# Patient Record
Sex: MALE | Race: WHITE | NOT HISPANIC OR LATINO | Employment: OTHER | URBAN - NONMETROPOLITAN AREA
[De-identification: names, ages, dates, MRNs, and addresses within clinical notes are randomized per-mention and may not be internally consistent; named-entity substitution may affect disease eponyms.]

---

## 2018-03-06 ENCOUNTER — HOSPITAL ENCOUNTER (EMERGENCY)
Facility: HOSPITAL | Age: 43
Discharge: HOME | End: 2018-03-06
Attending: EMERGENCY MEDICINE | Admitting: EMERGENCY MEDICINE

## 2018-03-06 ENCOUNTER — APPOINTMENT (OUTPATIENT)
Dept: ULTRASOUND IMAGING | Facility: HOSPITAL | Age: 43
End: 2018-03-06

## 2018-03-06 VITALS
SYSTOLIC BLOOD PRESSURE: 144 MMHG | HEART RATE: 88 BPM | OXYGEN SATURATION: 98 % | RESPIRATION RATE: 17 BRPM | WEIGHT: 315 LBS | TEMPERATURE: 98.5 F | HEIGHT: 62 IN | BODY MASS INDEX: 57.97 KG/M2 | DIASTOLIC BLOOD PRESSURE: 78 MMHG

## 2018-03-06 DIAGNOSIS — L03.115 CELLULITIS OF RIGHT LOWER EXTREMITY: Primary | ICD-10-CM

## 2018-03-06 LAB
ALBUMIN SERPL-MCNC: 3.7 G/DL (ref 3.5–5)
ALBUMIN/GLOB SERPL: 1.1 G/DL (ref 1.5–2.5)
ALP SERPL-CCNC: 80 U/L (ref 40–129)
ALT SERPL W P-5'-P-CCNC: 18 U/L (ref 10–44)
ANION GAP SERPL CALCULATED.3IONS-SCNC: 4.3 MMOL/L (ref 3.6–11.2)
APTT PPP: 30.2 SECONDS (ref 23.8–36.1)
AST SERPL-CCNC: 12 U/L (ref 10–34)
BASOPHILS # BLD AUTO: 0.01 10*3/MM3 (ref 0–0.3)
BASOPHILS NFR BLD AUTO: 0.2 % (ref 0–2)
BILIRUB SERPL-MCNC: 0.5 MG/DL (ref 0.2–1.8)
BUN BLD-MCNC: 13 MG/DL (ref 7–21)
BUN/CREAT SERPL: 24.1 (ref 7–25)
CALCIUM SPEC-SCNC: 8.8 MG/DL (ref 7.7–10)
CHLORIDE SERPL-SCNC: 105 MMOL/L (ref 99–112)
CO2 SERPL-SCNC: 30.7 MMOL/L (ref 24.3–31.9)
CREAT BLD-MCNC: 0.54 MG/DL (ref 0.43–1.29)
CRP SERPL-MCNC: 5.24 MG/DL (ref 0–0.99)
D-LACTATE SERPL-SCNC: 1 MMOL/L (ref 0.5–2)
DEPRECATED RDW RBC AUTO: 46.6 FL (ref 37–54)
EOSINOPHIL # BLD AUTO: 0.31 10*3/MM3 (ref 0–0.7)
EOSINOPHIL NFR BLD AUTO: 5.2 % (ref 0–5)
ERYTHROCYTE [DISTWIDTH] IN BLOOD BY AUTOMATED COUNT: 14.2 % (ref 11.5–14.5)
ERYTHROCYTE [SEDIMENTATION RATE] IN BLOOD: 41 MM/HR (ref 0–15)
GFR SERPL CREATININE-BSD FRML MDRD: >150 ML/MIN/1.73
GLOBULIN UR ELPH-MCNC: 3.3 GM/DL
GLUCOSE BLD-MCNC: 112 MG/DL (ref 70–110)
HCT VFR BLD AUTO: 43.3 % (ref 42–52)
HGB BLD-MCNC: 13.9 G/DL (ref 14–18)
IMM GRANULOCYTES # BLD: 0.01 10*3/MM3 (ref 0–0.03)
IMM GRANULOCYTES NFR BLD: 0.2 % (ref 0–0.5)
INR PPP: 1.05 (ref 0.9–1.1)
LYMPHOCYTES # BLD AUTO: 0.84 10*3/MM3 (ref 1–3)
LYMPHOCYTES NFR BLD AUTO: 14 % (ref 21–51)
MCH RBC QN AUTO: 29.6 PG (ref 27–33)
MCHC RBC AUTO-ENTMCNC: 32.1 G/DL (ref 33–37)
MCV RBC AUTO: 92.3 FL (ref 80–94)
MONOCYTES # BLD AUTO: 0.57 10*3/MM3 (ref 0.1–0.9)
MONOCYTES NFR BLD AUTO: 9.5 % (ref 0–10)
NEUTROPHILS # BLD AUTO: 4.26 10*3/MM3 (ref 1.4–6.5)
NEUTROPHILS NFR BLD AUTO: 70.9 % (ref 30–70)
OSMOLALITY SERPL CALC.SUM OF ELEC: 280.3 MOSM/KG (ref 273–305)
PLATELET # BLD AUTO: 202 10*3/MM3 (ref 130–400)
PMV BLD AUTO: 10.4 FL (ref 6–10)
POTASSIUM BLD-SCNC: 3.4 MMOL/L (ref 3.5–5.3)
PROT SERPL-MCNC: 7 G/DL (ref 6–8)
PROTHROMBIN TIME: 13.8 SECONDS (ref 11–15.4)
RBC # BLD AUTO: 4.69 10*6/MM3 (ref 4.7–6.1)
SODIUM BLD-SCNC: 140 MMOL/L (ref 135–153)
WBC NRBC COR # BLD: 6 10*3/MM3 (ref 4.5–12.5)

## 2018-03-06 PROCEDURE — 93971 EXTREMITY STUDY: CPT

## 2018-03-06 PROCEDURE — 96365 THER/PROPH/DIAG IV INF INIT: CPT

## 2018-03-06 PROCEDURE — 85652 RBC SED RATE AUTOMATED: CPT | Performed by: NURSE PRACTITIONER

## 2018-03-06 PROCEDURE — 99283 EMERGENCY DEPT VISIT LOW MDM: CPT

## 2018-03-06 PROCEDURE — 93971 EXTREMITY STUDY: CPT | Performed by: RADIOLOGY

## 2018-03-06 PROCEDURE — 85025 COMPLETE CBC W/AUTO DIFF WBC: CPT | Performed by: NURSE PRACTITIONER

## 2018-03-06 PROCEDURE — 86140 C-REACTIVE PROTEIN: CPT | Performed by: NURSE PRACTITIONER

## 2018-03-06 PROCEDURE — 87040 BLOOD CULTURE FOR BACTERIA: CPT | Performed by: NURSE PRACTITIONER

## 2018-03-06 PROCEDURE — 85730 THROMBOPLASTIN TIME PARTIAL: CPT | Performed by: NURSE PRACTITIONER

## 2018-03-06 PROCEDURE — 80053 COMPREHEN METABOLIC PANEL: CPT | Performed by: NURSE PRACTITIONER

## 2018-03-06 PROCEDURE — 36415 COLL VENOUS BLD VENIPUNCTURE: CPT

## 2018-03-06 PROCEDURE — 83605 ASSAY OF LACTIC ACID: CPT | Performed by: NURSE PRACTITIONER

## 2018-03-06 PROCEDURE — 85610 PROTHROMBIN TIME: CPT | Performed by: NURSE PRACTITIONER

## 2018-03-06 RX ORDER — SODIUM CHLORIDE 0.9 % (FLUSH) 0.9 %
10 SYRINGE (ML) INJECTION AS NEEDED
Status: DISCONTINUED | OUTPATIENT
Start: 2018-03-06 | End: 2018-03-07 | Stop reason: HOSPADM

## 2018-03-06 RX ORDER — CLINDAMYCIN PHOSPHATE 600 MG/50ML
600 INJECTION INTRAVENOUS ONCE
Status: COMPLETED | OUTPATIENT
Start: 2018-03-06 | End: 2018-03-06

## 2018-03-06 RX ORDER — DOXYCYCLINE 100 MG/1
100 CAPSULE ORAL 2 TIMES DAILY
Qty: 20 CAPSULE | Refills: 0 | Status: SHIPPED | OUTPATIENT
Start: 2018-03-06 | End: 2018-03-16

## 2018-03-06 RX ADMIN — CLINDAMYCIN PHOSPHATE 600 MG: 12 INJECTION, SOLUTION INTRAVENOUS at 22:20

## 2018-03-07 NOTE — DISCHARGE INSTRUCTIONS
"    Hypertension  Hypertension, commonly called high blood pressure, is when the force of blood pumping through the arteries is too strong. The arteries are the blood vessels that carry blood from the heart throughout the body. Hypertension forces the heart to work harder to pump blood and may cause arteries to become narrow or stiff. Having untreated or uncontrolled hypertension can cause heart attacks, strokes, kidney disease, and other problems.  A blood pressure reading consists of a higher number over a lower number. Ideally, your blood pressure should be below 120/80. The first (\"top\") number is called the systolic pressure. It is a measure of the pressure in your arteries as your heart beats. The second (\"bottom\") number is called the diastolic pressure. It is a measure of the pressure in your arteries as the heart relaxes.  What are the causes?  The cause of this condition is not known.  What increases the risk?  Some risk factors for high blood pressure are under your control. Others are not.  Factors you can change   · Smoking.  · Having type 2 diabetes mellitus, high cholesterol, or both.  · Not getting enough exercise or physical activity.  · Being overweight.  · Having too much fat, sugar, calories, or salt (sodium) in your diet.  · Drinking too much alcohol.  Factors that are difficult or impossible to change   · Having chronic kidney disease.  · Having a family history of high blood pressure.  · Age. Risk increases with age.  · Race. You may be at higher risk if you are -American.  · Gender. Men are at higher risk than women before age 45. After age 65, women are at higher risk than men.  · Having obstructive sleep apnea.  · Stress.  What are the signs or symptoms?  Extremely high blood pressure (hypertensive crisis) may cause:  · Headache.  · Anxiety.  · Shortness of breath.  · Nosebleed.  · Nausea and vomiting.  · Severe chest pain.  · Jerky movements you cannot control (seizures).  How is " this diagnosed?  This condition is diagnosed by measuring your blood pressure while you are seated, with your arm resting on a surface. The cuff of the blood pressure monitor will be placed directly against the skin of your upper arm at the level of your heart. It should be measured at least twice using the same arm. Certain conditions can cause a difference in blood pressure between your right and left arms.  Certain factors can cause blood pressure readings to be lower or higher than normal (elevated) for a short period of time:  · When your blood pressure is higher when you are in a health care provider's office than when you are at home, this is called white coat hypertension. Most people with this condition do not need medicines.  · When your blood pressure is higher at home than when you are in a health care provider's office, this is called masked hypertension. Most people with this condition may need medicines to control blood pressure.  If you have a high blood pressure reading during one visit or you have normal blood pressure with other risk factors:  · You may be asked to return on a different day to have your blood pressure checked again.  · You may be asked to monitor your blood pressure at home for 1 week or longer.  If you are diagnosed with hypertension, you may have other blood or imaging tests to help your health care provider understand your overall risk for other conditions.  How is this treated?  This condition is treated by making healthy lifestyle changes, such as eating healthy foods, exercising more, and reducing your alcohol intake. Your health care provider may prescribe medicine if lifestyle changes are not enough to get your blood pressure under control, and if:  · Your systolic blood pressure is above 130.  · Your diastolic blood pressure is above 80.  Your personal target blood pressure may vary depending on your medical conditions, your age, and other factors.  Follow these  instructions at home:  Eating and drinking   · Eat a diet that is high in fiber and potassium, and low in sodium, added sugar, and fat. An example eating plan is called the DASH (Dietary Approaches to Stop Hypertension) diet. To eat this way:  ¨ Eat plenty of fresh fruits and vegetables. Try to fill half of your plate at each meal with fruits and vegetables.  ¨ Eat whole grains, such as whole wheat pasta, brown rice, or whole grain bread. Fill about one quarter of your plate with whole grains.  ¨ Eat or drink low-fat dairy products, such as skim milk or low-fat yogurt.  ¨ Avoid fatty cuts of meat, processed or cured meats, and poultry with skin. Fill about one quarter of your plate with lean proteins, such as fish, chicken without skin, beans, eggs, and tofu.  ¨ Avoid premade and processed foods. These tend to be higher in sodium, added sugar, and fat.  · Reduce your daily sodium intake. Most people with hypertension should eat less than 1,500 mg of sodium a day.  · Limit alcohol intake to no more than 1 drink a day for nonpregnant women and 2 drinks a day for men. One drink equals 12 oz of beer, 5 oz of wine, or 1½ oz of hard liquor.  Lifestyle   · Work with your health care provider to maintain a healthy body weight or to lose weight. Ask what an ideal weight is for you.  · Get at least 30 minutes of exercise that causes your heart to beat faster (aerobic exercise) most days of the week. Activities may include walking, swimming, or biking.  · Include exercise to strengthen your muscles (resistance exercise), such as pilates or lifting weights, as part of your weekly exercise routine. Try to do these types of exercises for 30 minutes at least 3 days a week.  · Do not use any products that contain nicotine or tobacco, such as cigarettes and e-cigarettes. If you need help quitting, ask your health care provider.  · Monitor your blood pressure at home as told by your health care provider.  · Keep all follow-up visits  as told by your health care provider. This is important.  Medicines   · Take over-the-counter and prescription medicines only as told by your health care provider. Follow directions carefully. Blood pressure medicines must be taken as prescribed.  · Do not skip doses of blood pressure medicine. Doing this puts you at risk for problems and can make the medicine less effective.  · Ask your health care provider about side effects or reactions to medicines that you should watch for.  Contact a health care provider if:  · You think you are having a reaction to a medicine you are taking.  · You have headaches that keep coming back (recurring).  · You feel dizzy.  · You have swelling in your ankles.  · You have trouble with your vision.  Get help right away if:  · You develop a severe headache or confusion.  · You have unusual weakness or numbness.  · You feel faint.  · You have severe pain in your chest or abdomen.  · You vomit repeatedly.  · You have trouble breathing.  Summary  · Hypertension is when the force of blood pumping through your arteries is too strong. If this condition is not controlled, it may put you at risk for serious complications.  · Your personal target blood pressure may vary depending on your medical conditions, your age, and other factors. For most people, a normal blood pressure is less than 120/80.  · Hypertension is treated with lifestyle changes, medicines, or a combination of both. Lifestyle changes include weight loss, eating a healthy, low-sodium diet, exercising more, and limiting alcohol.  This information is not intended to replace advice given to you by your health care provider. Make sure you discuss any questions you have with your health care provider.  Document Released: 12/18/2006 Document Revised: 11/15/2017 Document Reviewed: 11/15/2017  [employer] Interactive Patient Education © 2017 [employer]

## 2018-03-07 NOTE — ED NOTES
Fall risk band applied to pts left risk at this time.     Jammie Teresa, HERNANDEZ  03/06/18 9548

## 2018-03-07 NOTE — ED PROVIDER NOTES
Subjective   Patient is a 42 y.o. male presenting with lower extremity pain.   History provided by:  Patient  Lower Extremity Issue   Location:  Leg  Injury: no    Leg location:  R leg  Pain details:     Quality:  Throbbing    Radiates to:  Does not radiate    Severity:  Moderate    Onset quality:  Sudden    Timing:  Constant    Progression:  Worsening  Chronicity:  New  Dislocation: no    Foreign body present:  No foreign bodies  Tetanus status:  Up to date  Prior injury to area:  No  Relieved by:  None tried  Worsened by:  Nothing  Ineffective treatments:  None tried  Associated symptoms: swelling    Associated symptoms: no fever        Review of Systems   Constitutional: Negative.  Negative for fever.   HENT: Negative.    Respiratory: Negative.    Cardiovascular: Negative.  Negative for chest pain.   Gastrointestinal: Negative.  Negative for abdominal pain.   Endocrine: Negative.    Genitourinary: Negative.  Negative for dysuria.   Skin: Negative.    Neurological: Negative.    Psychiatric/Behavioral: Negative.    All other systems reviewed and are negative.      History reviewed. No pertinent past medical history.    No Known Allergies    History reviewed. No pertinent surgical history.    History reviewed. No pertinent family history.    Social History     Social History   • Marital status:            Objective   Physical Exam   Constitutional: He is oriented to person, place, and time. He appears well-developed and well-nourished. No distress.   HENT:   Head: Normocephalic and atraumatic.   Right Ear: External ear normal.   Left Ear: External ear normal.   Nose: Nose normal.   Eyes: Conjunctivae and EOM are normal. Pupils are equal, round, and reactive to light.   Neck: Normal range of motion. Neck supple. No JVD present. No tracheal deviation present.   Cardiovascular: Normal rate, regular rhythm and normal heart sounds.    No murmur heard.  Pulmonary/Chest: Effort normal and breath sounds normal. No  respiratory distress. He has no wheezes.   Abdominal: Soft. Bowel sounds are normal. There is no tenderness.   Musculoskeletal: Normal range of motion. He exhibits no edema or deformity.        Right upper leg: He exhibits swelling.        Legs:  Neurological: He is alert and oriented to person, place, and time. No cranial nerve deficit.   Skin: Skin is warm and dry. No rash noted. He is not diaphoretic. No erythema. No pallor.   Psychiatric: He has a normal mood and affect. His behavior is normal. Thought content normal.   Nursing note and vitals reviewed.      Procedures         ED Course  ED Course                  MDM  Number of Diagnoses or Management Options  Cellulitis of right lower extremity: new and requires workup     Amount and/or Complexity of Data Reviewed  Clinical lab tests: reviewed  Tests in the radiology section of CPT®: reviewed    Risk of Complications, Morbidity, and/or Mortality  Presenting problems: low  Diagnostic procedures: low  Management options: low    Patient Progress  Patient progress: stable      Final diagnoses:   Cellulitis of right lower extremity            Christine Shea, KHUSHBU  03/07/18 0102

## 2018-03-11 LAB
BACTERIA SPEC AEROBE CULT: NORMAL
BACTERIA SPEC AEROBE CULT: NORMAL

## 2018-09-17 ENCOUNTER — HOSPITAL ENCOUNTER (EMERGENCY)
Facility: HOSPITAL | Age: 43
Discharge: HOME | End: 2018-09-18
Attending: FAMILY MEDICINE | Admitting: NURSE PRACTITIONER

## 2018-09-17 DIAGNOSIS — J18.9 PNEUMONIA OF RIGHT LOWER LOBE DUE TO INFECTIOUS ORGANISM: Primary | ICD-10-CM

## 2018-09-17 PROCEDURE — 99284 EMERGENCY DEPT VISIT MOD MDM: CPT

## 2018-09-17 PROCEDURE — 96365 THER/PROPH/DIAG IV INF INIT: CPT

## 2018-09-17 PROCEDURE — 96375 TX/PRO/DX INJ NEW DRUG ADDON: CPT

## 2018-09-17 RX ORDER — SODIUM CHLORIDE 0.9 % (FLUSH) 0.9 %
10 SYRINGE (ML) INJECTION AS NEEDED
Status: DISCONTINUED | OUTPATIENT
Start: 2018-09-17 | End: 2018-09-18 | Stop reason: HOSPADM

## 2018-09-17 RX ORDER — ACETAMINOPHEN 325 MG/1
975 TABLET ORAL ONCE
Status: COMPLETED | OUTPATIENT
Start: 2018-09-17 | End: 2018-09-18

## 2018-09-18 ENCOUNTER — APPOINTMENT (OUTPATIENT)
Dept: GENERAL RADIOLOGY | Facility: HOSPITAL | Age: 43
End: 2018-09-18

## 2018-09-18 VITALS
HEIGHT: 70 IN | HEART RATE: 96 BPM | BODY MASS INDEX: 45.1 KG/M2 | OXYGEN SATURATION: 99 % | RESPIRATION RATE: 14 BRPM | SYSTOLIC BLOOD PRESSURE: 152 MMHG | WEIGHT: 315 LBS | TEMPERATURE: 99.6 F | DIASTOLIC BLOOD PRESSURE: 86 MMHG

## 2018-09-18 LAB
ALBUMIN SERPL-MCNC: 3.7 G/DL (ref 3.5–5)
ALBUMIN/GLOB SERPL: 0.9 G/DL (ref 1.5–2.5)
ALP SERPL-CCNC: 80 U/L (ref 40–129)
ALT SERPL W P-5'-P-CCNC: 17 U/L (ref 10–44)
ANION GAP SERPL CALCULATED.3IONS-SCNC: 8.1 MMOL/L (ref 3.6–11.2)
AST SERPL-CCNC: 18 U/L (ref 10–34)
BACTERIA UR QL AUTO: ABNORMAL /HPF
BASOPHILS # BLD AUTO: 0.01 10*3/MM3 (ref 0–0.3)
BASOPHILS NFR BLD AUTO: 0.1 % (ref 0–2)
BILIRUB SERPL-MCNC: 1 MG/DL (ref 0.2–1.8)
BILIRUB UR QL STRIP: NEGATIVE
BUN BLD-MCNC: 12 MG/DL (ref 7–21)
BUN/CREAT SERPL: 16.9 (ref 7–25)
CALCIUM SPEC-SCNC: 8.8 MG/DL (ref 7.7–10)
CHLORIDE SERPL-SCNC: 98 MMOL/L (ref 99–112)
CLARITY UR: CLEAR
CO2 SERPL-SCNC: 24.9 MMOL/L (ref 24.3–31.9)
COLOR UR: YELLOW
CREAT BLD-MCNC: 0.71 MG/DL (ref 0.43–1.29)
DEPRECATED RDW RBC AUTO: 46.9 FL (ref 37–54)
EOSINOPHIL # BLD AUTO: 0.04 10*3/MM3 (ref 0–0.7)
EOSINOPHIL NFR BLD AUTO: 0.5 % (ref 0–5)
ERYTHROCYTE [DISTWIDTH] IN BLOOD BY AUTOMATED COUNT: 14.3 % (ref 11.5–14.5)
FLUAV AG NPH QL: NEGATIVE
FLUBV AG NPH QL IA: NEGATIVE
GFR SERPL CREATININE-BSD FRML MDRD: 122 ML/MIN/1.73
GLOBULIN UR ELPH-MCNC: 4.1 GM/DL
GLUCOSE BLD-MCNC: 108 MG/DL (ref 70–110)
GLUCOSE UR STRIP-MCNC: NEGATIVE MG/DL
HCT VFR BLD AUTO: 44 % (ref 42–52)
HGB BLD-MCNC: 13.9 G/DL (ref 14–18)
HGB UR QL STRIP.AUTO: ABNORMAL
HYALINE CASTS UR QL AUTO: ABNORMAL /LPF
IMM GRANULOCYTES # BLD: 0.01 10*3/MM3 (ref 0–0.03)
IMM GRANULOCYTES NFR BLD: 0.1 % (ref 0–0.5)
KETONES UR QL STRIP: NEGATIVE
LEUKOCYTE ESTERASE UR QL STRIP.AUTO: NEGATIVE
LYMPHOCYTES # BLD AUTO: 0.53 10*3/MM3 (ref 1–3)
LYMPHOCYTES NFR BLD AUTO: 6.4 % (ref 21–51)
MCH RBC QN AUTO: 29.4 PG (ref 27–33)
MCHC RBC AUTO-ENTMCNC: 31.6 G/DL (ref 33–37)
MCV RBC AUTO: 93 FL (ref 80–94)
MONOCYTES # BLD AUTO: 0.57 10*3/MM3 (ref 0.1–0.9)
MONOCYTES NFR BLD AUTO: 6.8 % (ref 0–10)
NEUTROPHILS # BLD AUTO: 7.17 10*3/MM3 (ref 1.4–6.5)
NEUTROPHILS NFR BLD AUTO: 86.1 % (ref 30–70)
NITRITE UR QL STRIP: NEGATIVE
OSMOLALITY SERPL CALC.SUM OF ELEC: 262.9 MOSM/KG (ref 273–305)
PH UR STRIP.AUTO: 8.5 [PH] (ref 5–8)
PLATELET # BLD AUTO: 184 10*3/MM3 (ref 130–400)
PMV BLD AUTO: 10 FL (ref 6–10)
POTASSIUM BLD-SCNC: 4 MMOL/L (ref 3.5–5.3)
PROT SERPL-MCNC: 7.8 G/DL (ref 6–8)
PROT UR QL STRIP: NEGATIVE
RBC # BLD AUTO: 4.73 10*6/MM3 (ref 4.7–6.1)
RBC # UR: ABNORMAL /HPF
REF LAB TEST METHOD: ABNORMAL
SODIUM BLD-SCNC: 131 MMOL/L (ref 135–153)
SP GR UR STRIP: 1.02 (ref 1–1.03)
SQUAMOUS #/AREA URNS HPF: ABNORMAL /HPF
UROBILINOGEN UR QL STRIP: ABNORMAL
WBC NRBC COR # BLD: 8.33 10*3/MM3 (ref 4.5–12.5)
WBC UR QL AUTO: ABNORMAL /HPF

## 2018-09-18 PROCEDURE — 94799 UNLISTED PULMONARY SVC/PX: CPT

## 2018-09-18 PROCEDURE — 94640 AIRWAY INHALATION TREATMENT: CPT

## 2018-09-18 PROCEDURE — 71045 X-RAY EXAM CHEST 1 VIEW: CPT | Performed by: RADIOLOGY

## 2018-09-18 PROCEDURE — 81001 URINALYSIS AUTO W/SCOPE: CPT | Performed by: NURSE PRACTITIONER

## 2018-09-18 PROCEDURE — 96375 TX/PRO/DX INJ NEW DRUG ADDON: CPT

## 2018-09-18 PROCEDURE — 25010000002 KETOROLAC TROMETHAMINE PER 15 MG: Performed by: NURSE PRACTITIONER

## 2018-09-18 PROCEDURE — 80053 COMPREHEN METABOLIC PANEL: CPT | Performed by: NURSE PRACTITIONER

## 2018-09-18 PROCEDURE — 96361 HYDRATE IV INFUSION ADD-ON: CPT

## 2018-09-18 PROCEDURE — 25010000002 CEFTRIAXONE: Performed by: NURSE PRACTITIONER

## 2018-09-18 PROCEDURE — 87804 INFLUENZA ASSAY W/OPTIC: CPT | Performed by: NURSE PRACTITIONER

## 2018-09-18 PROCEDURE — 96365 THER/PROPH/DIAG IV INF INIT: CPT

## 2018-09-18 PROCEDURE — 85025 COMPLETE CBC W/AUTO DIFF WBC: CPT | Performed by: NURSE PRACTITIONER

## 2018-09-18 PROCEDURE — 71045 X-RAY EXAM CHEST 1 VIEW: CPT

## 2018-09-18 RX ORDER — ALBUTEROL SULFATE 90 UG/1
2 AEROSOL, METERED RESPIRATORY (INHALATION) EVERY 6 HOURS PRN
Qty: 6.7 G | Refills: 0 | Status: ON HOLD | OUTPATIENT
Start: 2018-09-18 | End: 2018-11-26

## 2018-09-18 RX ORDER — AZITHROMYCIN 250 MG/1
250 TABLET, FILM COATED ORAL DAILY
Qty: 6 TABLET | Refills: 0 | Status: ON HOLD | OUTPATIENT
Start: 2018-09-18 | End: 2018-11-26

## 2018-09-18 RX ORDER — AZITHROMYCIN 250 MG/1
500 TABLET, FILM COATED ORAL ONCE
Status: COMPLETED | OUTPATIENT
Start: 2018-09-18 | End: 2018-09-18

## 2018-09-18 RX ORDER — IBUPROFEN 600 MG/1
600 TABLET ORAL EVERY 6 HOURS PRN
Qty: 20 TABLET | Refills: 0 | OUTPATIENT
Start: 2018-09-18 | End: 2018-11-24

## 2018-09-18 RX ORDER — IPRATROPIUM BROMIDE AND ALBUTEROL SULFATE 2.5; .5 MG/3ML; MG/3ML
3 SOLUTION RESPIRATORY (INHALATION) ONCE
Status: COMPLETED | OUTPATIENT
Start: 2018-09-18 | End: 2018-09-18

## 2018-09-18 RX ORDER — KETOROLAC TROMETHAMINE 30 MG/ML
15 INJECTION, SOLUTION INTRAMUSCULAR; INTRAVENOUS ONCE
Status: COMPLETED | OUTPATIENT
Start: 2018-09-18 | End: 2018-09-18

## 2018-09-18 RX ADMIN — ACETAMINOPHEN 975 MG: 325 TABLET ORAL at 00:20

## 2018-09-18 RX ADMIN — IPRATROPIUM BROMIDE AND ALBUTEROL SULFATE 3 ML: .5; 3 SOLUTION RESPIRATORY (INHALATION) at 00:58

## 2018-09-18 RX ADMIN — AZITHROMYCIN 500 MG: 250 TABLET, FILM COATED ORAL at 01:28

## 2018-09-18 RX ADMIN — CEFTRIAXONE 1 G: 1 INJECTION, POWDER, FOR SOLUTION INTRAMUSCULAR; INTRAVENOUS at 02:10

## 2018-09-18 RX ADMIN — SODIUM CHLORIDE 1000 ML: 9 INJECTION, SOLUTION INTRAVENOUS at 00:28

## 2018-09-18 RX ADMIN — KETOROLAC TROMETHAMINE 15 MG: 30 INJECTION, SOLUTION INTRAMUSCULAR; INTRAVENOUS at 01:29

## 2018-09-18 NOTE — ED PROVIDER NOTES
Subjective   Patient presents to ED with complaint of fever, body aches, and headache x 3 days. He denies cough, runny nose, ear pain, or sore throat. He has not been exposed to sick contacts.         History provided by:  Patient   used: No    Headache   Pain location:  Generalized  Quality:  Dull  Radiates to:  Does not radiate  Severity currently:  7/10  Severity at highest:  7/10  Onset quality:  Gradual  Duration:  3 days  Timing:  Intermittent  Progression:  Waxing and waning  Chronicity:  New  Similar to prior headaches: no    Context: not activity and not exposure to bright light    Relieved by:  Nothing  Worsened by:  Nothing  Ineffective treatments:  None tried  Associated symptoms: fatigue and fever    Associated symptoms: no eye pain, no near-syncope, no neck pain, no neck stiffness, no sore throat, no swollen glands, no syncope, no tingling and no visual change        Review of Systems   Constitutional: Positive for chills, fatigue and fever.   HENT: Negative for sore throat.    Eyes: Negative.  Negative for pain.   Respiratory: Negative.    Cardiovascular: Negative.  Negative for syncope and near-syncope.   Gastrointestinal: Negative.    Endocrine: Negative.    Genitourinary: Negative.    Musculoskeletal: Negative.  Negative for neck pain and neck stiffness.   Skin: Negative.    Allergic/Immunologic: Negative.    Neurological: Positive for headaches.   Hematological: Negative.    Psychiatric/Behavioral: Negative.    All other systems reviewed and are negative.      No past medical history on file.    No Known Allergies    No past surgical history on file.    No family history on file.    Social History     Social History   • Marital status:      Social History Main Topics   • Drug use: Unknown     Other Topics Concern   • Not on file           Objective   Physical Exam   Constitutional: He is oriented to person, place, and time. He appears well-developed and well-nourished.    HENT:   Head: Normocephalic.   Mouth/Throat: Oropharynx is clear and moist.   Eyes: Pupils are equal, round, and reactive to light. EOM are normal.   Neck: Normal range of motion. Neck supple.   Cardiovascular: Normal rate, regular rhythm, normal heart sounds and intact distal pulses.    Pulmonary/Chest: Effort normal.   Decreased breath sounds throughout   Abdominal: Soft. Bowel sounds are normal.   Musculoskeletal: Normal range of motion.   Neurological: He is alert and oriented to person, place, and time.   Skin: Skin is warm and dry. Capillary refill takes less than 2 seconds.   Psychiatric: He has a normal mood and affect. His behavior is normal. Judgment and thought content normal.   Nursing note and vitals reviewed.      Procedures           ED Course  ED Course as of Sep 18 0127   Tue Sep 18, 2018   0041 RLL infiltrate XR Chest 1 View [KK]      ED Course User Index  [KK] Mirela Davis, KHUSHBU                  MDM  Number of Diagnoses or Management Options  Pneumonia of right lower lobe due to infectious organism (CMS/HCC): new and requires workup     Amount and/or Complexity of Data Reviewed  Clinical lab tests: ordered and reviewed  Tests in the radiology section of CPT®: reviewed and ordered  Tests in the medicine section of CPT®: reviewed and ordered    Risk of Complications, Morbidity, and/or Mortality  Presenting problems: moderate  Diagnostic procedures: moderate  Management options: moderate    Patient Progress  Patient progress: improved        Final diagnoses:   Pneumonia of right lower lobe due to infectious organism (CMS/HCC)            Mirela Davis APRN  09/18/18 0127

## 2018-11-24 ENCOUNTER — HOSPITAL ENCOUNTER (EMERGENCY)
Facility: HOSPITAL | Age: 43
Discharge: HOME | End: 2018-11-24
Attending: EMERGENCY MEDICINE | Admitting: EMERGENCY MEDICINE

## 2018-11-24 VITALS
HEART RATE: 114 BPM | WEIGHT: 315 LBS | TEMPERATURE: 99.2 F | HEIGHT: 70 IN | SYSTOLIC BLOOD PRESSURE: 137 MMHG | BODY MASS INDEX: 45.1 KG/M2 | RESPIRATION RATE: 20 BRPM | DIASTOLIC BLOOD PRESSURE: 75 MMHG | OXYGEN SATURATION: 96 %

## 2018-11-24 DIAGNOSIS — J06.9 UPPER RESPIRATORY TRACT INFECTION, UNSPECIFIED TYPE: Primary | ICD-10-CM

## 2018-11-24 DIAGNOSIS — R51.9 NONINTRACTABLE HEADACHE, UNSPECIFIED CHRONICITY PATTERN, UNSPECIFIED HEADACHE TYPE: ICD-10-CM

## 2018-11-24 LAB
FLUAV AG NPH QL: NEGATIVE
FLUBV AG NPH QL IA: NEGATIVE

## 2018-11-24 PROCEDURE — 99283 EMERGENCY DEPT VISIT LOW MDM: CPT

## 2018-11-24 PROCEDURE — 87804 INFLUENZA ASSAY W/OPTIC: CPT | Performed by: PHYSICIAN ASSISTANT

## 2018-11-24 RX ORDER — FEXOFENADINE HCL 180 MG/1
180 TABLET ORAL DAILY
Qty: 30 TABLET | Refills: 0 | Status: ON HOLD | OUTPATIENT
Start: 2018-11-24 | End: 2018-11-26

## 2018-11-24 RX ORDER — IBUPROFEN 400 MG/1
800 TABLET ORAL ONCE
Status: COMPLETED | OUTPATIENT
Start: 2018-11-24 | End: 2018-11-24

## 2018-11-24 RX ORDER — IBUPROFEN 800 MG/1
800 TABLET ORAL EVERY 8 HOURS PRN
Qty: 15 TABLET | Refills: 0 | Status: ON HOLD | OUTPATIENT
Start: 2018-11-24 | End: 2018-11-26

## 2018-11-24 RX ADMIN — IBUPROFEN 800 MG: 400 TABLET, FILM COATED ORAL at 16:53

## 2018-11-26 ENCOUNTER — APPOINTMENT (OUTPATIENT)
Dept: GENERAL RADIOLOGY | Facility: HOSPITAL | Age: 43
End: 2018-11-26

## 2018-11-26 ENCOUNTER — APPOINTMENT (OUTPATIENT)
Dept: CT IMAGING | Facility: HOSPITAL | Age: 43
End: 2018-11-26

## 2018-11-26 ENCOUNTER — APPOINTMENT (OUTPATIENT)
Dept: ULTRASOUND IMAGING | Facility: HOSPITAL | Age: 43
End: 2018-11-26
Attending: EMERGENCY MEDICINE

## 2018-11-26 ENCOUNTER — HOSPITAL ENCOUNTER (INPATIENT)
Facility: HOSPITAL | Age: 43
LOS: 9 days | Discharge: HOME HEALTH | End: 2018-12-05
Attending: EMERGENCY MEDICINE | Admitting: HOSPITALIST

## 2018-11-26 DIAGNOSIS — E66.01 OBESITY, MORBID, BMI 50 OR HIGHER (HCC): ICD-10-CM

## 2018-11-26 DIAGNOSIS — L03.116 CELLULITIS OF LEFT LOWER EXTREMITY: Primary | ICD-10-CM

## 2018-11-26 DIAGNOSIS — R53.81 DEBILITY: ICD-10-CM

## 2018-11-26 LAB
ALBUMIN SERPL-MCNC: 3.3 G/DL (ref 3.5–5)
ALBUMIN/GLOB SERPL: 0.9 G/DL (ref 1.5–2.5)
ALP SERPL-CCNC: 80 U/L (ref 40–129)
ALT SERPL W P-5'-P-CCNC: 16 U/L (ref 10–44)
ANION GAP SERPL CALCULATED.3IONS-SCNC: 5.8 MMOL/L (ref 3.6–11.2)
AST SERPL-CCNC: 21 U/L (ref 10–34)
BACTERIA UR QL AUTO: ABNORMAL /HPF
BASOPHILS # BLD AUTO: 0.01 10*3/MM3 (ref 0–0.3)
BASOPHILS NFR BLD AUTO: 0.1 % (ref 0–2)
BILIRUB SERPL-MCNC: 1.1 MG/DL (ref 0.2–1.8)
BILIRUB UR QL STRIP: ABNORMAL
BUN BLD-MCNC: 18 MG/DL (ref 7–21)
BUN/CREAT SERPL: 26.9 (ref 7–25)
CALCIUM SPEC-SCNC: 8.9 MG/DL (ref 7.7–10)
CHLORIDE SERPL-SCNC: 102 MMOL/L (ref 99–112)
CLARITY UR: ABNORMAL
CO2 SERPL-SCNC: 26.2 MMOL/L (ref 24.3–31.9)
COLOR UR: ABNORMAL
CREAT BLD-MCNC: 0.67 MG/DL (ref 0.43–1.29)
CRP SERPL-MCNC: 27.85 MG/DL (ref 0–0.99)
D DIMER PPP FEU-MCNC: 3.01 MCGFEU/ML (ref 0–0.5)
D-LACTATE SERPL-SCNC: 1.6 MMOL/L (ref 0.5–2)
DEPRECATED RDW RBC AUTO: 48.3 FL (ref 37–54)
EOSINOPHIL # BLD AUTO: 0 10*3/MM3 (ref 0–0.7)
EOSINOPHIL NFR BLD AUTO: 0 % (ref 0–5)
ERYTHROCYTE [DISTWIDTH] IN BLOOD BY AUTOMATED COUNT: 14.8 % (ref 11.5–14.5)
ERYTHROCYTE [SEDIMENTATION RATE] IN BLOOD: 49 MM/HR (ref 0–15)
FLUAV AG NPH QL: NEGATIVE
FLUBV AG NPH QL IA: NEGATIVE
GFR SERPL CREATININE-BSD FRML MDRD: 130 ML/MIN/1.73
GLOBULIN UR ELPH-MCNC: 3.6 GM/DL
GLUCOSE BLD-MCNC: 132 MG/DL (ref 70–110)
GLUCOSE UR STRIP-MCNC: NEGATIVE MG/DL
HCT VFR BLD AUTO: 42.4 % (ref 42–52)
HGB BLD-MCNC: 13.6 G/DL (ref 14–18)
HGB UR QL STRIP.AUTO: ABNORMAL
HOLD SPECIMEN: NORMAL
HOLD SPECIMEN: NORMAL
HYALINE CASTS UR QL AUTO: ABNORMAL /LPF
IMM GRANULOCYTES # BLD: 0.09 10*3/MM3 (ref 0–0.03)
IMM GRANULOCYTES NFR BLD: 0.7 % (ref 0–0.5)
KETONES UR QL STRIP: ABNORMAL
LEUKOCYTE ESTERASE UR QL STRIP.AUTO: ABNORMAL
LYMPHOCYTES # BLD AUTO: 0.44 10*3/MM3 (ref 1–3)
LYMPHOCYTES NFR BLD AUTO: 3.6 % (ref 21–51)
MAGNESIUM SERPL-MCNC: 1.4 MG/DL (ref 1.7–2.6)
MCH RBC QN AUTO: 29.8 PG (ref 27–33)
MCHC RBC AUTO-ENTMCNC: 32.1 G/DL (ref 33–37)
MCV RBC AUTO: 92.8 FL (ref 80–94)
MONOCYTES # BLD AUTO: 0.22 10*3/MM3 (ref 0.1–0.9)
MONOCYTES NFR BLD AUTO: 1.8 % (ref 0–10)
NEUTROPHILS # BLD AUTO: 11.62 10*3/MM3 (ref 1.4–6.5)
NEUTROPHILS NFR BLD AUTO: 93.8 % (ref 30–70)
NITRITE UR QL STRIP: NEGATIVE
OSMOLALITY SERPL CALC.SUM OF ELEC: 272 MOSM/KG (ref 273–305)
PH UR STRIP.AUTO: 5.5 [PH] (ref 5–8)
PLATELET # BLD AUTO: 160 10*3/MM3 (ref 130–400)
PMV BLD AUTO: 11.2 FL (ref 6–10)
POTASSIUM BLD-SCNC: 3.4 MMOL/L (ref 3.5–5.3)
PROT SERPL-MCNC: 6.9 G/DL (ref 6–8)
PROT UR QL STRIP: ABNORMAL
RBC # BLD AUTO: 4.57 10*6/MM3 (ref 4.7–6.1)
RBC # UR: ABNORMAL /HPF
REF LAB TEST METHOD: ABNORMAL
S PYO AG THROAT QL: NEGATIVE
SODIUM BLD-SCNC: 134 MMOL/L (ref 135–153)
SP GR UR STRIP: 1.02 (ref 1–1.03)
SQUAMOUS #/AREA URNS HPF: ABNORMAL /HPF
UROBILINOGEN UR QL STRIP: ABNORMAL
WBC NRBC COR # BLD: 12.38 10*3/MM3 (ref 4.5–12.5)
WBC UR QL AUTO: ABNORMAL /HPF
WHOLE BLOOD HOLD SPECIMEN: NORMAL
WHOLE BLOOD HOLD SPECIMEN: NORMAL

## 2018-11-26 PROCEDURE — 71045 X-RAY EXAM CHEST 1 VIEW: CPT | Performed by: RADIOLOGY

## 2018-11-26 PROCEDURE — 99223 1ST HOSP IP/OBS HIGH 75: CPT | Performed by: HOSPITALIST

## 2018-11-26 PROCEDURE — 86140 C-REACTIVE PROTEIN: CPT | Performed by: EMERGENCY MEDICINE

## 2018-11-26 PROCEDURE — 85379 FIBRIN DEGRADATION QUANT: CPT | Performed by: EMERGENCY MEDICINE

## 2018-11-26 PROCEDURE — 87880 STREP A ASSAY W/OPTIC: CPT | Performed by: EMERGENCY MEDICINE

## 2018-11-26 PROCEDURE — 83735 ASSAY OF MAGNESIUM: CPT | Performed by: PHYSICIAN ASSISTANT

## 2018-11-26 PROCEDURE — 71045 X-RAY EXAM CHEST 1 VIEW: CPT

## 2018-11-26 PROCEDURE — 93970 EXTREMITY STUDY: CPT

## 2018-11-26 PROCEDURE — 80053 COMPREHEN METABOLIC PANEL: CPT | Performed by: EMERGENCY MEDICINE

## 2018-11-26 PROCEDURE — 93005 ELECTROCARDIOGRAM TRACING: CPT | Performed by: HOSPITALIST

## 2018-11-26 PROCEDURE — 25010000002 MAGNESIUM SULFATE 2 GM/50ML SOLUTION

## 2018-11-26 PROCEDURE — 85025 COMPLETE CBC W/AUTO DIFF WBC: CPT | Performed by: EMERGENCY MEDICINE

## 2018-11-26 PROCEDURE — 0 IOPAMIDOL PER 1 ML: Performed by: EMERGENCY MEDICINE

## 2018-11-26 PROCEDURE — 71275 CT ANGIOGRAPHY CHEST: CPT

## 2018-11-26 PROCEDURE — 25010000002 LINEZOLID 600 MG/300ML SOLUTION: Performed by: PHYSICIAN ASSISTANT

## 2018-11-26 PROCEDURE — 83605 ASSAY OF LACTIC ACID: CPT | Performed by: EMERGENCY MEDICINE

## 2018-11-26 PROCEDURE — 25010000002 HEPARIN (PORCINE) PER 1000 UNITS: Performed by: PHYSICIAN ASSISTANT

## 2018-11-26 PROCEDURE — 99284 EMERGENCY DEPT VISIT MOD MDM: CPT

## 2018-11-26 PROCEDURE — 93010 ELECTROCARDIOGRAM REPORT: CPT | Performed by: INTERNAL MEDICINE

## 2018-11-26 PROCEDURE — 81001 URINALYSIS AUTO W/SCOPE: CPT | Performed by: EMERGENCY MEDICINE

## 2018-11-26 PROCEDURE — 85652 RBC SED RATE AUTOMATED: CPT | Performed by: EMERGENCY MEDICINE

## 2018-11-26 PROCEDURE — 93970 EXTREMITY STUDY: CPT | Performed by: RADIOLOGY

## 2018-11-26 PROCEDURE — 25010000002 CEFTRIAXONE: Performed by: EMERGENCY MEDICINE

## 2018-11-26 PROCEDURE — 71275 CT ANGIOGRAPHY CHEST: CPT | Performed by: RADIOLOGY

## 2018-11-26 PROCEDURE — 87804 INFLUENZA ASSAY W/OPTIC: CPT | Performed by: EMERGENCY MEDICINE

## 2018-11-26 PROCEDURE — 87040 BLOOD CULTURE FOR BACTERIA: CPT | Performed by: EMERGENCY MEDICINE

## 2018-11-26 PROCEDURE — 87081 CULTURE SCREEN ONLY: CPT | Performed by: EMERGENCY MEDICINE

## 2018-11-26 RX ORDER — POTASSIUM CHLORIDE 1.5 G/1.77G
40 POWDER, FOR SOLUTION ORAL AS NEEDED
Status: DISCONTINUED | OUTPATIENT
Start: 2018-11-26 | End: 2018-12-05 | Stop reason: HOSPADM

## 2018-11-26 RX ORDER — SODIUM CHLORIDE 0.9 % (FLUSH) 0.9 %
3 SYRINGE (ML) INJECTION EVERY 12 HOURS SCHEDULED
Status: DISCONTINUED | OUTPATIENT
Start: 2018-11-26 | End: 2018-12-05 | Stop reason: HOSPADM

## 2018-11-26 RX ORDER — HEPARIN SODIUM 5000 [USP'U]/ML
5000 INJECTION, SOLUTION INTRAVENOUS; SUBCUTANEOUS EVERY 12 HOURS SCHEDULED
Status: DISCONTINUED | OUTPATIENT
Start: 2018-11-26 | End: 2018-12-05 | Stop reason: HOSPADM

## 2018-11-26 RX ORDER — POTASSIUM CHLORIDE 750 MG/1
40 CAPSULE, EXTENDED RELEASE ORAL AS NEEDED
Status: DISCONTINUED | OUTPATIENT
Start: 2018-11-26 | End: 2018-12-05 | Stop reason: HOSPADM

## 2018-11-26 RX ORDER — SODIUM CHLORIDE 0.9 % (FLUSH) 0.9 %
10 SYRINGE (ML) INJECTION AS NEEDED
Status: DISCONTINUED | OUTPATIENT
Start: 2018-11-26 | End: 2018-12-05 | Stop reason: HOSPADM

## 2018-11-26 RX ORDER — MAGNESIUM SULFATE HEPTAHYDRATE 40 MG/ML
4 INJECTION, SOLUTION INTRAVENOUS AS NEEDED
Status: DISCONTINUED | OUTPATIENT
Start: 2018-11-26 | End: 2018-12-05 | Stop reason: HOSPADM

## 2018-11-26 RX ORDER — NITROGLYCERIN 0.4 MG/1
0.4 TABLET SUBLINGUAL
Status: DISCONTINUED | OUTPATIENT
Start: 2018-11-26 | End: 2018-12-05 | Stop reason: HOSPADM

## 2018-11-26 RX ORDER — MAGNESIUM SULFATE HEPTAHYDRATE 40 MG/ML
2 INJECTION, SOLUTION INTRAVENOUS
Status: COMPLETED | OUTPATIENT
Start: 2018-11-26 | End: 2018-11-27

## 2018-11-26 RX ORDER — MAGNESIUM SULFATE HEPTAHYDRATE 40 MG/ML
2 INJECTION, SOLUTION INTRAVENOUS AS NEEDED
Status: DISCONTINUED | OUTPATIENT
Start: 2018-11-26 | End: 2018-12-05 | Stop reason: HOSPADM

## 2018-11-26 RX ORDER — HYDROCODONE BITARTRATE AND ACETAMINOPHEN 5; 325 MG/1; MG/1
1 TABLET ORAL EVERY 6 HOURS PRN
Status: DISCONTINUED | OUTPATIENT
Start: 2018-11-26 | End: 2018-12-05 | Stop reason: HOSPADM

## 2018-11-26 RX ORDER — SODIUM CHLORIDE 0.9 % (FLUSH) 0.9 %
1-10 SYRINGE (ML) INJECTION AS NEEDED
Status: DISCONTINUED | OUTPATIENT
Start: 2018-11-26 | End: 2018-12-05 | Stop reason: HOSPADM

## 2018-11-26 RX ORDER — LINEZOLID 2 MG/ML
600 INJECTION, SOLUTION INTRAVENOUS EVERY 12 HOURS
Status: DISCONTINUED | OUTPATIENT
Start: 2018-11-26 | End: 2018-11-27

## 2018-11-26 RX ORDER — SODIUM CHLORIDE 9 MG/ML
100 INJECTION, SOLUTION INTRAVENOUS CONTINUOUS
Status: DISCONTINUED | OUTPATIENT
Start: 2018-11-26 | End: 2018-11-27

## 2018-11-26 RX ADMIN — CEFTRIAXONE 1 G: 1 INJECTION, POWDER, FOR SOLUTION INTRAMUSCULAR; INTRAVENOUS at 17:30

## 2018-11-26 RX ADMIN — SODIUM CHLORIDE 100 ML/HR: 9 INJECTION, SOLUTION INTRAVENOUS at 23:27

## 2018-11-26 RX ADMIN — IOPAMIDOL 100 ML: 755 INJECTION, SOLUTION INTRAVENOUS at 19:00

## 2018-11-26 RX ADMIN — LINEZOLID 600 MG: 600 INJECTION, SOLUTION INTRAVENOUS at 21:22

## 2018-11-26 RX ADMIN — HEPARIN SODIUM 5000 UNITS: 5000 INJECTION INTRAVENOUS; SUBCUTANEOUS at 21:22

## 2018-11-26 RX ADMIN — HYDROCODONE BITARTRATE AND ACETAMINOPHEN 1 TABLET: 5; 325 TABLET ORAL at 21:22

## 2018-11-26 RX ADMIN — MAGNESIUM SULFATE IN WATER 2 G: 40 INJECTION, SOLUTION INTRAVENOUS at 23:34

## 2018-11-26 RX ADMIN — SODIUM CHLORIDE, PRESERVATIVE FREE 3 ML: 5 INJECTION INTRAVENOUS at 21:23

## 2018-11-26 RX ADMIN — SODIUM CHLORIDE 500 ML: 9 INJECTION, SOLUTION INTRAVENOUS at 21:22

## 2018-11-27 PROBLEM — L03.115 BILATERAL CELLULITIS OF LOWER LEG: Status: ACTIVE | Noted: 2018-11-26

## 2018-11-27 LAB
ALBUMIN SERPL-MCNC: 3 G/DL (ref 3.5–5)
ALBUMIN/GLOB SERPL: 0.8 G/DL (ref 1.5–2.5)
ALP SERPL-CCNC: 84 U/L (ref 40–129)
ALT SERPL W P-5'-P-CCNC: 17 U/L (ref 10–44)
ANION GAP SERPL CALCULATED.3IONS-SCNC: 6.7 MMOL/L (ref 3.6–11.2)
AST SERPL-CCNC: 25 U/L (ref 10–34)
BASOPHILS # BLD AUTO: 0.01 10*3/MM3 (ref 0–0.3)
BASOPHILS NFR BLD AUTO: 0.1 % (ref 0–2)
BILIRUB SERPL-MCNC: 1.1 MG/DL (ref 0.2–1.8)
BUN BLD-MCNC: 15 MG/DL (ref 7–21)
BUN/CREAT SERPL: 28.3 (ref 7–25)
CALCIUM SPEC-SCNC: 8.6 MG/DL (ref 7.7–10)
CHLORIDE SERPL-SCNC: 100 MMOL/L (ref 99–112)
CO2 SERPL-SCNC: 22.3 MMOL/L (ref 24.3–31.9)
CREAT BLD-MCNC: 0.53 MG/DL (ref 0.43–1.29)
CRP SERPL-MCNC: 30.63 MG/DL (ref 0–0.99)
DEPRECATED RDW RBC AUTO: 47.9 FL (ref 37–54)
EOSINOPHIL # BLD AUTO: 0.05 10*3/MM3 (ref 0–0.7)
EOSINOPHIL NFR BLD AUTO: 0.4 % (ref 0–5)
ERYTHROCYTE [DISTWIDTH] IN BLOOD BY AUTOMATED COUNT: 14.7 % (ref 11.5–14.5)
GFR SERPL CREATININE-BSD FRML MDRD: >150 ML/MIN/1.73
GLOBULIN UR ELPH-MCNC: 3.7 GM/DL
GLUCOSE BLD-MCNC: 98 MG/DL (ref 70–110)
HCT VFR BLD AUTO: 40.8 % (ref 42–52)
HGB BLD-MCNC: 13.1 G/DL (ref 14–18)
IMM GRANULOCYTES # BLD: 0.04 10*3/MM3 (ref 0–0.03)
IMM GRANULOCYTES NFR BLD: 0.3 % (ref 0–0.5)
LYMPHOCYTES # BLD AUTO: 0.38 10*3/MM3 (ref 1–3)
LYMPHOCYTES NFR BLD AUTO: 3 % (ref 21–51)
MCH RBC QN AUTO: 30 PG (ref 27–33)
MCHC RBC AUTO-ENTMCNC: 32.1 G/DL (ref 33–37)
MCV RBC AUTO: 93.6 FL (ref 80–94)
MONOCYTES # BLD AUTO: 0.41 10*3/MM3 (ref 0.1–0.9)
MONOCYTES NFR BLD AUTO: 3.2 % (ref 0–10)
MRSA DNA SPEC QL NAA+PROBE: POSITIVE
NEUTROPHILS # BLD AUTO: 11.78 10*3/MM3 (ref 1.4–6.5)
NEUTROPHILS NFR BLD AUTO: 93 % (ref 30–70)
OSMOLALITY SERPL CALC.SUM OF ELEC: 259.7 MOSM/KG (ref 273–305)
PLATELET # BLD AUTO: 165 10*3/MM3 (ref 130–400)
PMV BLD AUTO: 11 FL (ref 6–10)
POTASSIUM BLD-SCNC: 4 MMOL/L (ref 3.5–5.3)
PROT SERPL-MCNC: 6.7 G/DL (ref 6–8)
RBC # BLD AUTO: 4.36 10*6/MM3 (ref 4.7–6.1)
S AUREUS DNA SPEC QL NAA+PROBE: POSITIVE
SODIUM BLD-SCNC: 129 MMOL/L (ref 135–153)
WBC NRBC COR # BLD: 12.67 10*3/MM3 (ref 4.5–12.5)

## 2018-11-27 PROCEDURE — 87640 STAPH A DNA AMP PROBE: CPT | Performed by: INTERNAL MEDICINE

## 2018-11-27 PROCEDURE — 87641 MR-STAPH DNA AMP PROBE: CPT | Performed by: INTERNAL MEDICINE

## 2018-11-27 PROCEDURE — 25010000002 HEPARIN (PORCINE) PER 1000 UNITS: Performed by: PHYSICIAN ASSISTANT

## 2018-11-27 PROCEDURE — 85025 COMPLETE CBC W/AUTO DIFF WBC: CPT | Performed by: PHYSICIAN ASSISTANT

## 2018-11-27 PROCEDURE — 94799 UNLISTED PULMONARY SVC/PX: CPT

## 2018-11-27 PROCEDURE — 86140 C-REACTIVE PROTEIN: CPT | Performed by: PHYSICIAN ASSISTANT

## 2018-11-27 PROCEDURE — 80053 COMPREHEN METABOLIC PANEL: CPT | Performed by: PHYSICIAN ASSISTANT

## 2018-11-27 PROCEDURE — 25010000002 MAGNESIUM SULFATE 2 GM/50ML SOLUTION

## 2018-11-27 PROCEDURE — 25010000002 CEFEPIME 2 G/NS 100 ML SOLUTION: Performed by: INTERNAL MEDICINE

## 2018-11-27 PROCEDURE — 25010000002 LINEZOLID 600 MG/300ML SOLUTION: Performed by: PHYSICIAN ASSISTANT

## 2018-11-27 PROCEDURE — 99232 SBSQ HOSP IP/OBS MODERATE 35: CPT | Performed by: INTERNAL MEDICINE

## 2018-11-27 RX ADMIN — LINEZOLID 600 MG: 600 INJECTION, SOLUTION INTRAVENOUS at 08:33

## 2018-11-27 RX ADMIN — CEFEPIME 2 G: 2 INJECTION, POWDER, FOR SOLUTION INTRAVENOUS at 12:48

## 2018-11-27 RX ADMIN — HEPARIN SODIUM 5000 UNITS: 5000 INJECTION INTRAVENOUS; SUBCUTANEOUS at 20:15

## 2018-11-27 RX ADMIN — SODIUM CHLORIDE 100 ML/HR: 9 INJECTION, SOLUTION INTRAVENOUS at 00:03

## 2018-11-27 RX ADMIN — HEPARIN SODIUM 5000 UNITS: 5000 INJECTION INTRAVENOUS; SUBCUTANEOUS at 08:33

## 2018-11-27 RX ADMIN — HYDROCODONE BITARTRATE AND ACETAMINOPHEN 1 TABLET: 5; 325 TABLET ORAL at 20:15

## 2018-11-27 RX ADMIN — SODIUM CHLORIDE, PRESERVATIVE FREE 3 ML: 5 INJECTION INTRAVENOUS at 20:16

## 2018-11-27 RX ADMIN — CEFEPIME 2 G: 2 INJECTION, POWDER, FOR SOLUTION INTRAVENOUS at 20:15

## 2018-11-27 RX ADMIN — HYDROCODONE BITARTRATE AND ACETAMINOPHEN 1 TABLET: 5; 325 TABLET ORAL at 12:52

## 2018-11-27 RX ADMIN — SODIUM CHLORIDE, PRESERVATIVE FREE 3 ML: 5 INJECTION INTRAVENOUS at 08:34

## 2018-11-27 RX ADMIN — MAGNESIUM SULFATE IN WATER 2 G: 40 INJECTION, SOLUTION INTRAVENOUS at 00:00

## 2018-11-27 RX ADMIN — MAGNESIUM SULFATE IN WATER 2 G: 40 INJECTION, SOLUTION INTRAVENOUS at 02:30

## 2018-11-27 RX ADMIN — HYDROCODONE BITARTRATE AND ACETAMINOPHEN 1 TABLET: 5; 325 TABLET ORAL at 04:03

## 2018-11-28 LAB
ALBUMIN SERPL-MCNC: 3 G/DL (ref 3.5–5)
ALBUMIN/GLOB SERPL: 0.8 G/DL (ref 1.5–2.5)
ALP SERPL-CCNC: 86 U/L (ref 40–129)
ALT SERPL W P-5'-P-CCNC: 17 U/L (ref 10–44)
ANION GAP SERPL CALCULATED.3IONS-SCNC: 5.1 MMOL/L (ref 3.6–11.2)
AST SERPL-CCNC: 22 U/L (ref 10–34)
BACTERIA SPEC AEROBE CULT: NORMAL
BASOPHILS # BLD AUTO: 0.02 10*3/MM3 (ref 0–0.3)
BASOPHILS NFR BLD AUTO: 0.2 % (ref 0–2)
BILIRUB SERPL-MCNC: 0.8 MG/DL (ref 0.2–1.8)
BUN BLD-MCNC: 14 MG/DL (ref 7–21)
BUN/CREAT SERPL: 28.6 (ref 7–25)
CALCIUM SPEC-SCNC: 8.5 MG/DL (ref 7.7–10)
CHLORIDE SERPL-SCNC: 101 MMOL/L (ref 99–112)
CO2 SERPL-SCNC: 26.9 MMOL/L (ref 24.3–31.9)
CREAT BLD-MCNC: 0.49 MG/DL (ref 0.43–1.29)
CRP SERPL-MCNC: 27.66 MG/DL (ref 0–0.99)
DEPRECATED RDW RBC AUTO: 47.2 FL (ref 37–54)
EOSINOPHIL # BLD AUTO: 0.16 10*3/MM3 (ref 0–0.7)
EOSINOPHIL NFR BLD AUTO: 1.6 % (ref 0–5)
ERYTHROCYTE [DISTWIDTH] IN BLOOD BY AUTOMATED COUNT: 14.3 % (ref 11.5–14.5)
GFR SERPL CREATININE-BSD FRML MDRD: >150 ML/MIN/1.73
GLOBULIN UR ELPH-MCNC: 3.6 GM/DL
GLUCOSE BLD-MCNC: 88 MG/DL (ref 70–110)
HCT VFR BLD AUTO: 39.2 % (ref 42–52)
HGB BLD-MCNC: 12.4 G/DL (ref 14–18)
IMM GRANULOCYTES # BLD: 0.05 10*3/MM3 (ref 0–0.03)
IMM GRANULOCYTES NFR BLD: 0.5 % (ref 0–0.5)
LYMPHOCYTES # BLD AUTO: 0.57 10*3/MM3 (ref 1–3)
LYMPHOCYTES NFR BLD AUTO: 5.7 % (ref 21–51)
MAGNESIUM SERPL-MCNC: 1.9 MG/DL (ref 1.7–2.6)
MCH RBC QN AUTO: 29.1 PG (ref 27–33)
MCHC RBC AUTO-ENTMCNC: 31.6 G/DL (ref 33–37)
MCV RBC AUTO: 92 FL (ref 80–94)
MONOCYTES # BLD AUTO: 0.51 10*3/MM3 (ref 0.1–0.9)
MONOCYTES NFR BLD AUTO: 5.1 % (ref 0–10)
NEUTROPHILS # BLD AUTO: 8.62 10*3/MM3 (ref 1.4–6.5)
NEUTROPHILS NFR BLD AUTO: 86.9 % (ref 30–70)
OSMOLALITY SERPL CALC.SUM OF ELEC: 266.3 MOSM/KG (ref 273–305)
PHOSPHATE SERPL-MCNC: 3.1 MG/DL (ref 2.7–4.5)
PLATELET # BLD AUTO: 190 10*3/MM3 (ref 130–400)
PMV BLD AUTO: 10.3 FL (ref 6–10)
POTASSIUM BLD-SCNC: 3.7 MMOL/L (ref 3.5–5.3)
PROT SERPL-MCNC: 6.6 G/DL (ref 6–8)
RBC # BLD AUTO: 4.26 10*6/MM3 (ref 4.7–6.1)
SODIUM BLD-SCNC: 133 MMOL/L (ref 135–153)
WBC NRBC COR # BLD: 9.93 10*3/MM3 (ref 4.5–12.5)

## 2018-11-28 PROCEDURE — 84100 ASSAY OF PHOSPHORUS: CPT | Performed by: INTERNAL MEDICINE

## 2018-11-28 PROCEDURE — 80053 COMPREHEN METABOLIC PANEL: CPT | Performed by: INTERNAL MEDICINE

## 2018-11-28 PROCEDURE — 25010000002 CEFEPIME 2 G/NS 100 ML SOLUTION: Performed by: INTERNAL MEDICINE

## 2018-11-28 PROCEDURE — 83735 ASSAY OF MAGNESIUM: CPT | Performed by: HOSPITALIST

## 2018-11-28 PROCEDURE — 99232 SBSQ HOSP IP/OBS MODERATE 35: CPT | Performed by: INTERNAL MEDICINE

## 2018-11-28 PROCEDURE — 86140 C-REACTIVE PROTEIN: CPT | Performed by: INTERNAL MEDICINE

## 2018-11-28 PROCEDURE — 94799 UNLISTED PULMONARY SVC/PX: CPT

## 2018-11-28 PROCEDURE — 85025 COMPLETE CBC W/AUTO DIFF WBC: CPT | Performed by: INTERNAL MEDICINE

## 2018-11-28 PROCEDURE — 25010000002 VANCOMYCIN 5 G RECONSTITUTED SOLUTION 5,000 MG VIAL

## 2018-11-28 PROCEDURE — 25010000002 HEPARIN (PORCINE) PER 1000 UNITS: Performed by: PHYSICIAN ASSISTANT

## 2018-11-28 RX ORDER — L.ACID,PARA/B.BIFIDUM/S.THERM 8B CELL
1 CAPSULE ORAL DAILY
Status: DISCONTINUED | OUTPATIENT
Start: 2018-11-28 | End: 2018-12-05 | Stop reason: HOSPADM

## 2018-11-28 RX ORDER — DOCUSATE SODIUM 100 MG/1
100 CAPSULE, LIQUID FILLED ORAL 2 TIMES DAILY
Status: DISCONTINUED | OUTPATIENT
Start: 2018-11-28 | End: 2018-12-05 | Stop reason: HOSPADM

## 2018-11-28 RX ORDER — EMOLLIENT COMBINATION NO.92
LOTION (ML) TOPICAL DAILY
Status: DISCONTINUED | OUTPATIENT
Start: 2018-11-28 | End: 2018-12-05 | Stop reason: HOSPADM

## 2018-11-28 RX ORDER — MAGNESIUM SULFATE HEPTAHYDRATE 40 MG/ML
4 INJECTION, SOLUTION INTRAVENOUS ONCE
Status: COMPLETED | OUTPATIENT
Start: 2018-11-28 | End: 2018-11-28

## 2018-11-28 RX ADMIN — POLYETHYLENE GLYCOL (3350) 17 G: 17 POWDER, FOR SOLUTION ORAL at 13:24

## 2018-11-28 RX ADMIN — SODIUM CHLORIDE, PRESERVATIVE FREE 3 ML: 5 INJECTION INTRAVENOUS at 20:23

## 2018-11-28 RX ADMIN — HEPARIN SODIUM 5000 UNITS: 5000 INJECTION INTRAVENOUS; SUBCUTANEOUS at 08:27

## 2018-11-28 RX ADMIN — DOCUSATE SODIUM 100 MG: 100 CAPSULE ORAL at 20:22

## 2018-11-28 RX ADMIN — MAGNESIUM SULFATE HEPTAHYDRATE 4 G: 40 INJECTION, SOLUTION INTRAVENOUS at 08:27

## 2018-11-28 RX ADMIN — DOCUSATE SODIUM 100 MG: 100 CAPSULE ORAL at 13:24

## 2018-11-28 RX ADMIN — CEFEPIME 2 G: 2 INJECTION, POWDER, FOR SOLUTION INTRAVENOUS at 03:18

## 2018-11-28 RX ADMIN — HYDROCODONE BITARTRATE AND ACETAMINOPHEN 1 TABLET: 5; 325 TABLET ORAL at 20:22

## 2018-11-28 RX ADMIN — Medication 1 CAPSULE: at 18:07

## 2018-11-28 RX ADMIN — HEPARIN SODIUM 5000 UNITS: 5000 INJECTION INTRAVENOUS; SUBCUTANEOUS at 20:22

## 2018-11-28 RX ADMIN — VANCOMYCIN HYDROCHLORIDE 2000 MG: 5 INJECTION, POWDER, LYOPHILIZED, FOR SOLUTION INTRAVENOUS at 20:22

## 2018-11-28 RX ADMIN — HYDROCODONE BITARTRATE AND ACETAMINOPHEN 1 TABLET: 5; 325 TABLET ORAL at 02:05

## 2018-11-28 RX ADMIN — VANCOMYCIN HYDROCHLORIDE 2000 MG: 5 INJECTION, POWDER, LYOPHILIZED, FOR SOLUTION INTRAVENOUS at 13:18

## 2018-11-28 RX ADMIN — CEFEPIME 2 G: 2 INJECTION, POWDER, FOR SOLUTION INTRAVENOUS at 13:18

## 2018-11-28 RX ADMIN — SODIUM CHLORIDE, PRESERVATIVE FREE 3 ML: 5 INJECTION INTRAVENOUS at 08:27

## 2018-11-28 RX ADMIN — Medication: at 18:07

## 2018-11-29 LAB
ANION GAP SERPL CALCULATED.3IONS-SCNC: 6.5 MMOL/L (ref 3.6–11.2)
BASOPHILS # BLD AUTO: 0.05 10*3/MM3 (ref 0–0.3)
BASOPHILS NFR BLD AUTO: 0.7 % (ref 0–2)
BUN BLD-MCNC: 11 MG/DL (ref 7–21)
BUN/CREAT SERPL: 25 (ref 7–25)
CALCIUM SPEC-SCNC: 7.8 MG/DL (ref 7.7–10)
CHLORIDE SERPL-SCNC: 100 MMOL/L (ref 99–112)
CO2 SERPL-SCNC: 26.5 MMOL/L (ref 24.3–31.9)
CREAT BLD-MCNC: 0.44 MG/DL (ref 0.43–1.29)
CRP SERPL-MCNC: 18.28 MG/DL (ref 0–0.99)
DEPRECATED RDW RBC AUTO: 46.3 FL (ref 37–54)
EOSINOPHIL # BLD AUTO: 0.22 10*3/MM3 (ref 0–0.7)
EOSINOPHIL NFR BLD AUTO: 3.1 % (ref 0–5)
ERYTHROCYTE [DISTWIDTH] IN BLOOD BY AUTOMATED COUNT: 14.1 % (ref 11.5–14.5)
GFR SERPL CREATININE-BSD FRML MDRD: >150 ML/MIN/1.73
GLUCOSE BLD-MCNC: 96 MG/DL (ref 70–110)
HCT VFR BLD AUTO: 40.6 % (ref 42–52)
HGB BLD-MCNC: 12.9 G/DL (ref 14–18)
IMM GRANULOCYTES # BLD: 0.11 10*3/MM3 (ref 0–0.03)
IMM GRANULOCYTES NFR BLD: 1.6 % (ref 0–0.5)
LYMPHOCYTES # BLD AUTO: 0.58 10*3/MM3 (ref 1–3)
LYMPHOCYTES NFR BLD AUTO: 8.2 % (ref 21–51)
MAGNESIUM SERPL-MCNC: 1.9 MG/DL (ref 1.7–2.6)
MCH RBC QN AUTO: 29.3 PG (ref 27–33)
MCHC RBC AUTO-ENTMCNC: 31.8 G/DL (ref 33–37)
MCV RBC AUTO: 92.3 FL (ref 80–94)
MONOCYTES # BLD AUTO: 0.54 10*3/MM3 (ref 0.1–0.9)
MONOCYTES NFR BLD AUTO: 7.7 % (ref 0–10)
NEUTROPHILS # BLD AUTO: 5.54 10*3/MM3 (ref 1.4–6.5)
NEUTROPHILS NFR BLD AUTO: 78.7 % (ref 30–70)
OSMOLALITY SERPL CALC.SUM OF ELEC: 265.6 MOSM/KG (ref 273–305)
PLATELET # BLD AUTO: 195 10*3/MM3 (ref 130–400)
PMV BLD AUTO: 10.4 FL (ref 6–10)
POTASSIUM BLD-SCNC: 3.7 MMOL/L (ref 3.5–5.3)
RBC # BLD AUTO: 4.4 10*6/MM3 (ref 4.7–6.1)
SODIUM BLD-SCNC: 133 MMOL/L (ref 135–153)
VANCOMYCIN TROUGH SERPL-MCNC: 15.8 MCG/ML (ref 5–15)
WBC NRBC COR # BLD: 7.04 10*3/MM3 (ref 4.5–12.5)

## 2018-11-29 PROCEDURE — 80048 BASIC METABOLIC PNL TOTAL CA: CPT | Performed by: INTERNAL MEDICINE

## 2018-11-29 PROCEDURE — 80202 ASSAY OF VANCOMYCIN: CPT

## 2018-11-29 PROCEDURE — 85025 COMPLETE CBC W/AUTO DIFF WBC: CPT | Performed by: INTERNAL MEDICINE

## 2018-11-29 PROCEDURE — 83735 ASSAY OF MAGNESIUM: CPT | Performed by: INTERNAL MEDICINE

## 2018-11-29 PROCEDURE — 99222 1ST HOSP IP/OBS MODERATE 55: CPT | Performed by: SURGERY

## 2018-11-29 PROCEDURE — 25010000002 CEFEPIME 2 G/NS 100 ML SOLUTION: Performed by: INTERNAL MEDICINE

## 2018-11-29 PROCEDURE — 25010000002 VANCOMYCIN 5 G RECONSTITUTED SOLUTION 5,000 MG VIAL

## 2018-11-29 PROCEDURE — 25010000002 HEPARIN (PORCINE) PER 1000 UNITS: Performed by: PHYSICIAN ASSISTANT

## 2018-11-29 PROCEDURE — 86140 C-REACTIVE PROTEIN: CPT | Performed by: INTERNAL MEDICINE

## 2018-11-29 PROCEDURE — C1751 CATH, INF, PER/CENT/MIDLINE: HCPCS

## 2018-11-29 PROCEDURE — 99232 SBSQ HOSP IP/OBS MODERATE 35: CPT | Performed by: INTERNAL MEDICINE

## 2018-11-29 RX ORDER — SIMETHICONE 80 MG
80 TABLET,CHEWABLE ORAL 4 TIMES DAILY PRN
Status: DISCONTINUED | OUTPATIENT
Start: 2018-11-29 | End: 2018-12-05 | Stop reason: HOSPADM

## 2018-11-29 RX ORDER — ALUMINA, MAGNESIA, AND SIMETHICONE 2400; 2400; 240 MG/30ML; MG/30ML; MG/30ML
15 SUSPENSION ORAL EVERY 6 HOURS PRN
Status: DISCONTINUED | OUTPATIENT
Start: 2018-11-29 | End: 2018-12-05 | Stop reason: HOSPADM

## 2018-11-29 RX ORDER — MAGNESIUM SULFATE HEPTAHYDRATE 40 MG/ML
4 INJECTION, SOLUTION INTRAVENOUS ONCE
Status: COMPLETED | OUTPATIENT
Start: 2018-11-29 | End: 2018-11-29

## 2018-11-29 RX ORDER — LACTULOSE 10 G/15ML
30 SOLUTION ORAL ONCE
Status: COMPLETED | OUTPATIENT
Start: 2018-11-29 | End: 2018-11-29

## 2018-11-29 RX ORDER — SODIUM CHLORIDE 0.9 % (FLUSH) 0.9 %
10 SYRINGE (ML) INJECTION EVERY 12 HOURS SCHEDULED
Status: DISCONTINUED | OUTPATIENT
Start: 2018-11-29 | End: 2018-12-05 | Stop reason: HOSPADM

## 2018-11-29 RX ORDER — SODIUM CHLORIDE 0.9 % (FLUSH) 0.9 %
10 SYRINGE (ML) INJECTION AS NEEDED
Status: DISCONTINUED | OUTPATIENT
Start: 2018-11-29 | End: 2018-12-05 | Stop reason: HOSPADM

## 2018-11-29 RX ORDER — MAGNESIUM SULFATE HEPTAHYDRATE 40 MG/ML
4 INJECTION, SOLUTION INTRAVENOUS ONCE
Status: DISCONTINUED | OUTPATIENT
Start: 2018-11-29 | End: 2018-11-29

## 2018-11-29 RX ADMIN — VANCOMYCIN HYDROCHLORIDE 2000 MG: 5 INJECTION, POWDER, LYOPHILIZED, FOR SOLUTION INTRAVENOUS at 12:20

## 2018-11-29 RX ADMIN — CEFEPIME 2 G: 2 INJECTION, POWDER, FOR SOLUTION INTRAVENOUS at 00:33

## 2018-11-29 RX ADMIN — POLYETHYLENE GLYCOL (3350) 17 G: 17 POWDER, FOR SOLUTION ORAL at 08:18

## 2018-11-29 RX ADMIN — LACTULOSE 30 G: 20 SOLUTION ORAL at 11:40

## 2018-11-29 RX ADMIN — SODIUM CHLORIDE, PRESERVATIVE FREE 3 ML: 5 INJECTION INTRAVENOUS at 08:18

## 2018-11-29 RX ADMIN — VANCOMYCIN HYDROCHLORIDE 2000 MG: 5 INJECTION, POWDER, LYOPHILIZED, FOR SOLUTION INTRAVENOUS at 22:09

## 2018-11-29 RX ADMIN — SODIUM CHLORIDE, PRESERVATIVE FREE 3 ML: 5 INJECTION INTRAVENOUS at 21:04

## 2018-11-29 RX ADMIN — VANCOMYCIN HYDROCHLORIDE 2000 MG: 5 INJECTION, POWDER, LYOPHILIZED, FOR SOLUTION INTRAVENOUS at 04:18

## 2018-11-29 RX ADMIN — HEPARIN SODIUM 5000 UNITS: 5000 INJECTION INTRAVENOUS; SUBCUTANEOUS at 08:18

## 2018-11-29 RX ADMIN — MAGNESIUM SULFATE HEPTAHYDRATE 4 G: 40 INJECTION, SOLUTION INTRAVENOUS at 13:55

## 2018-11-29 RX ADMIN — DOCUSATE SODIUM 100 MG: 100 CAPSULE ORAL at 08:18

## 2018-11-29 RX ADMIN — HEPARIN SODIUM 5000 UNITS: 5000 INJECTION INTRAVENOUS; SUBCUTANEOUS at 21:03

## 2018-11-29 RX ADMIN — SODIUM CHLORIDE, PRESERVATIVE FREE 10 ML: 5 INJECTION INTRAVENOUS at 21:03

## 2018-11-29 RX ADMIN — DOCUSATE SODIUM 100 MG: 100 CAPSULE ORAL at 21:03

## 2018-11-29 RX ADMIN — CEFEPIME 2 G: 2 INJECTION, POWDER, FOR SOLUTION INTRAVENOUS at 17:49

## 2018-11-29 RX ADMIN — Medication 1 CAPSULE: at 08:18

## 2018-11-29 RX ADMIN — CEFEPIME 2 G: 2 INJECTION, POWDER, FOR SOLUTION INTRAVENOUS at 08:17

## 2018-11-29 RX ADMIN — SODIUM CHLORIDE, PRESERVATIVE FREE 10 ML: 5 INJECTION INTRAVENOUS at 11:45

## 2018-11-29 RX ADMIN — Medication: at 08:23

## 2018-11-29 RX ADMIN — SIMETHICONE CHEW TAB 80 MG 80 MG: 80 TABLET ORAL at 12:20

## 2018-11-30 LAB
ANION GAP SERPL CALCULATED.3IONS-SCNC: 6.8 MMOL/L (ref 3.6–11.2)
BASOPHILS # BLD MANUAL: 0.07 10*3/MM3 (ref 0–0.3)
BASOPHILS NFR BLD AUTO: 1 % (ref 0–2)
BUN BLD-MCNC: 9 MG/DL (ref 7–21)
BUN/CREAT SERPL: 22 (ref 7–25)
CALCIUM SPEC-SCNC: 7.6 MG/DL (ref 7.7–10)
CHLORIDE SERPL-SCNC: 103 MMOL/L (ref 99–112)
CO2 SERPL-SCNC: 28.2 MMOL/L (ref 24.3–31.9)
CREAT BLD-MCNC: 0.41 MG/DL (ref 0.43–1.29)
CRP SERPL-MCNC: 10.49 MG/DL (ref 0–0.99)
DEPRECATED RDW RBC AUTO: 47.1 FL (ref 37–54)
EOSINOPHIL # BLD MANUAL: 0.36 10*3/MM3 (ref 0–0.7)
EOSINOPHIL NFR BLD MANUAL: 5 % (ref 0–5)
ERYTHROCYTE [DISTWIDTH] IN BLOOD BY AUTOMATED COUNT: 14.1 % (ref 11.5–14.5)
GFR SERPL CREATININE-BSD FRML MDRD: >150 ML/MIN/1.73
GLUCOSE BLD-MCNC: 102 MG/DL (ref 70–110)
HCT VFR BLD AUTO: 38.9 % (ref 42–52)
HGB BLD-MCNC: 12.5 G/DL (ref 14–18)
HYPOCHROMIA BLD QL: ABNORMAL
LYMPHOCYTES # BLD MANUAL: 0.86 10*3/MM3 (ref 1–3)
LYMPHOCYTES NFR BLD MANUAL: 12 % (ref 21–51)
LYMPHOCYTES NFR BLD MANUAL: 5 % (ref 0–10)
MAGNESIUM SERPL-MCNC: 2.1 MG/DL (ref 1.7–2.6)
MCH RBC QN AUTO: 29.5 PG (ref 27–33)
MCHC RBC AUTO-ENTMCNC: 32.1 G/DL (ref 33–37)
MCV RBC AUTO: 91.7 FL (ref 80–94)
METAMYELOCYTES NFR BLD MANUAL: 1 % (ref 0–0)
MONOCYTES # BLD AUTO: 0.36 10*3/MM3 (ref 0.1–0.9)
NEUTROPHILS # BLD AUTO: 5.43 10*3/MM3 (ref 1.4–6.5)
NEUTROPHILS NFR BLD MANUAL: 74 % (ref 30–70)
NEUTS BAND NFR BLD MANUAL: 2 % (ref 4–12)
OSMOLALITY SERPL CALC.SUM OF ELEC: 274.6 MOSM/KG (ref 273–305)
PLAT MORPH BLD: NORMAL
PLATELET # BLD AUTO: 215 10*3/MM3 (ref 130–400)
PMV BLD AUTO: 9.9 FL (ref 6–10)
POTASSIUM BLD-SCNC: 3.8 MMOL/L (ref 3.5–5.3)
RBC # BLD AUTO: 4.24 10*6/MM3 (ref 4.7–6.1)
SCAN SLIDE: NORMAL
SODIUM BLD-SCNC: 138 MMOL/L (ref 135–153)
WBC NRBC COR # BLD: 7.14 10*3/MM3 (ref 4.5–12.5)

## 2018-11-30 PROCEDURE — 25010000002 HEPARIN (PORCINE) PER 1000 UNITS: Performed by: PHYSICIAN ASSISTANT

## 2018-11-30 PROCEDURE — 25010000002 CEFEPIME 2 G/NS 100 ML SOLUTION: Performed by: INTERNAL MEDICINE

## 2018-11-30 PROCEDURE — 25010000002 VANCOMYCIN 5 G RECONSTITUTED SOLUTION 5,000 MG VIAL

## 2018-11-30 PROCEDURE — 94799 UNLISTED PULMONARY SVC/PX: CPT

## 2018-11-30 PROCEDURE — 85025 COMPLETE CBC W/AUTO DIFF WBC: CPT | Performed by: INTERNAL MEDICINE

## 2018-11-30 PROCEDURE — 85007 BL SMEAR W/DIFF WBC COUNT: CPT | Performed by: INTERNAL MEDICINE

## 2018-11-30 PROCEDURE — 83735 ASSAY OF MAGNESIUM: CPT | Performed by: INTERNAL MEDICINE

## 2018-11-30 PROCEDURE — 80048 BASIC METABOLIC PNL TOTAL CA: CPT | Performed by: INTERNAL MEDICINE

## 2018-11-30 PROCEDURE — 99232 SBSQ HOSP IP/OBS MODERATE 35: CPT | Performed by: INTERNAL MEDICINE

## 2018-11-30 PROCEDURE — 99231 SBSQ HOSP IP/OBS SF/LOW 25: CPT | Performed by: SURGERY

## 2018-11-30 PROCEDURE — 86140 C-REACTIVE PROTEIN: CPT | Performed by: INTERNAL MEDICINE

## 2018-11-30 RX ORDER — MAGNESIUM CARB/ALUMINUM HYDROX 105-160MG
150 TABLET,CHEWABLE ORAL ONCE
Status: COMPLETED | OUTPATIENT
Start: 2018-11-30 | End: 2018-11-30

## 2018-11-30 RX ADMIN — CEFEPIME 2 G: 2 INJECTION, POWDER, FOR SOLUTION INTRAVENOUS at 23:35

## 2018-11-30 RX ADMIN — DOCUSATE SODIUM 100 MG: 100 CAPSULE ORAL at 20:01

## 2018-11-30 RX ADMIN — Medication: at 09:22

## 2018-11-30 RX ADMIN — VANCOMYCIN HYDROCHLORIDE 2000 MG: 5 INJECTION, POWDER, LYOPHILIZED, FOR SOLUTION INTRAVENOUS at 13:34

## 2018-11-30 RX ADMIN — CEFEPIME 2 G: 2 INJECTION, POWDER, FOR SOLUTION INTRAVENOUS at 01:38

## 2018-11-30 RX ADMIN — DOCUSATE SODIUM 100 MG: 100 CAPSULE ORAL at 09:21

## 2018-11-30 RX ADMIN — HEPARIN SODIUM 5000 UNITS: 5000 INJECTION INTRAVENOUS; SUBCUTANEOUS at 09:21

## 2018-11-30 RX ADMIN — CITROMA MAGNESIUM CITRATE 150 ML: 1.75 LIQUID ORAL at 09:33

## 2018-11-30 RX ADMIN — CEFEPIME 2 G: 2 INJECTION, POWDER, FOR SOLUTION INTRAVENOUS at 09:21

## 2018-11-30 RX ADMIN — SODIUM CHLORIDE, PRESERVATIVE FREE 3 ML: 5 INJECTION INTRAVENOUS at 20:01

## 2018-11-30 RX ADMIN — Medication 1 CAPSULE: at 09:21

## 2018-11-30 RX ADMIN — VANCOMYCIN HYDROCHLORIDE 2000 MG: 5 INJECTION, POWDER, LYOPHILIZED, FOR SOLUTION INTRAVENOUS at 20:01

## 2018-11-30 RX ADMIN — SODIUM CHLORIDE, PRESERVATIVE FREE 3 ML: 5 INJECTION INTRAVENOUS at 09:23

## 2018-11-30 RX ADMIN — HEPARIN SODIUM 5000 UNITS: 5000 INJECTION INTRAVENOUS; SUBCUTANEOUS at 20:01

## 2018-11-30 RX ADMIN — SODIUM CHLORIDE, PRESERVATIVE FREE 10 ML: 5 INJECTION INTRAVENOUS at 20:01

## 2018-11-30 RX ADMIN — POLYETHYLENE GLYCOL (3350) 17 G: 17 POWDER, FOR SOLUTION ORAL at 09:21

## 2018-11-30 RX ADMIN — CEFEPIME 2 G: 2 INJECTION, POWDER, FOR SOLUTION INTRAVENOUS at 15:54

## 2018-11-30 RX ADMIN — VANCOMYCIN HYDROCHLORIDE 2000 MG: 5 INJECTION, POWDER, LYOPHILIZED, FOR SOLUTION INTRAVENOUS at 05:20

## 2018-11-30 RX ADMIN — SODIUM CHLORIDE, PRESERVATIVE FREE 10 ML: 5 INJECTION INTRAVENOUS at 09:23

## 2018-12-01 LAB
ANION GAP SERPL CALCULATED.3IONS-SCNC: 5.6 MMOL/L (ref 3.6–11.2)
BACTERIA SPEC AEROBE CULT: NORMAL
BACTERIA SPEC AEROBE CULT: NORMAL
BASOPHILS # BLD MANUAL: 0.09 10*3/MM3 (ref 0–0.3)
BASOPHILS NFR BLD AUTO: 1 % (ref 0–2)
BUN BLD-MCNC: 9 MG/DL (ref 7–21)
BUN/CREAT SERPL: 25 (ref 7–25)
CALCIUM SPEC-SCNC: 7.7 MG/DL (ref 7.7–10)
CHLORIDE SERPL-SCNC: 101 MMOL/L (ref 99–112)
CO2 SERPL-SCNC: 28.4 MMOL/L (ref 24.3–31.9)
CREAT BLD-MCNC: 0.36 MG/DL (ref 0.43–1.29)
CRP SERPL-MCNC: 7 MG/DL (ref 0–0.99)
DEPRECATED RDW RBC AUTO: 46.4 FL (ref 37–54)
EOSINOPHIL # BLD MANUAL: 0.34 10*3/MM3 (ref 0–0.7)
EOSINOPHIL NFR BLD MANUAL: 4 % (ref 0–5)
ERYTHROCYTE [DISTWIDTH] IN BLOOD BY AUTOMATED COUNT: 14.1 % (ref 11.5–14.5)
GFR SERPL CREATININE-BSD FRML MDRD: >150 ML/MIN/1.73
GLUCOSE BLD-MCNC: 84 MG/DL (ref 70–110)
HCT VFR BLD AUTO: 40.2 % (ref 42–52)
HGB BLD-MCNC: 12.6 G/DL (ref 14–18)
LYMPHOCYTES # BLD MANUAL: 0.69 10*3/MM3 (ref 1–3)
LYMPHOCYTES NFR BLD MANUAL: 2 % (ref 0–10)
LYMPHOCYTES NFR BLD MANUAL: 8 % (ref 21–51)
MAGNESIUM SERPL-MCNC: 2.1 MG/DL (ref 1.7–2.6)
MCH RBC QN AUTO: 29.4 PG (ref 27–33)
MCHC RBC AUTO-ENTMCNC: 31.3 G/DL (ref 33–37)
MCV RBC AUTO: 93.7 FL (ref 80–94)
METAMYELOCYTES NFR BLD MANUAL: 1 % (ref 0–0)
MONOCYTES # BLD AUTO: 0.17 10*3/MM3 (ref 0.1–0.9)
MYELOCYTES NFR BLD MANUAL: 2 % (ref 0–0)
NEUTROPHILS # BLD AUTO: 7.04 10*3/MM3 (ref 1.4–6.5)
NEUTROPHILS NFR BLD MANUAL: 81 % (ref 30–70)
NEUTS BAND NFR BLD MANUAL: 1 % (ref 4–12)
NRBC SPEC MANUAL: 1 /100 WBC (ref 0–0)
OSMOLALITY SERPL CALC.SUM OF ELEC: 268 MOSM/KG (ref 273–305)
PLAT MORPH BLD: NORMAL
PLATELET # BLD AUTO: 258 10*3/MM3 (ref 130–400)
PMV BLD AUTO: 9.5 FL (ref 6–10)
POTASSIUM BLD-SCNC: 4.3 MMOL/L (ref 3.5–5.3)
RBC # BLD AUTO: 4.29 10*6/MM3 (ref 4.7–6.1)
RBC MORPH BLD: NORMAL
SCAN SLIDE: NORMAL
SODIUM BLD-SCNC: 135 MMOL/L (ref 135–153)
WBC NRBC COR # BLD: 8.59 10*3/MM3 (ref 4.5–12.5)

## 2018-12-01 PROCEDURE — 99232 SBSQ HOSP IP/OBS MODERATE 35: CPT | Performed by: INTERNAL MEDICINE

## 2018-12-01 PROCEDURE — 85025 COMPLETE CBC W/AUTO DIFF WBC: CPT | Performed by: INTERNAL MEDICINE

## 2018-12-01 PROCEDURE — 25010000002 VANCOMYCIN 5 G RECONSTITUTED SOLUTION 5,000 MG VIAL

## 2018-12-01 PROCEDURE — 86140 C-REACTIVE PROTEIN: CPT | Performed by: INTERNAL MEDICINE

## 2018-12-01 PROCEDURE — 83735 ASSAY OF MAGNESIUM: CPT | Performed by: INTERNAL MEDICINE

## 2018-12-01 PROCEDURE — 25010000002 CEFEPIME 2 G/NS 100 ML SOLUTION: Performed by: INTERNAL MEDICINE

## 2018-12-01 PROCEDURE — 99231 SBSQ HOSP IP/OBS SF/LOW 25: CPT | Performed by: SURGERY

## 2018-12-01 PROCEDURE — 25010000002 HEPARIN (PORCINE) PER 1000 UNITS: Performed by: PHYSICIAN ASSISTANT

## 2018-12-01 PROCEDURE — 85007 BL SMEAR W/DIFF WBC COUNT: CPT | Performed by: INTERNAL MEDICINE

## 2018-12-01 PROCEDURE — 80048 BASIC METABOLIC PNL TOTAL CA: CPT | Performed by: INTERNAL MEDICINE

## 2018-12-01 RX ORDER — BISACODYL 10 MG
10 SUPPOSITORY, RECTAL RECTAL ONCE
Status: COMPLETED | OUTPATIENT
Start: 2018-12-01 | End: 2018-12-01

## 2018-12-01 RX ORDER — LISINOPRIL 2.5 MG/1
5 TABLET ORAL
Status: DISCONTINUED | OUTPATIENT
Start: 2018-12-01 | End: 2018-12-05 | Stop reason: HOSPADM

## 2018-12-01 RX ADMIN — VANCOMYCIN HYDROCHLORIDE 2000 MG: 5 INJECTION, POWDER, LYOPHILIZED, FOR SOLUTION INTRAVENOUS at 05:03

## 2018-12-01 RX ADMIN — DOCUSATE SODIUM 100 MG: 100 CAPSULE ORAL at 08:42

## 2018-12-01 RX ADMIN — VANCOMYCIN HYDROCHLORIDE 2000 MG: 5 INJECTION, POWDER, LYOPHILIZED, FOR SOLUTION INTRAVENOUS at 22:12

## 2018-12-01 RX ADMIN — SODIUM CHLORIDE, PRESERVATIVE FREE 10 ML: 5 INJECTION INTRAVENOUS at 08:45

## 2018-12-01 RX ADMIN — BISACODYL 10 MG: 10 SUPPOSITORY RECTAL at 08:56

## 2018-12-01 RX ADMIN — SODIUM CHLORIDE, PRESERVATIVE FREE 10 ML: 5 INJECTION INTRAVENOUS at 21:04

## 2018-12-01 RX ADMIN — HEPARIN SODIUM 5000 UNITS: 5000 INJECTION INTRAVENOUS; SUBCUTANEOUS at 08:42

## 2018-12-01 RX ADMIN — LISINOPRIL 5 MG: 2.5 TABLET ORAL at 11:29

## 2018-12-01 RX ADMIN — POLYETHYLENE GLYCOL (3350) 17 G: 17 POWDER, FOR SOLUTION ORAL at 21:02

## 2018-12-01 RX ADMIN — SODIUM CHLORIDE, PRESERVATIVE FREE 3 ML: 5 INJECTION INTRAVENOUS at 21:04

## 2018-12-01 RX ADMIN — VANCOMYCIN HYDROCHLORIDE 2000 MG: 5 INJECTION, POWDER, LYOPHILIZED, FOR SOLUTION INTRAVENOUS at 13:50

## 2018-12-01 RX ADMIN — SODIUM CHLORIDE, PRESERVATIVE FREE 3 ML: 5 INJECTION INTRAVENOUS at 08:46

## 2018-12-01 RX ADMIN — Medication 1 CAPSULE: at 08:42

## 2018-12-01 RX ADMIN — POLYETHYLENE GLYCOL (3350) 17 G: 17 POWDER, FOR SOLUTION ORAL at 08:41

## 2018-12-01 RX ADMIN — Medication: at 08:46

## 2018-12-01 RX ADMIN — CEFEPIME 2 G: 2 INJECTION, POWDER, FOR SOLUTION INTRAVENOUS at 08:55

## 2018-12-01 RX ADMIN — HEPARIN SODIUM 5000 UNITS: 5000 INJECTION INTRAVENOUS; SUBCUTANEOUS at 21:02

## 2018-12-01 RX ADMIN — DOCUSATE SODIUM 100 MG: 100 CAPSULE ORAL at 21:03

## 2018-12-01 RX ADMIN — CEFEPIME 2 G: 2 INJECTION, POWDER, FOR SOLUTION INTRAVENOUS at 15:48

## 2018-12-01 NOTE — PROGRESS NOTES
Subjective     History:   Jimmy Recinos is a 42 y.o. male admitted on 11/26/2018 secondary to <principal problem not specified>     Procedures: None    CC: Constipation      Patient seen and examined with HERNANDEZ Cheatham. Awake and alert. States he feels overall improved. Denies any significant leg pain today. Reports constipation with no BM in several days despite bowel stimulation. (+) flatus. Denies CP, dyspnea or palpitations. Denies fever or chills. No acute events overnight per RN.     History taken from: patient, chart, and RN.      Objective     Vital Signs  Temp:  [98.1 °F (36.7 °C)-99 °F (37.2 °C)] 98.1 °F (36.7 °C)  Heart Rate:  [75-87] 75  Resp:  [18-22] 18  BP: (148-164)/(73-88) 149/80    Intake/Output Summary (Last 24 hours) at 12/1/2018 0838  Last data filed at 12/1/2018 0406  Gross per 24 hour   Intake 2650 ml   Output 2975 ml   Net -325 ml         Physical Exam:  General:    Awake, alert, in no acute distress, morbidly obese    Heart:      Normal S1 and S2. Regular rate and rhythm. No significant murmur, rubs or gallops appreciated.   Lungs:     Respirations regular, even and unlabored. Lungs clear to auscultation B/L. No wheezes, rales or rhonchi.   Abdomen:   Soft. Nontender. No guarding, rebound tenderness or  organomegaly noted. Bowel sounds present x 4.   Extremities:  (+) bilateral lower extremity lymphedema with erythema.  Blister formation noted on right foot (x 2) and left foot (x 1). Erythema appears overall improved over the last few days. Moves UE and LE equally B/L.     Results Review:    Results from last 7 days   Lab Units  12/01/18   0507  11/30/18   0515  11/29/18   0508  11/28/18   0545  11/27/18   0422  11/26/18   1408   WBC 10*3/mm3  8.59  7.14  7.04  9.93  12.67*  12.38   HEMOGLOBIN g/dL  12.6*  12.5*  12.9*  12.4*  13.1*  13.6*   PLATELETS 10*3/mm3  258  215  195  190  165  160     Results from last 7 days   Lab Units  12/01/18   0507  11/30/18   0515  11/29/18   0508  11/28/18    0545  11/27/18   0422  11/26/18   1408   SODIUM mmol/L  135  138  133*  133*  129*  134*   POTASSIUM mmol/L  4.3  3.8  3.7  3.7  4.0  3.4*   CHLORIDE mmol/L  101  103  100  101  100  102   CO2 mmol/L  28.4  28.2  26.5  26.9  22.3*  26.2   BUN mg/dL  9  9  11  14  15  18   CREATININE mg/dL  0.36*  0.41*  0.44  0.49  0.53  0.67   CALCIUM mg/dL  7.7  7.6*  7.8  8.5  8.6  8.9   GLUCOSE mg/dL  84  102  96  88  98  132*     Results from last 7 days   Lab Units  11/28/18   0545  11/27/18   0422  11/26/18   1408   BILIRUBIN mg/dL  0.8  1.1  1.1   ALK PHOS U/L  86  84  80   AST (SGOT) U/L  22  25  21   ALT (SGPT) U/L  17  17  16     Results from last 7 days   Lab Units  12/01/18   0507  11/30/18   0515  11/29/18   0508  11/28/18   0545  11/26/18   1408   MAGNESIUM mg/dL  2.1  2.1  1.9  1.9  1.4*               Imaging Results (last 24 hours)     ** No results found for the last 24 hours. **            Medications:    bisacodyl 10 mg Rectal Once   cefepime 2 g Intravenous Q8H   docusate sodium 100 mg Oral BID   heparin (porcine) 5,000 Units Subcutaneous Q12H   lactobacillus acidophilus 1 capsule Oral Daily   lubrisoft  Apply externally Daily   polyethylene glycol 17 g Oral BID   sodium chloride 10 mL Intravenous Q12H   sodium chloride 3 mL Intravenous Q12H   vancomycin 2,000 mg Intravenous Q8H              Assessment/Plan   Sepsis: Likely 2/2 bilateral lower extremity cellulitis. Afebrile and hemodynamically stable. Leukocytosis has resolved with stable WBC today. CRP remains elevated but improving. Blood cultures with NGTD. MRSA PCR (+). Currently on Vanc and Cefepime per ID. Cont to follow cultures and repeat labs in the AM. ID input appreciated.     Bilateral lower extremity cellulitis in the setting of chronic lymphedema: No evidence of DVT on venous duplex US of the lower extremities. Cont antibiotics as above. Appears clinically improved over the last few days. No clinical indication for surgical intervention at this  time. Surgery input appreciated.     Hyponatremia: Resolved. Cont 1500mL fluid restriction. Repeat labs in the AM.     Hypokalemia: Now resolved. Mg is >2 today. Repeat labs in the AM.     (+) D-dimer: CT chest PE protocol negative for PE. Venous duplex US of the bilateral lower extremities negative for DVT.     Essential HTN: BP staying persistently elevated. Start lisinopril 5mg daily.     Morbid obesity: Nutrition consulted.      Constipation: Cont Colace. Increase Miralax. Order dulcolax supp x 1 today.      DVT PPX: SQ heparin     Pt is at high risk 2/2 sepsis, bilateral LE cellulitis with chronic lymphedema and electrolyte abnormalities.       Bobby Nolan,   12/01/18  8:38 AM

## 2018-12-01 NOTE — PLAN OF CARE
Problem: Skin and Soft Tissue Infection (Adult)  Goal: Signs and Symptoms of Listed Potential Problems Will be Absent, Minimized or Managed (Skin and Soft Tissue Infection)  Outcome: Ongoing (interventions implemented as appropriate)      Problem: Infection, Risk/Actual (Adult)  Goal: Identify Related Risk Factors and Signs and Symptoms  Outcome: Ongoing (interventions implemented as appropriate)    Goal: Infection Prevention/Resolution  Outcome: Ongoing (interventions implemented as appropriate)      Problem: Self-Care Deficit (Adult,Obstetrics,Pediatric)  Goal: Identify Related Risk Factors and Signs and Symptoms  Outcome: Ongoing (interventions implemented as appropriate)    Goal: Improved Ability to Perform BADL and IADL  Outcome: Ongoing (interventions implemented as appropriate)      Problem: Fall Risk (Adult)  Goal: Identify Related Risk Factors and Signs and Symptoms  Outcome: Ongoing (interventions implemented as appropriate)    Goal: Absence of Fall  Outcome: Ongoing (interventions implemented as appropriate)      Problem: Patient Care Overview  Goal: Plan of Care Review  Outcome: Ongoing (interventions implemented as appropriate)      Problem: Skin Injury Risk (Adult)  Goal: Identify Related Risk Factors and Signs and Symptoms  Outcome: Ongoing (interventions implemented as appropriate)    Goal: Skin Health and Integrity  Outcome: Ongoing (interventions implemented as appropriate)      Problem: Patient Care Overview  Goal: Plan of Care Review  Outcome: Ongoing (interventions implemented as appropriate)

## 2018-12-01 NOTE — PLAN OF CARE
Problem: Skin and Soft Tissue Infection (Adult)  Goal: Signs and Symptoms of Listed Potential Problems Will be Absent, Minimized or Managed (Skin and Soft Tissue Infection)  Outcome: Ongoing (interventions implemented as appropriate)   11/27/18 1356   Goal/Outcome Evaluation   Problems Assessed (Skin and Soft Tissue Infection) all   Problems Present (Skin/Soft Tissue Inf) infection progression;situational response       Problem: Infection, Risk/Actual (Adult)  Goal: Identify Related Risk Factors and Signs and Symptoms  Outcome: Ongoing (interventions implemented as appropriate)   11/27/18 0137   Infection, Risk/Actual (Adult)   Related Risk Factors (Infection, Risk/Actual) tissue perfusion altered;skin integrity impairment   Signs and Symptoms (Infection, Risk/Actual) edema;pain;weakness     Goal: Infection Prevention/Resolution  Outcome: Ongoing (interventions implemented as appropriate)   12/01/18 0126   Infection, Risk/Actual (Adult)   Infection Prevention/Resolution making progress toward outcome       Problem: Fall Risk (Adult)  Goal: Identify Related Risk Factors and Signs and Symptoms  Outcome: Ongoing (interventions implemented as appropriate)   11/27/18 0137   Fall Risk (Adult)   Related Risk Factors (Fall Risk) fatigue/slow reaction;sleep pattern alteration;gait/mobility problems;environment unfamiliar   Signs and Symptoms (Fall Risk) presence of risk factors     Goal: Absence of Fall  Outcome: Ongoing (interventions implemented as appropriate)   12/01/18 0126   Fall Risk (Adult)   Absence of Fall making progress toward outcome       Problem: Skin Injury Risk (Adult)  Goal: Identify Related Risk Factors and Signs and Symptoms  Outcome: Ongoing (interventions implemented as appropriate)   11/27/18 2220   Skin Injury Risk (Adult)   Related Risk Factors (Skin Injury Risk) body weight extremes;moisture;mobility impaired     Goal: Skin Health and Integrity  Outcome: Ongoing (interventions implemented as  appropriate)   12/01/18 0126   Skin Injury Risk (Adult)   Skin Health and Integrity making progress toward outcome

## 2018-12-01 NOTE — PROGRESS NOTES
PROGRESS NOTE         Patient Identification:  Name:  Jimmy Recinos  Age:  42 y.o.  Sex:  male  :  1975  MRN:  [id number]  Visit Number:  [id number]  Primary Care Provider:  Belle Khanna DO      ----------------------------------------------------------------------------------------------------------------------  Subjective       Chief Complaints:    Fever        Interval History:      Bilateral lower extremities showing significant improvement of erythema and edema. Possible blister sites stable today. Discussed importance of elevation today. CRP improving with a normal white count. Denies any fever, chills, nausea, vomiting or diarrhea.    Review of Systems:    Constitutional: no fever, chills and night sweats. No appetite change or unexpected weight change. No fatigue.  Eyes: no eye drainage, itching or redness.  HEENT: no mouth sores, dysphagia or nose bleed.  Respiratory: no for shortness of breath, cough or production of sputum.  Cardiovascular: no chest pain, no palpitations, no orthopnea.  Gastrointestinal: no nausea, vomiting or diarrhea. No abdominal pain, hematemesis or rectal bleeding.  Genitourinary: no dysuria or polyuria.  Hematologic/lymphatic: no lymph node abnormalities, no easy bruising or easy bleeding.  Musculoskeletal: no muscle or joint pain.  Skin: No rash and no itching.  Neurological: no loss of consciousness, no seizure, no headache.  Behavioral/Psych: no depression or suicidal ideation.  Endocrine: no hot flashes.  Immunologic: negative.  ----------------------------------------------------------------------------------------------------------------------      Objective       Current [facility] Meds:    cefepime 2 g Intravenous Q8H   docusate sodium 100 mg Oral BID   heparin (porcine) 5,000 Units Subcutaneous Q12H   lactobacillus acidophilus 1 capsule Oral Daily   lisinopril 5 mg Oral Q24H   lubrisoft  Apply externally Daily   polyethylene glycol 17 g Oral BID    sodium chloride 10 mL Intravenous Q12H   sodium chloride 3 mL Intravenous Q12H   vancomycin 2,000 mg Intravenous Q8H        ----------------------------------------------------------------------------------------------------------------------    Vital Signs:  Temp:  [98.1 °F (36.7 °C)-98.8 °F (37.1 °C)] 98.1 °F (36.7 °C)  Heart Rate:  [75-87] 80  Resp:  [18-22] 18  BP: (145-164)/(73-88) 145/80  No data found.  SpO2 Percentage    11/30/18 1126 11/30/18 2328 12/01/18 0640   SpO2: 96% 95% 96%     SpO2:  [95 %-96 %] 96 %  on  Flow (L/min):  [2] 2;   Device (Oxygen Therapy): nasal cannula    Body mass index is 68.23 kg/m².  Wt Readings from Last 3 Encounters:   11/26/18 (!) 216 kg (475 lb 8 oz)   11/24/18 (!) 211 kg (465 lb)   09/17/18 (!) 220 kg (485 lb)        Intake/Output Summary (Last 24 hours) at 12/1/2018 1227  Last data filed at 12/1/2018 1200  Gross per 24 hour   Intake 3260 ml   Output 4300 ml   Net -1040 ml     Diet Regular; Cardiac, Consistent Carbohydrate, Daily Fluid Restriction; 1500 mL Fluid  ----------------------------------------------------------------------------------------------------------------------    Physical exam:    Constitutional:  Well-developed and well-nourished.  No respiratory distress.      HENT:  Head:  Normocephalic and atraumatic.  Mouth:  Moist mucous membranes.    Eyes:  Conjunctivae and EOM are normal.  No scleral icterus.    Neck:  Neck supple.  No JVD present.    Cardiovascular:  Normal rate, regular rhythm and normal heart sounds with no murmur. No edema.  Pulmonary/Chest:  No respiratory distress, no wheezes, no crackles, with normal breath sounds and good air movement.  Abdominal:  Soft.  Bowel sounds are normal.  No distension and no tenderness.   Musculoskeletal:  No edema, no tenderness, and no deformity.  No red or swollen joints anywhere.    Neurological:  Alert and oriented to person, place, and time. No facial droop.  No slurred speech.   Skin:  Skin is warm and  dry. No rash noted. No pallor.     ----------------------------------------------------------------------------------------------------------------------  I have personally reviewed the EKG/Telemetry strips   ----------------------------------------------------------------------------------------------------------------------            Results from last 7 days   Lab Units  12/01/18 0507 11/30/18   0515  11/29/18   0508   11/26/18   1408   CRP mg/dL  7.00*  10.49*  18.28*   < >  27.85*   LACTATE mmol/L   --    --    --    --   1.6   WBC 10*3/mm3  8.59  7.14  7.04   < >  12.38   HEMOGLOBIN g/dL  12.6*  12.5*  12.9*   < >  13.6*   HEMATOCRIT %  40.2*  38.9*  40.6*   < >  42.4   MCV fL  93.7  91.7  92.3   < >  92.8   MCHC g/dL  31.3*  32.1*  31.8*   < >  32.1*   PLATELETS 10*3/mm3  258  215  195   < >  160    < > = values in this interval not displayed.     Results from last 7 days   Lab Units  12/01/18 0507 11/30/18   0515  11/29/18   0508  11/28/18   0545  11/27/18   0422  11/26/18   1408   SODIUM mmol/L  135  138  133*  133*  129*  134*   POTASSIUM mmol/L  4.3  3.8  3.7  3.7  4.0  3.4*   MAGNESIUM mg/dL  2.1  2.1  1.9  1.9   --   1.4*   CHLORIDE mmol/L  101  103  100  101  100  102   CO2 mmol/L  28.4  28.2  26.5  26.9  22.3*  26.2   BUN mg/dL  9  9  11  14  15  18   CREATININE mg/dL  0.36*  0.41*  0.44  0.49  0.53  0.67   EGFR IF NONAFRICN AM mL/min/1.73  >150  >150  >150  >150  >150  130   CALCIUM mg/dL  7.7  7.6*  7.8  8.5  8.6  8.9   GLUCOSE mg/dL  84  102  96  88  98  132*   ALBUMIN g/dL   --    --    --   3.00*  3.00*  3.30*   BILIRUBIN mg/dL   --    --    --   0.8  1.1  1.1   ALK PHOS U/L   --    --    --   86  84  80   AST (SGOT) U/L   --    --    --   22  25  21   ALT (SGPT) U/L   --    --    --   17  17  16   Estimated Creatinine Clearance: 491.5 mL/min (A) (by C-G formula based on SCr of 0.36 mg/dL (L)).  No results found for: AMMONIA    No results found for: HGBA1C, POCGLU  No results found for:  HGBA1C  No results found for: TSH, FREET4    Blood Culture   Date Value Ref Range Status   11/26/2018 No growth at 24 hours  Preliminary   11/26/2018 No growth at 24 hours  Preliminary                 Pain Management Panel     Pain Management Panel Latest Ref Rng & Units 10/19/2014    AMPHETAMINES SCREEN, URINE NEGATIVE Negative    BARBITURATES SCREEN NEGATIVE Negative    BENZODIAZEPINE SCREEN, URINE NEGATIVE Negative    COCAINE SCREEN, URINE NEGATIVE Negative    METHADONE SCREEN, URINE NEGATIVE Negative          I have personally reviewed the above laboratory results.   ----------------------------------------------------------------------------------------------------------------------  Imaging Results (last 24 hours)     ** No results found for the last 24 hours. **        I have personally reviewed the above radiology results.   ----------------------------------------------------------------------------------------------------------------------    Assessment/Plan       Assessment/Plan     ASSESSMENT:       1. Sepsis with elevated white count and fever on admission  2. 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 Possible blister sites stable today. Discussed importance of elevation today. CRP improving with a normal white count. Denies any fever, chills, nausea, vomiting or diarrhea.    CT of chest and venous doppler studies are reported negative for PE, or DVT. Discussed his concerns with taking Vancomycin and he agrees to proceed with taking it going forward.     In the setting of  Charcot Ramya Tooth disease, and risk of nosocomial infection, recommend Cefepime 2 gm IV q 8hrs to cover for pseudomonas and vancomycin pharmacy to dose.     Will continue to follow and make adjustments as indicated.     CRP in the a.m.     Current Antimicrobials:    Cefepime 2 gm IV q 8  hrs  Vancomycin pharmacy to dose    Code Status:   Code Status and Medical Interventions:   Ordered at: 11/26/18 1911     Code Status:    CPR     Medical Interventions (Level of Support Prior to Arrest):    Full       Katerina Dickinson PA-C  12/01/18  12:27 PM

## 2018-12-01 NOTE — PROGRESS NOTES
CC: bilateral lower extremity cellulitis    Pt. Cellulitis improving.  Afebrile,  No open wounds of the BLE and areas of possible blistering improving.    AFVSS  BLE obesity and edema with improving cellulitis  No purulence, difficult to palpate pulses due to habitus and edema.    43 yo M with BLE cellulitis improving with antibiotics.  CRP continues to improve.  -continue antibiotics

## 2018-12-02 LAB
ANION GAP SERPL CALCULATED.3IONS-SCNC: 5 MMOL/L (ref 3.6–11.2)
BUN BLD-MCNC: 10 MG/DL (ref 7–21)
BUN/CREAT SERPL: 25 (ref 7–25)
CALCIUM SPEC-SCNC: 7.8 MG/DL (ref 7.7–10)
CHLORIDE SERPL-SCNC: 103 MMOL/L (ref 99–112)
CO2 SERPL-SCNC: 29 MMOL/L (ref 24.3–31.9)
CREAT BLD-MCNC: 0.4 MG/DL (ref 0.43–1.29)
CRP SERPL-MCNC: 4.92 MG/DL (ref 0–0.99)
DEPRECATED RDW RBC AUTO: 46.9 FL (ref 37–54)
EOSINOPHIL # BLD MANUAL: 0.24 10*3/MM3 (ref 0–0.7)
EOSINOPHIL NFR BLD MANUAL: 3 % (ref 0–5)
ERYTHROCYTE [DISTWIDTH] IN BLOOD BY AUTOMATED COUNT: 14.4 % (ref 11.5–14.5)
GFR SERPL CREATININE-BSD FRML MDRD: >150 ML/MIN/1.73
GLUCOSE BLD-MCNC: 94 MG/DL (ref 70–110)
HCT VFR BLD AUTO: 40.2 % (ref 42–52)
HGB BLD-MCNC: 12.7 G/DL (ref 14–18)
LYMPHOCYTES # BLD MANUAL: 0.89 10*3/MM3 (ref 1–3)
LYMPHOCYTES NFR BLD MANUAL: 11 % (ref 21–51)
LYMPHOCYTES NFR BLD MANUAL: 4 % (ref 0–10)
MCH RBC QN AUTO: 29.8 PG (ref 27–33)
MCHC RBC AUTO-ENTMCNC: 31.6 G/DL (ref 33–37)
MCV RBC AUTO: 94.4 FL (ref 80–94)
MONOCYTES # BLD AUTO: 0.32 10*3/MM3 (ref 0.1–0.9)
NEUTROPHILS # BLD AUTO: 6.39 10*3/MM3 (ref 1.4–6.5)
NEUTROPHILS NFR BLD MANUAL: 75 % (ref 30–70)
NEUTS BAND NFR BLD MANUAL: 4 % (ref 4–12)
OSMOLALITY SERPL CALC.SUM OF ELEC: 272.6 MOSM/KG (ref 273–305)
PLAT MORPH BLD: NORMAL
PLATELET # BLD AUTO: 299 10*3/MM3 (ref 130–400)
PMV BLD AUTO: 9.3 FL (ref 6–10)
POTASSIUM BLD-SCNC: 4.4 MMOL/L (ref 3.5–5.3)
PROMYELOCYTES NFR BLD MANUAL: 3 % (ref 0–0)
RBC # BLD AUTO: 4.26 10*6/MM3 (ref 4.7–6.1)
RBC MORPH BLD: NORMAL
SCAN SLIDE: NORMAL
SODIUM BLD-SCNC: 137 MMOL/L (ref 135–153)
WBC NRBC COR # BLD: 8.09 10*3/MM3 (ref 4.5–12.5)

## 2018-12-02 PROCEDURE — 25010000002 CEFEPIME 2 G/NS 100 ML SOLUTION: Performed by: INTERNAL MEDICINE

## 2018-12-02 PROCEDURE — 99232 SBSQ HOSP IP/OBS MODERATE 35: CPT | Performed by: INTERNAL MEDICINE

## 2018-12-02 PROCEDURE — 86140 C-REACTIVE PROTEIN: CPT | Performed by: INTERNAL MEDICINE

## 2018-12-02 PROCEDURE — 99231 SBSQ HOSP IP/OBS SF/LOW 25: CPT | Performed by: SURGERY

## 2018-12-02 PROCEDURE — 85007 BL SMEAR W/DIFF WBC COUNT: CPT | Performed by: INTERNAL MEDICINE

## 2018-12-02 PROCEDURE — 25010000002 VANCOMYCIN 5 G RECONSTITUTED SOLUTION 5,000 MG VIAL

## 2018-12-02 PROCEDURE — 25010000002 HEPARIN (PORCINE) PER 1000 UNITS: Performed by: PHYSICIAN ASSISTANT

## 2018-12-02 PROCEDURE — 80048 BASIC METABOLIC PNL TOTAL CA: CPT | Performed by: INTERNAL MEDICINE

## 2018-12-02 PROCEDURE — 85025 COMPLETE CBC W/AUTO DIFF WBC: CPT | Performed by: INTERNAL MEDICINE

## 2018-12-02 PROCEDURE — 94799 UNLISTED PULMONARY SVC/PX: CPT

## 2018-12-02 RX ADMIN — HEPARIN SODIUM 5000 UNITS: 5000 INJECTION INTRAVENOUS; SUBCUTANEOUS at 08:47

## 2018-12-02 RX ADMIN — LISINOPRIL 5 MG: 2.5 TABLET ORAL at 08:47

## 2018-12-02 RX ADMIN — SODIUM CHLORIDE, PRESERVATIVE FREE 3 ML: 5 INJECTION INTRAVENOUS at 21:07

## 2018-12-02 RX ADMIN — VANCOMYCIN HYDROCHLORIDE 2000 MG: 5 INJECTION, POWDER, LYOPHILIZED, FOR SOLUTION INTRAVENOUS at 05:46

## 2018-12-02 RX ADMIN — CEFEPIME 2 G: 2 INJECTION, POWDER, FOR SOLUTION INTRAVENOUS at 08:48

## 2018-12-02 RX ADMIN — SODIUM CHLORIDE, PRESERVATIVE FREE 10 ML: 5 INJECTION INTRAVENOUS at 21:07

## 2018-12-02 RX ADMIN — Medication: at 08:48

## 2018-12-02 RX ADMIN — SODIUM CHLORIDE, PRESERVATIVE FREE 3 ML: 5 INJECTION INTRAVENOUS at 08:52

## 2018-12-02 RX ADMIN — POLYETHYLENE GLYCOL (3350) 17 G: 17 POWDER, FOR SOLUTION ORAL at 08:47

## 2018-12-02 RX ADMIN — CEFEPIME 2 G: 2 INJECTION, POWDER, FOR SOLUTION INTRAVENOUS at 01:42

## 2018-12-02 RX ADMIN — DOCUSATE SODIUM 100 MG: 100 CAPSULE ORAL at 21:06

## 2018-12-02 RX ADMIN — Medication 1 CAPSULE: at 08:47

## 2018-12-02 RX ADMIN — POLYETHYLENE GLYCOL (3350) 17 G: 17 POWDER, FOR SOLUTION ORAL at 21:06

## 2018-12-02 RX ADMIN — VANCOMYCIN HYDROCHLORIDE 2000 MG: 5 INJECTION, POWDER, LYOPHILIZED, FOR SOLUTION INTRAVENOUS at 13:07

## 2018-12-02 RX ADMIN — VANCOMYCIN HYDROCHLORIDE 2000 MG: 5 INJECTION, POWDER, LYOPHILIZED, FOR SOLUTION INTRAVENOUS at 21:06

## 2018-12-02 RX ADMIN — DOCUSATE SODIUM 100 MG: 100 CAPSULE ORAL at 08:47

## 2018-12-02 RX ADMIN — HEPARIN SODIUM 5000 UNITS: 5000 INJECTION INTRAVENOUS; SUBCUTANEOUS at 21:06

## 2018-12-02 RX ADMIN — CEFEPIME 2 G: 2 INJECTION, POWDER, FOR SOLUTION INTRAVENOUS at 15:34

## 2018-12-02 RX ADMIN — SODIUM CHLORIDE, PRESERVATIVE FREE 10 ML: 5 INJECTION INTRAVENOUS at 08:52

## 2018-12-02 NOTE — PROGRESS NOTES
PROGRESS NOTE         Patient Identification:  Name:  Jimmy Recinos  Age:  42 y.o.  Sex:  male  :  1975  MRN:  [id number]  Visit Number:  [id number]  Primary Care Provider:  Belle Khanna DO      ----------------------------------------------------------------------------------------------------------------------  Subjective       Chief Complaints:    Fever        Interval History:      Bilateral lower extremities showing significant improvement of erythema and edema. Possible blister sites stable today. CRP level continues to improve from 7.00 down to 4.92 with a normal white count. Denies any complaints.   Review of Systems:    Constitutional: no fever, chills and night sweats. No appetite change or unexpected weight change. No fatigue.  Eyes: no eye drainage, itching or redness.  HEENT: no mouth sores, dysphagia or nose bleed.  Respiratory: no for shortness of breath, cough or production of sputum.  Cardiovascular: no chest pain, no palpitations, no orthopnea.  Gastrointestinal: no nausea, vomiting or diarrhea. No abdominal pain, hematemesis or rectal bleeding.  Genitourinary: no dysuria or polyuria.  Hematologic/lymphatic: no lymph node abnormalities, no easy bruising or easy bleeding.  Musculoskeletal: no muscle or joint pain.  Skin: No rash and no itching.  Neurological: no loss of consciousness, no seizure, no headache.  Behavioral/Psych: no depression or suicidal ideation.  Endocrine: no hot flashes.  Immunologic: negative.  ----------------------------------------------------------------------------------------------------------------------      Objective       Current Hospital Meds:    cefepime 2 g Intravenous Q8H   docusate sodium 100 mg Oral BID   heparin (porcine) 5,000 Units Subcutaneous Q12H   lactobacillus acidophilus 1 capsule Oral Daily   lisinopril 5 mg Oral Q24H   lubrisoft  Apply externally Daily   polyethylene glycol 17 g Oral BID   sodium chloride 10 mL Intravenous Q12H    sodium chloride 3 mL Intravenous Q12H   vancomycin 2,000 mg Intravenous Q8H        ----------------------------------------------------------------------------------------------------------------------    Vital Signs:  Temp:  [98.1 °F (36.7 °C)-99.5 °F (37.5 °C)] 98.2 °F (36.8 °C)  Heart Rate:  [80-92] 80  Resp:  [20-22] 22  BP: (136-149)/(72-87) 136/78  No data found.  SpO2 Percentage    12/01/18 2305 12/02/18 0209 12/02/18 0625   SpO2: 97% 97% 95%     SpO2:  [95 %-97 %] 95 %  on   ;   Device (Oxygen Therapy): room air    Body mass index is 68.23 kg/m².  Wt Readings from Last 3 Encounters:   11/26/18 (!) 216 kg (475 lb 8 oz)   11/24/18 (!) 211 kg (465 lb)   09/17/18 (!) 220 kg (485 lb)        Intake/Output Summary (Last 24 hours) at 12/2/2018 1217  Last data filed at 12/2/2018 1110  Gross per 24 hour   Intake 1460 ml   Output 3600 ml   Net -2140 ml     Diet Regular; Cardiac, Consistent Carbohydrate, Daily Fluid Restriction; 1500 mL Fluid  ----------------------------------------------------------------------------------------------------------------------    Physical exam:    Constitutional:  Well-developed and well-nourished.  No respiratory distress.      HENT:  Head:  Normocephalic and atraumatic.  Mouth:  Moist mucous membranes.    Eyes:  Conjunctivae and EOM are normal.  No scleral icterus.    Neck:  Neck supple.  No JVD present.    Cardiovascular:  Normal rate, regular rhythm and normal heart sounds with no murmur. No edema.  Pulmonary/Chest:  No respiratory distress, no wheezes, no crackles, with normal breath sounds and good air movement.  Abdominal:  Soft.  Bowel sounds are normal.  No distension and no tenderness.   Musculoskeletal:  No edema, no tenderness, and no deformity.  No red or swollen joints anywhere.    Neurological:  Alert and oriented to person, place, and time. No facial droop.  No slurred speech.   Skin:  Skin is warm and dry. No rash noted. No pallor.      ----------------------------------------------------------------------------------------------------------------------  I have personally reviewed the EKG/Telemetry strips   ----------------------------------------------------------------------------------------------------------------------            Results from last 7 days   Lab Units  12/02/18   0508 12/01/18   0507 11/30/18   0515   11/26/18   1408   CRP mg/dL  4.92*  7.00*  10.49*   < >  27.85*   LACTATE mmol/L   --    --    --    --   1.6   WBC 10*3/mm3  8.09  8.59  7.14   < >  12.38   HEMOGLOBIN g/dL  12.7*  12.6*  12.5*   < >  13.6*   HEMATOCRIT %  40.2*  40.2*  38.9*   < >  42.4   MCV fL  94.4*  93.7  91.7   < >  92.8   MCHC g/dL  31.6*  31.3*  32.1*   < >  32.1*   PLATELETS 10*3/mm3  299  258  215   < >  160    < > = values in this interval not displayed.     Results from last 7 days   Lab Units  12/02/18   0508  12/01/18   0507  11/30/18   0515  11/29/18   0508  11/28/18   0545  11/27/18   0422  11/26/18   1408   SODIUM mmol/L  137  135  138  133*  133*  129*  134*   POTASSIUM mmol/L  4.4  4.3  3.8  3.7  3.7  4.0  3.4*   MAGNESIUM mg/dL   --   2.1  2.1  1.9  1.9   --   1.4*   CHLORIDE mmol/L  103  101  103  100  101  100  102   CO2 mmol/L  29.0  28.4  28.2  26.5  26.9  22.3*  26.2   BUN mg/dL  10  9  9  11  14  15  18   CREATININE mg/dL  0.40*  0.36*  0.41*  0.44  0.49  0.53  0.67   EGFR IF NONAFRICN AM mL/min/1.73  >150  >150  >150  >150  >150  >150  130   CALCIUM mg/dL  7.8  7.7  7.6*  7.8  8.5  8.6  8.9   GLUCOSE mg/dL  94  84  102  96  88  98  132*   ALBUMIN g/dL   --    --    --    --   3.00*  3.00*  3.30*   BILIRUBIN mg/dL   --    --    --    --   0.8  1.1  1.1   ALK PHOS U/L   --    --    --    --   86  84  80   AST (SGOT) U/L   --    --    --    --   22  25  21   ALT (SGPT) U/L   --    --    --    --   17  17  16   Estimated Creatinine Clearance: 442.4 mL/min (A) (by C-G formula based on SCr of 0.4 mg/dL (L)).  No results found for:  AMMONIA    No results found for: HGBA1C, POCGLU  No results found for: HGBA1C  No results found for: TSH, FREET4    Blood Culture   Date Value Ref Range Status   11/26/2018 No growth at 24 hours  Preliminary   11/26/2018 No growth at 24 hours  Preliminary                 Pain Management Panel     Pain Management Panel Latest Ref Rng & Units 10/19/2014    AMPHETAMINES SCREEN, URINE NEGATIVE Negative    BARBITURATES SCREEN NEGATIVE Negative    BENZODIAZEPINE SCREEN, URINE NEGATIVE Negative    COCAINE SCREEN, URINE NEGATIVE Negative    METHADONE SCREEN, URINE NEGATIVE Negative          I have personally reviewed the above laboratory results.   ----------------------------------------------------------------------------------------------------------------------  Imaging Results (last 24 hours)     ** No results found for the last 24 hours. **        I have personally reviewed the above radiology results.   ----------------------------------------------------------------------------------------------------------------------    Assessment/Plan       Assessment/Plan     ASSESSMENT:       1. Sepsis with elevated white count and fever on admission  2. 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 Possible blister sites stable today. CRP level continues to improve from 7.00 down to 4.92 with a normal white count. Denies any complaints. Would continue current coverage with hope to de-escalate soon.     CT of chest and venous doppler studies are reported negative for PE, or DVT. Discussed his concerns with taking Vancomycin and he agrees to proceed with taking it going forward.     In the setting of  Charcot Ramya Tooth disease, and risk of nosocomial infection, recommend Cefepime 2 gm IV q 8hrs to cover for pseudomonas and vancomycin pharmacy to dose.     Will continue to follow and make  adjustments as indicated.     CRP in the a.m.     Current Antimicrobials:    Cefepime 2 gm IV q 8 hrs  Vancomycin pharmacy to dose    Code Status:   Code Status and Medical Interventions:   Ordered at: 11/26/18 1911     Code Status:    CPR     Medical Interventions (Level of Support Prior to Arrest):    Full       Katerina Dickinson PA-C  12/02/18  12:17 PM

## 2018-12-02 NOTE — PLAN OF CARE
Problem: Skin and Soft Tissue Infection (Adult)  Goal: Signs and Symptoms of Listed Potential Problems Will be Absent, Minimized or Managed (Skin and Soft Tissue Infection)  Outcome: Ongoing (interventions implemented as appropriate)      Problem: Infection, Risk/Actual (Adult)  Goal: Identify Related Risk Factors and Signs and Symptoms  Outcome: Outcome(s) achieved Date Met: 12/02/18    Goal: Infection Prevention/Resolution  Outcome: Ongoing (interventions implemented as appropriate)      Problem: Self-Care Deficit (Adult,Obstetrics,Pediatric)  Goal: Identify Related Risk Factors and Signs and Symptoms  Outcome: Outcome(s) achieved Date Met: 12/02/18    Goal: Improved Ability to Perform BADL and IADL  Outcome: Ongoing (interventions implemented as appropriate)      Problem: Fall Risk (Adult)  Goal: Identify Related Risk Factors and Signs and Symptoms  Outcome: Outcome(s) achieved Date Met: 12/02/18    Goal: Absence of Fall  Outcome: Ongoing (interventions implemented as appropriate)      Problem: Skin Injury Risk (Adult)  Goal: Identify Related Risk Factors and Signs and Symptoms  Outcome: Outcome(s) achieved Date Met: 12/02/18    Goal: Skin Health and Integrity  Outcome: Ongoing (interventions implemented as appropriate)      Problem: Patient Care Overview  Goal: Plan of Care Review  Outcome: Ongoing (interventions implemented as appropriate)

## 2018-12-02 NOTE — PROGRESS NOTES
Subjective     History:   Jimmy Recinos is a 42 y.o. male admitted on 11/26/2018 secondary to <principal problem not specified>     Procedures: None    CC: Follow up lower extremity cellulitis       Patient seen and examined with HERNANDEZ Valiente. Awake and alert. States he feels much better today. Leg pain much improved. (+) BM yesterday. Denies fever or chills. Denies CP, dyspnea or palpitations. Denies nausea or vomiting. No acute events overnight per RN.     History taken from: patient, chart, and RN.      Objective     Vital Signs  Temp:  [98.1 °F (36.7 °C)-99.5 °F (37.5 °C)] 98.1 °F (36.7 °C)  Heart Rate:  [80-92] 80  Resp:  [18-20] 20  BP: (136-149)/(72-87) 137/86    Intake/Output Summary (Last 24 hours) at 12/2/2018 0848  Last data filed at 12/2/2018 0728  Gross per 24 hour   Intake 1770 ml   Output 4400 ml   Net -2630 ml         Physical Exam:  General:    Awake, alert, in no acute distress, morbidly obese    Heart:      Normal S1 and S2. Regular rate and rhythm. No significant murmur, rubs or gallops appreciated.   Lungs:     Respirations regular, even and unlabored. Lungs clear to auscultation B/L. No wheezes, rales or rhonchi.   Abdomen:   Soft. Nontender. No guarding, rebound tenderness or  organomegaly noted. Bowel sounds present x 4.   Extremities:  (+) bilateral lower extremity lymphedema with erythema.  Blister formation noted on right foot (x 2) and left foot (x 1). Erythema overall improved. Moves UE and LE equally B/L.     Results Review:    Results from last 7 days   Lab Units  12/02/18   0508  12/01/18   0507  11/30/18   0515  11/29/18   0508  11/28/18   0545  11/27/18   0422  11/26/18   1408   WBC 10*3/mm3  8.09  8.59  7.14  7.04  9.93  12.67*  12.38   HEMOGLOBIN g/dL  12.7*  12.6*  12.5*  12.9*  12.4*  13.1*  13.6*   PLATELETS 10*3/mm3  299  258  215  195  190  165  160     Results from last 7 days   Lab Units  12/02/18   0508  12/01/18   0507  11/30/18   0515  11/29/18   0508  11/28/18   0545   11/27/18   0422  11/26/18   1408   SODIUM mmol/L  137  135  138  133*  133*  129*  134*   POTASSIUM mmol/L  4.4  4.3  3.8  3.7  3.7  4.0  3.4*   CHLORIDE mmol/L  103  101  103  100  101  100  102   CO2 mmol/L  29.0  28.4  28.2  26.5  26.9  22.3*  26.2   BUN mg/dL  10  9  9  11  14  15  18   CREATININE mg/dL  0.40*  0.36*  0.41*  0.44  0.49  0.53  0.67   CALCIUM mg/dL  7.8  7.7  7.6*  7.8  8.5  8.6  8.9   GLUCOSE mg/dL  94  84  102  96  88  98  132*     Results from last 7 days   Lab Units  11/28/18   0545  11/27/18   0422  11/26/18   1408   BILIRUBIN mg/dL  0.8  1.1  1.1   ALK PHOS U/L  86  84  80   AST (SGOT) U/L  22  25  21   ALT (SGPT) U/L  17  17  16     Results from last 7 days   Lab Units  12/01/18   0507  11/30/18   0515  11/29/18   0508  11/28/18   0545  11/26/18   1408   MAGNESIUM mg/dL  2.1  2.1  1.9  1.9  1.4*               Imaging Results (last 24 hours)     ** No results found for the last 24 hours. **            Medications:    cefepime 2 g Intravenous Q8H   docusate sodium 100 mg Oral BID   heparin (porcine) 5,000 Units Subcutaneous Q12H   lactobacillus acidophilus 1 capsule Oral Daily   lisinopril 5 mg Oral Q24H   lubrisoft  Apply externally Daily   polyethylene glycol 17 g Oral BID   sodium chloride 10 mL Intravenous Q12H   sodium chloride 3 mL Intravenous Q12H   vancomycin 2,000 mg Intravenous Q8H              Assessment/Plan   Sepsis: Likely 2/2 bilateral lower extremity cellulitis. Afebrile and hemodynamically stable. Leukocytosis has resolved with stable WBC today. CRP improving. Blood cultures with NGTD. MRSA PCR (+). Currently on Vanc and Cefepime per ID. Cont to follow cultures and repeat labs in the AM. ID input appreciated.     Bilateral lower extremity cellulitis in the setting of chronic lymphedema: No evidence of DVT on venous duplex US of the lower extremities. Cont antibiotics as above. Appears clinically improved over the last few days. No clinical indication for surgical intervention  at this time. Surgery input appreciated.     Hyponatremia: Resolved. Cont 1500mL fluid restriction. Repeat labs in the AM.     Hypokalemia: Now resolved.  Repeat labs in the AM.     (+) D-dimer: CT chest PE protocol negative for PE. Venous duplex US of the bilateral lower extremities negative for DVT.     Essential HTN: BP improved today after adding low dose lisinopril yesterday.      Morbid obesity: Nutrition consulted.      Constipation: Resolved after suppository yesterday. Cont Colace and Miralax.       DVT PPX: SQ heparin     Discussed with Katerina (ID PAMinaC).     Disposition: Home when medically stable.     Pt is at high risk 2/2 sepsis, bilateral LE cellulitis with chronic lymphedema and electrolyte abnormalities.       Bobby Nolan,   12/02/18  8:48 AM

## 2018-12-02 NOTE — PLAN OF CARE
Problem: Infection, Risk/Actual (Adult)  Goal: Identify Related Risk Factors and Signs and Symptoms  Outcome: Ongoing (interventions implemented as appropriate)   12/01/18 1417   Infection, Risk/Actual (Adult)   Related Risk Factors (Infection, Risk/Actual) chronic illness/condition;medication effects;skin integrity impairment   Signs and Symptoms (Infection, Risk/Actual) weakness     Goal: Infection Prevention/Resolution  Outcome: Ongoing (interventions implemented as appropriate)   12/01/18 2120   Infection, Risk/Actual (Adult)   Infection Prevention/Resolution making progress toward outcome       Problem: Self-Care Deficit (Adult,Obstetrics,Pediatric)  Goal: Identify Related Risk Factors and Signs and Symptoms  Outcome: Ongoing (interventions implemented as appropriate)   12/01/18 1417   Self-Care Deficit (Adult,Obstetrics,Pediatric)   Related Risk Factors (Self-Care Deficit) activity intolerance;fatigue/weakness;pain/discomfort;immobility   Signs and Symptoms (Self-Care Deficit) gait unstable;range of motion decreased;inability/unwillingness to perform self-care;weakness, paresis, paralysis     Goal: Improved Ability to Perform BADL and IADL  Outcome: Ongoing (interventions implemented as appropriate)   12/01/18 2120   Self-Care Deficit (Adult,Obstetrics,Pediatric)   Improved Ability to Perform BADL and IADL making progress toward outcome       Problem: Fall Risk (Adult)  Goal: Identify Related Risk Factors and Signs and Symptoms  Outcome: Ongoing (interventions implemented as appropriate)   12/01/18 1417   Fall Risk (Adult)   Related Risk Factors (Fall Risk) polypharmacy;fatigue/slow reaction;sensory deficits;environment unfamiliar;gait/mobility problems   Signs and Symptoms (Fall Risk) presence of risk factors     Goal: Absence of Fall  Outcome: Ongoing (interventions implemented as appropriate)   12/01/18 2120   Fall Risk (Adult)   Absence of Fall making progress toward outcome       Problem: Skin Injury Risk  (Adult)  Goal: Identify Related Risk Factors and Signs and Symptoms  Outcome: Ongoing (interventions implemented as appropriate)   12/01/18 1417   Skin Injury Risk (Adult)   Related Risk Factors (Skin Injury Risk) body weight extremes;infection;mobility impaired;moisture;tissue perfusion altered     Goal: Skin Health and Integrity  Outcome: Ongoing (interventions implemented as appropriate)   12/01/18 2120   Skin Injury Risk (Adult)   Skin Health and Integrity making progress toward outcome

## 2018-12-02 NOTE — PROGRESS NOTES
CC: bilateral lower extremity cellulitis     Pt. Cellulitis improving.  Afebrile,  No open wounds of the BLE and areas of possible blistering improving.     AFVSS  BLE obesity and edema with improving cellulitis  No purulence, difficult to palpate pulses due to habitus and edema.     43 yo M with BLE cellulitis improving with antibiotics.  CRP continues to improve.  -continue antibiotics  -Continue to monitor

## 2018-12-03 LAB
ANION GAP SERPL CALCULATED.3IONS-SCNC: 5.2 MMOL/L (ref 3.6–11.2)
BASOPHILS # BLD AUTO: 0.1 10*3/MM3 (ref 0–0.3)
BASOPHILS NFR BLD AUTO: 1.2 % (ref 0–2)
BUN BLD-MCNC: 9 MG/DL (ref 7–21)
BUN/CREAT SERPL: 22.5 (ref 7–25)
CALCIUM SPEC-SCNC: 7.9 MG/DL (ref 7.7–10)
CHLORIDE SERPL-SCNC: 101 MMOL/L (ref 99–112)
CO2 SERPL-SCNC: 27.8 MMOL/L (ref 24.3–31.9)
CREAT BLD-MCNC: 0.4 MG/DL (ref 0.43–1.29)
CRP SERPL-MCNC: 3.95 MG/DL (ref 0–0.99)
DEPRECATED RDW RBC AUTO: 47.9 FL (ref 37–54)
EOSINOPHIL # BLD AUTO: 0.45 10*3/MM3 (ref 0–0.7)
EOSINOPHIL NFR BLD AUTO: 5.6 % (ref 0–5)
ERYTHROCYTE [DISTWIDTH] IN BLOOD BY AUTOMATED COUNT: 14.6 % (ref 11.5–14.5)
GFR SERPL CREATININE-BSD FRML MDRD: >150 ML/MIN/1.73
GLUCOSE BLD-MCNC: 89 MG/DL (ref 70–110)
HCT VFR BLD AUTO: 42.5 % (ref 42–52)
HGB BLD-MCNC: 13 G/DL (ref 14–18)
IMM GRANULOCYTES # BLD: 0.29 10*3/MM3 (ref 0–0.03)
IMM GRANULOCYTES NFR BLD: 3.6 % (ref 0–0.5)
LYMPHOCYTES # BLD AUTO: 1 10*3/MM3 (ref 1–3)
LYMPHOCYTES NFR BLD AUTO: 12.4 % (ref 21–51)
MCH RBC QN AUTO: 29 PG (ref 27–33)
MCHC RBC AUTO-ENTMCNC: 30.6 G/DL (ref 33–37)
MCV RBC AUTO: 94.7 FL (ref 80–94)
MONOCYTES # BLD AUTO: 0.49 10*3/MM3 (ref 0.1–0.9)
MONOCYTES NFR BLD AUTO: 6.1 % (ref 0–10)
NEUTROPHILS # BLD AUTO: 5.71 10*3/MM3 (ref 1.4–6.5)
NEUTROPHILS NFR BLD AUTO: 71.1 % (ref 30–70)
OSMOLALITY SERPL CALC.SUM OF ELEC: 266.4 MOSM/KG (ref 273–305)
PLATELET # BLD AUTO: 314 10*3/MM3 (ref 130–400)
PMV BLD AUTO: 9.1 FL (ref 6–10)
POTASSIUM BLD-SCNC: 4.8 MMOL/L (ref 3.5–5.3)
RBC # BLD AUTO: 4.49 10*6/MM3 (ref 4.7–6.1)
SODIUM BLD-SCNC: 134 MMOL/L (ref 135–153)
WBC NRBC COR # BLD: 8.04 10*3/MM3 (ref 4.5–12.5)

## 2018-12-03 PROCEDURE — 99232 SBSQ HOSP IP/OBS MODERATE 35: CPT | Performed by: HOSPITALIST

## 2018-12-03 PROCEDURE — 25010000002 CEFEPIME 2 G/NS 100 ML SOLUTION

## 2018-12-03 PROCEDURE — 85025 COMPLETE CBC W/AUTO DIFF WBC: CPT | Performed by: INTERNAL MEDICINE

## 2018-12-03 PROCEDURE — 25010000002 HEPARIN (PORCINE) PER 1000 UNITS: Performed by: PHYSICIAN ASSISTANT

## 2018-12-03 PROCEDURE — 94799 UNLISTED PULMONARY SVC/PX: CPT

## 2018-12-03 PROCEDURE — 86140 C-REACTIVE PROTEIN: CPT | Performed by: INTERNAL MEDICINE

## 2018-12-03 PROCEDURE — 25010000002 VANCOMYCIN 5 G RECONSTITUTED SOLUTION 5,000 MG VIAL

## 2018-12-03 PROCEDURE — 99231 SBSQ HOSP IP/OBS SF/LOW 25: CPT | Performed by: SURGERY

## 2018-12-03 PROCEDURE — 80048 BASIC METABOLIC PNL TOTAL CA: CPT | Performed by: INTERNAL MEDICINE

## 2018-12-03 PROCEDURE — C1751 CATH, INF, PER/CENT/MIDLINE: HCPCS

## 2018-12-03 PROCEDURE — 25010000002 CEFEPIME 2 G/NS 100 ML SOLUTION: Performed by: INTERNAL MEDICINE

## 2018-12-03 RX ADMIN — DOCUSATE SODIUM 100 MG: 100 CAPSULE ORAL at 08:11

## 2018-12-03 RX ADMIN — SODIUM CHLORIDE, PRESERVATIVE FREE 10 ML: 5 INJECTION INTRAVENOUS at 20:12

## 2018-12-03 RX ADMIN — CEFEPIME 2 G: 2 INJECTION, POWDER, FOR SOLUTION INTRAVENOUS at 17:24

## 2018-12-03 RX ADMIN — VANCOMYCIN HYDROCHLORIDE 2000 MG: 5 INJECTION, POWDER, LYOPHILIZED, FOR SOLUTION INTRAVENOUS at 20:11

## 2018-12-03 RX ADMIN — CEFEPIME 2 G: 2 INJECTION, POWDER, FOR SOLUTION INTRAVENOUS at 01:00

## 2018-12-03 RX ADMIN — Medication: at 09:00

## 2018-12-03 RX ADMIN — POLYETHYLENE GLYCOL (3350) 17 G: 17 POWDER, FOR SOLUTION ORAL at 20:11

## 2018-12-03 RX ADMIN — POLYETHYLENE GLYCOL (3350) 17 G: 17 POWDER, FOR SOLUTION ORAL at 08:11

## 2018-12-03 RX ADMIN — HEPARIN SODIUM 5000 UNITS: 5000 INJECTION INTRAVENOUS; SUBCUTANEOUS at 20:11

## 2018-12-03 RX ADMIN — CEFEPIME 2 G: 2 INJECTION, POWDER, FOR SOLUTION INTRAVENOUS at 08:10

## 2018-12-03 RX ADMIN — VANCOMYCIN HYDROCHLORIDE 2000 MG: 5 INJECTION, POWDER, LYOPHILIZED, FOR SOLUTION INTRAVENOUS at 05:47

## 2018-12-03 RX ADMIN — LISINOPRIL 5 MG: 2.5 TABLET ORAL at 08:10

## 2018-12-03 RX ADMIN — VANCOMYCIN HYDROCHLORIDE 2000 MG: 5 INJECTION, POWDER, LYOPHILIZED, FOR SOLUTION INTRAVENOUS at 15:05

## 2018-12-03 RX ADMIN — SODIUM CHLORIDE, PRESERVATIVE FREE 3 ML: 5 INJECTION INTRAVENOUS at 09:05

## 2018-12-03 RX ADMIN — DOCUSATE SODIUM 100 MG: 100 CAPSULE ORAL at 20:11

## 2018-12-03 RX ADMIN — SODIUM CHLORIDE, PRESERVATIVE FREE 10 ML: 5 INJECTION INTRAVENOUS at 09:00

## 2018-12-03 RX ADMIN — HEPARIN SODIUM 5000 UNITS: 5000 INJECTION INTRAVENOUS; SUBCUTANEOUS at 08:11

## 2018-12-03 RX ADMIN — Medication 1 CAPSULE: at 08:11

## 2018-12-03 NOTE — PROGRESS NOTES
Cellulitis, morbid obesity    The erythema is improved significantly from last week and labs are improving. He has a couple of areas on the the right foot in particular that will likely peal eventually but I would not recommend any drainage procedure at this time. It is extremely important he loose weight if he wants to keep this from happening again.

## 2018-12-03 NOTE — PROGRESS NOTES
PROGRESS NOTE         Patient Identification:  Name:  Jimmy Recinos  Age:  42 y.o.  Sex:  male  :  1975  MRN:  [id number]  Visit Number:  [id number]  Primary Care Provider:  Belle Khanna DO      ----------------------------------------------------------------------------------------------------------------------  Subjective       Chief Complaints:    Fever        Interval History:      Stable erythema to lower extremities but worsening edema to right lower extremity. CRP continues to show improvement with normal white count. Patient did not have his lower extremities elevated during exam and discussed again the importance.     Review of Systems:    Constitutional: no fever, chills and night sweats. No appetite change or unexpected weight change. No fatigue.  Eyes: no eye drainage, itching or redness.  HEENT: no mouth sores, dysphagia or nose bleed.  Respiratory: no for shortness of breath, cough or production of sputum.  Cardiovascular: no chest pain, no palpitations, no orthopnea.  Gastrointestinal: no nausea, vomiting or diarrhea. No abdominal pain, hematemesis or rectal bleeding.  Genitourinary: no dysuria or polyuria.  Hematologic/lymphatic: no lymph node abnormalities, no easy bruising or easy bleeding.  Musculoskeletal: no muscle or joint pain.  Skin: No rash and no itching.  Neurological: no loss of consciousness, no seizure, no headache.  Behavioral/Psych: no depression or suicidal ideation.  Endocrine: no hot flashes.  Immunologic: negative.  ----------------------------------------------------------------------------------------------------------------------      Objective       Current Hospital Meds:    cefepime 2 g Intravenous Q8H   docusate sodium 100 mg Oral BID   heparin (porcine) 5,000 Units Subcutaneous Q12H   lactobacillus acidophilus 1 capsule Oral Daily   lisinopril 5 mg Oral Q24H   lubrisoft  Apply externally Daily   polyethylene glycol 17 g Oral BID   sodium chloride 10  mL Intravenous Q12H   sodium chloride 3 mL Intravenous Q12H   vancomycin 2,000 mg Intravenous Q8H        ----------------------------------------------------------------------------------------------------------------------    Vital Signs:  Temp:  [98.2 °F (36.8 °C)-98.8 °F (37.1 °C)] 98.2 °F (36.8 °C)  Heart Rate:  [82-87] 82  Resp:  [20] 20  BP: (127-161)/(63-79) 161/79  No data found.  SpO2 Percentage    12/02/18 2349 12/03/18 0625 12/03/18 0815   SpO2: 95% 96% 96%     SpO2:  [94 %-96 %] 96 %  on   ;   Device (Oxygen Therapy): room air    Body mass index is 68.23 kg/m².  Wt Readings from Last 3 Encounters:   11/26/18 (!) 216 kg (475 lb 8 oz)   11/24/18 (!) 211 kg (465 lb)   09/17/18 (!) 220 kg (485 lb)        Intake/Output Summary (Last 24 hours) at 12/3/2018 1207  Last data filed at 12/3/2018 0841  Gross per 24 hour   Intake 600 ml   Output 3300 ml   Net -2700 ml     Diet Regular; Cardiac, Consistent Carbohydrate, Daily Fluid Restriction; 1500 mL Fluid  ----------------------------------------------------------------------------------------------------------------------    Physical exam:    Constitutional:  Well-developed and well-nourished.  No respiratory distress.      HENT:  Head:  Normocephalic and atraumatic.  Mouth:  Moist mucous membranes.    Eyes:  Conjunctivae and EOM are normal.  No scleral icterus.    Neck:  Neck supple.  No JVD present.    Cardiovascular:  Normal rate, regular rhythm and normal heart sounds with no murmur. No edema.  Pulmonary/Chest:  No respiratory distress, no wheezes, no crackles, with normal breath sounds and good air movement.  Abdominal:  Soft.  Bowel sounds are normal.  No distension and no tenderness.   Musculoskeletal:  No edema, no tenderness, and no deformity.  No red or swollen joints anywhere.    Neurological:  Alert and oriented to person, place, and time. No facial droop.  No slurred speech.   Skin:  Skin is warm and dry. No rash noted. No pallor.      ----------------------------------------------------------------------------------------------------------------------  I have personally reviewed the EKG/Telemetry strips   ----------------------------------------------------------------------------------------------------------------------            Results from last 7 days   Lab Units  12/03/18 0429 12/02/18 0508 12/01/18 0507 11/26/18   1408   CRP mg/dL  3.95*  4.92*  7.00*   < >  27.85*   LACTATE mmol/L   --    --    --    --   1.6   WBC 10*3/mm3  8.04  8.09  8.59   < >  12.38   HEMOGLOBIN g/dL  13.0*  12.7*  12.6*   < >  13.6*   HEMATOCRIT %  42.5  40.2*  40.2*   < >  42.4   MCV fL  94.7*  94.4*  93.7   < >  92.8   MCHC g/dL  30.6*  31.6*  31.3*   < >  32.1*   PLATELETS 10*3/mm3  314  299  258   < >  160    < > = values in this interval not displayed.     Results from last 7 days   Lab Units  12/03/18 0429 12/02/18 0508 12/01/18   0507  11/30/18   0515  11/29/18   0508  11/28/18   0545  11/27/18 0422 11/26/18   1408   SODIUM mmol/L  134*  137  135  138  133*  133*  129*  134*   POTASSIUM mmol/L  4.8  4.4  4.3  3.8  3.7  3.7  4.0  3.4*   MAGNESIUM mg/dL   --    --   2.1  2.1  1.9  1.9   --   1.4*   CHLORIDE mmol/L  101  103  101  103  100  101  100  102   CO2 mmol/L  27.8  29.0  28.4  28.2  26.5  26.9  22.3*  26.2   BUN mg/dL  9  10  9  9  11  14  15  18   CREATININE mg/dL  0.40*  0.40*  0.36*  0.41*  0.44  0.49  0.53  0.67   EGFR IF NONAFRICN AM mL/min/1.73  >150  >150  >150  >150  >150  >150  >150  130   CALCIUM mg/dL  7.9  7.8  7.7  7.6*  7.8  8.5  8.6  8.9   GLUCOSE mg/dL  89  94  84  102  96  88  98  132*   ALBUMIN g/dL   --    --    --    --    --   3.00*  3.00*  3.30*   BILIRUBIN mg/dL   --    --    --    --    --   0.8  1.1  1.1   ALK PHOS U/L   --    --    --    --    --   86  84  80   AST (SGOT) U/L   --    --    --    --    --   22  25  21   ALT (SGPT) U/L   --    --    --    --    --   17  17  16   Estimated Creatinine  Clearance: 442.4 mL/min (A) (by C-G formula based on SCr of 0.4 mg/dL (L)).  No results found for: AMMONIA    No results found for: HGBA1C, POCGLU  No results found for: HGBA1C  No results found for: TSH, FREET4    Blood Culture   Date Value Ref Range Status   11/26/2018 No growth at 24 hours  Preliminary   11/26/2018 No growth at 24 hours  Preliminary                 Pain Management Panel     Pain Management Panel Latest Ref Rng & Units 10/19/2014    AMPHETAMINES SCREEN, URINE NEGATIVE Negative    BARBITURATES SCREEN NEGATIVE Negative    BENZODIAZEPINE SCREEN, URINE NEGATIVE Negative    COCAINE SCREEN, URINE NEGATIVE Negative    METHADONE SCREEN, URINE NEGATIVE Negative          I have personally reviewed the above laboratory results.   ----------------------------------------------------------------------------------------------------------------------  Imaging Results (last 24 hours)     ** No results found for the last 24 hours. **        I have personally reviewed the above radiology results.   ----------------------------------------------------------------------------------------------------------------------    Assessment/Plan       Assessment/Plan     ASSESSMENT:       1. Sepsis with elevated white count and fever on admission  2. 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 CRP continues to show improvement with normal white count. Patient did not have his lower extremities elevated during exam and discussed again the importance.     CT of chest and venous doppler studies are reported negative for PE, or DVT. Discussed his concerns with taking Vancomycin and he agrees to proceed with taking it going forward.     In the setting of  Charcot Ramya Tooth disease, and risk of nosocomial infection, recommend Cefepime 2 gm IV q 8hrs to cover for pseudomonas and vancomycin  pharmacy to dose.     Will continue to follow and make adjustments as indicated.     CRP in the a.m.     Current Antimicrobials:    Cefepime 2 gm IV q 8 hrs  Vancomycin pharmacy to dose    Code Status:   Code Status and Medical Interventions:   Ordered at: 11/26/18 1911     Code Status:    CPR     Medical Interventions (Level of Support Prior to Arrest):    Full       Katerina Dickinson PA-C  12/03/18  12:07 PM

## 2018-12-03 NOTE — PROGRESS NOTES
[facility] HOSPITALIST PROGRESS NOTE     Patient Identification:  Name:  Jimmy Recinos  Age:  42 y.o.  Sex:  male  :  1975  MRN:  [id number]  Visit Number:  [id number]  ROOM: [address] [place] [address]     Primary Care Provider:  Belle Khanna DO    Length of stay:  7    Subjective        Chief Compliant follow-up for cellulitis    Patient seen and examined this morning.  States that he is feeling much better.  Cellulitis in both lower extremity continues to improve.  Left lower extremity almost back to normal.  Right lower extremity improving and does have 2 blisters in the foot which is not draining.  Patient is not ambulating since he is cared that blisters will break open.  Otherwise denies any other complaints.  Afebrile and no events overnight.    Objective     Current Hospital Meds:  cefepime 2 g Intravenous Q8H   docusate sodium 100 mg Oral BID   heparin (porcine) 5,000 Units Subcutaneous Q12H   lactobacillus acidophilus 1 capsule Oral Daily   lisinopril 5 mg Oral Q24H   lubrisoft  Apply externally Daily   polyethylene glycol 17 g Oral BID   sodium chloride 10 mL Intravenous Q12H   sodium chloride 3 mL Intravenous Q12H   vancomycin 2,000 mg Intravenous Q8H      ----------------------------------------------------------------------------------------------------------------------  Vital Signs:  Temp:  [97.9 °F (36.6 °C)-98.4 °F (36.9 °C)] 97.9 °F (36.6 °C)  Heart Rate:  [78-87] 83  Resp:  [20] 20  BP: (115-161)/(63-79) 138/66  SpO2:  [94 %-96 %] 95 %  on   ;   Device (Oxygen Therapy): room air  Body mass index is 68.23 kg/m².    Wt Readings from Last 3 Encounters:   18 (!) 216 kg (475 lb 8 oz)   18 (!) 211 kg (465 lb)   18 (!) 220 kg (485 lb)       Intake/Output Summary (Last 24 hours) at 12/3/2018 1724  Last data filed at 12/3/2018 1547  Gross per 24 hour   Intake 480 ml   Output 2150 ml   Net -1670 ml     Diet Regular; Cardiac, Consistent Carbohydrate, Daily Fluid Restriction; 1500  mL Fluid  ----------------------------------------------------------------------------------------------------------------------  Physical exam:  General: Comfortable, Not in distress.  Well-developed and well-nourished.   HENT:  Head:  Normocephalic and atraumatic.  Mouth:  Moist mucous membranes.    Eyes:  Conjunctivae and EOM are normal.  Pupils are equal, round, and reactive to light.  No scleral icterus.    Neck:  Neck supple.  No JVD present.    Cardiovascular:  Normal rate, regular rhythm with no murmur.  Pulmonary/Chest:  No respiratory distress, no wheezes, no crackles, with normal breath sounds and good air movement.  Abdomen:  Soft.  Bowel sounds are normal.  No distension and no tenderness.   Musculoskeletal: Right lower extremity with the erythema anteriorly and 2 blister in the right foot.  Left lower extremity with the home blistered and minimal erythema.  Edema positive bilaterally but improving.    Neurological:  Alert and oriented to person, place, and time.  No cranial nerve deficit. No focal deficits. No facial droop.  No slurred speech.   Skin:  Skin is warm and dry. No rash noted. No pallor.   Peripheral vascular:  pulses in all 4 extremities with no clubbing, no cyanosis, no edema.  Genitourinary: No Mehta  ----------------------------------------------------------------------------------------------------------------------  ----------------------------------------------------------------------------------------------------------------------Results from last 7 days   Lab Units  12/03/18 0429 12/02/18 0508 12/01/18 0507   CRP mg/dL  3.95*  4.92*  7.00*   WBC 10*3/mm3  8.04  8.09  8.59   HEMOGLOBIN g/dL  13.0*  12.7*  12.6*   HEMATOCRIT %  42.5  40.2*  40.2*   MCV fL  94.7*  94.4*  93.7   MCHC g/dL  30.6*  31.6*  31.3*   PLATELETS 10*3/mm3  314  299  582     Results from last 7 days   Lab Units  12/03/18 0429 12/02/18 0508 12/01/18 0507 11/30/18   0515  11/29/18   0508   11/28/18   0545   11/27/18   0422   SODIUM mmol/L  134*  137  135  138  133*  133*   --   129*   POTASSIUM mmol/L  4.8  4.4  4.3  3.8  3.7  3.7   --   4.0   MAGNESIUM mg/dL   --    --   2.1  2.1  1.9  1.9   < >   --    CHLORIDE mmol/L  101  103  101  103  100  101   --   100   CO2 mmol/L  27.8  29.0  28.4  28.2  26.5  26.9   --   22.3*   BUN mg/dL  9  10  9  9  11  14   --   15   CREATININE mg/dL  0.40*  0.40*  0.36*  0.41*  0.44  0.49   --   0.53   PHOSPHORUS mg/dL   --    --    --    --    --   3.1   --    --    EGFR IF NONAFRICN AM mL/min/1.73  >150  >150  >150  >150  >150  >150   --   >150   CALCIUM mg/dL  7.9  7.8  7.7  7.6*  7.8  8.5   --   8.6   GLUCOSE mg/dL  89  94  84  102  96  88   --   98   ALBUMIN g/dL   --    --    --    --    --   3.00*   --   3.00*   BILIRUBIN mg/dL   --    --    --    --    --   0.8   --   1.1   ALK PHOS U/L   --    --    --    --    --   86   --   84   AST (SGOT) U/L   --    --    --    --    --   22   --   25   ALT (SGPT) U/L   --    --    --    --    --   17   --   17    < > = values in this interval not displayed.   Estimated Creatinine Clearance: 442.4 mL/min (A) (by C-G formula based on SCr of 0.4 mg/dL (L)).      No results found for: HGBA1C, POCGLU  No results found for: AMMONIA              I have personally looked at the labs and they are summarized above.  ----------------------------------------------------------------------------------------------------------------------  Imaging Results (last 24 hours)     ** No results found for the last 24 hours. **        I have personally reviewed the radiology images and read the final radiology report.    Assessment & Plan      Assessment:  Sepsis  B/L lower extremity cellulitis  Hyponatremia, mild  HTN  Positive d-dimer with negative CT PE protocol and negative venous Doppler bilateral  Morbid obesity  Constipation    Plan:  Sepsis secondary to bilateral lower extremity cellulitis resolved.  Leukocytosis resolved and CRP  improving.  Blood cultures negative.  Continue with IV antibiotics vancomycin and cefepime.  We will discuss with ID in a.m. regarding change to by mouth antibiotics.  Hyponatremia mild asymptomatic.  Continue to monitor.  Blood pressure improved on low-dose lisinopril.  Continue to monitor and titrate accordingly.  Continue with Colace and MiraLAX for constipation.  Heparin for DVT prophylaxis.  DC to home in next 24-48 hrs.    The patient is high risk due to the following diagnoses/reasons:  sepsis, bilateral LE cellulitis with chronic lymphedema and electrolyte abnormalities.           Jo Ann Díaz MD  12/03/18  5:24 PM

## 2018-12-03 NOTE — PROGRESS NOTES
Continued Stay Note  JOEL Ornelas     Patient Name: Jimmy Recinos  MRN: [id number]  Today's Date: 12/3/2018    Admit Date: 11/26/2018    Discharge Plan     Row Name 12/03/18 1115       Plan    Plan  Pt states he still plans to return home alone at d/c.  He says his ex-wife lives next door to him and she will be able to assist him, if needed.  He has a RW, WC, cane and AFO braces. Pt is requesting RW at d/c and  will arrange this with MD order for bariatric walker at d/c.  He says he had HH approx 6 years ago when he received IV abx at home for 2 months.  He says if he is unable to drive himself home at d/c his girlfriend, Philly, will drive him.  He sees KHUSHBU Champion, as his PCP.  He states he has not [facility] yet about a wheelchair ramp but he will do so today.    Patient/Family in Agreement with Plan  --    Plan Comments  Pt states he feels better today and has less redness/swelling of LE.  He has AFO braces at bedside but says he has not attempted to put them on yet.  He remains on Maxipime and Vanc IV with WBC and CRP improving.   CM will follow.        Discharge Codes    No documentation.       Expected Discharge Date and Time     Expected Discharge Date Expected Discharge Time    Dec 1, 2018             Judith Gamez RN

## 2018-12-03 NOTE — PROGRESS NOTES
Discharge Planning Assessment   [facility]     Patient Name: Jimmy Recinos  MRN: [id number]  Today's Date: 12/3/2018    Admit Date: 11/26/2018      Discharge Plan     Row Name 12/03/18 1244       Plan    Plan SS discussed pt with CM nurse, Judith. Pt has a prescription for a new rolling walker, however informed CM nurse that he contacted [facility] regarding new walker and they do not have the size he needs. SS contacted [facility] per Cammie who states pt's insurance will not pay for the size of rolling walker that pt is requesting due to his weight. SS notified CM nurse. CM following.      Anastasiya Marshall

## 2018-12-03 NOTE — PLAN OF CARE
Problem: Skin and Soft Tissue Infection (Adult)  Goal: Signs and Symptoms of Listed Potential Problems Will be Absent, Minimized or Managed (Skin and Soft Tissue Infection)  Outcome: Ongoing (interventions implemented as appropriate)   12/01/18 1417 12/02/18 1043   Goal/Outcome Evaluation   Problems Assessed (Skin and Soft Tissue Infection) --  all   Problems Present (Skin/Soft Tissue Inf) infection progression;pain --        Problem: Infection, Risk/Actual (Adult)  Goal: Infection Prevention/Resolution  Outcome: Ongoing (interventions implemented as appropriate)   12/02/18 1043   Infection, Risk/Actual (Adult)   Infection Prevention/Resolution making progress toward outcome       Problem: Self-Care Deficit (Adult,Obstetrics,Pediatric)  Goal: Improved Ability to Perform BADL and IADL  Outcome: Ongoing (interventions implemented as appropriate)   12/02/18 1043   Self-Care Deficit (Adult,Obstetrics,Pediatric)   Improved Ability to Perform BADL and IADL making progress toward outcome       Problem: Fall Risk (Adult)  Goal: Absence of Fall  Outcome: Ongoing (interventions implemented as appropriate)   12/02/18 1043   Fall Risk (Adult)   Absence of Fall making progress toward outcome       Problem: Patient Care Overview  Goal: Plan of Care Review  Outcome: Ongoing (interventions implemented as appropriate)   12/02/18 1043 12/03/18 0745   Coping/Psychosocial   Plan of Care Reviewed With --  patient   Plan of Care Review   Progress improving --      Goal: Individualization and Mutuality  Outcome: Ongoing (interventions implemented as appropriate)    Goal: Discharge Needs Assessment  Outcome: Ongoing (interventions implemented as appropriate)   11/27/18 1130   Discharge Needs Assessment   Readmission Within the Last 30 Days no previous admission in last 30 days   Concerns to be Addressed no discharge needs identified;denies needs/concerns at this time  (States he has no home care needs.)   Patient/Family Anticipates  Transition to home   Patient/Family Anticipated Services at Transition none  (Denies any needs at this time.)   Transportation Concerns car, none   Transportation Anticipated car, drives self  (Plans to drive self home if he can get his braces on if swelling is gone down in his legs.)     Goal: Interprofessional Rounds/Family Conf  Outcome: Ongoing (interventions implemented as appropriate)   12/03/18 1105   Interdisciplinary Rounds/Family Conf   Participants nursing       Problem: Skin Injury Risk (Adult)  Goal: Skin Health and Integrity  Outcome: Ongoing (interventions implemented as appropriate)   12/02/18 1043   Skin Injury Risk (Adult)   Skin Health and Integrity making progress toward outcome       Problem: Patient Care Overview  Goal: Plan of Care Review  Outcome: Ongoing (interventions implemented as appropriate)   12/02/18 1043 12/03/18 0745   Coping/Psychosocial   Plan of Care Reviewed With --  patient   Plan of Care Review   Progress improving --      Goal: Individualization and Mutuality  Outcome: Ongoing (interventions implemented as appropriate)    Goal: Discharge Needs Assessment  Outcome: Ongoing (interventions implemented as appropriate)   11/27/18 1130   Disability   Equipment Currently Used at Home ramp;walker, standard;wheelchair  (DME provided per [facility] facility. He has leg braces.)   Discharge Needs Assessment   Readmission Within the Last 30 Days no previous admission in last 30 days   Concerns to be Addressed no discharge needs identified;denies needs/concerns at this time  (States he has no home care needs.)   Patient/Family Anticipates Transition to home   Patient/Family Anticipated Services at Transition none  (Denies any needs at this time.)   Transportation Concerns car, none   Transportation Anticipated car, drives self  (Plans to drive self home if he can get his braces on if swelling is gone down in his legs.)     Goal: Interprofessional Rounds/Family Conf  Outcome: Ongoing  (interventions implemented as appropriate)   11/27/18 4413   Interdisciplinary Rounds/Family Conf   Participants family;nursing

## 2018-12-03 NOTE — PLAN OF CARE
Problem: Skin and Soft Tissue Infection (Adult)  Goal: Signs and Symptoms of Listed Potential Problems Will be Absent, Minimized or Managed (Skin and Soft Tissue Infection)  Outcome: Ongoing (interventions implemented as appropriate)   12/01/18 1417 12/02/18 1043   Goal/Outcome Evaluation   Problems Assessed (Skin and Soft Tissue Infection) --  all   Problems Present (Skin/Soft Tissue Inf) infection progression;pain --        Problem: Infection, Risk/Actual (Adult)  Goal: Infection Prevention/Resolution  Outcome: Ongoing (interventions implemented as appropriate)   12/02/18 1043   Infection, Risk/Actual (Adult)   Infection Prevention/Resolution making progress toward outcome       Problem: Fall Risk (Adult)  Goal: Absence of Fall  Outcome: Ongoing (interventions implemented as appropriate)   12/02/18 1043   Fall Risk (Adult)   Absence of Fall making progress toward outcome       Problem: Skin Injury Risk (Adult)  Goal: Skin Health and Integrity  Outcome: Ongoing (interventions implemented as appropriate)   12/02/18 1043   Skin Injury Risk (Adult)   Skin Health and Integrity making progress toward outcome       Problem: Patient Care Overview  Goal: Plan of Care Review  Outcome: Ongoing (interventions implemented as appropriate)   12/02/18 1043   Coping/Psychosocial   Plan of Care Reviewed With patient   Plan of Care Review   Progress improving     Goal: Individualization and Mutuality  Outcome: Ongoing (interventions implemented as appropriate)

## 2018-12-04 LAB — VANCOMYCIN TROUGH SERPL-MCNC: 28.1 MCG/ML (ref 5–15)

## 2018-12-04 PROCEDURE — 94799 UNLISTED PULMONARY SVC/PX: CPT

## 2018-12-04 PROCEDURE — G8979 MOBILITY GOAL STATUS: HCPCS

## 2018-12-04 PROCEDURE — 80202 ASSAY OF VANCOMYCIN: CPT

## 2018-12-04 PROCEDURE — G8987 SELF CARE CURRENT STATUS: HCPCS

## 2018-12-04 PROCEDURE — 25010000002 HEPARIN (PORCINE) PER 1000 UNITS: Performed by: PHYSICIAN ASSISTANT

## 2018-12-04 PROCEDURE — G8988 SELF CARE GOAL STATUS: HCPCS

## 2018-12-04 PROCEDURE — G8978 MOBILITY CURRENT STATUS: HCPCS

## 2018-12-04 PROCEDURE — 99232 SBSQ HOSP IP/OBS MODERATE 35: CPT | Performed by: HOSPITALIST

## 2018-12-04 PROCEDURE — 99231 SBSQ HOSP IP/OBS SF/LOW 25: CPT | Performed by: SURGERY

## 2018-12-04 PROCEDURE — 97530 THERAPEUTIC ACTIVITIES: CPT

## 2018-12-04 PROCEDURE — 25010000002 CEFEPIME 2 G/NS 100 ML SOLUTION

## 2018-12-04 PROCEDURE — 97162 PT EVAL MOD COMPLEX 30 MIN: CPT

## 2018-12-04 PROCEDURE — 25010000002 VANCOMYCIN 5 G RECONSTITUTED SOLUTION 5,000 MG VIAL

## 2018-12-04 PROCEDURE — 97110 THERAPEUTIC EXERCISES: CPT

## 2018-12-04 PROCEDURE — 97167 OT EVAL HIGH COMPLEX 60 MIN: CPT

## 2018-12-04 RX ADMIN — SODIUM CHLORIDE, PRESERVATIVE FREE 10 ML: 5 INJECTION INTRAVENOUS at 08:17

## 2018-12-04 RX ADMIN — CEFEPIME 2 G: 2 INJECTION, POWDER, FOR SOLUTION INTRAVENOUS at 00:35

## 2018-12-04 RX ADMIN — CEFEPIME 2 G: 2 INJECTION, POWDER, FOR SOLUTION INTRAVENOUS at 08:17

## 2018-12-04 RX ADMIN — DOCUSATE SODIUM 100 MG: 100 CAPSULE ORAL at 21:24

## 2018-12-04 RX ADMIN — SODIUM CHLORIDE, PRESERVATIVE FREE 3 ML: 5 INJECTION INTRAVENOUS at 22:10

## 2018-12-04 RX ADMIN — SODIUM CHLORIDE, PRESERVATIVE FREE 3 ML: 5 INJECTION INTRAVENOUS at 08:17

## 2018-12-04 RX ADMIN — CEFEPIME 2 G: 2 INJECTION, POWDER, FOR SOLUTION INTRAVENOUS at 17:00

## 2018-12-04 RX ADMIN — Medication: at 08:17

## 2018-12-04 RX ADMIN — VANCOMYCIN HYDROCHLORIDE 2000 MG: 5 INJECTION, POWDER, LYOPHILIZED, FOR SOLUTION INTRAVENOUS at 21:24

## 2018-12-04 RX ADMIN — VANCOMYCIN HYDROCHLORIDE 2000 MG: 5 INJECTION, POWDER, LYOPHILIZED, FOR SOLUTION INTRAVENOUS at 04:41

## 2018-12-04 RX ADMIN — LISINOPRIL 5 MG: 2.5 TABLET ORAL at 08:16

## 2018-12-04 RX ADMIN — POLYETHYLENE GLYCOL (3350) 17 G: 17 POWDER, FOR SOLUTION ORAL at 08:17

## 2018-12-04 RX ADMIN — DOCUSATE SODIUM 100 MG: 100 CAPSULE ORAL at 08:17

## 2018-12-04 RX ADMIN — Medication 1 CAPSULE: at 08:16

## 2018-12-04 RX ADMIN — HEPARIN SODIUM 5000 UNITS: 5000 INJECTION INTRAVENOUS; SUBCUTANEOUS at 21:24

## 2018-12-04 RX ADMIN — HEPARIN SODIUM 5000 UNITS: 5000 INJECTION INTRAVENOUS; SUBCUTANEOUS at 08:17

## 2018-12-04 RX ADMIN — SODIUM CHLORIDE, PRESERVATIVE FREE 10 ML: 5 INJECTION INTRAVENOUS at 22:11

## 2018-12-04 RX ADMIN — POLYETHYLENE GLYCOL (3350) 17 G: 17 POWDER, FOR SOLUTION ORAL at 21:24

## 2018-12-04 NOTE — PROGRESS NOTES
[facility] HOSPITALIST PROGRESS NOTE     Patient Identification:  Name:  Jimmy Recinos  Age:  42 y.o.  Sex:  male  :  1975  MRN:  [id number]  Visit Number:  [id number]  ROOM: [address] [place] [address]     Primary Care Provider:  Belle Khanna DO    Length of stay:  8    Subjective        Chief Compliant follow-up for cellulitis    Patient seen and examined this morning.  States that he is feeling much better.  Cellulitis in both lower extremity continues to improve.  Left lower extremity almost back to normal.  Right lower extremity improving and does have 2 blisters in the foot which is not draining.  Patient has not ambulated yet, did poorly with therapy.  Otherwise denies any other complaints.  Afebrile and no events overnight.    Objective     Current Hospital Meds:    cefepime 2 g Intravenous Q8H   docusate sodium 100 mg Oral BID   heparin (porcine) 5,000 Units Subcutaneous Q12H   lactobacillus acidophilus 1 capsule Oral Daily   lisinopril 5 mg Oral Q24H   lubrisoft  Apply externally Daily   polyethylene glycol 17 g Oral BID   sodium chloride 10 mL Intravenous Q12H   sodium chloride 3 mL Intravenous Q12H   vancomycin 2,000 mg Intravenous Q12H      ----------------------------------------------------------------------------------------------------------------------  Vital Signs:  Temp:  [97.8 °F (36.6 °C)-98.6 °F (37 °C)] 97.8 °F (36.6 °C)  Heart Rate:  [83-90] 88  Resp:  [20] 20  BP: (120-139)/(67-81) 139/74  SpO2:  [91 %-98 %] 93 %  on   ;   Device (Oxygen Therapy): room air  Body mass index is 68.23 kg/m².    Wt Readings from Last 3 Encounters:   18 (!) 216 kg (475 lb 8 oz)   18 (!) 211 kg (465 lb)   18 (!) 220 kg (485 lb)       Intake/Output Summary (Last 24 hours) at 2018 1738  Last data filed at 2018 1519  Gross per 24 hour   Intake 210 ml   Output 3350 ml   Net -3140 ml     Diet Regular; Cardiac, Consistent Carbohydrate, Daily Fluid Restriction; 1500 mL  Fluid  ----------------------------------------------------------------------------------------------------------------------  Physical exam:  General: Comfortable, Not in distress.  Well-developed and well-nourished.   HENT:  Head:  Normocephalic and atraumatic.  Mouth:  Moist mucous membranes.    Eyes:  Conjunctivae and EOM are normal.  Pupils are equal, round, and reactive to light.  No scleral icterus.    Neck:  Neck supple.  No JVD present.    Cardiovascular:  Normal rate, regular rhythm with no murmur.  Pulmonary/Chest:  No respiratory distress, no wheezes, no crackles, with normal breath sounds and good air movement.  Abdomen:  Soft.  Bowel sounds are normal.  No distension and no tenderness.   Musculoskeletal: Right lower extremity with the erythema and warmth anteriorly and 2 blister in the right foot.  Left lower extremity with the home blistered and minimal erythema.  Edema positive bilaterally but improving.    Neurological:  Alert and oriented to person, place, and time.  No cranial nerve deficit. No focal deficits. No facial droop.  No slurred speech.   Skin:  Skin is warm and dry. No rash noted. No pallor.   Peripheral vascular:  pulses in all 4 extremities with no clubbing, no cyanosis, no edema.  Genitourinary: No Mehta  ----------------------------------------------------------------------------------------------------------------------  ----------------------------------------------------------------------------------------------------------------------  Results from last 7 days   Lab Units  12/03/18 0429 12/02/18 0508 12/01/18 0507   CRP mg/dL  3.95*  4.92*  7.00*   WBC 10*3/mm3  8.04  8.09  8.59   HEMOGLOBIN g/dL  13.0*  12.7*  12.6*   HEMATOCRIT %  42.5  40.2*  40.2*   MCV fL  94.7*  94.4*  93.7   MCHC g/dL  30.6*  31.6*  31.3*   PLATELETS 10*3/mm3  314  299  258     Results from last 7 days   Lab Units  12/03/18 0429 12/02/18 0508 12/01/18 0507 11/30/18   0515  11/29/18   0508   11/28/18   0545   SODIUM mmol/L  134*  137  135  138  133*  133*   POTASSIUM mmol/L  4.8  4.4  4.3  3.8  3.7  3.7   MAGNESIUM mg/dL   --    --   2.1  2.1  1.9  1.9   CHLORIDE mmol/L  101  103  101  103  100  101   CO2 mmol/L  27.8  29.0  28.4  28.2  26.5  26.9   BUN mg/dL  9  10  9  9  11  14   CREATININE mg/dL  0.40*  0.40*  0.36*  0.41*  0.44  0.49   PHOSPHORUS mg/dL   --    --    --    --    --   3.1   EGFR IF NONAFRICN AM mL/min/1.73  >150  >150  >150  >150  >150  >150   CALCIUM mg/dL  7.9  7.8  7.7  7.6*  7.8  8.5   GLUCOSE mg/dL  89  94  84  102  96  88   ALBUMIN g/dL   --    --    --    --    --   3.00*   BILIRUBIN mg/dL   --    --    --    --    --   0.8   ALK PHOS U/L   --    --    --    --    --   86   AST (SGOT) U/L   --    --    --    --    --   22   ALT (SGPT) U/L   --    --    --    --    --   17   Estimated Creatinine Clearance: 442.4 mL/min (A) (by C-G formula based on SCr of 0.4 mg/dL (L)).      No results found for: HGBA1C, POCGLU  No results found for: AMMONIA              I have personally looked at the labs and they are summarized above.  ----------------------------------------------------------------------------------------------------------------------  Imaging Results (last 24 hours)     ** No results found for the last 24 hours. **        I have personally reviewed the radiology images and read the final radiology report.    Assessment & Plan      Assessment:  Sepsis  B/L lower extremity cellulitis  Hyponatremia, mild  HTN  Positive d-dimer with negative CT PE protocol and negative venous Doppler bilateral  Morbid obesity  Constipation  Debility    Plan:  Sepsis secondary to bilateral lower extremity cellulitis resolved.  Leukocytosis resolved and CRP improving.  Blood cultures negative.  Continue with IV antibiotics vancomycin and cefepime for 1 day than change to omnicef and doxycycline.  discuss with MERCEDES Borges regarding the plan.  Hyponatremia mild asymptomatic.  Continue to  monitor.  Blood pressure improved on low-dose lisinopril.  Continue to monitor and titrate accordingly.  Continue with Colace and MiraLAX for constipation.  Heparin for DVT prophylaxis.   DC to home in next 24-48 hrs if stable with weakness and debility.    The patient is high risk due to the following diagnoses/reasons:  sepsis, bilateral LE cellulitis with chronic lymphedema and electrolyte abnormalities.           Jo Ann Díaz MD  12/04/18  5:38 PM

## 2018-12-04 NOTE — PROGRESS NOTES
Pharmacokinetics Service Note:    Mr. Recinos continues on day 7 of vancomycin 2 gm q8hrs for his BLE cellulitis in the setting of chronic lymphedema.  A 5.25 hour post 2 hour infusion trough level was reported as 28.1 mg/L today.  This is higher than desired and consistent with poor clearance at this time.  Will extend the dosage interval to q12hrs from 2100 tonight to target troughs = 15-20 mg/L.  Will repeat a trough level at steady state if treatment continues.      Day 8 cefepime  Day 7 vancomycin IV    Thank you.  Daniela He, Pharm.D.  12/4/2018  2:30 PM

## 2018-12-04 NOTE — THERAPY EVALUATION
Acute Care - Physical Therapy Initial Evaluation   [facility]     Patient Name: Jimmy Recinos  : 1975  MRN: [id number]  Today's Date: 2018   Onset of Illness/Injury or Date of Surgery: 18  Date of Referral to PT: 18  Referring Physician:       Admit Date: 2018    Visit Dx:     ICD-10-CM ICD-9-CM   1. Cellulitis of left lower extremity L03.116 682.6     Patient Active Problem List   Diagnosis   • Bilateral cellulitis of lower leg     Past Medical History:   Diagnosis Date   • Charcot-Ramya disease    • Muscular dystrophy      Past Surgical History:   Procedure Laterality Date   • WRIST SURGERY          PT ASSESSMENT (last 12 hours)      Physical Therapy Evaluation     Row Name 18 1600          PT Evaluation Time/Intention    Subjective Information  complains of;weakness  -BC     Document Type  evaluation  -BC     Mode of Treatment  physical therapy;individual therapy  -BC     Patient Effort  fair  -BC     Symptoms Noted During/After Treatment  fatigue  -BC     Row Name 18 1600          General Information    Patient Profile Reviewed?  yes  -BC     Onset of Illness/Injury or Date of Surgery  18  -BC     Referring Physician    -BC     Patient Observations  alert;cooperative;agree to therapy  -BC     Prior Level of Function  independent:;all household mobility  -BC     Equipment Currently Used at Home  walker, rolling;orthotic device B LE leg braces  -BC     Risks Reviewed  patient:;LOB;nausea/vomiting;dizziness;increased discomfort;change in vital signs;increased drainage;lines disloged  -BC     Benefits Reviewed  patient:;improve function;increase independence;increase strength;increase balance;decrease risk of DVT;decrease pain;improve skin integrity;increase knowledge  -BC     Row Name 18 1600          Relationship/Environment    Lives With  alone  -BC     Family Caregiver if Needed  child(mercy), adult  -BC     Family Caregiver Names  Son   -BC     Row Name 12/04/18 1600          Resource/Environmental Concerns    Current Living Arrangements  home/apartment/condo  -BC     Resource/Environmental Concerns  none  -BC     Transportation Concerns  car, none  -BC     Row Name 12/04/18 1600          Cognitive Assessment/Intervention- PT/OT    Orientation Status (Cognition)  oriented x 4  -BC     Follows Commands (Cognition)  WFL  -BC     Row Name 12/04/18 1600          Bed Mobility Assessment/Treatment    Bed Mobility Assessment/Treatment  bed mobility (all) activities  -BC     [place] Level (Bed Mobility)  moderate assist (50% patient effort);maximum assist (25% patient effort);2 person assist  -BC     Assistive Device (Bed Mobility)  bed rails  -BC     Row Name 12/04/18 1600          Transfer Assessment/Treatment    Transfer Assessment/Treatment  sit-stand transfer;stand-sit transfer  -BC     Maintains Weight-bearing Status (Transfers)  physical assist to maintain  -BC     Sit-Stand [place] (Transfers)  moderate assist (50% patient effort);maximum assist (25% patient effort)  -BC     Stand-Sit [place] (Transfers)  moderate assist (50% patient effort);maximum assist (25% patient effort);2 person assist  -BC     Row Name 12/04/18 1600          Sit-Stand Transfer    Assistive Device (Sit-Stand Transfers)  walker, front-wheeled bariatric walker  -BC     Row Name 12/04/18 1600          Stand-Sit Transfer    Assistive Device (Stand-Sit Transfers)  walker, front-wheeled  -BC     Row Name 12/04/18 1600          Gait/Stairs Assessment/Training    Gait/Stairs Assessment/Training  gait/ambulation independence  -BC     [place] Level (Gait)  moderate assist (50% patient effort);maximum assist (25% patient effort);2 person assist  -BC     Assistive Device (Gait)  walker, front-wheeled  -BC     Distance in Feet (Gait)  3 side stepping toward HOB  -BC     Row Name 12/04/18 1600          General ROM    GENERAL ROM COMMENTS  BLE ROM limited due to disease  process.  -BC     Row Name 12/04/18 1600          MMT (Manual Muscle Testing)    General MMT Comments  BLE MMT 3-/5  -BC     Row Name 12/04/18 1600          Physical Therapy Clinical Impression    Date of Referral to PT  12/03/18  -BC     Functional Level at Time of Evaluation (PT Clinical Impression)  fair/poor  -BC     Criteria for Skilled Interventions Met (PT Clinical Impression)  yes;treatment indicated  -BC     Functional Limitations in Following Categories (Describe Specific Limitations)  self-care;home management;community/leisure  -BC     Rehab Potential (PT Clinical Summary)  fair, will monitor progress closely  -BC     Row Name 12/04/18 1600          Physical Therapy Goals    Bed Mobility Goal Selection (PT)  bed mobility, PT goal 1  -BC     Transfer Goal Selection (PT)  transfer, PT goal 1  -BC     Gait Training Goal Selection (PT)  gait training, PT goal 1  -BC     Row Name 12/04/18 1600          Bed Mobility Goal 1 (PT)    Activity/Assistive Device (Bed Mobility Goal 1, PT)  bed mobility activities, all  -BC     [place] Level/Cues Needed (Bed Mobility Goal 1, PT)  moderate assist (50-74% patient effort)  -BC     Time Frame (Bed Mobility Goal 1, PT)  by discharge  -[facility] Name 12/04/18 1600          Transfer Goal 1 (PT)    Activity/Assistive Device (Transfer Goal 1, PT)  transfers, all  -BC     [place] Level/Cues Needed (Transfer Goal 1, PT)  moderate assist (50-74% patient effort);minimum assist (75% or more patient effort)  -BC     Time Frame (Transfer Goal 1, PT)  by discharge  -BC     Row Name 12/04/18 1600          Gait Training Goal 1 (PT)    Activity/Assistive Device (Gait Training Goal 1, PT)  gait (walking locomotion)  -BC     [place] Level (Gait Training Goal 1, PT)  minimum assist (75% or more patient effort);moderate assist (50-74% patient effort)  -BC     Distance (Gait Goal 1, PT)  20  -BC     Time Frame (Gait Training Goal 1, PT)  by discharge  -BC     Row Name 12/04/18  1600          Positioning and Restraints    Pre-Treatment Position  in bed  -BC     Post Treatment Position  bed  -BC       User Key  (r) = Recorded By, (t) = Taken By, (c) = Cosigned By    Initials Name Provider Type    BC Janell Rosales PT Physical Therapist        Physical Therapy Education     Title: PT OT SLP Therapies (Done)     Topic: Physical Therapy (Done)     Point: Mobility training (Done)     Learning Progress Summary           Patient Acceptance, E, VU by BC at 12/4/2018  5:05 PM                   Point: Home exercise program (Done)     Learning Progress Summary           Patient Acceptance, E, VU by BC at 12/4/2018  5:05 PM                   Point: Body mechanics (Done)     Learning Progress Summary           Patient Acceptance, E, VU by BC at 12/4/2018  5:05 PM                   Point: Precautions (Done)     Learning Progress Summary           Patient Acceptance, E, VU by BC at 12/4/2018  5:05 PM                               User Key     Initials Effective Dates Name Provider Type Discipline    BC 03/14/16 -  Janell Rosales PT Physical Therapist PT              PT Recommendation and Plan  Planned Therapy Interventions (PT Eval): balance training, bed mobility training, gait training, home exercise program, patient/family education, strengthening, transfer training  Therapy Frequency (PT Clinical Impression): 3 times/wk(3-5x/week)  Plan of Care Reviewed With: patient  Outcome Measures     Row Name 12/04/18 1700 12/04/18 1325 12/04/18 1300       How much help from another person do you currently need...    Turning from your back to your side while in flat bed without using bedrails?  2  -BC  --  --    Moving from lying on back to sitting on the side of a flat bed without bedrails?  2  -BC  --  --    Moving to and from a bed to a chair (including a wheelchair)?  1  -BC  --  --    Standing up from a chair using your arms (e.g., wheelchair, bedside chair)?  2  -BC  --  --    Climbing 3-5 steps  with a railing?  1  -BC  --  --    To walk in hospital room?  2  -BC  --  --    AM-PAC 6 Clicks Score  10  -BC  --  --       How much help from another is currently needed...    Putting on and taking off regular lower body clothing?  --  2  -KR  --    Bathing (including washing, rinsing, and drying)  --  3  -KR  --    Toileting (which includes using toilet bed pan or urinal)  --  2  -KR  --    Putting on and taking off regular upper body clothing  --  3  -KR  --    Taking care of personal grooming (such as brushing teeth)  --  3  -KR  --    Eating meals  --  4  -KR  --    Score  --  17  -KR  --       Functional Assessment    Outcome Measure Options  AM-PAC 6 Clicks Basic Mobility (PT)  -BC  --  AM-PAC 6 Clicks Daily Activity (OT)  -KR      User Key  (r) = Recorded By, (t) = Taken By, (c) = Cosigned By    Initials Name Provider Type    BC Janell Rosales, PT Physical Therapist    David Thomas, OT Occupational Therapist         Time Calculation:   PT Charges     Row Name 12/04/18 1708             Time Calculation    PT Received On  12/04/18  -BC      PT Goal Re-Cert Due Date  12/18/18  -BC         Time Calculation- PT    Total Timed Code Minutes- PT  60 minute(s)  -BC         Timed Charges    15457 - PT Therapeutic Exercise Minutes  15  -BC      61669 - PT Therapeutic Activity Minutes  15  -BC        User Key  (r) = Recorded By, (t) = Taken By, (c) = Cosigned By    Initials Name Provider Type    BC Janell Rosales, PT Physical Therapist        Therapy Suggested Charges     Code   Minutes Charges    56699 (CPT®) Hc Pt Neuromusc Re Education Ea 15 Min      92030 (CPT®) Hc Pt Ther Proc Ea 15 Min 15 1    95792 (CPT®) Hc Gait Training Ea 15 Min      99254 (CPT®) Hc Pt Therapeutic Act Ea 15 Min 15 1    60462 (CPT®) Hc Pt Manual Therapy Ea 15 Min      86801 (CPT®) Hc Pt Iontophoresis Ea 15 Min      13197 (CPT®) Hc Pt Elec Stim Ea-Per 15 Min      50989 (CPT®) Hc Pt Ultrasound Ea 15 Min      12336 (CPT®) Hc Pt Self  Care/Mgmt/Train Ea 15 Min      27538 (CPT®) Hc Pt Prosthetic (S) Train Initial Encounter, Each 15 Min      20272 (CPT®) Hc Pt Orthotic(S)/Prosthetic(S) Encounter, Each 15 Min      90436 (CPT®) Hc Orthotic(S) Mgmt/Train Initial Encounter, Each 15min      Total  30 2        Therapy Charges for Today     Code Description Service Date Service Provider Modifiers Qty    03611837385 HC PT MOBILITY CURRENT 12/4/2018 Janell Rosales, PT GP, CL 1    11572752760 HC PT MOBILITY PROJECTED 12/4/2018 Janell Rosales, PT GP, CK 1    72570991661 HC PT THER PROC EA 15 MIN 12/4/2018 Janell Rosales, PT GP 1    27701058596 HC PT THERAPEUTIC ACT EA 15 MIN 12/4/2018 Janell Rosales, PT GP 1    91648847624 HC PT EVAL MOD COMPLEXITY 2 12/4/2018 Janell Rosales, PT GP 1    83832952710 HC PT THER SUPP EA 15 MIN 12/4/2018 Janell Rosales, PT GP 4          PT G-Codes  Outcome Measure Options: AM-PAC 6 Clicks Basic Mobility (PT)  AM-PAC 6 Clicks Score: 10  Score: 17  Functional Limitation: Mobility: Walking and moving around  Mobility: Walking and Moving Around Current Status (): At least 60 percent but less than 80 percent impaired, limited or restricted  Mobility: Walking and Moving Around Goal Status (): At least 40 percent but less than 60 percent impaired, limited or restricted      Janell Rosales PT  12/4/2018

## 2018-12-04 NOTE — PLAN OF CARE
Problem: Skin and Soft Tissue Infection (Adult)  Goal: Signs and Symptoms of Listed Potential Problems Will be Absent, Minimized or Managed (Skin and Soft Tissue Infection)  Outcome: Ongoing (interventions implemented as appropriate)   12/01/18 1417 12/04/18 0935   Goal/Outcome Evaluation   Problems Assessed (Skin and Soft Tissue Infection) --  all   Problems Present (Skin/Soft Tissue Inf) infection progression;pain --        Problem: Infection, Risk/Actual (Adult)  Goal: Infection Prevention/Resolution  Outcome: Ongoing (interventions implemented as appropriate)   12/04/18 0935   Infection, Risk/Actual (Adult)   Infection Prevention/Resolution making progress toward outcome       Problem: Self-Care Deficit (Adult,Obstetrics,Pediatric)  Goal: Improved Ability to Perform BADL and IADL  Outcome: Ongoing (interventions implemented as appropriate)   12/04/18 0935   Self-Care Deficit (Adult,Obstetrics,Pediatric)   Improved Ability to Perform BADL and IADL making progress toward outcome       Problem: Fall Risk (Adult)  Goal: Absence of Fall  Outcome: Ongoing (interventions implemented as appropriate)   12/04/18 0935   Fall Risk (Adult)   Absence of Fall making progress toward outcome       Problem: Skin Injury Risk (Adult)  Goal: Skin Health and Integrity  Outcome: Ongoing (interventions implemented as appropriate)   12/04/18 0935   Skin Injury Risk (Adult)   Skin Health and Integrity making progress toward outcome       Problem: Patient Care Overview  Goal: Plan of Care Review  Outcome: Ongoing (interventions implemented as appropriate)   12/04/18 0935   Coping/Psychosocial   Plan of Care Reviewed With patient   Plan of Care Review   Progress improving     Goal: Individualization and Mutuality  Outcome: Ongoing (interventions implemented as appropriate)    Goal: Discharge Needs Assessment  Outcome: Ongoing (interventions implemented as appropriate)    Goal: Interprofessional Rounds/Family Conf  Outcome: Ongoing  (interventions implemented as appropriate)

## 2018-12-04 NOTE — PROGRESS NOTES
PROGRESS NOTE         Patient Identification:  Name:  Jimmy Recinos  Age:  42 y.o.  Sex:  male  :  1975  MRN:  [id number]  Visit Number:  [id number]  Primary Care Provider:  Belle Khanna DO      ----------------------------------------------------------------------------------------------------------------------  Subjective       Chief Complaints:    Fever        Interval History:      Lower extremities continue to show improvement with less edema and erythema today. CRP level continues to show improvement down to 3.95 from 4.92 with a normal white count. The patient has requested to continue IV antibiotic therapy for another day.     Review of Systems:    Constitutional: no fever, chills and night sweats. No appetite change or unexpected weight change. No fatigue.  Eyes: no eye drainage, itching or redness.  HEENT: no mouth sores, dysphagia or nose bleed.  Respiratory: no for shortness of breath, cough or production of sputum.  Cardiovascular: no chest pain, no palpitations, no orthopnea.  Gastrointestinal: no nausea, vomiting or diarrhea. No abdominal pain, hematemesis or rectal bleeding.  Genitourinary: no dysuria or polyuria.  Hematologic/lymphatic: no lymph node abnormalities, no easy bruising or easy bleeding.  Musculoskeletal: no muscle or joint pain.  Skin: No rash and no itching.  Neurological: no loss of consciousness, no seizure, no headache.  Behavioral/Psych: no depression or suicidal ideation.  Endocrine: no hot flashes.  Immunologic: negative.  ----------------------------------------------------------------------------------------------------------------------      Objective       Current [facility] Meds:    cefepime 2 g Intravenous Q8H   docusate sodium 100 mg Oral BID   heparin (porcine) 5,000 Units Subcutaneous Q12H   lactobacillus acidophilus 1 capsule Oral Daily   lisinopril 5 mg Oral Q24H   lubrisoft  Apply externally Daily   polyethylene glycol 17 g Oral BID   sodium  chloride 10 mL Intravenous Q12H   sodium chloride 3 mL Intravenous Q12H   vancomycin 2,000 mg Intravenous Q8H        ----------------------------------------------------------------------------------------------------------------------    Vital Signs:  Temp:  [97.9 °F (36.6 °C)-98.6 °F (37 °C)] 98.6 °F (37 °C)  Heart Rate:  [78-90] 86  Resp:  [20] 20  BP: (115-138)/(66-81) 121/67  No data found.  SpO2 Percentage    12/03/18 2234 12/04/18 0300 12/04/18 0639   SpO2: 91% 94% 93%     SpO2:  [91 %-98 %] 93 %  on   ;   Device (Oxygen Therapy): room air    Body mass index is 68.23 kg/m².  Wt Readings from Last 3 Encounters:   11/26/18 (!) 216 kg (475 lb 8 oz)   11/24/18 (!) 211 kg (465 lb)   09/17/18 (!) 220 kg (485 lb)        Intake/Output Summary (Last 24 hours) at 12/4/2018 0939  Last data filed at 12/4/2018 0806  Gross per 24 hour   Intake 360 ml   Output 4700 ml   Net -4340 ml     Diet Regular; Cardiac, Consistent Carbohydrate, Daily Fluid Restriction; 1500 mL Fluid  ----------------------------------------------------------------------------------------------------------------------    Physical exam:    Constitutional:  Well-developed and well-nourished.  No respiratory distress.      HENT:  Head:  Normocephalic and atraumatic.  Mouth:  Moist mucous membranes.    Eyes:  Conjunctivae and EOM are normal.  No scleral icterus.    Neck:  Neck supple.  No JVD present.    Cardiovascular:  Normal rate, regular rhythm and normal heart sounds with no murmur. No edema.  Pulmonary/Chest:  No respiratory distress, no wheezes, no crackles, with normal breath sounds and good air movement.  Abdominal:  Soft.  Bowel sounds are normal.  No distension and no tenderness.   Musculoskeletal:  No edema, no tenderness, and no deformity.  No red or swollen joints anywhere.    Neurological:  Alert and oriented to person, place, and time. No facial droop.  No slurred speech.   Skin:  Skin is warm and dry. No rash noted. No pallor.      ----------------------------------------------------------------------------------------------------------------------  I have personally reviewed the EKG/Telemetry strips   ----------------------------------------------------------------------------------------------------------------------            Results from last 7 days   Lab Units  12/03/18   0429  12/02/18   0508  12/01/18   0507   CRP mg/dL  3.95*  4.92*  7.00*   WBC 10*3/mm3  8.04  8.09  8.59   HEMOGLOBIN g/dL  13.0*  12.7*  12.6*   HEMATOCRIT %  42.5  40.2*  40.2*   MCV fL  94.7*  94.4*  93.7   MCHC g/dL  30.6*  31.6*  31.3*   PLATELETS 10*3/mm3  314  299  258     Results from last 7 days   Lab Units  12/03/18   0429  12/02/18   0508  12/01/18   0507  11/30/18   0515  11/29/18   0508  11/28/18   0545   SODIUM mmol/L  134*  137  135  138  133*  133*   POTASSIUM mmol/L  4.8  4.4  4.3  3.8  3.7  3.7   MAGNESIUM mg/dL   --    --   2.1  2.1  1.9  1.9   CHLORIDE mmol/L  101  103  101  103  100  101   CO2 mmol/L  27.8  29.0  28.4  28.2  26.5  26.9   BUN mg/dL  9  10  9  9  11  14   CREATININE mg/dL  0.40*  0.40*  0.36*  0.41*  0.44  0.49   EGFR IF NONAFRICN AM mL/min/1.73  >150  >150  >150  >150  >150  >150   CALCIUM mg/dL  7.9  7.8  7.7  7.6*  7.8  8.5   GLUCOSE mg/dL  89  94  84  102  96  88   ALBUMIN g/dL   --    --    --    --    --   3.00*   BILIRUBIN mg/dL   --    --    --    --    --   0.8   ALK PHOS U/L   --    --    --    --    --   86   AST (SGOT) U/L   --    --    --    --    --   22   ALT (SGPT) U/L   --    --    --    --    --   17   Estimated Creatinine Clearance: 442.4 mL/min (A) (by C-G formula based on SCr of 0.4 mg/dL (L)).  No results found for: AMMONIA    No results found for: HGBA1C, POCGLU  No results found for: HGBA1C  No results found for: TSH, FREET4    Blood Culture   Date Value Ref Range Status   11/26/2018 No growth at 24 hours  Preliminary   11/26/2018 No growth at 24 hours  Preliminary                 Pain Management Panel      Pain Management Panel Latest Ref Rng & Units 10/19/2014    AMPHETAMINES SCREEN, URINE NEGATIVE Negative    BARBITURATES SCREEN NEGATIVE Negative    BENZODIAZEPINE SCREEN, URINE NEGATIVE Negative    COCAINE SCREEN, URINE NEGATIVE Negative    METHADONE SCREEN, URINE NEGATIVE Negative          I have personally reviewed the above laboratory results.   ----------------------------------------------------------------------------------------------------------------------  Imaging Results (last 24 hours)     ** No results found for the last 24 hours. **        I have personally reviewed the above radiology results.   ----------------------------------------------------------------------------------------------------------------------    Assessment/Plan       Assessment/Plan     ASSESSMENT:       1. Sepsis with elevated white count and fever on admission  2. 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 CRP level continues to show improvement down to 3.95 from 4.92 with a normal white count. The patient has requested to continue IV antibiotic therapy for another day. Would de-escalate to Omnicef 300 mg PO BID and Doxycycline 100 mg PO BID on discharge to continue for another 10-14 days due to the high risk of relapsing cellulitis.     Recommend to continue to elevate lower extremities as this is important for further improvement.      Current Antimicrobials:    Cefepime 2 gm IV q 8 hrs  Vancomycin pharmacy to dose    Code Status:   Code Status and Medical Interventions:   Ordered at: 11/26/18 0131     Code Status:    CPR     Medical Interventions (Level of Support Prior to Arrest):    Full       Katerina Dickinson PA-C  12/04/18  9:39 AM

## 2018-12-04 NOTE — THERAPY EVALUATION
Acute Care - Occupational Therapy Initial Evaluation   [facility]     Patient Name: Jimmy Recinos  : 1975  MRN: [id number]  Today's Date: 2018             Admit Date: 2018       ICD-10-CM ICD-9-CM   1. Cellulitis of left lower extremity L03.116 682.6     Patient Active Problem List   Diagnosis   • Bilateral cellulitis of lower leg     Past Medical History:   Diagnosis Date   • Charcot-Ramya disease    • Muscular dystrophy      Past Surgical History:   Procedure Laterality Date   • WRIST SURGERY            OT ASSESSMENT FLOWSHEET (last 72 hours)      Occupational Therapy Evaluation     Row Name 18 1313                   OT Evaluation Time/Intention    Document Type  evaluation  -KR        Mode of Treatment  occupational therapy  -KR        Patient Effort  good  -KR           Cognitive Assessment/Intervention- PT/OT    Orientation Status (Cognition)  oriented x 4  -KR        Follows Commands (Cognition)  WFL  -KR           ADL Assessment/Intervention    BADL Assessment/Intervention  upper body dressing;lower body dressing;grooming;feeding;toileting  -KR           Upper Body Dressing Assessment/Training    Upper Body Dressing [place] Level  upper body dressing skills;contact guard assist  -KR           Lower Body Dressing Assessment/Training    Lower Body Dressing [place] Level  lower body dressing skills;minimum assist (75% patient effort)  -KR           Grooming Assessment/Training    [place] Level (Grooming)  grooming skills;set up  -KR           Self-Feeding Assessment/Training    [place] Level (Feeding)  feeding skills;set up  -KR           Toileting Assessment/Training    [place] Level (Toileting)  toileting skills;moderate assist (50% patient effort)  -KR           General ROM    GENERAL ROM COMMENTS  BUE WFL, BUE hands present with premorbid deformities at PIP 90 degrees flexion. PROM WFL.   -KR           MMT (Manual Muscle Testing)    General MMT Comments  BUE  3/5  -KR           Clinical Impression (OT)    Criteria for Skilled Therapeutic Interventions Met (OT Eval)  yes  -KR        Rehab Potential (OT Eval)  good, to achieve stated therapy goals  -KR           Planned OT Interventions    Planned Therapy Interventions (OT Eval)  activity tolerance training;strengthening exercise  -KR           OT Goals    Dressing Goal Selection (OT)  dressing, OT goal 1  -KR        Strength Goal Selection (OT)  strength, OT goal 1  -KR        Additional Documentation  Strength Goal Selection (OT) (Row)  -KR           Dressing Goal 1 (OT)    Activity/Assistive Device (Dressing Goal 1, OT)  dressing skills, all  -KR        [place]/Cues Needed (Dressing Goal 1, OT)  set-up required  -KR        Time Frame (Dressing Goal 1, OT)  by discharge  -KR           Strength Goal 1 (OT)    Strength Goal 1 (OT)  BUE increase x 1 to enhance self care skills  -KR        Time Frame (Strength Goal 1, OT)  by discharge  -KR          User Key  (r) = Recorded By, (t) = Taken By, (c) = Cosigned By    Initials Name Effective Dates    KR David Daniels, OT 04/03/18 -                OT Recommendation and Plan  Planned Therapy Interventions (OT Eval): activity tolerance training, strengthening exercise       Outcome Measures     Row Name 12/04/18 1325 12/04/18 1300          How much help from another is currently needed...    Putting on and taking off regular lower body clothing?  2  -KR  --     Bathing (including washing, rinsing, and drying)  3  -KR  --     Toileting (which includes using toilet bed pan or urinal)  2  -KR  --     Putting on and taking off regular upper body clothing  3  -KR  --     Taking care of personal grooming (such as brushing teeth)  3  -KR  --     Eating meals  4  -KR  --     Score  17  -KR  --        Functional Assessment    Outcome Measure Options  --  AM-PAC 6 Clicks Daily Activity (OT)  -KR       User Key  (r) = Recorded By, (t) = Taken By, (c) = Cosigned By    Initials Name  Provider Type    David Thomas OT Occupational Therapist          Time Calculation:   Time Calculation- OT     Row Name 12/04/18 1326             Time Calculation- OT    Total Timed Code Minutes- OT  30 minute(s)  -JARED        User Key  (r) = Recorded By, (t) = Taken By, (c) = Cosigned By    Initials Name Provider Type    David Thomas OT Occupational Therapist        Therapy Suggested Charges     Code   Minutes Charges    None           Therapy Charges for Today     Code Description Service Date Service Provider Modifiers Qty    [id number]  OT SELFCARE CURRENT 12/4/2018 David Daniels OT GO, CK 1    [id number] HC OT SELFCARE PROJECTED 12/4/2018 David Daniels OT GO, CI 1    [id number]  OT EVAL HIGH COMPLEXITY 2 12/4/2018 David Daniels OT GO 1          OT G-codes  OT Professional Judgement Used?: Yes  Functional Limitation: Self care  Self Care Current Status (): At least 40 percent but less than 60 percent impaired, limited or restricted  Self Care Goal Status (): At least 1 percent but less than 20 percent impaired, limited or restricted    David Daniels OT  12/4/2018

## 2018-12-04 NOTE — PLAN OF CARE
Problem: Skin and Soft Tissue Infection (Adult)  Goal: Signs and Symptoms of Listed Potential Problems Will be Absent, Minimized or Managed (Skin and Soft Tissue Infection)  Outcome: Ongoing (interventions implemented as appropriate)   12/01/18 1417 12/02/18 1043   Goal/Outcome Evaluation   Problems Assessed (Skin and Soft Tissue Infection) --  all   Problems Present (Skin/Soft Tissue Inf) infection progression;pain --        Problem: Infection, Risk/Actual (Adult)  Goal: Infection Prevention/Resolution  Outcome: Ongoing (interventions implemented as appropriate)   12/02/18 1043   Infection, Risk/Actual (Adult)   Infection Prevention/Resolution making progress toward outcome       Problem: Self-Care Deficit (Adult,Obstetrics,Pediatric)  Goal: Improved Ability to Perform BADL and IADL  Outcome: Ongoing (interventions implemented as appropriate)   12/02/18 1043   Self-Care Deficit (Adult,Obstetrics,Pediatric)   Improved Ability to Perform BADL and IADL making progress toward outcome       Problem: Fall Risk (Adult)  Goal: Absence of Fall  Outcome: Ongoing (interventions implemented as appropriate)   12/02/18 1043   Fall Risk (Adult)   Absence of Fall making progress toward outcome       Problem: Patient Care Overview  Goal: Interprofessional Rounds/Family Conf  Outcome: Ongoing (interventions implemented as appropriate)   12/03/18 1105   Interdisciplinary Rounds/Family Conf   Participants nursing       Problem: Skin Injury Risk (Adult)  Goal: Skin Health and Integrity  Outcome: Ongoing (interventions implemented as appropriate)   12/02/18 1043   Skin Injury Risk (Adult)   Skin Health and Integrity making progress toward outcome       Problem: Patient Care Overview  Goal: Plan of Care Review  Outcome: Ongoing (interventions implemented as appropriate)   12/02/18 1043 12/03/18 2000   Coping/Psychosocial   Plan of Care Reviewed With --  patient   Plan of Care Review   Progress improving --      Goal: Discharge Needs  "Assessment  Outcome: Ongoing (interventions implemented as appropriate)   11/27/18 1130 12/03/18 1115   Discharge Needs Assessment   Readmission Within the Last 30 Days no previous admission in last 30 days --    Concerns to be Addressed no discharge needs identified;denies needs/concerns at this time  (States he has no home care needs.) --    Patient/Family Anticipates Transition to home --    Patient/Family Anticipated Services at Transition none  (Denies any needs at this time.) --    Transportation Concerns car, none --    Transportation Anticipated car, drives self  (Plans to drive self home if he can get his braces on if swelling is gone down in his legs.) --    Anticipated Changes Related to Illness none --    Equipment Needed After Discharge --  walker, rolling  (Pt states he has had RW approx 9 years and is requesting a new one. He says he has attempted to get one from [facility], but he needs 36\" width and they have been unable to order one for him. SW will arrange bariatric RW at d/c with MD order.)   Outpatient/Agency/Support Group Needs (States he has no Home care needs and no Home Health needs.) --    Disability   Equipment Currently Used at Home --  cane, straight;wheelchair;walker, rolling;other (see comments);ramp  (Pt says he has a RW, homemade cane, WC, AFO braces from [employer] in [place] and wheelchair ramp at home. )     Goal: Interprofessional Rounds/Family Conf  Outcome: Ongoing (interventions implemented as appropriate)   11/27/18 2220   Interdisciplinary Rounds/Family Conf   Participants family;nursing         "

## 2018-12-04 NOTE — PROGRESS NOTES
Cellulitis, morbid obesity    The feet are continuing to improve. Afeb and VSS. The areas on the right foot that were more blister like will likely eventually peal but  I would leave them alone.

## 2018-12-05 VITALS
TEMPERATURE: 97.6 F | OXYGEN SATURATION: 94 % | WEIGHT: 315 LBS | SYSTOLIC BLOOD PRESSURE: 145 MMHG | BODY MASS INDEX: 45.1 KG/M2 | DIASTOLIC BLOOD PRESSURE: 75 MMHG | HEIGHT: 70 IN | RESPIRATION RATE: 16 BRPM | HEART RATE: 82 BPM

## 2018-12-05 PROBLEM — I10 ESSENTIAL HYPERTENSION: Status: ACTIVE | Noted: 2018-12-05

## 2018-12-05 PROBLEM — E66.01 OBESITY, MORBID, BMI 50 OR HIGHER (HCC): Status: ACTIVE | Noted: 2018-12-05

## 2018-12-05 PROCEDURE — G8978 MOBILITY CURRENT STATUS: HCPCS

## 2018-12-05 PROCEDURE — G8980 MOBILITY D/C STATUS: HCPCS

## 2018-12-05 PROCEDURE — 99239 HOSP IP/OBS DSCHRG MGMT >30: CPT | Performed by: HOSPITALIST

## 2018-12-05 PROCEDURE — 25010000002 VANCOMYCIN 5 G RECONSTITUTED SOLUTION 5,000 MG VIAL

## 2018-12-05 PROCEDURE — 97110 THERAPEUTIC EXERCISES: CPT

## 2018-12-05 PROCEDURE — 97530 THERAPEUTIC ACTIVITIES: CPT

## 2018-12-05 PROCEDURE — 25010000002 CEFEPIME 2 G/NS 100 ML SOLUTION

## 2018-12-05 PROCEDURE — 25010000002 HEPARIN (PORCINE) PER 1000 UNITS: Performed by: PHYSICIAN ASSISTANT

## 2018-12-05 PROCEDURE — G8979 MOBILITY GOAL STATUS: HCPCS

## 2018-12-05 PROCEDURE — 97116 GAIT TRAINING THERAPY: CPT

## 2018-12-05 PROCEDURE — 99231 SBSQ HOSP IP/OBS SF/LOW 25: CPT | Performed by: SURGERY

## 2018-12-05 RX ORDER — PSEUDOEPHEDRINE HCL 30 MG
100 TABLET ORAL 2 TIMES DAILY
Qty: 14 EACH | Refills: 0
Start: 2018-12-05 | End: 2018-12-12

## 2018-12-05 RX ORDER — LISINOPRIL 5 MG/1
5 TABLET ORAL
Qty: 30 TABLET | Refills: 0 | Status: SHIPPED | OUTPATIENT
Start: 2018-12-06 | End: 2019-01-05

## 2018-12-05 RX ORDER — EMOLLIENT COMBINATION NO.92
LOTION (ML) TOPICAL DAILY
Qty: 946 ML | Refills: 0 | Status: SHIPPED | OUTPATIENT
Start: 2018-12-06 | End: 2019-12-21

## 2018-12-05 RX ORDER — CEFDINIR 300 MG/1
300 CAPSULE ORAL EVERY 12 HOURS SCHEDULED
Status: DISCONTINUED | OUTPATIENT
Start: 2018-12-05 | End: 2018-12-05 | Stop reason: HOSPADM

## 2018-12-05 RX ORDER — CEFDINIR 300 MG/1
300 CAPSULE ORAL EVERY 12 HOURS SCHEDULED
Qty: 19 CAPSULE | Refills: 0 | Status: SHIPPED | OUTPATIENT
Start: 2018-12-05 | End: 2018-12-15

## 2018-12-05 RX ORDER — CA/D3/MAG OX/ZINC/COP/MANG/BOR 600 MG-800
1 TABLET,CHEWABLE ORAL DAILY
Qty: 14 CAPSULE | Refills: 0 | Status: SHIPPED | OUTPATIENT
Start: 2018-12-06 | End: 2018-12-20

## 2018-12-05 RX ORDER — DOXYCYCLINE 100 MG/1
100 CAPSULE ORAL EVERY 12 HOURS SCHEDULED
Status: DISCONTINUED | OUTPATIENT
Start: 2018-12-05 | End: 2018-12-05 | Stop reason: HOSPADM

## 2018-12-05 RX ORDER — DOXYCYCLINE 100 MG/1
100 CAPSULE ORAL EVERY 12 HOURS SCHEDULED
Qty: 19 CAPSULE | Refills: 0 | Status: SHIPPED | OUTPATIENT
Start: 2018-12-05 | End: 2018-12-15

## 2018-12-05 RX ADMIN — Medication: at 08:46

## 2018-12-05 RX ADMIN — HEPARIN SODIUM 5000 UNITS: 5000 INJECTION INTRAVENOUS; SUBCUTANEOUS at 08:46

## 2018-12-05 RX ADMIN — POLYETHYLENE GLYCOL (3350) 17 G: 17 POWDER, FOR SOLUTION ORAL at 08:46

## 2018-12-05 RX ADMIN — SODIUM CHLORIDE, PRESERVATIVE FREE 10 ML: 5 INJECTION INTRAVENOUS at 08:46

## 2018-12-05 RX ADMIN — CEFEPIME 2 G: 2 INJECTION, POWDER, FOR SOLUTION INTRAVENOUS at 00:25

## 2018-12-05 RX ADMIN — DOCUSATE SODIUM 100 MG: 100 CAPSULE ORAL at 08:45

## 2018-12-05 RX ADMIN — CEFEPIME 2 G: 2 INJECTION, POWDER, FOR SOLUTION INTRAVENOUS at 08:45

## 2018-12-05 RX ADMIN — Medication 1 CAPSULE: at 08:45

## 2018-12-05 RX ADMIN — LISINOPRIL 5 MG: 2.5 TABLET ORAL at 08:45

## 2018-12-05 RX ADMIN — DOXYCYCLINE 100 MG: 100 CAPSULE ORAL at 11:20

## 2018-12-05 RX ADMIN — CEFDINIR 300 MG: 300 CAPSULE ORAL at 11:20

## 2018-12-05 RX ADMIN — VANCOMYCIN HYDROCHLORIDE 2000 MG: 5 INJECTION, POWDER, LYOPHILIZED, FOR SOLUTION INTRAVENOUS at 08:45

## 2018-12-05 NOTE — DISCHARGE INSTR - APPOINTMENTS
Follow-up with Laurel Pedraza on 12/12/18 at 9:40 am  Office # [phone]  Please arrive 20 minutes early.

## 2018-12-05 NOTE — PLAN OF CARE
Problem: Skin and Soft Tissue Infection (Adult)  Goal: Signs and Symptoms of Listed Potential Problems Will be Absent, Minimized or Managed (Skin and Soft Tissue Infection)  Outcome: Ongoing (interventions implemented as appropriate)   12/01/18 1417 12/04/18 0935   Goal/Outcome Evaluation   Problems Assessed (Skin and Soft Tissue Infection) --  all   Problems Present (Skin/Soft Tissue Inf) infection progression;pain --        Problem: Infection, Risk/Actual (Adult)  Goal: Infection Prevention/Resolution  Outcome: Ongoing (interventions implemented as appropriate)   12/04/18 0935   Infection, Risk/Actual (Adult)   Infection Prevention/Resolution making progress toward outcome       Problem: Self-Care Deficit (Adult,Obstetrics,Pediatric)  Goal: Improved Ability to Perform BADL and IADL  Outcome: Ongoing (interventions implemented as appropriate)   12/04/18 0935   Self-Care Deficit (Adult,Obstetrics,Pediatric)   Improved Ability to Perform BADL and IADL making progress toward outcome       Problem: Fall Risk (Adult)  Goal: Absence of Fall  Outcome: Ongoing (interventions implemented as appropriate)   12/04/18 0935   Fall Risk (Adult)   Absence of Fall making progress toward outcome       Problem: Skin Injury Risk (Adult)  Goal: Skin Health and Integrity  Outcome: Ongoing (interventions implemented as appropriate)   12/04/18 0935   Skin Injury Risk (Adult)   Skin Health and Integrity making progress toward outcome       Problem: Patient Care Overview  Goal: Plan of Care Review  Outcome: Ongoing (interventions implemented as appropriate)   12/04/18 0935 12/04/18 2000   Coping/Psychosocial   Plan of Care Reviewed With --  patient   Plan of Care Review   Progress improving --      Goal: Discharge Needs Assessment  Outcome: Ongoing (interventions implemented as appropriate)   11/27/18 1130 12/03/18 1115 12/04/18 1600   Discharge Needs Assessment   Readmission Within the Last 30 Days no previous admission in last 30 days --   "--    Concerns to be Addressed no discharge needs identified;denies needs/concerns at this time  (States he has no home care needs.) --  --    Patient/Family Anticipates Transition to home --  --    Patient/Family Anticipated Services at Transition none  (Denies any needs at this time.) --  --    Transportation Concerns --  --  car, none   Transportation Anticipated car, drives self  (Plans to drive self home if he can get his braces on if swelling is gone down in his legs.) --  --    Anticipated Changes Related to Illness none --  --    Equipment Needed After Discharge --  walker, rolling  (Pt states he has had RW approx 9 years and is requesting a new one. He says he has attempted to get one from [facility], but he needs 36\" width and they have been unable to order one for him. SW will arrange bariatric RW at d/c with MD order.) --    Outpatient/Agency/Support Group Needs (States he has no Home care needs and no Home Health needs.) --  --    Disability   Equipment Currently Used at Home --  cane, straight;wheelchair;walker, rolling;other (see comments);ramp  (Pt says he has a RW, homemade cane, WC, AFO braces from [employer] in [place] and wheelchair ramp at home. ) --      Goal: Interprofessional Rounds/Family Conf  Outcome: Ongoing (interventions implemented as appropriate)   11/27/18 2220   Interdisciplinary Rounds/Family Conf   Participants family;nursing       Problem: Mobility, Physical Impaired (Adult)  Goal: Identify Related Risk Factors and Signs and Symptoms  Outcome: Ongoing (interventions implemented as appropriate)   12/04/18 2233   Mobility, Physical Impaired (Adult)   Related Risk Factors (Physical Mobility, Impaired) fatigue;fear of falling;obesity   Signs and Symptoms (Physical Mobility Impaired) unsafe transfers/ambulation;fear/anxiety related to mobility     Goal: Enhanced Mobility Skills  Outcome: Ongoing (interventions implemented as appropriate)   12/04/18 2233   Mobility, Physical " Impaired (Adult)   Enhanced Mobility Skills making progress toward outcome     Goal: Enhanced Functional Ability  Outcome: Ongoing (interventions implemented as appropriate)   12/04/18 5923   Mobility, Physical Impaired (Adult)   Enhanced Functional Ability making progress toward outcome

## 2018-12-05 NOTE — THERAPY TREATMENT NOTE
Acute Care - Occupational Therapy Treatment Note   [facility]     Patient Name: Jimmy Recinos  : 1975  MRN: [id number]  Today's Date: 2018  Onset of Illness/Injury or Date of Surgery: 18     Referring Physician:     Admit Date: 2018       ICD-10-CM ICD-9-CM   1. Cellulitis of left lower extremity L03.116 682.6   2. Debility R53.81 799.3   3. Obesity, morbid, BMI 50 or higher (CMS/HCC) E66.01 278.01     Patient Active Problem List   Diagnosis   • Bilateral cellulitis of lower leg   • Essential hypertension   • Obesity, morbid, BMI 50 or higher (CMS/[facility])     Past Medical History:   Diagnosis Date   • Charcot-Ramya disease    • Muscular dystrophy      Past Surgical History:   Procedure Laterality Date   • WRIST SURGERY         Therapy Treatment    Rehabilitation Treatment Summary     Row Name 18 1248             Treatment Time/Intention    Discipline  occupational therapist  -KR      Document Type  therapy note (daily note)  -KR      Patient Effort  good  -KR      Recorded by [KR] David Daniels OT 18 1256      Row Name 18 1248             Bed Mobility Assessment/Treatment    Bed Mobility Assessment/Treatment  bed mobility (all) activities  -KR      Miner Level (Bed Mobility)  minimum assist (75% patient effort)  -KR      Recorded by [KR] David Daniels OT 18 1256      Row Name 18 1248             Motor Skills Assessment/Interventions    Additional Documentation  Therapeutic Exercise (Group)  -KR      Recorded by [KR] David Daniels OT 18 1256      Row Name 18 1248             Therapeutic Exercise    Upper Extremity (Therapeutic Exercise)  wand exercises  -KR      Hand (Therapeutic Exercise)  hand , bilateral  -KR      Exercise Type (Therapeutic Exercise)  AAROM (active assistive range of motion);isotonic contraction, concentric;resistive exercises  -KR      Position (Therapeutic Exercise)  seated  -KR      Equipment (Therapeutic  Exercise)  dowel bari/wand;resistive bands  -KR      Expected Outcome (Therapeutic Exercise)  improve functional tolerance, self-care activity  -KR      Recorded by [KR] David Daniels OT 12/05/18 1256        User Key  (r) = Recorded By, (t) = Taken By, (c) = Cosigned By    Initials Name Effective Dates Discipline    David Thomas, OT 04/03/18 -  OT                 OT Recommendation and Plan  Planned Therapy Interventions (OT Eval): activity tolerance training, strengthening exercise     Outcome Measures     Row Name 12/04/18 1700 12/04/18 1325 12/04/18 1300       How much help from another person do you currently need...    Turning from your back to your side while in flat bed without using bedrails?  2  -BC  --  --    Moving from lying on back to sitting on the side of a flat bed without bedrails?  2  -BC  --  --    Moving to and from a bed to a chair (including a wheelchair)?  1  -BC  --  --    Standing up from a chair using your arms (e.g., wheelchair, bedside chair)?  2  -BC  --  --    Climbing 3-5 steps with a railing?  1  -BC  --  --    To walk in hospital room?  2  -BC  --  --    AM-PAC 6 Clicks Score  10  -BC  --  --       How much help from another is currently needed...    Putting on and taking off regular lower body clothing?  --  2  -KR  --    Bathing (including washing, rinsing, and drying)  --  3  -KR  --    Toileting (which includes using toilet bed pan or urinal)  --  2  -KR  --    Putting on and taking off regular upper body clothing  --  3  -KR  --    Taking care of personal grooming (such as brushing teeth)  --  3  -KR  --    Eating meals  --  4  -KR  --    Score  --  17  -KR  --       Functional Assessment    Outcome Measure Options  AM-PAC 6 Clicks Basic Mobility (PT)  -BC  --  AM-PAC 6 Clicks Daily Activity (OT)  -KR      User Key  (r) = Recorded By, (t) = Taken By, (c) = Cosigned By    Initials Name Provider Type    BC Janell Rosales, PT Physical Therapist    David Thomas, OT  Occupational Therapist           Time Calculation:   Time Calculation- OT     Row Name 12/05/18 1256             Time Calculation- OT    Total Timed Code Minutes- OT  45 minute(s)  -JARED        User Key  (r) = Recorded By, (t) = Taken By, (c) = Cosigned By    Initials Name Provider Type    David Tohmas OT Occupational Therapist           Therapy Suggested Charges     Code   Minutes Charges    None           Therapy Charges for Today     Code Description Service Date Service Provider Modifiers Qty    23536423388 HC OT SELFCARE CURRENT 12/4/2018 David Daniels OT GO, CK 1    83403512305 HC OT SELFCARE PROJECTED 12/4/2018 David Daniels OT GO, CI 1    [id number]  OT EVAL HIGH COMPLEXITY 2 12/4/2018 David Daniels OT GO 1    [id number]  OT THER PROC EA 15 MIN 12/5/2018 David Daniels OT GO 3          OT G-codes  OT Professional Judgement Used?: Yes  Functional Limitation: Self care  Self Care Current Status (): At least 40 percent but less than 60 percent impaired, limited or restricted  Self Care Goal Status (): At least 1 percent but less than 20 percent impaired, limited or restricted    David Daniels OT  12/5/2018

## 2018-12-05 NOTE — PROGRESS NOTES
PROGRESS NOTE         Patient Identification:  Name:  Jimmy Recinos  Age:  42 y.o.  Sex:  male  :  1975  MRN:  [id number]  Visit Number:  [id number]  Primary Care Provider:  Belle Khanna DO      ----------------------------------------------------------------------------------------------------------------------  Subjective       Chief Complaints:    Fever    Interval History:      Lower extremities continue to show improvement. Denies any complaints.     Review of Systems:    Constitutional: no fever, chills and night sweats. No appetite change or unexpected weight change. No fatigue.  Eyes: no eye drainage, itching or redness.  HEENT: no mouth sores, dysphagia or nose bleed.  Respiratory: no for shortness of breath, cough or production of sputum.  Cardiovascular: no chest pain, no palpitations, no orthopnea.  Gastrointestinal: no nausea, vomiting or diarrhea. No abdominal pain, hematemesis or rectal bleeding.  Genitourinary: no dysuria or polyuria.  Hematologic/lymphatic: no lymph node abnormalities, no easy bruising or easy bleeding.  Musculoskeletal: no muscle or joint pain.  Skin: No rash and no itching.  Neurological: no loss of consciousness, no seizure, no headache.  Behavioral/Psych: no depression or suicidal ideation.  Endocrine: no hot flashes.  Immunologic: negative.  ----------------------------------------------------------------------------------------------------------------------      Objective       \[facility] [place] [facility]\"" Meds:    cefdinir 300 mg Oral Q12H   docusate sodium 100 mg Oral BID   doxycycline 100 mg Oral Q12H   heparin (porcine) 5,000 Units Subcutaneous Q12H   lactobacillus acidophilus 1 capsule Oral Daily   lisinopril 5 mg Oral Q24H   lubrisoft  Apply externally Daily   polyethylene glycol 17 g Oral BID   sodium chloride 10 mL Intravenous Q12H   sodium chloride 3 mL Intravenous Q12H         ----------------------------------------------------------------------------------------------------------------------    Vital Signs:  Temp:  [97.4 °F (36.3 °C)-98.3 °F (36.8 °C)] 97.4 °F (36.3 °C)  Heart Rate:  [77-99] 77  Resp:  [18-20] 18  BP: (118-139)/(49-83) 134/76  No data found.  SpO2 Percentage    12/04/18 0639 12/04/18 1938 12/05/18 0714   SpO2: 93% 93% 94%     SpO2:  [93 %-94 %] 94 %  on   ;   Device (Oxygen Therapy): room air    Body mass index is 68.23 kg/m².  Wt Readings from Last 3 Encounters:   11/26/18 (!) 216 kg (475 lb 8 oz)   11/24/18 (!) 211 kg (465 lb)   09/17/18 (!) 220 kg (485 lb)        Intake/Output Summary (Last 24 hours) at 12/5/2018 0952  Last data filed at 12/5/2018 0300  Gross per 24 hour   Intake 610 ml   Output 1400 ml   Net -790 ml     Diet Regular; Cardiac, Consistent Carbohydrate, Daily Fluid Restriction; 1500 mL Fluid  ----------------------------------------------------------------------------------------------------------------------    Physical exam:    Constitutional:  Well-developed and well-nourished.  No respiratory distress.      HENT:  Head:  Normocephalic and atraumatic.  Mouth:  Moist mucous membranes.    Eyes:  Conjunctivae and EOM are normal.  No scleral icterus.    Neck:  Neck supple.  No JVD present.    Cardiovascular:  Normal rate, regular rhythm and normal heart sounds with no murmur. No edema.  Pulmonary/Chest:  No respiratory distress, no wheezes, no crackles, with normal breath sounds and good air movement.  Abdominal:  Soft.  Bowel sounds are normal.  No distension and no tenderness.   Musculoskeletal:  No edema, no tenderness, and no deformity.  No red or swollen joints anywhere.    Neurological:  Alert and oriented to person, place, and time. No facial droop.  No slurred speech.   Skin:  Skin is warm and dry. No rash noted. No pallor.      ----------------------------------------------------------------------------------------------------------------------  I have personally reviewed the EKG/Telemetry strips   ----------------------------------------------------------------------------------------------------------------------            Results from last 7 days   Lab Units  12/03/18   0429  12/02/18   0508  12/01/18   0507   CRP mg/dL  3.95*  4.92*  7.00*   WBC 10*3/mm3  8.04  8.09  8.59   HEMOGLOBIN g/dL  13.0*  12.7*  12.6*   HEMATOCRIT %  42.5  40.2*  40.2*   MCV fL  94.7*  94.4*  93.7   MCHC g/dL  30.6*  31.6*  31.3*   PLATELETS 10*3/mm3  314  299  258     Results from last 7 days   Lab Units  12/03/18   0429  12/02/18   0508  12/01/18   0507  11/30/18   0515  11/29/18   0508   SODIUM mmol/L  134*  137  135  138  133*   POTASSIUM mmol/L  4.8  4.4  4.3  3.8  3.7   MAGNESIUM mg/dL   --    --   2.1  2.1  1.9   CHLORIDE mmol/L  101  103  101  103  100   CO2 mmol/L  27.8  29.0  28.4  28.2  26.5   BUN mg/dL  9  10  9  9  11   CREATININE mg/dL  0.40*  0.40*  0.36*  0.41*  0.44   EGFR IF NONAFRICN AM mL/min/1.73  >150  >150  >150  >150  >150   CALCIUM mg/dL  7.9  7.8  7.7  7.6*  7.8   GLUCOSE mg/dL  89  94  84  102  96   Estimated Creatinine Clearance: 442.4 mL/min (A) (by C-G formula based on SCr of 0.4 mg/dL (L)).  No results found for: AMMONIA    No results found for: HGBA1C, POCGLU  No results found for: HGBA1C  No results found for: TSH, FREET4    Blood Culture   Date Value Ref Range Status   11/26/2018 No growth at 24 hours  Preliminary   11/26/2018 No growth at 24 hours  Preliminary                 Pain Management Panel     Pain Management Panel Latest Ref Rng & Units 10/19/2014    AMPHETAMINES SCREEN, URINE NEGATIVE Negative    BARBITURATES SCREEN NEGATIVE Negative    BENZODIAZEPINE SCREEN, URINE NEGATIVE Negative    COCAINE SCREEN, URINE NEGATIVE Negative    METHADONE SCREEN, URINE NEGATIVE Negative          I have personally reviewed the  above laboratory results.   ----------------------------------------------------------------------------------------------------------------------  Imaging Results (last 24 hours)     ** No results found for the last 24 hours. **        I have personally reviewed the above radiology results.   ----------------------------------------------------------------------------------------------------------------------    Assessment/Plan       Assessment/Plan     ASSESSMENT:       1. Sepsis with elevated white count and fever on admission  2. 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 Denies any complaints. Antibiotic therapy de-escalated to Omnicef 300 mg PO BID and Doxycycling 100 mg PO BID for 10-14 days due to the high risk of relapsing cellulitis.     Recommend to continue to elevate lower extremities as this is important for further improvement.      Current Antimicrobials:    Omnicef 300 mg PO BID and Doxycycling 100 mg PO BID for 10-14 days    Code Status:   Code Status and Medical Interventions:   Ordered at: 11/26/18 1911     Code Status:    CPR     Medical Interventions (Level of Support Prior to Arrest):    Full       Katerina Dickinson PA-C  12/05/18  9:52 AM

## 2018-12-05 NOTE — PROGRESS NOTES
Cellulitis, obesity    He is continuing to improve. Afeb and VSS. His feet are doing well and the two small areas on the right foot are stable and will eventually peal but do not need debridement. I will see him prn.

## 2018-12-05 NOTE — DISCHARGE SUMMARY
[facility] HOSPITALISTS DISCHARGE SUMMARY    Patient Identification:  Name:  Jimmy Recinos  Age:  42 y.o.  Sex:  male  :  1975  MRN:  [id number]  Visit Number:  [id number]    Date of Admission: 2018  Date of Discharge:  2018     PCP: Belle Khanna, DO    DISCHARGE DIAGNOSIS  Sepsis  B/L lower extremity cellulitis on chronic lymphedema  Hyponatremia, mild  HTN  Positive d-dimer with negative CT PE protocol and negative venous Doppler bilateral  Morbid obesity with BMI 68  Constipation  Debility    CONSULTS   Surgery  ID    PROCEDURES PERFORMED  None    HOSPITAL COURSE  Mr. Recinos is a 42 y.o. male with past medical history significant for Charcot-Ramya tooth disease, muscular dystrophy, and morbid obesity presented to [facility] complaining of increased swelling and pain in both legs.  Please see the admitting history and physical for further details. Admitted with sepsis and lower extremity cellulitis in setting of chronic lymphedema.  ID consulted and patient was started on IV vancomycin and cefepime.  Slow to clinically improve initially with blister formation noted and surgery consulted.  Continued to improve slowly with no surgical intervention. Patient was also started on low dose lisinopril for the HTN and BP stabilized. Started on laxatives for constipation and resolved. Sepsis resolved. Received IV abx till  and changed to po omnicef and doxycycline from  for 10 days per ID. PT/OT consulted and the patient ambulated. Continued to improve symptomatically with resolution of the cellulitis in LLE and marked improvement in the RLE. Bariatric walker prescription given.  Since patient was vitally stable and improved symptomatically and would like to go home, it was decided to discharge the patient to home on home health.  Discharge disposition stable.    VITAL SIGNS:  Temp:  [97.4 °F (36.3 °C)-98.3 °F (36.8 °C)] 97.6 °F (36.4 °C)  Heart Rate:  [77-99]  82  Resp:  [16-18] 16  BP: (118-145)/(49-83) 145/75  SpO2:  [93 %-94 %] 94 %  on   ;   Device (Oxygen Therapy): room air    Body mass index is 68.23 kg/m².  Wt Readings from Last 3 Encounters:   11/26/18 (!) 216 kg (475 lb 8 oz)   11/24/18 (!) 211 kg (465 lb)   09/17/18 (!) 220 kg (485 lb)       PHYSICAL EXAM:  General: Comfortable, Not in distress.  Well-developed and well-nourished.   HENT:  Head:  Normocephalic and atraumatic.  Mouth:  Moist mucous membranes.    Eyes:  Conjunctivae and EOM are normal.  Pupils are equal, round, and reactive to light.  No scleral icterus.    Neck:  Neck supple.  No JVD present.    Cardiovascular:  Normal rate, regular rhythm with no murmur.  Pulmonary/Chest:  No respiratory distress, no wheezes, no crackles, with normal breath sounds and good air movement.  Abdomen:  Soft.  Bowel sounds are normal.  No distension and no tenderness.   Musculoskeletal: Right lower extremity with the erythema, improving and warmth anteriorly and 2 blister in the right foot.  Left lower extremity with the one blister and cellulitis resolved.  Edema positive bilaterally but improving.    Neurological:  Alert and oriented to person, place, and time.  No cranial nerve deficit. No focal deficits. No facial droop.  No slurred speech.   Skin:  Skin is warm and dry. No rash noted. No pallor.   Peripheral vascular:  pulses in all 4 extremities with no clubbing, no cyanosis, no edema.  Genitourinary: No Mehta    DISCHARGE DISPOSITION   To home on HH    DISCHARGE MEDICATIONS:     Discharge Medications      New Medications      Instructions Start Date   cefdinir 300 MG capsule  Commonly known as:  OMNICEF   300 mg, Oral, Every 12 Hours Scheduled      docusate sodium 100 MG capsule   100 mg, Oral, 2 Times Daily      doxycycline 100 MG capsule  Commonly known as:  MONODOX   100 mg, Oral, Every 12 Hours Scheduled      lisinopril 5 MG tablet  Commonly known as:  PRINIVIL,ZESTRIL   5 mg, Oral, Every 24 Hours  Scheduled      lubrisoft lotion lotion   Apply externally, Daily      polyethylene glycol pack packet  Commonly known as:  MIRALAX   17 g, Oral, 2 Times Daily      PROBIOTIC ADVANCED capsule   1 capsule, Oral, Daily             Diet Instructions     Diet: Cardiac      Discharge Diet:  Cardiac        Activity Instructions     Activity as Tolerated          No future appointments.  Your Scheduled Appointments     Follow-up with Laurel Pedraza on 12/12/18 at 9:40 am  Office # [phone]  Please arrive 20 minutes early.                 Additional Instructions for the Follow-ups that You Need to Schedule     Ambulatory Referral to Home Health   As directed      Face to Face Visit Date:  12/5/2018    Follow-up Provider for Plan of Care?:  I treated the patient in an acute care facility and will not continue treatment after discharge.    Follow-up Provider:  GISEL RANGEL [[id number]]    Reason/Clinical Findings:  debility, cellulitis, morbid obesity    Describe mobility limitations that make leaving home difficult:  debility, cellulitis, morbid obesity    Nursing/Therapeutic Services Requested:  Physical Therapy Occupational Therapy    PT orders:  Therapeutic exercise Gait Training Strengthening    Weight Bearing Status:  As Tolerated    Occupational orders:  Strengthening    Frequency:  Other (2-3 times per week)         Discharge Follow-up with PCP   As directed       Currently Documented PCP:    Gisel Rangel DO    PCP Phone Number:    [phone]     Follow Up Details:  Within 1 week            Contact information for follow-up providers     Gisel Rangel DO Follow up.    Specialty:  Internal Medicine  Why:  Within 1 week  Contact information:  [address] [facility] [address] [phone]             [facility] HOME CARE REFERRAL [facility] Follow up.    Specialty:  Home Health Services  Contact information:  [address] [id number] [phone]                 Contact  information for after-discharge care     Home Medical Care     [facility] HOME HEALTH .    Service:  Home Health Services  Contact information:  [address] [place] [address]   [facility] [address] [phone]                              TEST  RESULTS PENDING AT DISCHARGE  None     CODE STATUS  Code Status and Medical Interventions:   Ordered at: 11/26/18 1911     Code Status:    CPR     Medical Interventions (Level of Support Prior to Arrest):    Full       Jo Ann Díaz MD  12/05/18  5:41 PM    Please note that this discharge summary required more than 30 minutes to complete.    Please send a copy of this dictation to the following providers:  Belle Khanna DO

## 2018-12-05 NOTE — PROGRESS NOTES
Discharge Planning Assessment   [facility]     Patient Name: Jimmy Recinos  MRN: [id number]  Today's Date: 12/5/2018    Admit Date: 11/26/2018    Discharge Plan     Row Name 12/05/18 1537       Plan    Final Discharge Disposition Code  06 - home with home health care    Final Note  [facility], [facility] not [facility]. [facility] to f/u with pt at home regarding rolling walker and to repair his w/c. SS contacted [facility] [phone] per [place] with referral. SS faxed referral including order to [facility] [phone]. Nurse to call report to [facility]. No other needs identified.      Home Medical Care - Selection Complete      Service Provider Request Status Selected Services Address Phone Number Fax Number    [facility] [address] [place] [address] [address] [facility] [address] [phone]     Anastasiya Marhsall

## 2018-12-05 NOTE — DISCHARGE PLACEMENT REQUEST
"Lester Marley (42 y.o. Male)     Date of Birth Social Security Number Address Home Phone MRN    1975  [address] [facility] [address] [phone] [id number]    [employer] Marital Status          None        Admission Date Admission Type Admitting Provider Attending Provider Department, Room/Bed    18 Emergency Bobby Nolan DO Srinivas, Priyanka, MD [address], 3338/1P    Discharge Date Discharge Disposition Discharge Destination         Home or Self Care              Attending Provider:  Jo Ann Díaz MD    Allergies:  No Known Allergies    Isolation:  Contact   Infection:  MRSA (18)   Code Status:  CPR    Ht:  177.8 cm (70\")   Wt:  216 kg (475 lb 8 oz)    Admission Cmt:  None   Principal Problem:  None                Active Insurance as of 2018     Primary Coverage     Payor Plan Insurance Group Employer/Plan Group    MEDICARE MEDICARE A & B      Payor Plan Address Payor Plan Phone Number Payor Plan Fax Number Effective Dates    [address] [phone]  2003 - None Entered    [place] [address]       Subscriber Name Subscriber Birth Date Member ID       LESTER MARLEY 1975 [id number]                 Emergency Contacts      (Rel.) Home Phone Work Phone Mobile Phone    Alondra Marley (Father) [phone] -- --        [address] [address]  Dept. Phone:  [phone]  Dept. Fax:   Date Ordered: Dec 5, 2018         Patient:  Lester Marley MRN:  [id number]   [address] [facility]  [place] [address] :  1975  SSN:    Phone: [phone] Sex:  M     Weight: 216 kg (475 lb 8 oz)         Ht Readings from Last 1 Encounters:   18 177.8 cm (70\")         Walker               (Order ID: [id number])    Diagnosis:  Debility (R53.81 [ICD-10-CM] 799.3 [ICD-9-CM])  Obesity, morbid, BMI 50 or higher (CMS/HCC) (E66.01 [ICD-10-CM] 278.01 [ICD-9-CM])   Quantity:  1   "   Equipment:  Heavy Duty Walker with Wheels, Patients > 300 lbs  Length of Need (99 Months = Lifetime): 99 Months = Lifetime        Authorizing Provider's Phone: [phone]   Authorizing Provider:Jo Ann Díaz MD  Authorizing Provider's NPI: [id number]  Order Entered By: Jo Ann Díaz MD 2018 11:47 AM     Electronically signed by: Jo Ann Díaz MD 2018 11:47 AM               History & Physical      Jimi Turner MD at 2018  5:55 PM               [facility] HOSPITALIST HISTORY AND PHYSICAL    Patient Identification:  Name:  Jimmy Recinos  Age:  42 y.o.  Sex:  male  :  1975  MRN:  [id number]   Visit Number:  [id number]  Primary Care Physician:  Belle Khanna DO     2018  1:46 PM    Subjective     Chief complaint:   Chief Complaint   Patient presents with   • Fever     History of presenting illness:   Patient is a 42 y.o. male with past medical history significant for Charcot-Ramya tooth disease, muscular dystrophy, and morbid obesity. He presented to the emergency department of [facility] on 18 with complaints of headache, myalgias, and rhinorrhea, along with increased swelling and pain in both legs. He was felt to have an acute viral upper respiratory infection and discharged on Ibuprofen for headache and myalgias. He has continued to have generalized weakness and reported fever at home from 100F to 105F. He reports that his symptoms began on Saturday prior to coming to the ED. He has chronic pedal edema which has been worse and associated with erythema and weeping of the bilateral lower extremities since Saturday.  He has also had increasing pain in his legs. He states that on Saturday, he had intermittent confusion, fever, muscle aches, and severe chills. He has also had headaches off and on without neck pain or stiffness, changes in vision, discomfort with light/sound, unilateral weakness, or numbness/tingling  sensations. He came back to the ED earlier this afternoon due to ongoing and worsening symptoms. He denies cough or chest congestion. No shortness of breath or chest pains. No injury, other than a reported fall when he was trying transported out of his home via EMS today. He reports falling to his right knee and having some discomfort there. No other reported injuries. No history of DVT or PE. When he wears braces on his legs, he is able to ambulate freely, but has been less mobile over the last few days due to being ill.  He has had dysuria and dark urine.    Upon arrival to the ED, vitals were /78, RR 20, , spO2 99%, temperature 99F. Workup in the ED was significant for CRP elevated at 27.85, white blood cell count mildly elevated at 12.38, potassium 3.4, sodium 134, d-dimer positive at 3.01.  Lactic acid is normal at 1.6.  Urinalysis reveals cloudy/dark yellow urine with trace ketones, 2+ blood, trace of leukocyte esterase, 1+ protein, 1+ bilirubin, 21-30 red blood cells, 0-2 white blood cells, bacteria is a trace, and squamous epithelial cells 0-2.  He was noted to have cellulitis of the bilateral lower extremities, superimposed on chronic lymphedema.  Venous Doppler was negative for evidence of deep venous thrombosis.  CT scan with PE protocol was ordered to be completed STAT while he was in the ED. Patient has been admitted to the medical/surgical unit of [facility] with remote telemetry for further evaluation and therapy.   ---------------------------------------------------------------------------------------------------------------------   Review of Systems   Constitutional: Positive for activity change, chills, fatigue and fever. Negative for appetite change and diaphoresis.   HENT: Negative for congestion, postnasal drip, rhinorrhea, sinus pressure, sinus pain and sore throat.    Eyes: Negative for photophobia, pain, discharge, redness, itching and visual disturbance.   Respiratory:  Negative for cough, chest tightness, shortness of breath and wheezing.    Cardiovascular: Positive for leg swelling. Negative for chest pain and palpitations.   Gastrointestinal: Negative for abdominal distention, abdominal pain, anal bleeding, blood in stool, constipation, diarrhea, nausea and vomiting.        Decreased appetite.    Endocrine: Negative for cold intolerance, heat intolerance, polydipsia, polyphagia and polyuria.   Genitourinary: Positive for difficulty urinating, dysuria, frequency and urgency. Negative for hematuria.   Musculoskeletal: Positive for arthralgias and myalgias. Negative for gait problem, neck pain and neck stiffness.   Skin: Positive for color change. Negative for pallor, rash and wound.   Allergic/Immunologic: Negative for environmental allergies, food allergies and immunocompromised state.   Neurological: Positive for weakness (generalized, superimposed on chronic lower extremity weakness from muscular dystrophy) and headaches. Negative for dizziness, syncope, speech difficulty and numbness.   Hematological: Negative for adenopathy. Does not bruise/bleed easily.   Psychiatric/Behavioral: Positive for confusion. Negative for agitation and behavioral problems.   ---------------------------------------------------------------------------------------------------------------------   Past Medical History:   Diagnosis Date   • Charcot-Ramya disease    • Muscular dystrophy      *  Morbid obesity BMI 68      Past Surgical History:   Procedure Laterality Date   • WRIST SURGERY       History reviewed. No pertinent family history.  Social History     Socioeconomic History   • Marital status:      Spouse name: Not on file   • Number of children: Not on file   • Years of education: Not on file   • Highest education level: Not on file   Tobacco Use   • Smoking status: Never Smoker   Substance and Sexual Activity   • Alcohol use: No     Frequency: Never   • Drug use: No    ---------------------------------------------------------------------------------------------------------------------   Allergies:  Patient has no known allergies.  ---------------------------------------------------------------------------------------------------------------------   Prior to Admission Medications     Prescriptions Last Dose Informant Patient Reported? Taking?    albuterol (PROVENTIL HFA;VENTOLIN HFA) 108 (90 Base) MCG/ACT inhaler   No No    Inhale 2 puffs Every 6 (Six) Hours As Needed for Wheezing.    azithromycin (ZITHROMAX) 250 MG tablet   No No    Take 1 tablet by mouth Daily. Take 2 tablets the first day, then 1 tablet daily for 4 days.    fexofenadine (ALLEGRA ALLERGY) 180 MG tablet   No No    Take 1 tablet by mouth Daily.    ibuprofen (ADVIL,MOTRIN) 800 MG tablet   No No    Take 1 tablet by mouth Every 8 (Eight) Hours As Needed for Mild Pain  or Headache.      ---------------------------------------------------------------------------------------------------------------------  Objective   [facility] Scheduled Meds:    ceftriaxone 1 g Intravenous Once   Pharmacy to dose vancomycin  Does not apply Once   vancomycin 2,000 mg Intravenous Once   ---------------------------------------------------------------------------------------------------------------------   Vital Signs:  Temp:  [99 °F (37.2 °C)] 99 °F (37.2 °C)  Heart Rate:  [105-112] 105  Resp:  [20] 20  BP: (108-148)/(60-95) 136/85  Mean Arterial Pressure (Non-Invasive) for the past 24 hrs (Last 3 readings):   Noninvasive MAP (mmHg)   11/26/18 1700 106   11/26/18 1632 117   11/26/18 1601 92     SpO2 Percentage    11/26/18 1446 11/26/18 1532 11/26/18 1601   SpO2: 97% 98% (!) 86%     SpO2:  [86 %-100 %] 86 %  on   ;      Body mass index is 64.57 kg/m².  Wt Readings from Last 3 Encounters:   11/26/18 (!) 204 kg (450 lb)   11/24/18 (!) 211 kg (465 lb)   09/17/18 (!) 220 kg (485 lb)      ---------------------------------------------------------------------------------------------------------------------   Physical Exam:  Constitutional: Morbidly obese. Alert and oriented, sitting up in bed. No acute distress but appears uncomfortable.  HENT:  Head: Normocephalic and atraumatic.  Mouth:  Moist mucous membranes.    Eyes:  Conjunctivae and EOM are normal. No scleral icterus. No erythema or drainage.   Neck:  Neck supple.  No JVD present. No carotid bruits noted.   Cardiovascular:  Tachycardic, regular rhythm and normal heart sounds with no murmur.  Pulmonary/Chest:  No respiratory distress, no wheezes, no crackles, with normal breath sounds and good air movement.  Abdominal:  Soft.  Bowel sounds are normal x4 quadrants. No distension and no tenderness.   Musculoskeletal: Changes of chronic lymphedema of the bilateral lower extremities. Erythema and scabbing noted of the bilateral lower extremities with some weeping on the left.   Neurological:  Alert and oriented to person, place, and time.  No cranial nerve deficit.  No tongue deviation.  No facial droop.  No slurred speech. No photophobia or phonophobia.   Skin: Erythema, warmth, and scabbing noted of the bilateral lower extremities with some weeping noted on the left. This includes primarily foot, lower leg and inner thigh but spares the area around the knee. Also present on right lower extremity but less pronounced. No wounds or ulcers.   Psychiatric:  Normal mood and affect.  Behavior is normal.  Judgment and thought content normal.   Peripheral vascular: Chronic lymphedema. Difficult to palpate pedal pulses secondary to edema.   Genitourinary: No plasencia catheter in place.   ---------------------------------------------------------------------------------------------------------------------  EKG: Pending.     Telemetry: Sinus tachycardia. 100-115bpm.     I have personally reviewed the EKG/Telemetry  strip.  ---------------------------------------------------------------------------------------------------------------------             Results from last 7 days   Lab Units  11/26/18   1408   CRP mg/dL  27.85*   LACTATE mmol/L  1.6   WBC 10*3/mm3  12.38   HEMOGLOBIN g/dL  13.6*   HEMATOCRIT %  42.4   MCV fL  92.8   MCHC g/dL  32.1*   PLATELETS 10*3/mm3  160     Results from last 7 days   Lab Units  11/26/18   1408   SODIUM mmol/L  134*   POTASSIUM mmol/L  3.4*   CHLORIDE mmol/L  102   CO2 mmol/L  26.2   BUN mg/dL  18   CREATININE mg/dL  0.67   EGFR IF NONAFRICN AM mL/min/1.73  130   CALCIUM mg/dL  8.9   GLUCOSE mg/dL  132*   ALBUMIN g/dL  3.30*   BILIRUBIN mg/dL  1.1   ALK PHOS U/L  80   AST (SGOT) U/L  21   ALT (SGPT) U/L  16   Estimated Creatinine Clearance: 253.9 mL/min (by C-G formula based on SCr of 0.67 mg/dL).    Pain Management Panel     Pain Management Panel Latest Ref Rng & Units 10/19/2014    AMPHETAMINES SCREEN, URINE NEGATIVE Negative    BARBITURATES SCREEN NEGATIVE Negative    BENZODIAZEPINE SCREEN, URINE NEGATIVE Negative    COCAINE SCREEN, URINE NEGATIVE Negative    METHADONE SCREEN, URINE NEGATIVE Negative        I have personally reviewed the above laboratory results.   ---------------------------------------------------------------------------------------------------------------------  Imaging Results (last 7 days)     Procedure Component Value Units Date/Time    US Venous Doppler Lower Extremity Bilateral (duplex) [[id number]] Collected:  11/26/18 1632     Updated:  11/26/18 1634    Narrative:       EXAMINATION: US VENOUS DOPPLER LOWER EXTREMITY BILATERAL (DUPLEX)-      CLINICAL INDICATION:     Leg Pain and Swelling  swelling, pain      TECHNIQUE: Multiplanar gray scale and Doppler vascular sonographic  imaging of the deep veins  without and with compression.      COMPARISON: NONE      FINDINGS: Somewhat limited. The visualized deep veins demonstrate flow  and are compressible. No evidence of  "deep venous thrombosis.             Impression:       No DVT. Somewhat limited     This report was finalized on 11/26/2018 4:32 PM by Dr. Alvarez Eaton MD.       XR Chest 1 View [[id number]] Collected:  11/26/18 1459     Updated:  11/26/18 1506    Narrative:       EXAMINATION: XR CHEST 1 VW-      CLINICAL INDICATION:     Simple Sepsis triage protocol     TECHNIQUE:  XR CHEST 1 VW-      COMPARISON: 9/18/2018      FINDINGS:   The lungs remain aerated.  Heart and mediastinum contours are unremarkable.  No pleural effusion.  No pneumothorax.   Bony and soft tissue structures are unremarkable.       Impression:       No radiographic evidence of acute cardiac or pulmonary  disease.     This report was finalized on 11/26/2018 2:59 PM by Dr. Alvarez Eaton MD.           I have personally reviewed the above radiology results.   ---------------------------------------------------------------------------------------------------------------------  Assessment & Plan      Sepsis: present on admission, as noted by , CRP 27.85,  WBC >12,000, with acute cellulitis bilateral lower extremities: Blood cultures were drawn x2 in the ED and urine culture ordered though UA is questionable. Will continue IV rocephin 1g daily. Patient is refusing IV vancomycin as he reports that it \"caused VRE in his foot.\" For now, will start Linezolid with pharmacy to dose. Will give a 500cc fluid bolus and start on IV fluids at 100cc/hr. Repeat CBC, CRP, CMP in AM. Sepsis bundle added. EKG ordered given sinus tachycardia and to establish baseline. Consult ID for further assistance in antibiotic management. Low dose norco available PRN for pain control.     -Positive d dimer: Venous doppler of the bilateral lower extremities with no evidence of DVT. Check STAT CT of the chest with PE protocol given + d dimer at 3 and recent immobility, results still pending. Could simply be elevated from infectious process. No respiratory symptoms at this time. "     -Prerenal azotemia with BUN:cr ratio of 27, suggesting dehydration: continue IV fluid hydration. Repeat labs in the morning. Decreased appetite likely contributing to dehydration and electrolyte derangements (see below). No reported nausea or vomiting. Will place on clear liquid diet for now and advance as tolerated.     -Acute hypokalemia/hypomagnesemia: Place on both K+ and magnesium replacement protocol.     -Acute hyponatremia: Mild, likely hypovolemic. Continue IV fluids and follow AM labs.     -Previous history of VRE of the left foot: as noted above, started on linezolid, ID consult pending.     -History of muscular dystrophy and charcot eyad tooth disease.     -Morbid obesity, BMI 64.57.     -DVT Prophylaxis: Subcutaneous heparin.    * The patient is considered to be a high risk patient due to: sepsis, bilateral lower extremity cellulitis superimposed on lymphdema, positive D. Dimer, electrolyte abnormalities, morbid obesity.     Code Status: FULL CODE.      I have discussed the patient's assessment and plan with the patient, his girlfriend, and his RN Nayely who was present for the entire interview and physical examination on [facility].    DENISA Graves  11/26/18  5:55 PM  ---------------------------------------------------------------------------------------------------------------------   * I have seen the patient in conjunction with DENISA Diaz, and have amended her note to reflect my own findings, assessment and plan.    Electronically signed by Jimi Turner MD at 11/26/2018  8:59 PM

## 2018-12-05 NOTE — PROGRESS NOTES
Discharge Planning Assessment   [facility]     Patient Name: Jimmy Recinos  MRN: [id number]  Today's Date: 12/5/2018    Admit Date: 11/26/2018        Discharge Plan     Row Name 12/05/18 1209       Plan    Plan  SS spoke to PTJanell who states pt needs a heavy duty bariatric rolling walker. SS contacted [facility] [phone] per April who states they have an extra wide bariatric rolling walker that can be delivered to pt today. SS spoke to Dr. Díaz and order for heavy duty walker with wheels was received. SS faxed information including order to [facility] [phone]. CM nurseSuzanne provided pt with information about [facility] per David LONG's request. CM nurse spoke to pt regarding home health services. Pt has no preference for home health agency. SS to follow.         Anastasiya Marshall

## 2018-12-05 NOTE — THERAPY DISCHARGE NOTE
Acute Care - Physical Therapy Treatment Note/Discharge   [place]     Patient Name: Jimmy Recinos  : 1975  MRN: [id number]  Today's Date: 2018  Onset of Illness/Injury or Date of Surgery: 18  Date of Referral to PT: 18  Referring Physician:     Admit Date: 2018    Visit Dx:    ICD-10-CM ICD-9-CM   1. Cellulitis of left lower extremity L03.116 682.6   2. Debility R53.81 799.3   3. Obesity, morbid, BMI 50 or higher (CMS/[facility]) E66.01 278.01     Patient Active Problem List   Diagnosis   • Bilateral cellulitis of lower leg   • Essential hypertension   • Obesity, morbid, BMI 50 or higher (CMS/[facility])       Physical Therapy Education     Title: PT OT SLP Therapies (Resolved)     Topic: Physical Therapy (Resolved)     Point: Mobility training (Resolved)     Learning Progress Summary           Patient Acceptance, E, VU by BC at 2018  5:05 PM                   Point: Home exercise program (Resolved)     Learning Progress Summary           Patient Acceptance, E, VU by BC at 2018  5:05 PM                   Point: Body mechanics (Resolved)     Learning Progress Summary           Patient Acceptance, E, VU by BC at 2018  5:05 PM                   Point: Precautions (Resolved)     Learning Progress Summary           Patient Acceptance, E, VU by BC at 2018  5:05 PM                               User Key     Initials Effective Dates Name Provider Type Discipline    BC 16 -  Janell Rosales, PT Physical Therapist PT                Therapy Treatment  Rehabilitation Treatment Summary     Row Name 18 1500 18 1248          Treatment Time/Intention    Discipline  physical therapist  -BC  occupational therapist  -KR     Document Type  therapy note (daily note)  -BC  therapy note (daily note)  -KR     Subjective Information  complains of;weakness  -BC  --     Mode of Treatment  physical therapy;individual therapy  -BC  --     Therapy Frequency (PT Clinical  Impression)  3 times/wk 3-5x/week  -BC  --     Patient Effort  fair  -BC  good  -KR     Recorded by [BC] Janell Rosales, PT 12/05/18 1555 [KR] David Daniels, OT 12/05/18 1256     Row Name 12/05/18 1500             Cognitive Assessment/Intervention- PT/OT    Orientation Status (Cognition)  oriented x 4  -BC      Follows Commands (Cognition)  WFL  -BC      Recorded by [BC] Janell Rosales, PT 12/05/18 1555      Row Name 12/05/18 1500 12/05/18 1248          Bed Mobility Assessment/Treatment    Bed Mobility Assessment/Treatment  bed mobility (all) activities  -BC  bed mobility (all) activities  -KR     [place] Level (Bed Mobility)  moderate assist (50% patient effort);maximum assist (25% patient effort);2 person assist  -BC  minimum assist (75% patient effort)  -KR     Assistive Device (Bed Mobility)  bed rails  -BC  --     Recorded by [BC] Janell Rosales, PT 12/05/18 1555 [KR] David Daniels, OT 12/05/18 1256     Row Name 12/05/18 1500             Transfer Assessment/Treatment    Transfer Assessment/Treatment  sit-stand transfer;stand-sit transfer  -BC      Maintains Weight-bearing Status (Transfers)  verbal cues to maintain  -BC      Recorded by [BC] Janell Rosales, PT 12/05/18 1555      Row Name 12/05/18 1500             Sit-Stand Transfer    Sit-Stand [place] (Transfers)  moderate assist (50% patient effort);maximum assist (25% patient effort)  -BC      Assistive Device (Sit-Stand Transfers)  walker, front-wheeled bariatric walker  -BC      Recorded by [BC] Janell Rosales, PT 12/05/18 1555      Row Name 12/05/18 1500             Stand-Sit Transfer    Stand-Sit [place] (Transfers)  moderate assist (50% patient effort);maximum assist (25% patient effort);2 person assist  -BC      Assistive Device (Stand-Sit Transfers)  walker, front-wheeled  -BC      Recorded by [BC] Janell Rosales, PT 12/05/18 1555      Row Name 12/05/18 1500             Gait/Stairs Assessment/Training    Gait/Stairs  Assessment/Training  gait/ambulation independence  -BC      [place] Level (Gait)  moderate assist (50% patient effort);maximum assist (25% patient effort);2 person assist  -BC      Assistive Device (Gait)  walker, front-wheeled  -BC      Distance in Feet (Gait)  6  -BC      Recorded by [BC] Janell Rosales, PT 12/05/18 1555      Row Name 12/05/18 1248             Motor Skills Assessment/Interventions    Additional Documentation  Therapeutic Exercise (Group)  -KR      Recorded by [KR] David Daniels, OT 12/05/18 1256      Row Name 12/05/18 1248             Therapeutic Exercise    Upper Extremity (Therapeutic Exercise)  wand exercises  -KR      Hand (Therapeutic Exercise)  hand , bilateral  -KR      Exercise Type (Therapeutic Exercise)  AAROM (active assistive range of motion);isotonic contraction, concentric;resistive exercises  -KR      Position (Therapeutic Exercise)  seated  -KR      Equipment (Therapeutic Exercise)  dowel bari/wand;resistive bands  -KR      Expected Outcome (Therapeutic Exercise)  improve functional tolerance, self-care activity  -KR      Recorded by [KR] David Daniels, OT 12/05/18 1256      Row Name 12/05/18 1500             Positioning and Restraints    Pre-Treatment Position  in bed  -BC      Post Treatment Position  bed  -BC      In Bed  notified nsg;supine;call light within reach;encouraged to call for assist;with family/caregiver;side rails up x3  -BC      Recorded by [BC] Janell Rosales, PT 12/05/18 5079        User Key  (r) = Recorded By, (t) = Taken By, (c) = Cosigned By    Initials Name Effective Dates Discipline    BC Janell Rosales, PT 03/14/16 -  PT    KR David Daniels, OT 04/03/18 -  OT             PT Recommendation and Plan  Planned Therapy Interventions (PT Eval): balance training, bed mobility training, gait training, home exercise program, patient/family education, strengthening, transfer training  Therapy Frequency (PT Clinical Impression): 3  times/wk(3-5x/week)  Plan of Care Reviewed With: patient    Outcome Measures     Row Name 12/05/18 1500 12/04/18 1700 12/04/18 1325       How much help from another person do you currently need...    Turning from your back to your side while in flat bed without using bedrails?  2  -BC  2  -BC  --    Moving from lying on back to sitting on the side of a flat bed without bedrails?  2  -BC  2  -BC  --    Moving to and from a bed to a chair (including a wheelchair)?  2  -BC  1  -BC  --    Standing up from a chair using your arms (e.g., wheelchair, bedside chair)?  2  -BC  2  -BC  --    Climbing 3-5 steps with a railing?  1  -BC  1  -BC  --    To walk in hospital room?  2  -BC  2  -BC  --    AM-PAC 6 Clicks Score  11  -BC  10  -BC  --       How much help from another is currently needed...    Putting on and taking off regular lower body clothing?  --  --  2  -KR    Bathing (including washing, rinsing, and drying)  --  --  3  -KR    Toileting (which includes using toilet bed pan or urinal)  --  --  2  -KR    Putting on and taking off regular upper body clothing  --  --  3  -KR    Taking care of personal grooming (such as brushing teeth)  --  --  3  -KR    Eating meals  --  --  4  -KR    Score  --  --  17  -KR       Functional Assessment    Outcome Measure Options  AM-[facility] 6 Clicks Basic Mobility (PT)  -[facility] 6 Clicks Basic Mobility (PT)  -BC  --    Row Name 12/04/18 1300             Functional Assessment    Outcome Measure Options  AM-[facility] 6 Clicks Daily Activity (OT)  -KR        User Key  (r) = Recorded By, (t) = Taken By, (c) = Cosigned By    Initials Name Provider Type    BC Janell Rosales, PT Physical Therapist    KR David Daniels, OT Occupational Therapist           Time Calculation:   PT Charges     Row Name 12/05/18 1556             Time Calculation    PT Received On  12/05/18  -BC         Time Calculation- PT    Total Timed Code Minutes- PT  30 minute(s)  -BC         Timed Charges    99133 - Gait Training  Minutes   15  -BC      26860 - PT Therapeutic Activity Minutes  15  -BC        User Key  (r) = Recorded By, (t) = Taken By, (c) = Cosigned By    Initials Name Provider Type    BC Janell Rosales, PT Physical Therapist        Therapy Suggested Charges     Code   Minutes Charges    65514 (CPT®) Hc Pt Neuromusc Re Education Ea 15 Min      65192 (CPT®) Hc Pt Ther Proc Ea 15 Min      09123 (CPT®) Hc Gait Training Ea 15 Min 15 1    74525 (CPT®) Hc Pt Therapeutic Act Ea 15 Min 15 1    50954 (CPT®) Hc Pt Manual Therapy Ea 15 Min      49608 (CPT®) Hc Pt Iontophoresis Ea 15 Min      37552 (CPT®) Hc Pt Elec Stim Ea-Per 15 Min      01629 (CPT®) Hc Pt Ultrasound Ea 15 Min      24200 (CPT®) Hc Pt Self Care/Mgmt/Train Ea 15 Min      23922 (CPT®) Hc Pt Prosthetic (S) Train Initial Encounter, Each 15 Min      92951 (CPT®) Hc Pt Orthotic(S)/Prosthetic(S) Encounter, Each 15 Min      35367 (CPT®) Hc Orthotic(S) Mgmt/Train Initial Encounter, Each 15min      Total  30 2          Therapy Charges for Today     Code Description Service Date Service Provider Modifiers Qty    57360382874 HC PT MOBILITY CURRENT 12/4/2018 Janell Rosales, PT GP, CL 1    18295770772 HC PT MOBILITY PROJECTED 12/4/2018 Janell Rosales, PT GP, CK 1    74777382735 HC PT THER PROC EA 15 MIN 12/4/2018 Janell Rosales, PT GP 1    42247597196 HC PT THERAPEUTIC ACT EA 15 MIN 12/4/2018 Janell Rosales, PT GP 1    76085683847 HC PT EVAL MOD COMPLEXITY 2 12/4/2018 Janell Rosales, PT GP 1    37597595645 HC PT THER SUPP EA 15 MIN 12/4/2018 Janell Rosales, PT GP 4    07908038274 HC PT MOBILITY CURRENT 12/5/2018 Janell Rosales, PT GP, CL 1    93323356424 HC PT MOBILITY PROJECTED 12/5/2018 Janell Rosales, PT GP, CL 1    82810237555 HC PT MOBILITY DISCHARGE 12/5/2018 Janell Rosales, PT GP, CL 1    31618152425  GAIT TRAINING EA 15 MIN 12/5/2018 Janell Rosales, PT GP 1    17575071082 HC PT THER SUPP EA 15 MIN 12/5/2018 Janell Rosales, PT GP 2     [id number]  PT THERAPEUTIC ACT EA 15 MIN 12/5/2018 Janell Rosales, PT GP 1          PT G-Codes  Outcome Measure Options: AM-PAC 6 Clicks Basic Mobility (PT)  AM-PAC 6 Clicks Score: 11  Score: 17  Functional Limitation: Mobility: Walking and moving around  Mobility: Walking and Moving Around Current Status (): At least 60 percent but less than 80 percent impaired, limited or restricted  Mobility: Walking and Moving Around Goal Status (): At least 60 percent but less than 80 percent impaired, limited or restricted  Mobility: Walking and Moving Around Discharge Status (): At least 60 percent but less than 80 percent impaired, limited or restricted         Janell Rosales, PT  12/5/2018

## 2018-12-05 NOTE — DISCHARGE PLACEMENT REQUEST
"Lester Marley (42 y.o. Male)     Date of Birth Social Security Number Address Home Phone MRN    1975  [address] [facility] [address] [phone] [id number]    Holiness Marital Status          None        Admission Date Admission Type Admitting Provider Attending Provider Department, Room/Bed    11/26/18 Emergency Bobby Nolan DO Srinivas, Priyanka, MD [address], 3338/1P    Discharge Date Discharge Disposition Discharge Destination         Home or Self Care              Attending Provider:  Jo Ann Díaz MD    Allergies:  No Known Allergies    Isolation:  Contact   Infection:  MRSA (11/27/18)   Code Status:  CPR    Ht:  177.8 cm (70\")   Wt:  216 kg (475 lb 8 oz)    Admission Cmt:  None   Principal Problem:  None                Active Insurance as of 11/26/2018     Primary Coverage     Payor Plan Insurance Group Employer/Plan Group    MEDICARE MEDICARE A & B      Payor Plan Address Payor Plan Phone Number Payor Plan Fax Number Effective Dates    [address] [phone]  8/1/2003 - None Entered    [place] [id number]       Subscriber Name Subscriber Birth Date Member ID       LESTER MARLEY 1975 [id number]                 Emergency Contacts      (Rel.) Home Phone Work Phone Mobile Phone    Alondra Marley (Father) [phone] -- --        After Visit Summary     Lester Marley MRN: [id number]      11/26/2018 - 12/5/2018     [address]    Your Next Steps     Ask     ·   Ask how to get these   ? docusate sodium   ? polyethylene glycol   Do     ·    these medications from [facility]   ? cefdinir   ? doxycycline   ? lisinopril   ? lubrisoft   ? PROBIOTIC ADVANCED   MyChart     View your After Visit Summary and more online at [email or web address]   After Visit Summary   Instructions     ·   Your Care at Home     Walker   ·   Your medications have changed     START " taking:   ? cefdinir (OMNICEF)   ? docusate sodium   ? doxycycline (MONODOX)   ? lisinopril (PRINIVIL,ZESTRIL)   ? lubrisoft   ? polyethylene glycol (MIRALAX)   ? PROBIOTIC ADVANCED   Review your updated medication list below.   Your Next Steps   Ask   Do   If you have any questions about your recovery, please call the [facility] Nurse Call Center at [phone]. A registered nurse is available 24 hours a day 7 days a week to assist you.   ·   Activity instructions     Activity as Tolerated   ·   Diet instructions     Diet: Cardiac   Discharge Diet:   Cardiac   ·   Other instructions     Discharge Follow-up with PCP   Currently Documented PCP:   Belle Khanna DO   PCP Phone Number:   [phone]   Neal   What's next     What's next          Follow up with Belle Khanna DO   Within 1 week  [address] [facility] [address] [phone]          Follow up with [facility] CARE REFERRAL [place] [id number] [phone]    Your Allergies   Date Reviewed: 11/26/2018   Your Allergies   No active allergies   Patient Belongings Returned     Document Return of Belongings Flowsheet     Were the patient bedside belongings sent home? --   Medications Retrieved from Pharmacy & Sent Home --   Belongings Sent to Safe --   Belongings sent with: --   Belongings Retrieved from Security & Sent Home --       MyChart Signup     Our records indicate that you have an active [facility] Recommend account.     You can view your After Visit Summary by going to [employer] and logging in with your Recommend username and password.  If you don't have a Recommend username and password but a parent or guardian has access to your record, the parent or guardian should login with their own Recommend username and password and access your record to view the After Visit Summary.     If you have questions, you can email [email or web address] or call [phone]  or toll free number [phone] to talk to our MyChart staff.  Remember, [employer] is NOT to be used for urgent needs.  For medical emergencies, dial 911.     Medication List   Medication List     Morning Afternoon Evening Bedtime As Needed    cefdinir 300 MG capsule   Commonly known as: OMNICEF   Take 1 capsule by mouth Every 12 (Twelve) Hours for 19 doses.   For: Infection of the Skin and/or Related Soft Tissue          docusate sodium 100 MG capsule   Take 100 mg by mouth 2 (Two) Times a Day for 7 days.          doxycycline 100 MG capsule   Commonly known as: MONODOX   Take 1 capsule by mouth Every 12 (Twelve) Hours for 19 doses.   For: Infection of the Skin and/or Related Soft Tissue          lisinopril 5 MG tablet   Commonly known as: PRINIVIL,ZESTRIL   Take 1 tablet by mouth Daily for 30 days.          lubrisoft lotion lotion   Apply to both the lower extremities daily as directed          polyethylene glycol pack packet   Commonly known as: MIRALAX   Take 17 g by mouth 2 (Two) Times a Day for 7 days.          PROBIOTIC ADVANCED capsule   Take 1 capsule by mouth Daily for 14 days.         Where to  your medications     ·    these medications at [facility]     ? cefdinir • doxycycline • lisinopril • lubrisoft • PROBIOTIC ADVANCED   Address:  [address] [facility]   Hours: 8AM-6PM Mon-Fri   Phone: [phone]   ·   Ask your doctor where to pick these up     ? • docusate sodium 100 MG capsule   ? • polyethylene glycol pack packet   Your Medication List      Always carry an updated list of your medications with you. If there is an emergency, a responder can quickly see what medications you are taking. Take this paperwork with you the next time you see your health care provider.        [employer]        Opioid Resource     If you or someone you know needs information on substance abuse, please visit   [email or web address] for listings of facilities and resources  across [place].     [address] [address]  Phone:  [phone]  Fax:   Date: Dec 5, 2018      Ambulatory Referral to Home Health     Patient:  Jimmy Recinos MRN:  [id number]   [address] [place] [address] :  1975  SSN:    Phone: [phone] Sex:  M      INSURANCE PAYOR PLAN GROUP # SUBSCRIBER ID   Primary:    MEDICARE [id number]   [id number]      Referring Provider Information:  HARESH ALDANA Phone: [phone] Fax:       Referral Information:   # Visits:  1 Referral Type: Home Health [42]   Urgency:  Routine Referral Reason: Specialty Services Required   Start Date: Dec 5, 2018 End Date:  To be determined by Insurer   Diagnosis: Debility (R53.81 [ICD-10-CM] 799.3 [ICD-9-CM])  Obesity, morbid, BMI 50 or higher (CMS/[facility]) (E66.01 [ICD-10-CM] 278.01 [ICD-9-CM])      Refer to Dept: [facility]  Refer to Provider:   Refer to Facility:       Face to Face Visit Date: 2018  Follow-up Provider for Plan of Care? I treated the patient in an acute care facility and will not continue treatment after discharge.  Follow-up Provider: GISEL RANGEL [[id number]]  Reason/Clinical Findings: debility, cellulitis, morbid obesity  Describe mobility limitations that make leaving home difficult: debility, cellulitis, morbid obesity  Nursing/Therapeutic Services Requested: Physical Therapy  Nursing/Therapeutic Services Requested: Occupational Therapy  PT orders: Therapeutic exercise  PT orders: Gait Training  PT orders: Strengthening  Weight Bearing Status: As Tolerated  Occupational orders: Strengthening  Frequency: Other (2-3 times per week)     This document serves as a request of services and does not constitute Insurance authorization or approval of services.  To determine eligibility, please contact the members Insurance carrier to verify and review coverage.     If you have medical questions regarding this request for services. Please contact [facility]  [address] at [phone] between the hours of 8:00am - 5:00pm (Mon-Fri).       Authorizing Provider:Jo Ann Díaz MD  Authorizing Provider's NPI: [id number]  Order Entered By: Jo Ann Díaz MD 2018 11:47 AM     Electronically signed by: Jo Ann Díaz MD 2018 11:47 AM               History & Physical      Jimi Turner MD at 2018  5:55 PM               [facility] HOSPITALIST HISTORY AND PHYSICAL    Patient Identification:  Name:  Jimmy Recinos  Age:  42 y.o.  Sex:  male  :  1975  MRN:  [id number]   Visit Number:  [id number]  Primary Care Physician:  Belle Khanna DO     2018  1:46 PM    Subjective     Chief complaint:   Chief Complaint   Patient presents with   • Fever     History of presenting illness:   Patient is a 42 y.o. male with past medical history significant for Charcot-Ramya tooth disease, muscular dystrophy, and morbid obesity. He presented to the emergency department of [facility] originally on 18 with complaints of headache, myalgias, and rhinorrhea, along with increased swelling and pain in both legs. He was felt to have an acute viral upper respiratory infection and discharged on Ibuprofen for headache and myalgias. He has continued to have generalized weakness and reported fever at home from 100F to 105F. He reports that his symptoms began on Saturday prior to coming to the ED. He has chronic pedal edema which has been worse and associated with erythema and weeping of the bilateral lower extremities since Saturday.  He has also had increasing pain in his legs. He states that on Saturday, he had intermittent confusion, fever, muscle aches, and severe chills. He has also had headaches off and on without neck pain or stiffness, changes in vision, discomfort with light/sound, unilateral weakness, or numbness/tingling sensations. He came back to the ED earlier this afternoon due to ongoing and worsening  symptoms. He denies cough or chest congestion. No shortness of breath or chest pains. No injury, other than a reported fall when he was trying transported out of his home via EMS today. He reports falling to his right knee and having some discomfort there. No other reported injuries. No history of DVT or PE. When he wears braces on his legs, he is able to ambulate freely, but has been less mobile over the last few days due to being ill.  He has had dysuria and dark urine.    Upon arrival to the ED, vitals were /78, RR 20, , spO2 99%, temperature 99F. Workup in the ED was significant for CRP elevated at 27.85, white blood cell count mildly elevated at 12.38, potassium 3.4, sodium 134, d-dimer positive at 3.01.  Lactic acid is normal at 1.6.  Urinalysis reveals cloudy/dark yellow urine with trace ketones, 2+ blood, trace of leukocyte esterase, 1+ protein, 1+ bilirubin, 21-30 red blood cells, 0-2 white blood cells, bacteria is a trace, and squamous epithelial cells 0-2.  He was noted to have cellulitis of the bilateral lower extremities, superimposed on chronic lymphedema.  Venous Doppler was negative for evidence of deep venous thrombosis.  CT scan with PE protocol was ordered to be completed STAT while he was in the ED. Patient has been admitted to the medical/surgical unit of [facility] with remote telemetry for further evaluation and therapy.   ---------------------------------------------------------------------------------------------------------------------   Review of Systems   Constitutional: Positive for activity change, chills, fatigue and fever. Negative for appetite change and diaphoresis.   HENT: Negative for congestion, postnasal drip, rhinorrhea, sinus pressure, sinus pain and sore throat.    Eyes: Negative for photophobia, pain, discharge, redness, itching and visual disturbance.   Respiratory: Negative for cough, chest tightness, shortness of breath and wheezing.     Cardiovascular: Positive for leg swelling. Negative for chest pain and palpitations.   Gastrointestinal: Negative for abdominal distention, abdominal pain, anal bleeding, blood in stool, constipation, diarrhea, nausea and vomiting.        Decreased appetite.    Endocrine: Negative for cold intolerance, heat intolerance, polydipsia, polyphagia and polyuria.   Genitourinary: Positive for difficulty urinating, dysuria, frequency and urgency. Negative for hematuria.   Musculoskeletal: Positive for arthralgias and myalgias. Negative for gait problem, neck pain and neck stiffness.   Skin: Positive for color change. Negative for pallor, rash and wound.   Allergic/Immunologic: Negative for environmental allergies, food allergies and immunocompromised state.   Neurological: Positive for weakness (generalized, superimposed on chronic lower extremity weakness from muscular dystrophy) and headaches. Negative for dizziness, syncope, speech difficulty and numbness.   Hematological: Negative for adenopathy. Does not bruise/bleed easily.   Psychiatric/Behavioral: Positive for confusion. Negative for agitation and behavioral problems.   ---------------------------------------------------------------------------------------------------------------------   Past Medical History:   Diagnosis Date   • Charcot-Ramya disease    • Muscular dystrophy      *  Morbid obesity BMI 68      Past Surgical History:   Procedure Laterality Date   • WRIST SURGERY       History reviewed. No pertinent family history.  Social History     Socioeconomic History   • Marital status:      Spouse name: Not on file   • Number of children: Not on file   • Years of education: Not on file   • Highest education level: Not on file   Tobacco Use   • Smoking status: Never Smoker   Substance and Sexual Activity   • Alcohol use: No     Frequency: Never   • Drug use: No    ---------------------------------------------------------------------------------------------------------------------   Allergies:  Patient has no known allergies.  ---------------------------------------------------------------------------------------------------------------------   Prior to Admission Medications     Prescriptions Last Dose Informant Patient Reported? Taking?    albuterol (PROVENTIL HFA;VENTOLIN HFA) 108 (90 Base) MCG/ACT inhaler   No No    Inhale 2 puffs Every 6 (Six) Hours As Needed for Wheezing.    azithromycin (ZITHROMAX) 250 MG tablet   No No    Take 1 tablet by mouth Daily. Take 2 tablets the first day, then 1 tablet daily for 4 days.    fexofenadine (ALLEGRA ALLERGY) 180 MG tablet   No No    Take 1 tablet by mouth Daily.    ibuprofen (ADVIL,MOTRIN) 800 MG tablet   No No    Take 1 tablet by mouth Every 8 (Eight) Hours As Needed for Mild Pain  or Headache.      ---------------------------------------------------------------------------------------------------------------------  Objective   [facility] Scheduled Meds:    ceftriaxone 1 g Intravenous Once   Pharmacy to dose vancomycin  Does not apply Once   vancomycin 2,000 mg Intravenous Once   ---------------------------------------------------------------------------------------------------------------------   Vital Signs:  Temp:  [99 °F (37.2 °C)] 99 °F (37.2 °C)  Heart Rate:  [105-112] 105  Resp:  [20] 20  BP: (108-148)/(60-95) 136/85  Mean Arterial Pressure (Non-Invasive) for the past 24 hrs (Last 3 readings):   Noninvasive MAP (mmHg)   11/26/18 1700 106   11/26/18 1632 117   11/26/18 1601 92     SpO2 Percentage    11/26/18 1446 11/26/18 1532 11/26/18 1601   SpO2: 97% 98% (!) 86%     SpO2:  [86 %-100 %] 86 %  on   ;      Body mass index is 64.57 kg/m².  Wt Readings from Last 3 Encounters:   11/26/18 (!) 204 kg (450 lb)   11/24/18 (!) 211 kg (465 lb)   09/17/18 (!) 220 kg (485 lb)      ---------------------------------------------------------------------------------------------------------------------   Physical Exam:  Constitutional: Morbidly obese. Alert and oriented, sitting up in bed. No acute distress but appears uncomfortable.  HENT:  Head: Normocephalic and atraumatic.  Mouth:  Moist mucous membranes.    Eyes:  Conjunctivae and EOM are normal. No scleral icterus. No erythema or drainage.   Neck:  Neck supple.  No JVD present. No carotid bruits noted.   Cardiovascular:  Tachycardic, regular rhythm and normal heart sounds with no murmur.  Pulmonary/Chest:  No respiratory distress, no wheezes, no crackles, with normal breath sounds and good air movement.  Abdominal:  Soft.  Bowel sounds are normal x4 quadrants. No distension and no tenderness.   Musculoskeletal: Changes of chronic lymphedema of the bilateral lower extremities. Erythema and scabbing noted of the bilateral lower extremities with some weeping on the left.   Neurological:  Alert and oriented to person, place, and time.  No cranial nerve deficit.  No tongue deviation.  No facial droop.  No slurred speech. No photophobia or phonophobia.   Skin: Erythema, warmth, and scabbing noted of the bilateral lower extremities with some weeping noted on the left. This includes primarily foot, lower leg and inner thigh but spares the area around the knee. Also present on right lower extremity but less pronounced. No wounds or ulcers.   Psychiatric:  Normal mood and affect.  Behavior is normal.  Judgment and thought content normal.   Peripheral vascular: Chronic lymphedema. Difficult to palpate pedal pulses secondary to edema.   Genitourinary: No plasencia catheter in place.   ---------------------------------------------------------------------------------------------------------------------  EKG: Pending.     Telemetry: Sinus tachycardia. 100-115bpm.     I have personally reviewed the EKG/Telemetry  strip.  ---------------------------------------------------------------------------------------------------------------------             Results from last 7 days   Lab Units  11/26/18   1408   CRP mg/dL  27.85*   LACTATE mmol/L  1.6   WBC 10*3/mm3  12.38   HEMOGLOBIN g/dL  13.6*   HEMATOCRIT %  42.4   MCV fL  92.8   MCHC g/dL  32.1*   PLATELETS 10*3/mm3  160     Results from last 7 days   Lab Units  11/26/18   1408   SODIUM mmol/L  134*   POTASSIUM mmol/L  3.4*   CHLORIDE mmol/L  102   CO2 mmol/L  26.2   BUN mg/dL  18   CREATININE mg/dL  0.67   EGFR IF NONAFRICN AM mL/min/1.73  130   CALCIUM mg/dL  8.9   GLUCOSE mg/dL  132*   ALBUMIN g/dL  3.30*   BILIRUBIN mg/dL  1.1   ALK PHOS U/L  80   AST (SGOT) U/L  21   ALT (SGPT) U/L  16   Estimated Creatinine Clearance: 253.9 mL/min (by C-G formula based on SCr of 0.67 mg/dL).    Pain Management Panel     Pain Management Panel Latest Ref Rng & Units 10/19/2014    AMPHETAMINES SCREEN, URINE NEGATIVE Negative    BARBITURATES SCREEN NEGATIVE Negative    BENZODIAZEPINE SCREEN, URINE NEGATIVE Negative    COCAINE SCREEN, URINE NEGATIVE Negative    METHADONE SCREEN, URINE NEGATIVE Negative        I have personally reviewed the above laboratory results.   ---------------------------------------------------------------------------------------------------------------------  Imaging Results (last 7 days)     Procedure Component Value Units Date/Time    US Venous Doppler Lower Extremity Bilateral (duplex) [[id number]] Collected:  11/26/18 1632     Updated:  11/26/18 1634    Narrative:       EXAMINATION: US VENOUS DOPPLER LOWER EXTREMITY BILATERAL (DUPLEX)-      CLINICAL INDICATION:     Leg Pain and Swelling  swelling, pain      TECHNIQUE: Multiplanar gray scale and Doppler vascular sonographic  imaging of the deep veins  without and with compression.      COMPARISON: NONE      FINDINGS: Somewhat limited. The visualized deep veins demonstrate flow  and are compressible. No evidence of  "deep venous thrombosis.             Impression:       No DVT. Somewhat limited     This report was finalized on 11/26/2018 4:32 PM by Dr. Alvarez Eaton MD.       XR Chest 1 View [[id number]] Collected:  11/26/18 1459     Updated:  11/26/18 1506    Narrative:       EXAMINATION: XR CHEST 1 VW-      CLINICAL INDICATION:     Simple Sepsis triage protocol     TECHNIQUE:  XR CHEST 1 VW-      COMPARISON: 9/18/2018      FINDINGS:   The lungs remain aerated.  Heart and mediastinum contours are unremarkable.  No pleural effusion.  No pneumothorax.   Bony and soft tissue structures are unremarkable.       Impression:       No radiographic evidence of acute cardiac or pulmonary  disease.     This report was finalized on 11/26/2018 2:59 PM by Dr. Alvarez Eaton MD.           I have personally reviewed the above radiology results.   ---------------------------------------------------------------------------------------------------------------------  Assessment & Plan      Sepsis: present on admission, as noted by , CRP 27.85,  WBC >12,000, with acute cellulitis bilateral lower extremities: Blood cultures were drawn x2 in the ED and urine culture ordered though UA is questionable. Will continue IV rocephin 1g daily. Patient is refusing IV vancomycin as he reports that it \"caused VRE in his foot.\" For now, will start Linezolid with pharmacy to dose. Will give a 500cc fluid bolus and start on IV fluids at 100cc/hr. Repeat CBC, CRP, CMP in AM. Sepsis bundle added. EKG ordered given sinus tachycardia and to establish baseline. Consult ID for further assistance in antibiotic management. Low dose norco available PRN for pain control.     -Positive d dimer: Venous doppler of the bilateral lower extremities with no evidence of DVT. Check STAT CT of the chest with PE protocol given + d dimer at 3 and recent immobility, results still pending. Could simply be elevated from infectious process. No respiratory symptoms at this time. "     -Prerenal azotemia with BUN:cr ratio of 27, suggesting dehydration: continue IV fluid hydration. Repeat labs in the morning. Decreased appetite likely contributing to dehydration and electrolyte derangements (see below). No reported nausea or vomiting. Will place on clear liquid diet for now and advance as tolerated.     -Acute hypokalemia/hypomagnesemia: Place on both K+ and magnesium replacement protocol.     -Acute hyponatremia: Mild, likely hypovolemic. Continue IV fluids and follow AM labs.     -Previous history of VRE of the left foot: as noted above, started on linezolid, ID consult pending.     -History of muscular dystrophy and charcot eyad tooth disease.     -Morbid obesity, BMI 64.57.     -DVT Prophylaxis: Subcutaneous heparin.    * The patient is considered to be a high risk patient due to: sepsis, bilateral lower extremity cellulitis superimposed on lymphdema, positive D. Dimer, electrolyte abnormalities, morbid obesity.     Code Status: FULL CODE.      I have discussed the patient's assessment and plan with the patient, his girlfriend, and his RN Nayely who was present for the entire interview and physical examination on 3Nort.    DENISA Graves  11/26/18  5:55 PM  ---------------------------------------------------------------------------------------------------------------------   * I have seen the patient in conjunction with DENISA Diaz, and have amended her note to reflect my own findings, assessment and plan.    Electronically signed by Jimi Turner MD at 11/26/2018  8:59 PM       ICU Vital Signs     Row Name 12/05/18 1015 12/05/18 0714 12/05/18 0300 12/04/18 2300 12/04/18 2000       Vitals    Temp  97.4 °F (36.3 °C)  97.4 °F (36.3 °C)  --  --  --    Temp src  Oral  Oral  --  --  --    Pulse  77  77  84  91  --    Heart Rate Source  Monitor  Monitor  Monitor  Monitor  --    Resp  18  18  18  18  --    Resp Rate Source  Visual  Visual  Visual  Visual  --    BP  137/70   134/76  139/71  118/49  --    BP Location  Left arm  Left arm  Left arm  Left arm  --    BP Method  Automatic  Automatic  Automatic  Automatic  --    Patient Position  Lying  Lying  Lying  Lying  --       Oxygen Therapy    SpO2  --  94 %  --  --  --    Pulse Oximetry Type  --  Continuous  --  --  --    Device (Oxygen Therapy)  --  --  --  --  room air    Row Name 12/04/18 1938 12/04/18 1846 12/04/18 1604             Vitals    Temp  --  98.3 °F (36.8 °C)  97.8 °F (36.6 °C)      Temp src  --  Oral  Oral      Pulse  --  99  88      Heart Rate Source  --  Monitor  Monitor      Resp  --  18  20      Resp Rate Source  --  Visual  Visual      BP  --  133/83  139/74      BP Location  --  Left arm  Left arm      BP Method  --  Automatic  Automatic      Patient Position  --  Lying  Lying         Oxygen Therapy    SpO2  93 %  --  --      Device (Oxygen Therapy)  room air  --  --          Intake & Output (last day)       12/04 0701 - 12/05 0700 12/05 0701 - 12/06 0700    P.O. 610 240    Total Intake(mL/kg) 610 (2.8) 240 (1.1)    Urine (mL/kg/hr) 2100 (0.4) 1400 (1.2)    Stool 0     Total Output 2100 1400    Net -1490 -1160          Stool Unmeasured Occurrence 1 x         Hospital Medications (active)       Dose Frequency Start End    aluminum-magnesium hydroxide-simethicone (MAALOX MAX) 400-400-40 MG/5ML suspension 15 mL 15 mL Every 6 Hours PRN 11/29/2018     Sig - Route: Take 15 mL by mouth Every 6 (Six) Hours As Needed for Indigestion or Heartburn. - Oral    cefdinir (OMNICEF) capsule 300 mg 300 mg Every 12 Hours Scheduled 12/5/2018 12/15/2018    Sig - Route: Take 1 capsule by mouth Every 12 (Twelve) Hours. - Oral    Cosign for Ordering: Accepted by Rosita Kendrick MD on 12/5/2018 10:46 AM    docusate sodium (COLACE) capsule 100 mg 100 mg 2 Times Daily 11/28/2018     Sig - Route: Take 1 capsule by mouth 2 (Two) Times a Day. - Oral    doxycycline (MONODOX) capsule 100 mg 100 mg Every 12 Hours Scheduled 12/5/2018  "12/15/2018    Sig - Route: Take 1 capsule by mouth Every 12 (Twelve) Hours. - Oral    Cosign for Ordering: Accepted by Rosita Kendrick MD on 12/5/2018 10:46 AM    heparin (porcine) 5000 UNIT/ML injection 5,000 Units 5,000 Units Every 12 Hours Scheduled 11/26/2018     Sig - Route: Inject 1 mL under the skin into the appropriate area as directed Every 12 (Twelve) Hours. - Subcutaneous    Cosign for Ordering: Accepted by Jimi Turner MD on 11/26/2018  8:39 PM    HYDROcodone-acetaminophen (NORCO) 5-325 MG per tablet 1 tablet 1 tablet Every 6 Hours PRN 11/26/2018 12/6/2018    Sig - Route: Take 1 tablet by mouth Every 6 (Six) Hours As Needed for Moderate Pain  (Hold for SBP<100 or oversedation). - Oral    lactobacillus acidophilus (RISAQUAD) capsule 1 capsule 1 capsule Daily 11/28/2018     Sig - Route: Take 1 capsule by mouth Daily. - Oral    lisinopril (PRINIVIL,ZESTRIL) tablet 5 mg 5 mg Every 24 Hours Scheduled 12/1/2018     Sig - Route: Take 2 tablets by mouth Daily. - Oral    lubrisoft lotion  Daily 11/28/2018     Sig - Route: Apply  topically Daily. - Apply externally    Magnesium Sulfate 2 gram / 50mL Infusion (GIVE X 3 BAGS TO EQUAL 6GM TOTAL DOSE) - Mg 1.1 - 1.5 mg/dl 2 g As Needed 11/26/2018     Sig - Route: Infuse 50 mL into a venous catheter As Needed (See Administration Instructions). - Intravenous    Linked Group 1:  \"Or\" Linked Group Details        Magnesium Sulfate 2 gram Bolus, followed by 8 gram infusion (total Mg dose 10 grams)- Mg less than or equal to 1mg/dL 2 g As Needed 11/26/2018     Sig - Route: Infuse 50 mL into a venous catheter As Needed (See Administration Instructions). - Intravenous    Linked Group 1:  \"Or\" Linked Group Details        Magnesium Sulfate 4 gram infusion- Mg 1.6-1.9 mg/dL 4 g As Needed 11/26/2018     Sig - Route: Infuse 100 mL into a venous catheter As Needed (See Administration Instructions). - Intravenous    Linked Group 1:  \"Or\" Linked Group Details        " nitroglycerin (NITROSTAT) SL tablet 0.4 mg 0.4 mg Every 5 Minutes PRN 11/26/2018     Sig - Route: Place 1 tablet under the tongue Every 5 (Five) Minutes As Needed for Chest Pain (if systolic BP greater than 100 mm/Hg.). - Sublingual    Cosign for Ordering: Accepted by Jimi Turner MD on 11/26/2018  8:39 PM    polyethylene glycol 3350 powder (packet) 17 g 2 Times Daily 12/1/2018     Sig - Route: Take 17 g by mouth 2 (Two) Times a Day. - Oral    potassium chloride (KLOR-CON) packet 40 mEq 40 mEq As Needed 11/26/2018     Sig - Route: Take 40 mEq by mouth As Needed (potassium replacement, see admin instructions). - Oral    Cosign for Ordering: Accepted by Jimi Turner MD on 11/26/2018  8:39 PM    potassium chloride (MICRO-K) CR capsule 40 mEq 40 mEq As Needed 11/26/2018     Sig - Route: Take 4 capsules by mouth As Needed (potassium replacement.  see admin instructions). - Oral    Cosign for Ordering: Accepted by Jimi Turner MD on 11/26/2018  8:39 PM    simethicone (MYLICON) chewable tablet 80 mg 80 mg 4 Times Daily PRN 11/29/2018     Sig - Route: Chew 1 tablet 4 (Four) Times a Day As Needed for Flatulence. - Oral    sodium chloride 0.9 % flush 1-10 mL 1-10 mL As Needed 11/26/2018     Sig - Route: Infuse 1-10 mL into a venous catheter As Needed for Line Care. - Intravenous    Cosign for Ordering: Accepted by Jimi Turner MD on 11/26/2018  8:39 PM    sodium chloride 0.9 % flush 10 mL 10 mL As Needed 11/26/2018     Sig - Route: Infuse 10 mL into a venous catheter As Needed for Line Care. - Intravenous    Cosign for Ordering: Accepted by Janak Nieves MD on 11/26/2018  2:46 PM    sodium chloride 0.9 % flush 10 mL 10 mL Every 12 Hours Scheduled 11/29/2018     Sig - Route: Infuse 10 mL into a venous catheter Every 12 (Twelve) Hours. - Intravenous    sodium chloride 0.9 % flush 10 mL 10 mL As Needed 11/29/2018     Sig - Route: Infuse 10 mL into a venous catheter As Needed for  Line Care (After Medication Administration). - Intravenous    sodium chloride 0.9 % flush 3 mL 3 mL Every 12 Hours Scheduled 11/26/2018     Sig - Route: Infuse 3 mL into a venous catheter Every 12 (Twelve) Hours. - Intravenous    Cosign for Ordering: Accepted by Jimi Turner MD on 11/26/2018  8:39 PM    cefepime (MAXIPIME) 2 g/100 mL 0.9% NS (mbp) (Discontinued) 2 g Every 8 Hours 11/27/2018 12/5/2018    Sig - Route: Infuse 100 mL into a venous catheter Every 8 (Eight) Hours. - Intravenous    vancomycin (VANCOCIN) 2,000 mg in sodium chloride 0.9 % 500 mL IVPB (Discontinued) 2,000 mg Every 8 Hours 11/27/2018 12/4/2018    Sig - Route: Infuse 2,000 mg into a venous catheter Every 8 (Eight) Hours. - Intravenous    vancomycin (VANCOCIN) 2,000 mg in sodium chloride 0.9 % 500 mL IVPB (Discontinued) 2,000 mg Every 12 Hours 12/4/2018 12/5/2018    Sig - Route: Infuse 2,000 mg into a venous catheter Every 12 (Twelve) Hours. - Intravenous            Lab Results (last 24 hours)     Procedure Component Value Units Date/Time    Vancomycin, Trough [[id number]]  (Abnormal) Collected:  12/04/18 1202    Specimen:  Blood Updated:  12/04/18 1306     Vancomycin Trough 28.10 mcg/mL         Imaging Results (last 24 hours)     ** No results found for the last 24 hours. **        Orders (last 24 hrs)     Start     Ordered    12/06/18 0000  Probiotic Product (PROBIOTIC ADVANCED) capsule  Daily      12/05/18 1147    12/06/18 0000  lisinopril (PRINIVIL,ZESTRIL) 5 MG tablet  Every 24 Hours Scheduled      12/05/18 1147    12/06/18 0000  Emollient (LUBRISOFT) lotion lotion  Daily      12/05/18 1147    12/05/18 1144  Discontinue IV  Once      12/05/18 1147    12/05/18 1141  Discharge patient  Once      12/05/18 1147    12/05/18 1015  cefdinir (OMNICEF) capsule 300 mg  Every 12 Hours Scheduled      12/05/18 0952    12/05/18 1015  doxycycline (MONODOX) capsule 100 mg  Every 12 Hours Scheduled      12/05/18 0952    12/05/18 0000  cefdinir  (OMNICEF) 300 MG capsule  Every 12 Hours Scheduled      12/05/18 1147    12/05/18 0000  docusate sodium 100 MG capsule  2 Times Daily      12/05/18 1147    12/05/18 0000  polyethylene glycol (MIRALAX) pack packet  2 Times Daily      12/05/18 1147    12/05/18 0000  doxycycline (MONODOX) 100 MG capsule  Every 12 Hours Scheduled      12/05/18 1147    12/05/18 0000  Discharge Follow-up with PCP      12/05/18 1147    12/05/18 0000  Diet: Cardiac      12/05/18 1147    12/05/18 0000  Activity as Tolerated      12/05/18 1147    12/05/18 0000  Walker      12/05/18 1147    12/05/18 0000  Ambulatory Referral to Home Health      12/05/18 1147    12/04/18 2100  vancomycin (VANCOCIN) 2,000 mg in sodium chloride 0.9 % 500 mL IVPB  Every 12 Hours,   Status:  Discontinued      12/04/18 1427    12/04/18 1747  Activity As Tolerated  Until Discontinued      12/04/18 1746    12/01/18 1000  lisinopril (PRINIVIL,ZESTRIL) tablet 5 mg  Every 24 Hours Scheduled      12/01/18 0841    12/01/18 0930  polyethylene glycol 3350 powder (packet)  2 Times Daily      12/01/18 0826    11/29/18 1045  sodium chloride 0.9 % flush 10 mL  Every 12 Hours Scheduled      11/29/18 0955    11/29/18 0955  sodium chloride 0.9 % flush 10 mL  As Needed      11/29/18 0955    11/29/18 0925  simethicone (MYLICON) chewable tablet 80 mg  4 Times Daily PRN      11/29/18 0925    11/29/18 0925  aluminum-magnesium hydroxide-simethicone (MAALOX MAX) 400-400-40 MG/5ML suspension 15 mL  Every 6 Hours PRN      11/29/18 0925    11/28/18 1900  lubrisoft lotion  Daily      11/28/18 1716    11/28/18 1800  lactobacillus acidophilus (RISAQUAD) capsule 1 capsule  Daily      11/28/18 1649    11/28/18 1300  docusate sodium (COLACE) capsule 100 mg  2 Times Daily      11/28/18 1138    11/27/18 2000  cefepime (MAXIPIME) 2 g/100 mL 0.9% NS (mbp)  Every 8 Hours,   Status:  Discontinued      11/27/18 1045    11/27/18 1300  vancomycin (VANCOCIN) 2,000 mg in sodium chloride 0.9 % 500 mL IVPB   Every 8 Hours,   Status:  Discontinued      11/27/18 1109    11/26/18 2100  sodium chloride 0.9 % flush 3 mL  Every 12 Hours Scheduled      11/26/18 1911 11/26/18 2100  heparin (porcine) 5000 UNIT/ML injection 5,000 Units  Every 12 Hours Scheduled      11/26/18 1911 11/26/18 2044  HYDROcodone-acetaminophen (NORCO) 5-325 MG per tablet 1 tablet  Every 6 Hours PRN      11/26/18 2044 11/26/18 2042  Magnesium Sulfate 2 gram Bolus, followed by 8 gram infusion (total Mg dose 10 grams)- Mg less than or equal to 1mg/dL  As Needed      11/26/18 2042 11/26/18 2042  Magnesium Sulfate 2 gram / 50mL Infusion (GIVE X 3 BAGS TO EQUAL 6GM TOTAL DOSE) - Mg 1.1 - 1.5 mg/dl  As Needed      11/26/18 2042 11/26/18 2042  Magnesium Sulfate 4 gram infusion- Mg 1.6-1.9 mg/dL  As Needed      11/26/18 2042 11/26/18 1931  potassium chloride (MICRO-K) CR capsule 40 mEq  As Needed      11/26/18 1931 11/26/18 1931  potassium chloride (KLOR-CON) packet 40 mEq  As Needed      11/26/18 1931 11/26/18 1910  sodium chloride 0.9 % flush 1-10 mL  As Needed      11/26/18 1911 11/26/18 1910  nitroglycerin (NITROSTAT) SL tablet 0.4 mg  Every 5 Minutes PRN      11/26/18 1911 11/26/18 1356  sodium chloride 0.9 % flush 10 mL  As Needed      11/26/18 1357    Unscheduled  Oxygen Therapy- Nasal Cannula; 2 LPM; Titrate for SPO2: 92%, Greater Than or Equal To  Continuous PRN      11/26/18 1357    Unscheduled  Magnesium  As Needed      11/26/18 1931    Unscheduled  Potassium  As Needed      11/26/18 1931    Unscheduled  ECG 12 Lead  As Needed     Comments:  Nurse to Release if Patient Expericences Acute Chest Pain or Dysrhythmias    11/26/18 1911    Unscheduled  Potassium  As Needed     Comments:  For Ventricular Arrhythmias      11/26/18 1911    Unscheduled  Magnesium  As Needed     Comments:  For Ventricular Arrhythmias      11/26/18 1911    Unscheduled  Troponin  As Needed     Comments:  For Chest Pain      11/26/18 1911     Unscheduled  Digoxin Level  As Needed     Comments:  For Atrial Arrhythmias      11/26/18 1911    Unscheduled  Blood Gas, Arterial  As Needed     Comments:  Per O2 PolicyNotify Physician      11/26/18 1911    Unscheduled  Up with assistance  As Needed      11/26/18 1911    Unscheduled  Magnesium  As Needed      11/26/18 2042    Unscheduled  Potassium  As Needed      11/26/18 2042    Unscheduled  Change Dressing to IV Site As Needed When Damp, Loose or Soiled  As Needed      11/29/18 0955    Unscheduled  Change Needleless Connectors  As Needed     Comments:  Change Needleless Connectors When:  - Removed For Any Reason  - Residual Blood or Debris Within Connector  - Prior to Drawing Blood Cultures  - Contamination of Connector  - After Administration of Blood or Blood Components    11/29/18 0955    --  SCANNED - TELEMETRY        11/26/18 0000    --  SCANNED - TELEMETRY        11/26/18 0000    --  SCANNED - TELEMETRY        11/26/18 0000    --  SCANNED - TELEMETRY        11/26/18 0000    --  SCANNED - TELEMETRY        11/26/18 0000    --  SCANNED - TELEMETRY        11/26/18 0000          Operative/Procedure Notes (most recent note)     No notes of this type exist for this encounter.           Physician Progress Notes (most recent note)      Ranulfo House MD at 12/5/2018  9:55 AM        Cellulitis, obesity    He is continuing to improve. Afeb and VSS. His feet are doing well and the two small areas on the right foot are stable and will eventually peal but do not need debridement. I will see him prn.     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 He has a long history of lymphedema due to his obesity but has not had erythema and this much swelling before. He has had no specific injury to the legs. He has been in the hospital several days now with continued swelling and erythema in the feet. His CBC has been normal but CRP has been elevated though it is down some from yesterday. No fever. He also has some blistered areas on the feet. He had a venous doppler that was negative. He had been able to walk with braces until recently when his legs began swelling more.     Review of Systems   Constitutional: Positive for activity change. Negative for appetite change, chills, fever and unexpected weight change.   HENT: Negative for congestion, facial swelling and sore throat.    Eyes: Negative for photophobia and visual disturbance.   Respiratory: Negative for chest tightness, shortness of breath and wheezing.    Cardiovascular: Positive for leg swelling. Negative for chest pain and palpitations.   Gastrointestinal: Negative for abdominal distention, abdominal pain, anal bleeding, blood in stool, constipation, diarrhea, nausea, rectal pain and vomiting.   Endocrine: Negative for cold intolerance, heat intolerance, polydipsia and polyuria.   Genitourinary: Negative for difficulty urinating, dysuria, flank pain and urgency.   Musculoskeletal: Positive for myalgias. Negative for back pain.   Skin: Positive for color change. Negative for rash and wound.   Allergic/Immunologic: Negative for immunocompromised state.   Neurological: Negative for dizziness, seizures, syncope, light-headedness, numbness and headaches.   Hematological: Negative for adenopathy. Does not bruise/bleed easily.   Psychiatric/Behavioral: Negative for behavioral problems and confusion. The patient is not nervous/anxious.         Past Medical History:   Diagnosis Date   • Charcot-Ramya  disease    • Muscular dystrophy    ,   Past Surgical History:   Procedure Laterality Date   • WRIST SURGERY     , History reviewed. No pertinent family history.,   Social History     Tobacco Use   • Smoking status: Never Smoker   Substance Use Topics   • Alcohol use: No     Frequency: Never   • Drug use: No   ,   No medications prior to admission.   , Scheduled Meds:    cefepime 2 g Intravenous Q8H   docusate sodium 100 mg Oral BID   heparin (porcine) 5,000 Units Subcutaneous Q12H   lactobacillus acidophilus 1 capsule Oral Daily   lubrisoft  Apply externally Daily   magnesium sulfate 4 g Intravenous Once   polyethylene glycol 17 g Oral Daily   sodium chloride 10 mL Intravenous Q12H   sodium chloride 3 mL Intravenous Q12H   vancomycin 2,000 mg Intravenous Q8H   , Continuous Infusions:   , PRN Meds:  •  aluminum-magnesium hydroxide-simethicone  •  HYDROcodone-acetaminophen  •  magnesium sulfate **OR** magnesium sulfate **OR** magnesium sulfate  •  nitroglycerin  •  potassium chloride  •  potassium chloride  •  simethicone  •  sodium chloride  •  sodium chloride  •  sodium chloride and Allergies:  Patient has no known allergies.    Objective      Vital Signs  Temp:  [98.1 °F (36.7 °C)-98.8 °F (37.1 °C)] 98.7 °F (37.1 °C)  Heart Rate:  [87-97] 90  Resp:  [18-20] 20  BP: (122-154)/(65-87) 141/75    Physical Exam   Constitutional: He is oriented to person, place, and time. He appears well-developed and well-nourished. He does not appear ill. No distress.       HENT:   Head: Normocephalic. Head is without laceration. Hair is normal.   Right Ear: Hearing and ear canal normal.   Left Ear: Hearing and ear canal normal.   Nose: Nose normal. No sinus tenderness. No epistaxis. Right sinus exhibits no maxillary sinus tenderness and no frontal sinus tenderness. Left sinus exhibits no maxillary sinus tenderness and no frontal sinus tenderness.   Eyes: Conjunctivae and lids are normal. Pupils are equal, round, and reactive to light.    Neck: Normal range of motion. No JVD present. No tracheal tenderness present. No tracheal deviation present. No thyroid mass and no thyromegaly present.   Cardiovascular: Normal rate and regular rhythm. Exam reveals no gallop.   No murmur heard.  Pulmonary/Chest: Effort normal and breath sounds normal. No stridor. He has no wheezes. He exhibits no tenderness.   Abdominal: Soft. Bowel sounds are normal. He exhibits no distension, no ascites and no mass. There is no tenderness. There is no rebound and no guarding. No hernia.   Musculoskeletal: He exhibits no edema or deformity.   Lymphadenopathy:     He has no cervical adenopathy.     He has no axillary adenopathy.        Right: No inguinal and no supraclavicular adenopathy present.        Left: No inguinal and no supraclavicular adenopathy present.   Neurological: He is alert and oriented to person, place, and time. He exhibits normal muscle tone.   Skin: Skin is warm, dry and intact. No rash noted. There is erythema. No pallor.   Psychiatric: He has a normal mood and affect. His behavior is normal. Thought content normal.   Vitals reviewed.      Results Review:    I reviewed the patient's new clinical results.  I reviewed the patient's new imaging results and agree with the interpretation.  I reviewed the patient's other test results and agree with the interpretation        Assessment/Plan  He is super morbidly obese with chronic lymphedema and now has cellulitis. It will be very difficult to heal anything on his feet or legs due to this. He is at risk of starting a cycle of foot and leg wounds that wont heal. I discussed and stressed to him that if he does not agressively control his diet, this could be the end of his ability to ambulate at all as well as putting his life at risk. Continue the IV antibiotics and watch the feet closely for signs of worsening.       Bilateral cellulitis of lower leg      Assessment & Plan    I discussed the patients findings and my  recommendations with patient, nursing staff and consulting provider    Ranulfo House MD  18  12:12 PM    Time:          Electronically signed by Ranulfo House MD at 2018 12:25 PM          Nutrition Notes (most recent note)      Nancy Tsang RD at 2018  8:43 PM        Nutrition Services    Patient Name:  Jimmy Recinos  YOB: 1975  MRN: [id number]  Admit Date:  2018    Note will change diet to cardiac diet to provide low fat for calorie reduction.     Thank you    Electronically signed by:  Nancy Tsang RD  18 8:43 PM     Electronically signed by Nancy Tsang RD at 2018  8:44 PM          Physical Therapy Notes (most recent note)      Janell Rosales, PT at 2018  5:09 PM  Version 1 of 1         Acute Care - Physical Therapy Initial Evaluation   [facility]     Patient Name: Jimmy Recinos  : 1975  MRN: [id number]  Today's Date: 2018   Onset of Illness/Injury or Date of Surgery: 18  Date of Referral to PT: 18  Referring Physician:       Admit Date: 2018    Visit Dx:     ICD-10-CM ICD-9-CM   1. Cellulitis of left lower extremity L03.116 682.6     Patient Active Problem List   Diagnosis   • Bilateral cellulitis of lower leg     Past Medical History:   Diagnosis Date   • Charcot-Ramya disease    • Muscular dystrophy      Past Surgical History:   Procedure Laterality Date   • WRIST SURGERY          PT ASSESSMENT (last 12 hours)      Physical Therapy Evaluation     Row Name 18 1600          PT Evaluation Time/Intention    Subjective Information  complains of;weakness  -BC     Document Type  evaluation  -BC     Mode of Treatment  physical therapy;individual therapy  -BC     Patient Effort  fair  -BC     Symptoms Noted During/After Treatment  fatigue  -BC     Row Name 18 1600          General Information    Patient Profile Reviewed?  yes  -BC     Onset of Illness/Injury or Date of Surgery  18   -BC     Referring Physician    -BC     Patient Observations  alert;cooperative;agree to therapy  -BC     Prior Level of Function  independent:;all household mobility  -BC     Equipment Currently Used at Home  walker, rolling;orthotic device B LE leg braces  -BC     Risks Reviewed  patient:;LOB;nausea/vomiting;dizziness;increased discomfort;change in vital signs;increased drainage;lines disloged  -BC     Benefits Reviewed  patient:;improve function;increase independence;increase strength;increase balance;decrease risk of DVT;decrease pain;improve skin integrity;increase knowledge  -BC     Row Name 12/04/18 1600          Relationship/Environment    Lives With  alone  -BC     Family Caregiver if Needed  child(mercy), adult  -BC     Family Caregiver Names  Son  -BC     Row Name 12/04/18 1600          Resource/Environmental Concerns    Current Living Arrangements  home/apartment/condo  -BC     Resource/Environmental Concerns  none  -BC     Transportation Concerns  car, none  -BC     Row Name 12/04/18 1600          Cognitive Assessment/Intervention- PT/OT    Orientation Status (Cognition)  oriented x 4  -BC     Follows Commands (Cognition)  WFL  -BC     Row Name 12/04/18 1600          Bed Mobility Assessment/Treatment    Bed Mobility Assessment/Treatment  bed mobility (all) activities  -BC     [place] Level (Bed Mobility)  moderate assist (50% patient effort);maximum assist (25% patient effort);2 person assist  -BC     Assistive Device (Bed Mobility)  bed rails  -BC     Row Name 12/04/18 1600          Transfer Assessment/Treatment    Transfer Assessment/Treatment  sit-stand transfer;stand-sit transfer  -BC     Maintains Weight-bearing Status (Transfers)  physical assist to maintain  -BC     Sit-Stand [place] (Transfers)  moderate assist (50% patient effort);maximum assist (25% patient effort)  -BC     Stand-Sit [place] (Transfers)  moderate assist (50% patient effort);maximum assist (25% patient  effort);2 person assist  -BC     Row Name 12/04/18 1600          Sit-Stand Transfer    Assistive Device (Sit-Stand Transfers)  walker, front-wheeled bariatric walker  -BC     Row Name 12/04/18 1600          Stand-Sit Transfer    Assistive Device (Stand-Sit Transfers)  walker, front-wheeled  -BC     Row Name 12/04/18 1600          Gait/Stairs Assessment/Training    Gait/Stairs Assessment/Training  gait/ambulation independence  -BC     [place] Level (Gait)  moderate assist (50% patient effort);maximum assist (25% patient effort);2 person assist  -BC     Assistive Device (Gait)  walker, front-wheeled  -BC     Distance in Feet (Gait)  3 side stepping toward HOB  -BC     Row Name 12/04/18 1600          General ROM    GENERAL ROM COMMENTS  BLE ROM limited due to disease process.  -BC     Row Name 12/04/18 1600          MMT (Manual Muscle Testing)    General MMT Comments  BLE MMT 3-/5  -BC     Row Name 12/04/18 1600          Physical Therapy Clinical Impression    Date of Referral to PT  12/03/18  -BC     Functional Level at Time of Evaluation (PT Clinical Impression)  fair/poor  -BC     Criteria for Skilled Interventions Met (PT Clinical Impression)  yes;treatment indicated  -BC     Functional Limitations in Following Categories (Describe Specific Limitations)  self-care;home management;community/leisure  -BC     Rehab Potential (PT Clinical Summary)  fair, will monitor progress closely  -BC     Row Name 12/04/18 1600          Physical Therapy Goals    Bed Mobility Goal Selection (PT)  bed mobility, PT goal 1  -BC     Transfer Goal Selection (PT)  transfer, PT goal 1  -BC     Gait Training Goal Selection (PT)  gait training, PT goal 1  -BC     Row Name 12/04/18 1600          Bed Mobility Goal 1 (PT)    Activity/Assistive Device (Bed Mobility Goal 1, PT)  bed mobility activities, all  -BC     [place] Level/Cues Needed (Bed Mobility Goal 1, PT)  moderate assist (50-74% patient effort)  -BC     Time Frame (Bed  Mobility Goal 1, PT)  by discharge  -BC     Row Name 12/04/18 1600          Transfer Goal 1 (PT)    Activity/Assistive Device (Transfer Goal 1, PT)  transfers, all  -BC     [place] Level/Cues Needed (Transfer Goal 1, PT)  moderate assist (50-74% patient effort);minimum assist (75% or more patient effort)  -BC     Time Frame (Transfer Goal 1, PT)  by discharge  -BC     Row Name 12/04/18 1600          Gait Training Goal 1 (PT)    Activity/Assistive Device (Gait Training Goal 1, PT)  gait (walking locomotion)  -BC     [place] Level (Gait Training Goal 1, PT)  minimum assist (75% or more patient effort);moderate assist (50-74% patient effort)  -BC     Distance (Gait Goal 1, PT)  20  -BC     Time Frame (Gait Training Goal 1, PT)  by discharge  -BC     Row Name 12/04/18 1600          Positioning and Restraints    Pre-Treatment Position  in bed  -BC     Post Treatment Position  bed  -BC       User Key  (r) = Recorded By, (t) = Taken By, (c) = Cosigned By    Initials Name Provider Type    BC Janell Rosales PT Physical Therapist        Physical Therapy Education     Title: PT OT SLP Therapies (Done)     Topic: Physical Therapy (Done)     Point: Mobility training (Done)     Learning Progress Summary           Patient Acceptance, E, VU by BC at 12/4/2018  5:05 PM                   Point: Home exercise program (Done)     Learning Progress Summary           Patient Acceptance, E, VU by BC at 12/4/2018  5:05 PM                   Point: Body mechanics (Done)     Learning Progress Summary           Patient Acceptance, E, VU by BC at 12/4/2018  5:05 PM                   Point: Precautions (Done)     Learning Progress Summary           Patient Acceptance, E, VU by BC at 12/4/2018  5:05 PM                               User Key     Initials Effective Dates Name Provider Type [facility] 03/14/16 -  Janell Rosales PT Physical Therapist PT              PT Recommendation and Plan  Planned Therapy Interventions (PT  Eval): balance training, bed mobility training, gait training, home exercise program, patient/family education, strengthening, transfer training  Therapy Frequency (PT Clinical Impression): 3 times/wk(3-5x/week)  Plan of Care Reviewed With: patient  Outcome Measures     Row Name 12/04/18 1700 12/04/18 1325 12/04/18 1300       How much help from another person do you currently need...    Turning from your back to your side while in flat bed without using bedrails?  2  -BC  --  --    Moving from lying on back to sitting on the side of a flat bed without bedrails?  2  -BC  --  --    Moving to and from a bed to a chair (including a wheelchair)?  1  -BC  --  --    Standing up from a chair using your arms (e.g., wheelchair, bedside chair)?  2  -BC  --  --    Climbing 3-5 steps with a railing?  1  -BC  --  --    To walk in hospital room?  2  -BC  --  --    AM-PAC 6 Clicks Score  10  -BC  --  --       How much help from another is currently needed...    Putting on and taking off regular lower body clothing?  --  2  -KR  --    Bathing (including washing, rinsing, and drying)  --  3  -KR  --    Toileting (which includes using toilet bed pan or urinal)  --  2  -KR  --    Putting on and taking off regular upper body clothing  --  3  -KR  --    Taking care of personal grooming (such as brushing teeth)  --  3  -KR  --    Eating meals  --  4  -KR  --    Score  --  17  -KR  --       Functional Assessment    Outcome Measure Options  AM-PAC 6 Clicks Basic Mobility (PT)  -BC  --  AM-PAC 6 Clicks Daily Activity (OT)  -KR      User Key  (r) = Recorded By, (t) = Taken By, (c) = Cosigned By    Initials Name Provider Type    BC Janell Rosales, PT Physical Therapist    David Thomas, OT Occupational Therapist         Time Calculation:   PT Charges     Row Name 12/04/18 1708             Time Calculation    PT Received On  12/04/18  -BC      PT Goal Re-Cert Due Date  12/18/18  -BC         Time Calculation- PT    Total Timed Code  Minutes- PT  60 minute(s)  -BC         Timed Charges    02922 - PT Therapeutic Exercise Minutes  15  -BC      22759 - PT Therapeutic Activity Minutes  15  -BC        User Key  (r) = Recorded By, (t) = Taken By, (c) = Cosigned By    Initials Name Provider Type    BC Janell Rosales, PT Physical Therapist        Therapy Suggested Charges     Code   Minutes Charges    42082 (CPT®) Hc Pt Neuromusc Re Education Ea 15 Min      95389 (CPT®) Hc Pt Ther Proc Ea 15 Min 15 1    47566 (CPT®) Hc Gait Training Ea 15 Min      51476 (CPT®) Hc Pt Therapeutic Act Ea 15 Min 15 1    91408 (CPT®) Hc Pt Manual Therapy Ea 15 Min      89056 (CPT®) Hc Pt Iontophoresis Ea 15 Min      55630 (CPT®) Hc Pt Elec Stim Ea-Per 15 Min      72002 (CPT®) Hc Pt Ultrasound Ea 15 Min      77619 (CPT®) Hc Pt Self Care/Mgmt/Train Ea 15 Min      67558 (CPT®) Hc Pt Prosthetic (S) Train Initial Encounter, Each 15 Min      83533 (CPT®) Hc Pt Orthotic(S)/Prosthetic(S) Encounter, Each 15 Min      41556 (CPT®) Hc Orthotic(S) Mgmt/Train Initial Encounter, Each 15min      Total  30 2        Therapy Charges for Today     Code Description Service Date Service Provider Modifiers Qty    72182452902 HC PT MOBILITY CURRENT 12/4/2018 Janell Rosales, PT GP, CL 1    40320663932 HC PT MOBILITY PROJECTED 12/4/2018 Janell Rosales, PT GP, CK 1    90669428779 HC PT THER PROC EA 15 MIN 12/4/2018 Janell Rosales, PT GP 1    80218081425 HC PT THERAPEUTIC ACT EA 15 MIN 12/4/2018 Janell Rosales, PT GP 1    04147788795 HC PT EVAL MOD COMPLEXITY 2 12/4/2018 Janell Rosales, PT GP 1    70763656728 HC PT THER SUPP EA 15 MIN 12/4/2018 Janell Rosales, PT GP 4          PT G-Codes  Outcome Measure Options: AM-PAC 6 Clicks Basic Mobility (PT)  AM-PAC 6 Clicks Score: 10  Score: 17  Functional Limitation: Mobility: Walking and moving around  Mobility: Walking and Moving Around Current Status (): At least 60 percent but less than 80 percent impaired, limited or  restricted  Mobility: Walking and Moving Around Goal Status (): At least 40 percent but less than 60 percent impaired, limited or restricted      Janell Rosales, PT  2018         Electronically signed by Janell Rosales, PT at 2018  5:09 PM          Occupational Therapy Notes (most recent note)      David Daniels, OT at 2018  1:29 PM          Acute Care - Occupational Therapy Initial Evaluation  [facility]     Patient Name: Jimmy Recinos  : 1975  MRN: [id number]  Today's Date: 2018             Admit Date: 2018       ICD-10-CM ICD-9-CM   1. Cellulitis of left lower extremity L03.116 682.6     Patient Active Problem List   Diagnosis   • Bilateral cellulitis of lower leg     Past Medical History:   Diagnosis Date   • Charcot-Ramya disease    • Muscular dystrophy      Past Surgical History:   Procedure Laterality Date   • WRIST SURGERY            OT ASSESSMENT FLOWSHEET (last 72 hours)      Occupational Therapy Evaluation     Row Name 18 1313                   OT Evaluation Time/Intention    Document Type  evaluation  -KR        Mode of Treatment  occupational therapy  -KR        Patient Effort  good  -KR           Cognitive Assessment/Intervention- PT/OT    Orientation Status (Cognition)  oriented x 4  -KR        Follows Commands (Cognition)  WFL  -KR           ADL Assessment/Intervention    BADL Assessment/Intervention  upper body dressing;lower body dressing;grooming;feeding;toileting  -KR           Upper Body Dressing Assessment/Training    Upper Body Dressing [place] Level  upper body dressing skills;contact guard assist  -KR           Lower Body Dressing Assessment/Training    Lower Body Dressing [place] Level  lower body dressing skills;minimum assist (75% patient effort)  -KR           Grooming Assessment/Training    [place] Level (Grooming)  grooming skills;set up  -KR           Self-Feeding Assessment/Training    [place] Level (Feeding)   feeding skills;set up  -KR           Toileting Assessment/Training    [place] Level (Toileting)  toileting skills;moderate assist (50% patient effort)  -KR           General ROM    GENERAL ROM COMMENTS  BUE WFL, BUE hands present with premorbid deformities at PIP 90 degrees flexion. PROM WFL.   -KR           MMT (Manual Muscle Testing)    General MMT Comments  BUE 3/5  -KR           Clinical Impression (OT)    Criteria for Skilled Therapeutic Interventions Met (OT Eval)  yes  -KR        Rehab Potential (OT Eval)  good, to achieve stated therapy goals  -KR           Planned OT Interventions    Planned Therapy Interventions (OT Eval)  activity tolerance training;strengthening exercise  -KR           OT Goals    Dressing Goal Selection (OT)  dressing, OT goal 1  -KR        Strength Goal Selection (OT)  strength, OT goal 1  -KR        Additional Documentation  Strength Goal Selection (OT) (Row)  -KR           Dressing Goal 1 (OT)    Activity/Assistive Device (Dressing Goal 1, OT)  dressing skills, all  -KR        [place]/Cues Needed (Dressing Goal 1, OT)  set-up required  -KR        Time Frame (Dressing Goal 1, OT)  by discharge  -KR           Strength Goal 1 (OT)    Strength Goal 1 (OT)  BUE increase x 1 to enhance self care skills  -KR        Time Frame (Strength Goal 1, OT)  by discharge  -KR          User Key  (r) = Recorded By, (t) = Taken By, (c) = Cosigned By    Initials Name Effective Dates    KR David Daniels, OT 04/03/18 -                OT Recommendation and Plan  Planned Therapy Interventions (OT Eval): activity tolerance training, strengthening exercise       Outcome Measures     Row Name 12/04/18 1325 12/04/18 1300          How much help from another is currently needed...    Putting on and taking off regular lower body clothing?  2  -KR  --     Bathing (including washing, rinsing, and drying)  3  -KR  --     Toileting (which includes using toilet bed pan or urinal)  2  -KR  --     Putting on and  taking off regular upper body clothing  3  -KR  --     Taking care of personal grooming (such as brushing teeth)  3  -KR  --     Eating meals  4  -KR  --     Score  17  -KR  --        Functional Assessment    Outcome Measure Options  --  AM-PAC 6 Clicks Daily Activity (OT)  -KR       User Key  (r) = Recorded By, (t) = Taken By, (c) = Cosigned By    Initials Name Provider Type    David Thomas OT Occupational Therapist          Time Calculation:   Time Calculation- OT     Row Name 12/04/18 1326             Time Calculation- OT    Total Timed Code Minutes- OT  30 minute(s)  -KR        User Key  (r) = Recorded By, (t) = Taken By, (c) = Cosigned By    Initials Name Provider Type    David Thomas OT Occupational Therapist        Therapy Suggested Charges     Code   Minutes Charges    None           Therapy Charges for Today     Code Description Service Date Service Provider Modifiers Qty    57897629141  OT SELFCARE CURRENT 12/4/2018 David Daniels OT GO, CK 1    47497109312 HC OT SELFCARE PROJECTED 12/4/2018 David Daniels OT GO, CI 1    [id number]  OT EVAL HIGH COMPLEXITY 2 12/4/2018 David Daniels OT GO 1          OT G-codes  OT Professional Judgement Used?: Yes  Functional Limitation: Self care  Self Care Current Status (): At least 40 percent but less than 60 percent impaired, limited or restricted  Self Care Goal Status (): At least 1 percent but less than 20 percent impaired, limited or restricted    David Daniels OT  12/4/2018    Electronically signed by David Daniels OT at 12/4/2018  1:38 PM       Speech Language Pathology Notes (most recent note)     No notes of this type exist for this encounter.        Respiratory Therapy Notes (most recent note)     No notes of this type exist for this encounter.        ADL Documentation (most recent)      Most Recent Value   Presence of Pain  denies pain/discomfort   Transferring  3 - assistive equipment and person   Toileting  3 - assistive equipment  and person   Bathing  3 - assistive equipment and person   Dressing  3 - assistive equipment and person   Eating  0 - independent   Communication  0 - understands/communicates without difficulty   Swallowing  0 - swallows foods/liquids without difficulty   Equipment Currently Used at Home  cane, straight, wheelchair, walker, rolling, other (see comments), ramp [Pt says he has a RW, homemade cane, WC, AFO braces from [employer] in [place] and wheelchair ramp at home. ]            Discharge Summary     No notes of this type exist for this encounter.        Discharge Order (From admission, onward)    Start     Ordered    12/05/18 1141  Discharge patient  Once     Expected Discharge Date:  12/05/18    Expected Discharge Time:  Midday    Discharge Disposition:  Home or Self Care    Physician of Record for Attribution - Please select from Treatment Team:  HARESH ALDANA [[id number]]    Review needed by CMO to determine Physician of Record:  No       Question Answer Comment   Physician of Record for Attribution - Please select from Treatment Team HARESH ALDANA    Review needed by CMO to determine Physician of Record No        12/05/18 1147

## 2018-12-06 ENCOUNTER — READMISSION MANAGEMENT (OUTPATIENT)
Dept: CALL CENTER | Facility: HOSPITAL | Age: 43
End: 2018-12-06

## 2018-12-06 NOTE — OUTREACH NOTE
Prep Survey      Responses   Facility patient discharged from?  [facility]   Is patient eligible?  Yes   Discharge diagnosis  Sepsis   Does the patient have one of the following disease processes/diagnoses(primary or secondary)?  Sepsis   Does the patient have Home health ordered?  Yes   What is the Home health agency?   [facility]   Is there a DME ordered?  Yes   What DME was ordered?  RW and W/c fix per [facility]    Prep survey completed?  Yes          Jordana Mendes RN

## 2018-12-10 ENCOUNTER — READMISSION MANAGEMENT (OUTPATIENT)
Dept: CALL CENTER | Facility: HOSPITAL | Age: 43
End: 2018-12-10

## 2018-12-10 NOTE — OUTREACH NOTE
Sepsis Week 1 Survey      Responses   Facility patient discharged from?  [facility]   Does the patient have one of the following disease processes/diagnoses(primary or secondary)?  Sepsis   Is there a successful TCM telephone encounter documented?  No   Week 1 attempt successful?  Yes   Call start time  0743   Call end time  0749   Discharge diagnosis  Sepsis   Is patient permission given to speak with other caregiver?  No   Meds reviewed with patient/caregiver?  Yes   Is the patient having any side effects they believe may be caused by any medication additions or changes?  No   Does the patient have all medications related to this admission filled (includes all antibiotics, inhalers, nebulizers,steroids,etc.)  Yes   Is the patient taking all medications as directed (includes completed medication regime)?  Yes   Does the patient have a primary care provider?   Yes   Does the patient have an appointment with their PCP within 7 days of discharge?  Yes   Has the patient kept scheduled appointments due by today?  N/A   What is the Home health agency?   [facility]   Has home health visited the patient within 72 hours of discharge?  Yes   What DME was ordered?  RW and W/c fix per [facility]    Has all DME been delivered?  No   DME interventions  Other   DME comments  Walker was not available, has wheelchair   Psychosocial issues?  No   Comments  Bariatric walker is not in stock with [facility], emailed case management   Did the patient receive a copy of their discharge instructions?  Yes   Nursing interventions  Reviewed instructions with patient   What is the patient's perception of their health status since discharge?  Improving   Is the patient/caregiver able to teach back Sepsis?  S - Shivering,fever or very cold, E - Extreme pain or generalized discomfort (worst ever,especially abdomen), P - Pale or discolored skin, S - Sleepy, difficult to arouse,confused, I -   I feel like I might die-a feeling of  hopelessness, S - Short of breath   Nursing interventions  Nurse provided patient education   Is patient/caregiver able to teach back steps to recovery at home?  Set small, achievable goals for return to baseline health, Rest and regain strength, Record milestones and struggles in a journal, Eat a balanced diet, Exercise as tolerated   Is the patient/caregiver able to teach back signs and symptoms of worsening condition:  Fever, Hyperthermia, Rapid heart rate (>90), Shortness of breath/rapid respiratory rate, Altered mental status(confusion/coma), Edema, High blood glucose without diabetes   Is the patient/caregiver able to teach back the hierarchy of who to call/visit for symptoms/problems? PCP, Specialist, Home health nurse, Urgent Care, ED, 911  Yes   Week 1 call completed?  Yes   Wrap up additional comments  [facility] does not stock bariatric walker. Told patient can order if he is willing to pay extra. Emailed case management.          Betzy Duarte RN

## 2018-12-18 ENCOUNTER — READMISSION MANAGEMENT (OUTPATIENT)
Dept: CALL CENTER | Facility: HOSPITAL | Age: 43
End: 2018-12-18

## 2018-12-18 NOTE — OUTREACH NOTE
Sepsis Week 2 Survey      Responses   Facility patient discharged from?  [facility]   Does the patient have one of the following disease processes/diagnoses(primary or secondary)?  Sepsis   Week 2 attempt successful?  Yes   Call start time  1108   Call end time  1112   Discharge diagnosis  Sepsis   Is patient permission given to speak with other caregiver?  No   Meds reviewed with patient/caregiver?  Yes   Is the patient having any side effects they believe may be caused by any medication additions or changes?  No   Does the patient have all medications related to this admission filled (includes all antibiotics, inhalers, nebulizers,steroids,etc.)  Yes   Is the patient taking all medications as directed (includes completed medication regime)?  Yes   Medication comments  finished antibiotics   Does the patient have a primary care provider?   Yes   Has the patient kept scheduled appointments due by today?  No   What is preventing the patient from keeping their appointments?  Transportation   Nursing Interventions  Advised to reschedule appointment   What is the Home health agency?   [facility]   Has home health visited the patient within 72 hours of discharge?  Yes   What DME was ordered?  RW and W/c fix per [facility]    Has all DME been delivered?  No   DME comments  Walker was not available, has wheelchair   Psychosocial issues?  No   Comments  Bariatric walker is not in stock with [facility]   Did the patient receive a copy of their discharge instructions?  Yes   Nursing interventions  Reviewed instructions with patient   What is the patient's perception of their health status since discharge?  Improving   Is the patient/caregiver able to teach back Sepsis?  S - Shivering,fever or very cold, E - Extreme pain or generalized discomfort (worst ever,especially abdomen), P - Pale or discolored skin, S - Sleepy, difficult to arouse,confused, I -   I feel like I might die-a feeling of hopelessness, S - Short of  breath   Nursing interventions  Nurse provided patient education   Is patient/caregiver able to teach back steps to recovery at home?  Set small, achievable goals for return to baseline health, Rest and regain strength, Make a list of questions for PCP appoinment, Exercise as tolerated   Is the patient/caregiver able to teach back signs and symptoms of worsening condition:  Fever, Hyperthermia, Rapid heart rate (>90), Shortness of breath/rapid respiratory rate, Altered mental status(confusion/coma), Edema, High blood glucose without diabetes   Is the patient/caregiver able to teach back the hierarchy of who to call/visit for symptoms/problems? PCP, Specialist, Home health nurse, Urgent Care, ED, 911  Yes   Week 2 call completed?  Yes          Olivia Mcfarlane RN

## 2018-12-27 ENCOUNTER — READMISSION MANAGEMENT (OUTPATIENT)
Dept: CALL CENTER | Facility: HOSPITAL | Age: 43
End: 2018-12-27

## 2018-12-27 NOTE — OUTREACH NOTE
Sepsis Week 3 Survey      Responses   Facility patient discharged from?  [facility]   Does the patient have one of the following disease processes/diagnoses(primary or secondary)?  Sepsis   Week 3 attempt successful?  Yes   Call start time  1204   Call end time  1205   Meds reviewed with patient/caregiver?  Yes   Is the patient taking all medications as directed (includes completed medication regime)?  Yes   Has the patient kept scheduled appointments due by today?  Yes   What is the patient's perception of their health status since discharge?  Improving   Week 3 call completed?  Yes          Olivia Oconnor RN

## 2019-01-03 ENCOUNTER — READMISSION MANAGEMENT (OUTPATIENT)
Dept: CALL CENTER | Facility: HOSPITAL | Age: 44
End: 2019-01-03

## 2019-01-03 NOTE — OUTREACH NOTE
Sepsis Week 4 Survey      Responses   Facility patient discharged from?  [facility]   Does the patient have one of the following disease processes/diagnoses(primary or secondary)?  Sepsis   Week 4 attempt successful?  Yes   Call start time  1331   Call end time  1333   Discharge diagnosis  Sepsis   Meds reviewed with patient/caregiver?  Yes   Is the patient having any side effects they believe may be caused by any medication additions or changes?  No   Is the patient taking all medications as directed (includes completed medication regime)?  Yes   Has the patient kept scheduled appointments due by today?  Yes   Nursing Interventions  Advised to reschedule appointment   Is the patient still receiving Home Health Services?  N/A   Psychosocial issues?  No   What is the patient's perception of their health status since discharge?  Improving   Nursing interventions  Nurse provided patient education   Is the patient/caregiver able to teach back Sepsis?  S - Shivering,fever or very cold, E - Extreme pain or generalized discomfort (worst ever,especially abdomen), P - Pale or discolored skin, S - Sleepy, difficult to arouse,confused, S - Short of breath   Nursing interventions  Nurse provided reassurance to patient, Nurse provided patient education   Is patient/caregiver able to teach back steps to recovery at home?  Set small, achievable goals for return to baseline health, Make a list of questions for PCP appoinment, Exercise as tolerated, Eat a balanced diet, Rest and regain strength   Is the patient/caregiver able to teach back signs and symptoms of worsening condition:  Fever, Edema, Shortness of breath/rapid respiratory rate, Rapid heart rate (>90)   Is the patient/caregiver able to teach back the hierarchy of who to call/visit for symptoms/problems? PCP, Specialist, Home health nurse, Urgent Care, ED, 911  Yes   Week 4 call completed?  Yes          Aixa Childers RN

## 2019-12-18 ENCOUNTER — HOSPITAL ENCOUNTER (EMERGENCY)
Facility: HOSPITAL | Age: 44
Discharge: HOME | End: 2019-12-19
Attending: FAMILY MEDICINE | Admitting: FAMILY MEDICINE

## 2019-12-18 DIAGNOSIS — R68.83 CHILLS (WITHOUT FEVER): ICD-10-CM

## 2019-12-18 DIAGNOSIS — M54.50 LOW BACK PAIN WITHOUT SCIATICA, UNSPECIFIED BACK PAIN LATERALITY, UNSPECIFIED CHRONICITY: Primary | ICD-10-CM

## 2019-12-18 LAB
A-A DO2: 33.3 MMHG (ref 0–300)
ARTERIAL PATENCY WRIST A: POSITIVE
ATMOSPHERIC PRESS: 736 MMHG
BASE EXCESS BLDA CALC-SCNC: 5.1 MMOL/L (ref 0–2)
BASOPHILS # BLD AUTO: 0.03 10*3/MM3 (ref 0–0.2)
BASOPHILS NFR BLD AUTO: 0.3 % (ref 0–1.5)
BDY SITE: ABNORMAL
BODY TEMPERATURE: 0 C
CO2 BLDA-SCNC: 30.6 MMOL/L (ref 22–33)
COHGB MFR BLD: 1.3 % (ref 0–5)
DEPRECATED RDW RBC AUTO: 49.1 FL (ref 37–54)
EOSINOPHIL # BLD AUTO: 0.12 10*3/MM3 (ref 0–0.4)
EOSINOPHIL NFR BLD AUTO: 1.1 % (ref 0.3–6.2)
ERYTHROCYTE [DISTWIDTH] IN BLOOD BY AUTOMATED COUNT: 14.5 % (ref 12.3–15.4)
HCO3 BLDA-SCNC: 29.4 MMOL/L (ref 20–26)
HCT VFR BLD AUTO: 44.8 % (ref 37.5–51)
HCT VFR BLD CALC: 44.2 % (ref 38–51)
HGB BLD-MCNC: 14.1 G/DL (ref 13–17.7)
HGB BLDA-MCNC: 14.4 G/DL (ref 14–18)
HOROWITZ INDEX BLD+IHG-RTO: 21 %
IMM GRANULOCYTES # BLD AUTO: 0.04 10*3/MM3 (ref 0–0.05)
IMM GRANULOCYTES NFR BLD AUTO: 0.4 % (ref 0–0.5)
LYMPHOCYTES # BLD AUTO: 0.54 10*3/MM3 (ref 0.7–3.1)
LYMPHOCYTES NFR BLD AUTO: 4.7 % (ref 19.6–45.3)
Lab: ABNORMAL
MCH RBC QN AUTO: 29 PG (ref 26.6–33)
MCHC RBC AUTO-ENTMCNC: 31.5 G/DL (ref 31.5–35.7)
MCV RBC AUTO: 92.2 FL (ref 79–97)
METHGB BLD QL: 0.2 % (ref 0–3)
MODALITY: ABNORMAL
MONOCYTES # BLD AUTO: 0.64 10*3/MM3 (ref 0.1–0.9)
MONOCYTES NFR BLD AUTO: 5.6 % (ref 5–12)
NEUTROPHILS # BLD AUTO: 10.02 10*3/MM3 (ref 1.7–7)
NEUTROPHILS NFR BLD AUTO: 87.9 % (ref 42.7–76)
NOTE: ABNORMAL
NRBC BLD AUTO-RTO: 0 /100 WBC (ref 0–0.2)
OXYHGB MFR BLDV: 92.8 % (ref 94–99)
PCO2 BLDA: 40.7 MM HG (ref 35–45)
PCO2 TEMP ADJ BLD: ABNORMAL MM[HG]
PH BLDA: 7.47 PH UNITS (ref 7.35–7.45)
PH, TEMP CORRECTED: ABNORMAL
PLATELET # BLD AUTO: 190 10*3/MM3 (ref 140–450)
PMV BLD AUTO: 10.5 FL (ref 6–12)
PO2 BLDA: 65.4 MM HG (ref 83–108)
PO2 TEMP ADJ BLD: ABNORMAL MM[HG]
RBC # BLD AUTO: 4.86 10*6/MM3 (ref 4.14–5.8)
SAO2 % BLDCOA: 94.2 % (ref 94–99)
VENTILATOR MODE: ABNORMAL
WBC NRBC COR # BLD: 11.39 10*3/MM3 (ref 3.4–10.8)

## 2019-12-18 PROCEDURE — 83605 ASSAY OF LACTIC ACID: CPT | Performed by: FAMILY MEDICINE

## 2019-12-18 PROCEDURE — 82375 ASSAY CARBOXYHB QUANT: CPT

## 2019-12-18 PROCEDURE — 83880 ASSAY OF NATRIURETIC PEPTIDE: CPT | Performed by: FAMILY MEDICINE

## 2019-12-18 PROCEDURE — 82805 BLOOD GASES W/O2 SATURATION: CPT

## 2019-12-18 PROCEDURE — 86140 C-REACTIVE PROTEIN: CPT | Performed by: FAMILY MEDICINE

## 2019-12-18 PROCEDURE — 85025 COMPLETE CBC W/AUTO DIFF WBC: CPT | Performed by: FAMILY MEDICINE

## 2019-12-18 PROCEDURE — 93005 ELECTROCARDIOGRAM TRACING: CPT | Performed by: FAMILY MEDICINE

## 2019-12-18 PROCEDURE — 80053 COMPREHEN METABOLIC PANEL: CPT | Performed by: FAMILY MEDICINE

## 2019-12-18 PROCEDURE — 83050 HGB METHEMOGLOBIN QUAN: CPT

## 2019-12-18 PROCEDURE — P9612 CATHETERIZE FOR URINE SPEC: HCPCS

## 2019-12-18 PROCEDURE — 85610 PROTHROMBIN TIME: CPT | Performed by: FAMILY MEDICINE

## 2019-12-18 PROCEDURE — 99284 EMERGENCY DEPT VISIT MOD MDM: CPT

## 2019-12-18 PROCEDURE — 85730 THROMBOPLASTIN TIME PARTIAL: CPT | Performed by: FAMILY MEDICINE

## 2019-12-18 PROCEDURE — 36415 COLL VENOUS BLD VENIPUNCTURE: CPT

## 2019-12-18 PROCEDURE — 36600 WITHDRAWAL OF ARTERIAL BLOOD: CPT

## 2019-12-18 PROCEDURE — 96374 THER/PROPH/DIAG INJ IV PUSH: CPT

## 2019-12-18 PROCEDURE — 84484 ASSAY OF TROPONIN QUANT: CPT | Performed by: FAMILY MEDICINE

## 2019-12-18 RX ORDER — SODIUM CHLORIDE 0.9 % (FLUSH) 0.9 %
10 SYRINGE (ML) INJECTION AS NEEDED
Status: DISCONTINUED | OUTPATIENT
Start: 2019-12-18 | End: 2019-12-19 | Stop reason: HOSPADM

## 2019-12-19 ENCOUNTER — APPOINTMENT (OUTPATIENT)
Dept: CT IMAGING | Facility: HOSPITAL | Age: 44
End: 2019-12-19

## 2019-12-19 ENCOUNTER — APPOINTMENT (OUTPATIENT)
Dept: GENERAL RADIOLOGY | Facility: HOSPITAL | Age: 44
End: 2019-12-19

## 2019-12-19 VITALS
SYSTOLIC BLOOD PRESSURE: 126 MMHG | RESPIRATION RATE: 20 BRPM | HEIGHT: 70 IN | BODY MASS INDEX: 45.1 KG/M2 | DIASTOLIC BLOOD PRESSURE: 73 MMHG | OXYGEN SATURATION: 100 % | WEIGHT: 315 LBS | HEART RATE: 94 BPM | TEMPERATURE: 98.8 F

## 2019-12-19 LAB
ALBUMIN SERPL-MCNC: 3.49 G/DL (ref 3.5–5.2)
ALBUMIN/GLOB SERPL: 0.8 G/DL
ALP SERPL-CCNC: 84 U/L (ref 39–117)
ALT SERPL W P-5'-P-CCNC: 20 U/L (ref 1–41)
ANION GAP SERPL CALCULATED.3IONS-SCNC: 11.5 MMOL/L (ref 5–15)
APTT PPP: 30.9 SECONDS (ref 23.8–36.1)
AST SERPL-CCNC: 19 U/L (ref 1–40)
B PERT DNA SPEC QL NAA+PROBE: NOT DETECTED
BACTERIA UR QL AUTO: ABNORMAL /HPF
BILIRUB SERPL-MCNC: 0.6 MG/DL (ref 0.2–1.2)
BILIRUB UR QL STRIP: NEGATIVE
BUN BLD-MCNC: 13 MG/DL (ref 6–20)
BUN/CREAT SERPL: 21 (ref 7–25)
C PNEUM DNA NPH QL NAA+NON-PROBE: NOT DETECTED
CALCIUM SPEC-SCNC: 8.9 MG/DL (ref 8.6–10.5)
CHLORIDE SERPL-SCNC: 95 MMOL/L (ref 98–107)
CLARITY UR: CLEAR
CO2 SERPL-SCNC: 24.5 MMOL/L (ref 22–29)
COLOR UR: YELLOW
CREAT BLD-MCNC: 0.62 MG/DL (ref 0.76–1.27)
CRP SERPL-MCNC: 3.05 MG/DL (ref 0–0.5)
D-LACTATE SERPL-SCNC: 1.2 MMOL/L (ref 0.5–2)
FLUAV AG NPH QL: NEGATIVE
FLUAV H1 2009 PAND RNA NPH QL NAA+PROBE: NOT DETECTED
FLUAV H1 HA GENE NPH QL NAA+PROBE: NOT DETECTED
FLUAV H3 RNA NPH QL NAA+PROBE: NOT DETECTED
FLUAV SUBTYP SPEC NAA+PROBE: NOT DETECTED
FLUBV AG NPH QL IA: NEGATIVE
FLUBV RNA ISLT QL NAA+PROBE: NOT DETECTED
GFR SERPL CREATININE-BSD FRML MDRD: 142 ML/MIN/1.73
GLOBULIN UR ELPH-MCNC: 4.3 GM/DL
GLUCOSE BLD-MCNC: 124 MG/DL (ref 65–99)
GLUCOSE UR STRIP-MCNC: NEGATIVE MG/DL
HADV DNA SPEC NAA+PROBE: NOT DETECTED
HCOV 229E RNA SPEC QL NAA+PROBE: NOT DETECTED
HCOV HKU1 RNA SPEC QL NAA+PROBE: NOT DETECTED
HCOV NL63 RNA SPEC QL NAA+PROBE: NOT DETECTED
HCOV OC43 RNA SPEC QL NAA+PROBE: NOT DETECTED
HGB UR QL STRIP.AUTO: NEGATIVE
HMPV RNA NPH QL NAA+NON-PROBE: NOT DETECTED
HPIV1 RNA SPEC QL NAA+PROBE: NOT DETECTED
HPIV2 RNA SPEC QL NAA+PROBE: NOT DETECTED
HPIV3 RNA NPH QL NAA+PROBE: NOT DETECTED
HPIV4 P GENE NPH QL NAA+PROBE: NOT DETECTED
HYALINE CASTS UR QL AUTO: ABNORMAL /LPF
INR PPP: 1.12 (ref 0.9–1.1)
KETONES UR QL STRIP: NEGATIVE
LEUKOCYTE ESTERASE UR QL STRIP.AUTO: ABNORMAL
M PNEUMO IGG SER IA-ACNC: NOT DETECTED
NITRITE UR QL STRIP: NEGATIVE
NT-PROBNP SERPL-MCNC: 38 PG/ML (ref 5–450)
PH UR STRIP.AUTO: 6.5 [PH] (ref 5–8)
POTASSIUM BLD-SCNC: 3.9 MMOL/L (ref 3.5–5.2)
PROT SERPL-MCNC: 7.8 G/DL (ref 6–8.5)
PROT UR QL STRIP: NEGATIVE
PROTHROMBIN TIME: 15 SECONDS (ref 11–15.4)
RBC # UR: ABNORMAL /HPF
REF LAB TEST METHOD: ABNORMAL
RHINOVIRUS RNA SPEC NAA+PROBE: NOT DETECTED
RSV RNA NPH QL NAA+NON-PROBE: NOT DETECTED
SODIUM BLD-SCNC: 131 MMOL/L (ref 136–145)
SP GR UR STRIP: 1.02 (ref 1–1.03)
SQUAMOUS #/AREA URNS HPF: ABNORMAL /HPF
TROPONIN T SERPL-MCNC: <0.01 NG/ML (ref 0–0.03)
UROBILINOGEN UR QL STRIP: ABNORMAL
WBC UR QL AUTO: ABNORMAL /HPF

## 2019-12-19 PROCEDURE — 25010000002 KETOROLAC TROMETHAMINE PER 15 MG: Performed by: FAMILY MEDICINE

## 2019-12-19 PROCEDURE — 81001 URINALYSIS AUTO W/SCOPE: CPT | Performed by: FAMILY MEDICINE

## 2019-12-19 PROCEDURE — 0099U HC BIOFIRE FILMARRAY RESP PANEL 1: CPT | Performed by: FAMILY MEDICINE

## 2019-12-19 PROCEDURE — 71045 X-RAY EXAM CHEST 1 VIEW: CPT

## 2019-12-19 PROCEDURE — 87804 INFLUENZA ASSAY W/OPTIC: CPT | Performed by: FAMILY MEDICINE

## 2019-12-19 PROCEDURE — 96374 THER/PROPH/DIAG INJ IV PUSH: CPT

## 2019-12-19 PROCEDURE — 87040 BLOOD CULTURE FOR BACTERIA: CPT | Performed by: FAMILY MEDICINE

## 2019-12-19 PROCEDURE — 0 IOVERSOL 74 % SOLUTION: Performed by: FAMILY MEDICINE

## 2019-12-19 PROCEDURE — 71275 CT ANGIOGRAPHY CHEST: CPT

## 2019-12-19 PROCEDURE — 74176 CT ABD & PELVIS W/O CONTRAST: CPT

## 2019-12-19 PROCEDURE — 93010 ELECTROCARDIOGRAM REPORT: CPT | Performed by: INTERNAL MEDICINE

## 2019-12-19 PROCEDURE — 72131 CT LUMBAR SPINE W/O DYE: CPT

## 2019-12-19 RX ORDER — TRAMADOL HYDROCHLORIDE 50 MG/1
50 TABLET ORAL EVERY 6 HOURS PRN
Qty: 15 TABLET | Refills: 0 | Status: SHIPPED | OUTPATIENT
Start: 2019-12-19 | End: 2019-12-21

## 2019-12-19 RX ORDER — KETOROLAC TROMETHAMINE 30 MG/ML
15 INJECTION, SOLUTION INTRAMUSCULAR; INTRAVENOUS ONCE
Status: COMPLETED | OUTPATIENT
Start: 2019-12-19 | End: 2019-12-19

## 2019-12-19 RX ADMIN — IOVERSOL 95 ML: 741 INJECTION INTRA-ARTERIAL; INTRAVENOUS at 03:31

## 2019-12-19 RX ADMIN — KETOROLAC TROMETHAMINE 15 MG: 30 INJECTION, SOLUTION INTRAMUSCULAR; INTRAVENOUS at 04:59

## 2019-12-21 ENCOUNTER — APPOINTMENT (OUTPATIENT)
Dept: GENERAL RADIOLOGY | Facility: HOSPITAL | Age: 44
End: 2019-12-21

## 2019-12-21 ENCOUNTER — HOSPITAL ENCOUNTER (INPATIENT)
Facility: HOSPITAL | Age: 44
LOS: 11 days | Discharge: HOME | End: 2020-01-01
Attending: EMERGENCY MEDICINE | Admitting: INTERNAL MEDICINE

## 2019-12-21 DIAGNOSIS — R79.89 ELEVATED LFTS: ICD-10-CM

## 2019-12-21 DIAGNOSIS — L03.116 CELLULITIS OF LEFT LOWER EXTREMITY: Primary | ICD-10-CM

## 2019-12-21 PROBLEM — L03.90 SEPSIS DUE TO CELLULITIS (HCC): Status: ACTIVE | Noted: 2019-12-21

## 2019-12-21 PROBLEM — L03.115 BILATERAL CELLULITIS OF LOWER LEG: Status: RESOLVED | Noted: 2018-11-26 | Resolved: 2019-12-21

## 2019-12-21 PROBLEM — A41.9 SEPSIS DUE TO CELLULITIS (HCC): Status: ACTIVE | Noted: 2019-12-21

## 2019-12-21 LAB
ALBUMIN SERPL-MCNC: 3.31 G/DL (ref 3.5–5.2)
ALBUMIN/GLOB SERPL: 0.8 G/DL
ALP SERPL-CCNC: 80 U/L (ref 39–117)
ALT SERPL W P-5'-P-CCNC: 22 U/L (ref 1–41)
ANION GAP SERPL CALCULATED.3IONS-SCNC: 9.8 MMOL/L (ref 5–15)
AST SERPL-CCNC: 19 U/L (ref 1–40)
BILIRUB SERPL-MCNC: 0.9 MG/DL (ref 0.2–1.2)
BUN BLD-MCNC: 12 MG/DL (ref 6–20)
BUN/CREAT SERPL: 19 (ref 7–25)
CALCIUM SPEC-SCNC: 8.5 MG/DL (ref 8.6–10.5)
CHLORIDE SERPL-SCNC: 93 MMOL/L (ref 98–107)
CO2 SERPL-SCNC: 29.2 MMOL/L (ref 22–29)
CREAT BLD-MCNC: 0.63 MG/DL (ref 0.76–1.27)
CRP SERPL-MCNC: 23.78 MG/DL (ref 0–0.5)
D-LACTATE SERPL-SCNC: 1.7 MMOL/L (ref 0.5–2)
D-LACTATE SERPL-SCNC: 2.4 MMOL/L (ref 0.5–2)
DEPRECATED RDW RBC AUTO: 48.2 FL (ref 37–54)
ERYTHROCYTE [DISTWIDTH] IN BLOOD BY AUTOMATED COUNT: 14.3 % (ref 12.3–15.4)
ERYTHROCYTE [SEDIMENTATION RATE] IN BLOOD: 40 MM/HR (ref 0–15)
FLUAV AG NPH QL: NEGATIVE
FLUBV AG NPH QL IA: NEGATIVE
GFR SERPL CREATININE-BSD FRML MDRD: 138 ML/MIN/1.73
GLOBULIN UR ELPH-MCNC: 4.4 GM/DL
GLUCOSE BLD-MCNC: 163 MG/DL (ref 65–99)
HCT VFR BLD AUTO: 43.5 % (ref 37.5–51)
HGB BLD-MCNC: 13.8 G/DL (ref 13–17.7)
HOLD SPECIMEN: NORMAL
LYMPHOCYTES # BLD MANUAL: 0.46 10*3/MM3 (ref 0.7–3.1)
LYMPHOCYTES NFR BLD MANUAL: 3 % (ref 19.6–45.3)
MAGNESIUM SERPL-MCNC: 1.6 MG/DL (ref 1.6–2.6)
MCH RBC QN AUTO: 29.2 PG (ref 26.6–33)
MCHC RBC AUTO-ENTMCNC: 31.7 G/DL (ref 31.5–35.7)
MCV RBC AUTO: 92 FL (ref 79–97)
NEUTROPHILS # BLD AUTO: 14.81 10*3/MM3 (ref 1.7–7)
NEUTROPHILS NFR BLD MANUAL: 90 % (ref 42.7–76)
NEUTS BAND NFR BLD MANUAL: 7 % (ref 0–5)
NT-PROBNP SERPL-MCNC: 466.6 PG/ML (ref 5–450)
PLAT MORPH BLD: NORMAL
PLATELET # BLD AUTO: 163 10*3/MM3 (ref 140–450)
PMV BLD AUTO: 10.3 FL (ref 6–12)
POTASSIUM BLD-SCNC: 3.6 MMOL/L (ref 3.5–5.2)
PROT SERPL-MCNC: 7.7 G/DL (ref 6–8.5)
RBC # BLD AUTO: 4.73 10*6/MM3 (ref 4.14–5.8)
RBC MORPH BLD: NORMAL
S PYO AG THROAT QL: NEGATIVE
SCAN SLIDE: NORMAL
SODIUM BLD-SCNC: 132 MMOL/L (ref 136–145)
TSH SERPL DL<=0.05 MIU/L-ACNC: 0.79 UIU/ML (ref 0.27–4.2)
WBC NRBC COR # BLD: 15.27 10*3/MM3 (ref 3.4–10.8)

## 2019-12-21 PROCEDURE — 93010 ELECTROCARDIOGRAM REPORT: CPT | Performed by: INTERNAL MEDICINE

## 2019-12-21 PROCEDURE — 85652 RBC SED RATE AUTOMATED: CPT | Performed by: NURSE PRACTITIONER

## 2019-12-21 PROCEDURE — 71045 X-RAY EXAM CHEST 1 VIEW: CPT

## 2019-12-21 PROCEDURE — 25010000002 HEPARIN (PORCINE) PER 1000 UNITS: Performed by: INTERNAL MEDICINE

## 2019-12-21 PROCEDURE — 80053 COMPREHEN METABOLIC PANEL: CPT | Performed by: NURSE PRACTITIONER

## 2019-12-21 PROCEDURE — 85025 COMPLETE CBC W/AUTO DIFF WBC: CPT | Performed by: NURSE PRACTITIONER

## 2019-12-21 PROCEDURE — 87880 STREP A ASSAY W/OPTIC: CPT | Performed by: NURSE PRACTITIONER

## 2019-12-21 PROCEDURE — 99285 EMERGENCY DEPT VISIT HI MDM: CPT

## 2019-12-21 PROCEDURE — 93005 ELECTROCARDIOGRAM TRACING: CPT | Performed by: NURSE PRACTITIONER

## 2019-12-21 PROCEDURE — 87081 CULTURE SCREEN ONLY: CPT | Performed by: NURSE PRACTITIONER

## 2019-12-21 PROCEDURE — 85007 BL SMEAR W/DIFF WBC COUNT: CPT | Performed by: NURSE PRACTITIONER

## 2019-12-21 PROCEDURE — 86140 C-REACTIVE PROTEIN: CPT | Performed by: NURSE PRACTITIONER

## 2019-12-21 PROCEDURE — 83605 ASSAY OF LACTIC ACID: CPT | Performed by: NURSE PRACTITIONER

## 2019-12-21 PROCEDURE — 87804 INFLUENZA ASSAY W/OPTIC: CPT | Performed by: NURSE PRACTITIONER

## 2019-12-21 PROCEDURE — 83735 ASSAY OF MAGNESIUM: CPT | Performed by: INTERNAL MEDICINE

## 2019-12-21 PROCEDURE — 25010000002 VANCOMYCIN 5 G RECONSTITUTED SOLUTION 5,000 MG VIAL: Performed by: NURSE PRACTITIONER

## 2019-12-21 PROCEDURE — 83880 ASSAY OF NATRIURETIC PEPTIDE: CPT | Performed by: NURSE PRACTITIONER

## 2019-12-21 PROCEDURE — 84443 ASSAY THYROID STIM HORMONE: CPT | Performed by: INTERNAL MEDICINE

## 2019-12-21 PROCEDURE — 36415 COLL VENOUS BLD VENIPUNCTURE: CPT

## 2019-12-21 PROCEDURE — 94799 UNLISTED PULMONARY SVC/PX: CPT

## 2019-12-21 PROCEDURE — 25010000002 PIPERACILLIN-TAZOBACTAM: Performed by: NURSE PRACTITIONER

## 2019-12-21 PROCEDURE — 99223 1ST HOSP IP/OBS HIGH 75: CPT | Performed by: INTERNAL MEDICINE

## 2019-12-21 PROCEDURE — 87040 BLOOD CULTURE FOR BACTERIA: CPT | Performed by: NURSE PRACTITIONER

## 2019-12-21 RX ORDER — SODIUM CHLORIDE 0.9 % (FLUSH) 0.9 %
10 SYRINGE (ML) INJECTION AS NEEDED
Status: DISCONTINUED | OUTPATIENT
Start: 2019-12-21 | End: 2020-01-01 | Stop reason: HOSPADM

## 2019-12-21 RX ORDER — SODIUM CHLORIDE 0.9 % (FLUSH) 0.9 %
10 SYRINGE (ML) INJECTION EVERY 12 HOURS SCHEDULED
Status: DISCONTINUED | OUTPATIENT
Start: 2019-12-21 | End: 2020-01-01 | Stop reason: HOSPADM

## 2019-12-21 RX ORDER — OXYCODONE AND ACETAMINOPHEN 10; 325 MG/1; MG/1
1 TABLET ORAL ONCE
Status: COMPLETED | OUTPATIENT
Start: 2019-12-21 | End: 2019-12-21

## 2019-12-21 RX ORDER — ONDANSETRON 2 MG/ML
4 INJECTION INTRAMUSCULAR; INTRAVENOUS ONCE
Status: DISCONTINUED | OUTPATIENT
Start: 2019-12-21 | End: 2019-12-21

## 2019-12-21 RX ORDER — ACETAMINOPHEN 325 MG/1
650 TABLET ORAL EVERY 6 HOURS PRN
Status: DISCONTINUED | OUTPATIENT
Start: 2019-12-21 | End: 2020-01-01 | Stop reason: HOSPADM

## 2019-12-21 RX ORDER — HEPARIN SODIUM 5000 [USP'U]/ML
5000 INJECTION, SOLUTION INTRAVENOUS; SUBCUTANEOUS EVERY 12 HOURS SCHEDULED
Status: DISCONTINUED | OUTPATIENT
Start: 2019-12-21 | End: 2020-01-01 | Stop reason: HOSPADM

## 2019-12-21 RX ORDER — NITROGLYCERIN 0.4 MG/1
0.4 TABLET SUBLINGUAL
Status: DISCONTINUED | OUTPATIENT
Start: 2019-12-21 | End: 2020-01-01 | Stop reason: HOSPADM

## 2019-12-21 RX ORDER — ONDANSETRON 4 MG/1
4 TABLET, ORALLY DISINTEGRATING ORAL ONCE
Status: DISCONTINUED | OUTPATIENT
Start: 2019-12-21 | End: 2019-12-21

## 2019-12-21 RX ORDER — SODIUM CHLORIDE 9 MG/ML
75 INJECTION, SOLUTION INTRAVENOUS CONTINUOUS
Status: DISCONTINUED | OUTPATIENT
Start: 2019-12-21 | End: 2019-12-23

## 2019-12-21 RX ADMIN — PIPERACILLIN SODIUM,TAZOBACTAM SODIUM 3.38 G: 3; .375 INJECTION, POWDER, FOR SOLUTION INTRAVENOUS at 18:10

## 2019-12-21 RX ADMIN — ACETAMINOPHEN 650 MG: 325 TABLET ORAL at 21:21

## 2019-12-21 RX ADMIN — HEPARIN SODIUM 5000 UNITS: 5000 INJECTION INTRAVENOUS; SUBCUTANEOUS at 21:22

## 2019-12-21 RX ADMIN — SODIUM CHLORIDE 75 ML/HR: 9 INJECTION, SOLUTION INTRAVENOUS at 21:22

## 2019-12-21 RX ADMIN — OXYCODONE HYDROCHLORIDE AND ACETAMINOPHEN 1 TABLET: 10; 325 TABLET ORAL at 15:38

## 2019-12-21 RX ADMIN — VANCOMYCIN HYDROCHLORIDE 2000 MG: 5 INJECTION, POWDER, LYOPHILIZED, FOR SOLUTION INTRAVENOUS at 19:04

## 2019-12-21 RX ADMIN — SODIUM CHLORIDE, PRESERVATIVE FREE 10 ML: 5 INJECTION INTRAVENOUS at 21:23

## 2019-12-22 LAB
ALBUMIN SERPL-MCNC: 2.71 G/DL (ref 3.5–5.2)
ALBUMIN/GLOB SERPL: 0.6 G/DL
ALP SERPL-CCNC: 68 U/L (ref 39–117)
ALT SERPL W P-5'-P-CCNC: 19 U/L (ref 1–41)
ANION GAP SERPL CALCULATED.3IONS-SCNC: 8.1 MMOL/L (ref 5–15)
AST SERPL-CCNC: 16 U/L (ref 1–40)
BASOPHILS # BLD AUTO: 0.02 10*3/MM3 (ref 0–0.2)
BASOPHILS NFR BLD AUTO: 0.2 % (ref 0–1.5)
BILIRUB SERPL-MCNC: 0.8 MG/DL (ref 0.2–1.2)
BUN BLD-MCNC: 11 MG/DL (ref 6–20)
BUN/CREAT SERPL: 20 (ref 7–25)
CALCIUM SPEC-SCNC: 8.1 MG/DL (ref 8.6–10.5)
CHLORIDE SERPL-SCNC: 94 MMOL/L (ref 98–107)
CO2 SERPL-SCNC: 28.9 MMOL/L (ref 22–29)
CREAT BLD-MCNC: 0.55 MG/DL (ref 0.76–1.27)
CRP SERPL-MCNC: 31 MG/DL (ref 0–0.5)
D-LACTATE SERPL-SCNC: 0.8 MMOL/L (ref 0.5–2)
DEPRECATED RDW RBC AUTO: 49.1 FL (ref 37–54)
EOSINOPHIL # BLD AUTO: 0.04 10*3/MM3 (ref 0–0.4)
EOSINOPHIL NFR BLD AUTO: 0.3 % (ref 0.3–6.2)
ERYTHROCYTE [DISTWIDTH] IN BLOOD BY AUTOMATED COUNT: 14.5 % (ref 12.3–15.4)
GFR SERPL CREATININE-BSD FRML MDRD: >150 ML/MIN/1.73
GLOBULIN UR ELPH-MCNC: 4.2 GM/DL
GLUCOSE BLD-MCNC: 146 MG/DL (ref 65–99)
HBA1C MFR BLD: 5.2 % (ref 4.8–5.6)
HCT VFR BLD AUTO: 39.4 % (ref 37.5–51)
HGB BLD-MCNC: 12.2 G/DL (ref 13–17.7)
HYPOCHROMIA BLD QL: NORMAL
IMM GRANULOCYTES # BLD AUTO: 0.05 10*3/MM3 (ref 0–0.05)
IMM GRANULOCYTES NFR BLD AUTO: 0.4 % (ref 0–0.5)
LYMPHOCYTES # BLD AUTO: 0.5 10*3/MM3 (ref 0.7–3.1)
LYMPHOCYTES NFR BLD AUTO: 4.3 % (ref 19.6–45.3)
MAGNESIUM SERPL-MCNC: 1.7 MG/DL (ref 1.6–2.6)
MCH RBC QN AUTO: 28.4 PG (ref 26.6–33)
MCHC RBC AUTO-ENTMCNC: 31 G/DL (ref 31.5–35.7)
MCV RBC AUTO: 91.8 FL (ref 79–97)
MONOCYTES # BLD AUTO: 0.53 10*3/MM3 (ref 0.1–0.9)
MONOCYTES NFR BLD AUTO: 4.6 % (ref 5–12)
NEUTROPHILS # BLD AUTO: 10.46 10*3/MM3 (ref 1.7–7)
NEUTROPHILS NFR BLD AUTO: 90.2 % (ref 42.7–76)
NRBC BLD AUTO-RTO: 0 /100 WBC (ref 0–0.2)
PHOSPHATE SERPL-MCNC: 1.9 MG/DL (ref 2.5–4.5)
PHOSPHATE SERPL-MCNC: 1.9 MG/DL (ref 2.5–4.5)
PLAT MORPH BLD: NORMAL
PLATELET # BLD AUTO: 171 10*3/MM3 (ref 140–450)
PMV BLD AUTO: 10.3 FL (ref 6–12)
POTASSIUM BLD-SCNC: 3.2 MMOL/L (ref 3.5–5.2)
POTASSIUM BLD-SCNC: 4.2 MMOL/L (ref 3.5–5.2)
PROT SERPL-MCNC: 6.9 G/DL (ref 6–8.5)
RBC # BLD AUTO: 4.29 10*6/MM3 (ref 4.14–5.8)
SODIUM BLD-SCNC: 131 MMOL/L (ref 136–145)
TROPONIN T SERPL-MCNC: <0.01 NG/ML (ref 0–0.03)
TROPONIN T SERPL-MCNC: <0.01 NG/ML (ref 0–0.03)
WBC NRBC COR # BLD: 11.6 10*3/MM3 (ref 3.4–10.8)

## 2019-12-22 PROCEDURE — 94799 UNLISTED PULMONARY SVC/PX: CPT

## 2019-12-22 PROCEDURE — 86140 C-REACTIVE PROTEIN: CPT | Performed by: INTERNAL MEDICINE

## 2019-12-22 PROCEDURE — 25010000002 PIPERACILLIN-TAZOBACTAM: Performed by: INTERNAL MEDICINE

## 2019-12-22 PROCEDURE — 84100 ASSAY OF PHOSPHORUS: CPT | Performed by: INTERNAL MEDICINE

## 2019-12-22 PROCEDURE — 84132 ASSAY OF SERUM POTASSIUM: CPT | Performed by: INTERNAL MEDICINE

## 2019-12-22 PROCEDURE — 80053 COMPREHEN METABOLIC PANEL: CPT | Performed by: INTERNAL MEDICINE

## 2019-12-22 PROCEDURE — 85025 COMPLETE CBC W/AUTO DIFF WBC: CPT | Performed by: INTERNAL MEDICINE

## 2019-12-22 PROCEDURE — 83036 HEMOGLOBIN GLYCOSYLATED A1C: CPT | Performed by: INTERNAL MEDICINE

## 2019-12-22 PROCEDURE — 99232 SBSQ HOSP IP/OBS MODERATE 35: CPT | Performed by: NURSE PRACTITIONER

## 2019-12-22 PROCEDURE — 84484 ASSAY OF TROPONIN QUANT: CPT | Performed by: NURSE PRACTITIONER

## 2019-12-22 PROCEDURE — 25010000002 HEPARIN (PORCINE) PER 1000 UNITS: Performed by: INTERNAL MEDICINE

## 2019-12-22 PROCEDURE — 83605 ASSAY OF LACTIC ACID: CPT | Performed by: INTERNAL MEDICINE

## 2019-12-22 PROCEDURE — 83735 ASSAY OF MAGNESIUM: CPT | Performed by: INTERNAL MEDICINE

## 2019-12-22 PROCEDURE — 25010000002 VANCOMYCIN 5 G RECONSTITUTED SOLUTION 5,000 MG VIAL: Performed by: INTERNAL MEDICINE

## 2019-12-22 PROCEDURE — 85007 BL SMEAR W/DIFF WBC COUNT: CPT | Performed by: INTERNAL MEDICINE

## 2019-12-22 RX ORDER — MAGNESIUM SULFATE HEPTAHYDRATE 40 MG/ML
2 INJECTION, SOLUTION INTRAVENOUS AS NEEDED
Status: DISCONTINUED | OUTPATIENT
Start: 2019-12-22 | End: 2020-01-01 | Stop reason: HOSPADM

## 2019-12-22 RX ORDER — POTASSIUM CHLORIDE 7.45 MG/ML
10 INJECTION INTRAVENOUS
Status: DISCONTINUED | OUTPATIENT
Start: 2019-12-22 | End: 2020-01-01 | Stop reason: HOSPADM

## 2019-12-22 RX ORDER — POTASSIUM CHLORIDE 20 MEQ/1
40 TABLET, EXTENDED RELEASE ORAL AS NEEDED
Status: DISCONTINUED | OUTPATIENT
Start: 2019-12-22 | End: 2020-01-01 | Stop reason: HOSPADM

## 2019-12-22 RX ORDER — MULTIVITAMIN
1 TABLET ORAL DAILY
Status: DISCONTINUED | OUTPATIENT
Start: 2019-12-22 | End: 2020-01-01 | Stop reason: HOSPADM

## 2019-12-22 RX ORDER — POTASSIUM CHLORIDE 1.5 G/1.77G
40 POWDER, FOR SOLUTION ORAL AS NEEDED
Status: DISCONTINUED | OUTPATIENT
Start: 2019-12-22 | End: 2020-01-01 | Stop reason: HOSPADM

## 2019-12-22 RX ORDER — MAGNESIUM SULFATE HEPTAHYDRATE 40 MG/ML
4 INJECTION, SOLUTION INTRAVENOUS AS NEEDED
Status: DISCONTINUED | OUTPATIENT
Start: 2019-12-22 | End: 2020-01-01 | Stop reason: HOSPADM

## 2019-12-22 RX ORDER — MAGNESIUM SULFATE HEPTAHYDRATE 40 MG/ML
4 INJECTION, SOLUTION INTRAVENOUS ONCE
Status: COMPLETED | OUTPATIENT
Start: 2019-12-22 | End: 2019-12-22

## 2019-12-22 RX ORDER — POTASSIUM CHLORIDE 20 MEQ/1
40 TABLET, EXTENDED RELEASE ORAL EVERY 4 HOURS
Status: COMPLETED | OUTPATIENT
Start: 2019-12-22 | End: 2019-12-22

## 2019-12-22 RX ORDER — PANTOPRAZOLE SODIUM 40 MG/1
40 TABLET, DELAYED RELEASE ORAL
Status: DISCONTINUED | OUTPATIENT
Start: 2019-12-23 | End: 2020-01-01 | Stop reason: HOSPADM

## 2019-12-22 RX ADMIN — VANCOMYCIN HYDROCHLORIDE 2000 MG: 5 INJECTION, POWDER, LYOPHILIZED, FOR SOLUTION INTRAVENOUS at 05:44

## 2019-12-22 RX ADMIN — POTASSIUM & SODIUM PHOSPHATES POWDER PACK 280-160-250 MG 2 PACKET: 280-160-250 PACK at 15:35

## 2019-12-22 RX ADMIN — SODIUM CHLORIDE, PRESERVATIVE FREE 10 ML: 5 INJECTION INTRAVENOUS at 22:18

## 2019-12-22 RX ADMIN — MAGNESIUM SULFATE HEPTAHYDRATE 4 G: 40 INJECTION, SOLUTION INTRAVENOUS at 11:02

## 2019-12-22 RX ADMIN — POTASSIUM CHLORIDE 40 MEQ: 1500 TABLET, EXTENDED RELEASE ORAL at 15:34

## 2019-12-22 RX ADMIN — ACETAMINOPHEN 650 MG: 325 TABLET ORAL at 05:43

## 2019-12-22 RX ADMIN — VANCOMYCIN HYDROCHLORIDE 2000 MG: 5 INJECTION, POWDER, LYOPHILIZED, FOR SOLUTION INTRAVENOUS at 17:21

## 2019-12-22 RX ADMIN — ACETAMINOPHEN 650 MG: 325 TABLET ORAL at 23:40

## 2019-12-22 RX ADMIN — Medication 1 TABLET: at 15:34

## 2019-12-22 RX ADMIN — PIPERACILLIN SODIUM,TAZOBACTAM SODIUM 3.38 G: 3; .375 INJECTION, POWDER, FOR SOLUTION INTRAVENOUS at 09:00

## 2019-12-22 RX ADMIN — POTASSIUM CHLORIDE 40 MEQ: 1500 TABLET, EXTENDED RELEASE ORAL at 11:02

## 2019-12-22 RX ADMIN — HEPARIN SODIUM 5000 UNITS: 5000 INJECTION INTRAVENOUS; SUBCUTANEOUS at 22:17

## 2019-12-22 RX ADMIN — SODIUM CHLORIDE, PRESERVATIVE FREE 10 ML: 5 INJECTION INTRAVENOUS at 08:54

## 2019-12-22 RX ADMIN — HEPARIN SODIUM 5000 UNITS: 5000 INJECTION INTRAVENOUS; SUBCUTANEOUS at 08:51

## 2019-12-22 RX ADMIN — PIPERACILLIN SODIUM,TAZOBACTAM SODIUM 3.38 G: 3; .375 INJECTION, POWDER, FOR SOLUTION INTRAVENOUS at 17:21

## 2019-12-22 RX ADMIN — SODIUM CHLORIDE 75 ML/HR: 9 INJECTION, SOLUTION INTRAVENOUS at 15:35

## 2019-12-22 RX ADMIN — PIPERACILLIN SODIUM,TAZOBACTAM SODIUM 3.38 G: 3; .375 INJECTION, POWDER, FOR SOLUTION INTRAVENOUS at 02:47

## 2019-12-23 ENCOUNTER — APPOINTMENT (OUTPATIENT)
Dept: ULTRASOUND IMAGING | Facility: HOSPITAL | Age: 44
End: 2019-12-23

## 2019-12-23 ENCOUNTER — APPOINTMENT (OUTPATIENT)
Dept: MRI IMAGING | Facility: HOSPITAL | Age: 44
End: 2019-12-23

## 2019-12-23 PROBLEM — R65.20 SEVERE SEPSIS (HCC): Status: ACTIVE | Noted: 2019-12-21

## 2019-12-23 LAB
ALBUMIN SERPL-MCNC: 2.53 G/DL (ref 3.5–5.2)
ALBUMIN/GLOB SERPL: 0.6 G/DL
ALP SERPL-CCNC: 76 U/L (ref 39–117)
ALT SERPL W P-5'-P-CCNC: 24 U/L (ref 1–41)
ANION GAP SERPL CALCULATED.3IONS-SCNC: 9.2 MMOL/L (ref 5–15)
AST SERPL-CCNC: 22 U/L (ref 1–40)
BACTERIA SPEC AEROBE CULT: NORMAL
BASOPHILS # BLD AUTO: 0.02 10*3/MM3 (ref 0–0.2)
BASOPHILS NFR BLD AUTO: 0.2 % (ref 0–1.5)
BILIRUB SERPL-MCNC: 0.6 MG/DL (ref 0.2–1.2)
BUN BLD-MCNC: 10 MG/DL (ref 6–20)
BUN/CREAT SERPL: 19.6 (ref 7–25)
CALCIUM SPEC-SCNC: 8 MG/DL (ref 8.6–10.5)
CHLORIDE SERPL-SCNC: 98 MMOL/L (ref 98–107)
CO2 SERPL-SCNC: 27.8 MMOL/L (ref 22–29)
CREAT BLD-MCNC: 0.51 MG/DL (ref 0.76–1.27)
CRP SERPL-MCNC: 25.25 MG/DL (ref 0–0.5)
D-LACTATE SERPL-SCNC: 0.8 MMOL/L (ref 0.5–2)
DEPRECATED RDW RBC AUTO: 49.1 FL (ref 37–54)
EOSINOPHIL # BLD AUTO: 0.14 10*3/MM3 (ref 0–0.4)
EOSINOPHIL NFR BLD AUTO: 1.6 % (ref 0.3–6.2)
ERYTHROCYTE [DISTWIDTH] IN BLOOD BY AUTOMATED COUNT: 14.4 % (ref 12.3–15.4)
GFR SERPL CREATININE-BSD FRML MDRD: >150 ML/MIN/1.73
GLOBULIN UR ELPH-MCNC: 4.4 GM/DL
GLUCOSE BLD-MCNC: 109 MG/DL (ref 65–99)
HCT VFR BLD AUTO: 38.1 % (ref 37.5–51)
HGB BLD-MCNC: 12 G/DL (ref 13–17.7)
IMM GRANULOCYTES # BLD AUTO: 0.04 10*3/MM3 (ref 0–0.05)
IMM GRANULOCYTES NFR BLD AUTO: 0.5 % (ref 0–0.5)
LYMPHOCYTES # BLD AUTO: 0.53 10*3/MM3 (ref 0.7–3.1)
LYMPHOCYTES NFR BLD AUTO: 6 % (ref 19.6–45.3)
MAGNESIUM SERPL-MCNC: 2.1 MG/DL (ref 1.6–2.6)
MCH RBC QN AUTO: 29.2 PG (ref 26.6–33)
MCHC RBC AUTO-ENTMCNC: 31.5 G/DL (ref 31.5–35.7)
MCV RBC AUTO: 92.7 FL (ref 79–97)
MONOCYTES # BLD AUTO: 0.4 10*3/MM3 (ref 0.1–0.9)
MONOCYTES NFR BLD AUTO: 4.5 % (ref 5–12)
NEUTROPHILS # BLD AUTO: 7.73 10*3/MM3 (ref 1.7–7)
NEUTROPHILS NFR BLD AUTO: 87.2 % (ref 42.7–76)
NRBC BLD AUTO-RTO: 0 /100 WBC (ref 0–0.2)
PHOSPHATE SERPL-MCNC: 2.5 MG/DL (ref 2.5–4.5)
PLATELET # BLD AUTO: 171 10*3/MM3 (ref 140–450)
PMV BLD AUTO: 10.1 FL (ref 6–12)
POTASSIUM BLD-SCNC: 4 MMOL/L (ref 3.5–5.2)
PROT SERPL-MCNC: 6.9 G/DL (ref 6–8.5)
RBC # BLD AUTO: 4.11 10*6/MM3 (ref 4.14–5.8)
SODIUM BLD-SCNC: 135 MMOL/L (ref 136–145)
VANCOMYCIN TROUGH SERPL-MCNC: 8 MCG/ML (ref 5–20)
WBC NRBC COR # BLD: 8.86 10*3/MM3 (ref 3.4–10.8)

## 2019-12-23 PROCEDURE — 80053 COMPREHEN METABOLIC PANEL: CPT | Performed by: NURSE PRACTITIONER

## 2019-12-23 PROCEDURE — 86140 C-REACTIVE PROTEIN: CPT | Performed by: NURSE PRACTITIONER

## 2019-12-23 PROCEDURE — 93970 EXTREMITY STUDY: CPT

## 2019-12-23 PROCEDURE — 76775 US EXAM ABDO BACK WALL LIM: CPT | Performed by: RADIOLOGY

## 2019-12-23 PROCEDURE — 85025 COMPLETE CBC W/AUTO DIFF WBC: CPT | Performed by: NURSE PRACTITIONER

## 2019-12-23 PROCEDURE — 99233 SBSQ HOSP IP/OBS HIGH 50: CPT | Performed by: PHYSICIAN ASSISTANT

## 2019-12-23 PROCEDURE — 25010000002 CEFTRIAXONE: Performed by: PHYSICIAN ASSISTANT

## 2019-12-23 PROCEDURE — 76775 US EXAM ABDO BACK WALL LIM: CPT

## 2019-12-23 PROCEDURE — 25010000002 PIPERACILLIN-TAZOBACTAM: Performed by: INTERNAL MEDICINE

## 2019-12-23 PROCEDURE — 83735 ASSAY OF MAGNESIUM: CPT | Performed by: NURSE PRACTITIONER

## 2019-12-23 PROCEDURE — 25010000002 VANCOMYCIN 5 G RECONSTITUTED SOLUTION 5,000 MG VIAL: Performed by: INTERNAL MEDICINE

## 2019-12-23 PROCEDURE — 84100 ASSAY OF PHOSPHORUS: CPT | Performed by: NURSE PRACTITIONER

## 2019-12-23 PROCEDURE — 80202 ASSAY OF VANCOMYCIN: CPT | Performed by: INTERNAL MEDICINE

## 2019-12-23 PROCEDURE — 25010000002 HEPARIN (PORCINE) PER 1000 UNITS: Performed by: INTERNAL MEDICINE

## 2019-12-23 PROCEDURE — 93970 EXTREMITY STUDY: CPT | Performed by: RADIOLOGY

## 2019-12-23 PROCEDURE — 83605 ASSAY OF LACTIC ACID: CPT | Performed by: NURSE PRACTITIONER

## 2019-12-23 RX ADMIN — METOPROLOL TARTRATE 12.5 MG: 25 TABLET, FILM COATED ORAL at 12:00

## 2019-12-23 RX ADMIN — SODIUM CHLORIDE, PRESERVATIVE FREE 10 ML: 5 INJECTION INTRAVENOUS at 08:36

## 2019-12-23 RX ADMIN — HEPARIN SODIUM 5000 UNITS: 5000 INJECTION INTRAVENOUS; SUBCUTANEOUS at 08:33

## 2019-12-23 RX ADMIN — SODIUM CHLORIDE, PRESERVATIVE FREE 10 ML: 5 INJECTION INTRAVENOUS at 21:43

## 2019-12-23 RX ADMIN — VANCOMYCIN HYDROCHLORIDE 2000 MG: 5 INJECTION, POWDER, LYOPHILIZED, FOR SOLUTION INTRAVENOUS at 05:36

## 2019-12-23 RX ADMIN — ACETAMINOPHEN 650 MG: 325 TABLET ORAL at 18:54

## 2019-12-23 RX ADMIN — PANTOPRAZOLE SODIUM 40 MG: 40 TABLET, DELAYED RELEASE ORAL at 05:35

## 2019-12-23 RX ADMIN — HEPARIN SODIUM 5000 UNITS: 5000 INJECTION INTRAVENOUS; SUBCUTANEOUS at 21:42

## 2019-12-23 RX ADMIN — Medication 1 TABLET: at 08:32

## 2019-12-23 RX ADMIN — PIPERACILLIN SODIUM,TAZOBACTAM SODIUM 3.38 G: 3; .375 INJECTION, POWDER, FOR SOLUTION INTRAVENOUS at 01:48

## 2019-12-23 RX ADMIN — VANCOMYCIN HYDROCHLORIDE 2500 MG: 5 INJECTION, POWDER, LYOPHILIZED, FOR SOLUTION INTRAVENOUS at 18:54

## 2019-12-23 RX ADMIN — PIPERACILLIN SODIUM,TAZOBACTAM SODIUM 3.38 G: 3; .375 INJECTION, POWDER, FOR SOLUTION INTRAVENOUS at 09:23

## 2019-12-23 RX ADMIN — SODIUM CHLORIDE 75 ML/HR: 9 INJECTION, SOLUTION INTRAVENOUS at 05:35

## 2019-12-23 RX ADMIN — POTASSIUM & SODIUM PHOSPHATES POWDER PACK 280-160-250 MG 2 PACKET: 280-160-250 PACK at 02:06

## 2019-12-23 RX ADMIN — METOPROLOL TARTRATE 12.5 MG: 25 TABLET, FILM COATED ORAL at 21:43

## 2019-12-23 RX ADMIN — CEFTRIAXONE 2 G: 2 INJECTION, POWDER, FOR SOLUTION INTRAMUSCULAR; INTRAVENOUS at 16:18

## 2019-12-24 ENCOUNTER — APPOINTMENT (OUTPATIENT)
Dept: CT IMAGING | Facility: HOSPITAL | Age: 44
End: 2019-12-24

## 2019-12-24 ENCOUNTER — APPOINTMENT (OUTPATIENT)
Dept: CARDIOLOGY | Facility: HOSPITAL | Age: 44
End: 2019-12-24

## 2019-12-24 ENCOUNTER — APPOINTMENT (OUTPATIENT)
Dept: GENERAL RADIOLOGY | Facility: HOSPITAL | Age: 44
End: 2019-12-24

## 2019-12-24 PROBLEM — E87.1 HYPONATREMIA: Status: ACTIVE | Noted: 2019-12-24

## 2019-12-24 PROBLEM — J96.01 ACUTE RESPIRATORY FAILURE WITH HYPOXIA (HCC): Status: ACTIVE | Noted: 2019-12-24

## 2019-12-24 LAB
ALBUMIN SERPL-MCNC: 2.46 G/DL (ref 3.5–5.2)
ALBUMIN/GLOB SERPL: 0.5 G/DL
ALP SERPL-CCNC: 114 U/L (ref 39–117)
ALT SERPL W P-5'-P-CCNC: 39 U/L (ref 1–41)
ANION GAP SERPL CALCULATED.3IONS-SCNC: 9.5 MMOL/L (ref 5–15)
AST SERPL-CCNC: 43 U/L (ref 1–40)
BACTERIA SPEC AEROBE CULT: NORMAL
BACTERIA SPEC AEROBE CULT: NORMAL
BASOPHILS # BLD AUTO: 0.03 10*3/MM3 (ref 0–0.2)
BASOPHILS NFR BLD AUTO: 0.4 % (ref 0–1.5)
BH CV ECHO MEAS - % IVS THICK: 22.9 %
BH CV ECHO MEAS - % LVPW THICK: 28.4 %
BH CV ECHO MEAS - ACS: 2.7 CM
BH CV ECHO MEAS - AO ROOT AREA (BSA CORRECTED): 1.2
BH CV ECHO MEAS - AO ROOT AREA: 13.2 CM^2
BH CV ECHO MEAS - AO ROOT DIAM: 4.1 CM
BH CV ECHO MEAS - BSA(HAYCOCK): 3.9 M^2
BH CV ECHO MEAS - BSA: 3.3 M^2
BH CV ECHO MEAS - BZI_BMI: 86.2 KILOGRAMS/M^2
BH CV ECHO MEAS - BZI_METRIC_HEIGHT: 177.8 CM
BH CV ECHO MEAS - BZI_METRIC_WEIGHT: 272.6 KG
BH CV ECHO MEAS - EDV(CUBED): 183.7 ML
BH CV ECHO MEAS - EDV(TEICH): 159.1 ML
BH CV ECHO MEAS - EF(CUBED): 64.4 %
BH CV ECHO MEAS - EF(TEICH): 55.2 %
BH CV ECHO MEAS - ESV(CUBED): 65.5 ML
BH CV ECHO MEAS - ESV(TEICH): 71.3 ML
BH CV ECHO MEAS - FS: 29.1 %
BH CV ECHO MEAS - IVS/LVPW: 0.9
BH CV ECHO MEAS - IVSD: 1.3 CM
BH CV ECHO MEAS - IVSS: 1.6 CM
BH CV ECHO MEAS - LA DIMENSION: 4.5 CM
BH CV ECHO MEAS - LA/AO: 1.1
BH CV ECHO MEAS - LV MASS(C)D: 334.5 GRAMS
BH CV ECHO MEAS - LV MASS(C)DI: 100.4 GRAMS/M^2
BH CV ECHO MEAS - LV MASS(C)S: 283.3 GRAMS
BH CV ECHO MEAS - LV MASS(C)SI: 85 GRAMS/M^2
BH CV ECHO MEAS - LVIDD: 5.7 CM
BH CV ECHO MEAS - LVIDS: 4 CM
BH CV ECHO MEAS - LVOT AREA (M): 3.8 CM^2
BH CV ECHO MEAS - LVOT AREA: 3.8 CM^2
BH CV ECHO MEAS - LVOT DIAM: 2.2 CM
BH CV ECHO MEAS - LVPWD: 1.4 CM
BH CV ECHO MEAS - LVPWS: 1.8 CM
BH CV ECHO MEAS - RVDD: 3 CM
BH CV ECHO MEAS - SI(CUBED): 35.5 ML/M^2
BH CV ECHO MEAS - SI(TEICH): 26.4 ML/M^2
BH CV ECHO MEAS - SV(CUBED): 118.3 ML
BH CV ECHO MEAS - SV(TEICH): 87.8 ML
BILIRUB SERPL-MCNC: 0.5 MG/DL (ref 0.2–1.2)
BUN BLD-MCNC: 7 MG/DL (ref 6–20)
BUN/CREAT SERPL: 13.5 (ref 7–25)
CALCIUM SPEC-SCNC: 8.4 MG/DL (ref 8.6–10.5)
CHLORIDE SERPL-SCNC: 97 MMOL/L (ref 98–107)
CO2 SERPL-SCNC: 25.5 MMOL/L (ref 22–29)
CREAT BLD-MCNC: 0.52 MG/DL (ref 0.76–1.27)
CRP SERPL-MCNC: 18.13 MG/DL (ref 0–0.5)
DEPRECATED RDW RBC AUTO: 48.3 FL (ref 37–54)
EOSINOPHIL # BLD AUTO: 0.21 10*3/MM3 (ref 0–0.4)
EOSINOPHIL NFR BLD AUTO: 2.5 % (ref 0.3–6.2)
ERYTHROCYTE [DISTWIDTH] IN BLOOD BY AUTOMATED COUNT: 14.1 % (ref 12.3–15.4)
GFR SERPL CREATININE-BSD FRML MDRD: >150 ML/MIN/1.73
GLOBULIN UR ELPH-MCNC: 5.4 GM/DL
GLUCOSE BLD-MCNC: 118 MG/DL (ref 65–99)
HCT VFR BLD AUTO: 42.5 % (ref 37.5–51)
HGB BLD-MCNC: 13.1 G/DL (ref 13–17.7)
IMM GRANULOCYTES # BLD AUTO: 0.06 10*3/MM3 (ref 0–0.05)
IMM GRANULOCYTES NFR BLD AUTO: 0.7 % (ref 0–0.5)
LV EF 2D ECHO EST: 60 %
LYMPHOCYTES # BLD AUTO: 0.68 10*3/MM3 (ref 0.7–3.1)
LYMPHOCYTES NFR BLD AUTO: 8 % (ref 19.6–45.3)
MAGNESIUM SERPL-MCNC: 2.1 MG/DL (ref 1.6–2.6)
MAXIMAL PREDICTED HEART RATE: 176 BPM
MCH RBC QN AUTO: 28.8 PG (ref 26.6–33)
MCHC RBC AUTO-ENTMCNC: 30.8 G/DL (ref 31.5–35.7)
MCV RBC AUTO: 93.4 FL (ref 79–97)
MONOCYTES # BLD AUTO: 0.43 10*3/MM3 (ref 0.1–0.9)
MONOCYTES NFR BLD AUTO: 5 % (ref 5–12)
NEUTROPHILS # BLD AUTO: 7.13 10*3/MM3 (ref 1.7–7)
NEUTROPHILS NFR BLD AUTO: 83.4 % (ref 42.7–76)
NRBC BLD AUTO-RTO: 0 /100 WBC (ref 0–0.2)
PHOSPHATE SERPL-MCNC: 3 MG/DL (ref 2.5–4.5)
PLATELET # BLD AUTO: 218 10*3/MM3 (ref 140–450)
PMV BLD AUTO: 10.6 FL (ref 6–12)
POTASSIUM BLD-SCNC: 4.3 MMOL/L (ref 3.5–5.2)
PROT SERPL-MCNC: 7.9 G/DL (ref 6–8.5)
RBC # BLD AUTO: 4.55 10*6/MM3 (ref 4.14–5.8)
SODIUM BLD-SCNC: 132 MMOL/L (ref 136–145)
STRESS TARGET HR: 150 BPM
WBC NRBC COR # BLD: 8.54 10*3/MM3 (ref 3.4–10.8)

## 2019-12-24 PROCEDURE — 25010000002 VANCOMYCIN 5 G RECONSTITUTED SOLUTION 5,000 MG VIAL: Performed by: INTERNAL MEDICINE

## 2019-12-24 PROCEDURE — 0 IOVERSOL 74 % SOLUTION: Performed by: INTERNAL MEDICINE

## 2019-12-24 PROCEDURE — 73700 CT LOWER EXTREMITY W/O DYE: CPT | Performed by: RADIOLOGY

## 2019-12-24 PROCEDURE — 25010000002 HEPARIN (PORCINE) PER 1000 UNITS: Performed by: INTERNAL MEDICINE

## 2019-12-24 PROCEDURE — 83735 ASSAY OF MAGNESIUM: CPT | Performed by: INTERNAL MEDICINE

## 2019-12-24 PROCEDURE — 71045 X-RAY EXAM CHEST 1 VIEW: CPT | Performed by: RADIOLOGY

## 2019-12-24 PROCEDURE — 86140 C-REACTIVE PROTEIN: CPT | Performed by: PHYSICIAN ASSISTANT

## 2019-12-24 PROCEDURE — 72133 CT LUMBAR SPINE W/O & W/DYE: CPT

## 2019-12-24 PROCEDURE — 25010000002 FUROSEMIDE PER 20 MG: Performed by: INTERNAL MEDICINE

## 2019-12-24 PROCEDURE — 93306 TTE W/DOPPLER COMPLETE: CPT | Performed by: INTERNAL MEDICINE

## 2019-12-24 PROCEDURE — 80053 COMPREHEN METABOLIC PANEL: CPT | Performed by: PHYSICIAN ASSISTANT

## 2019-12-24 PROCEDURE — 84100 ASSAY OF PHOSPHORUS: CPT | Performed by: INTERNAL MEDICINE

## 2019-12-24 PROCEDURE — 93306 TTE W/DOPPLER COMPLETE: CPT

## 2019-12-24 PROCEDURE — 99233 SBSQ HOSP IP/OBS HIGH 50: CPT | Performed by: PHYSICIAN ASSISTANT

## 2019-12-24 PROCEDURE — 25010000002 CEFTRIAXONE: Performed by: PHYSICIAN ASSISTANT

## 2019-12-24 PROCEDURE — 72131 CT LUMBAR SPINE W/O DYE: CPT | Performed by: RADIOLOGY

## 2019-12-24 PROCEDURE — 71045 X-RAY EXAM CHEST 1 VIEW: CPT

## 2019-12-24 PROCEDURE — 94799 UNLISTED PULMONARY SVC/PX: CPT

## 2019-12-24 PROCEDURE — 85025 COMPLETE CBC W/AUTO DIFF WBC: CPT | Performed by: PHYSICIAN ASSISTANT

## 2019-12-24 PROCEDURE — 73702 CT LWR EXTREMITY W/O&W/DYE: CPT

## 2019-12-24 RX ORDER — FUROSEMIDE 10 MG/ML
40 INJECTION INTRAMUSCULAR; INTRAVENOUS ONCE
Status: COMPLETED | OUTPATIENT
Start: 2019-12-24 | End: 2019-12-24

## 2019-12-24 RX ADMIN — VANCOMYCIN HYDROCHLORIDE 2500 MG: 5 INJECTION, POWDER, LYOPHILIZED, FOR SOLUTION INTRAVENOUS at 17:07

## 2019-12-24 RX ADMIN — VANCOMYCIN HYDROCHLORIDE 2500 MG: 5 INJECTION, POWDER, LYOPHILIZED, FOR SOLUTION INTRAVENOUS at 06:05

## 2019-12-24 RX ADMIN — ACETAMINOPHEN 650 MG: 325 TABLET ORAL at 11:17

## 2019-12-24 RX ADMIN — FUROSEMIDE 40 MG: 10 INJECTION, SOLUTION INTRAMUSCULAR; INTRAVENOUS at 11:18

## 2019-12-24 RX ADMIN — PANTOPRAZOLE SODIUM 40 MG: 40 TABLET, DELAYED RELEASE ORAL at 06:05

## 2019-12-24 RX ADMIN — SODIUM CHLORIDE, PRESERVATIVE FREE 10 ML: 5 INJECTION INTRAVENOUS at 21:20

## 2019-12-24 RX ADMIN — METOPROLOL TARTRATE 12.5 MG: 25 TABLET, FILM COATED ORAL at 08:29

## 2019-12-24 RX ADMIN — HEPARIN SODIUM 5000 UNITS: 5000 INJECTION INTRAVENOUS; SUBCUTANEOUS at 21:20

## 2019-12-24 RX ADMIN — Medication 1 TABLET: at 08:29

## 2019-12-24 RX ADMIN — IOVERSOL 100 ML: 741 INJECTION INTRA-ARTERIAL; INTRAVENOUS at 15:00

## 2019-12-24 RX ADMIN — METOPROLOL TARTRATE 12.5 MG: 25 TABLET, FILM COATED ORAL at 21:20

## 2019-12-24 RX ADMIN — CEFTRIAXONE 2 G: 2 INJECTION, POWDER, FOR SOLUTION INTRAMUSCULAR; INTRAVENOUS at 15:58

## 2019-12-24 RX ADMIN — HEPARIN SODIUM 5000 UNITS: 5000 INJECTION INTRAVENOUS; SUBCUTANEOUS at 08:29

## 2019-12-25 LAB
ALBUMIN SERPL-MCNC: 2.26 G/DL (ref 3.5–5.2)
ALBUMIN/GLOB SERPL: 0.4 G/DL
ALP SERPL-CCNC: 115 U/L (ref 39–117)
ALT SERPL W P-5'-P-CCNC: 41 U/L (ref 1–41)
ANION GAP SERPL CALCULATED.3IONS-SCNC: 11.8 MMOL/L (ref 5–15)
AST SERPL-CCNC: 34 U/L (ref 1–40)
BASOPHILS # BLD AUTO: 0.03 10*3/MM3 (ref 0–0.2)
BASOPHILS NFR BLD AUTO: 0.4 % (ref 0–1.5)
BILIRUB SERPL-MCNC: 0.4 MG/DL (ref 0.2–1.2)
BUN BLD-MCNC: 8 MG/DL (ref 6–20)
BUN/CREAT SERPL: 20.5 (ref 7–25)
CALCIUM SPEC-SCNC: 8.6 MG/DL (ref 8.6–10.5)
CHLORIDE SERPL-SCNC: 95 MMOL/L (ref 98–107)
CO2 SERPL-SCNC: 26.2 MMOL/L (ref 22–29)
CREAT BLD-MCNC: 0.39 MG/DL (ref 0.76–1.27)
CRP SERPL-MCNC: 13.68 MG/DL (ref 0–0.5)
DEPRECATED RDW RBC AUTO: 48.2 FL (ref 37–54)
EOSINOPHIL # BLD AUTO: 0.29 10*3/MM3 (ref 0–0.4)
EOSINOPHIL NFR BLD AUTO: 3.6 % (ref 0.3–6.2)
ERYTHROCYTE [DISTWIDTH] IN BLOOD BY AUTOMATED COUNT: 14 % (ref 12.3–15.4)
GFR SERPL CREATININE-BSD FRML MDRD: >150 ML/MIN/1.73
GLOBULIN UR ELPH-MCNC: 5.1 GM/DL
GLUCOSE BLD-MCNC: 128 MG/DL (ref 65–99)
HCT VFR BLD AUTO: 41.2 % (ref 37.5–51)
HGB BLD-MCNC: 12.6 G/DL (ref 13–17.7)
IMM GRANULOCYTES # BLD AUTO: 0.08 10*3/MM3 (ref 0–0.05)
IMM GRANULOCYTES NFR BLD AUTO: 1 % (ref 0–0.5)
LYMPHOCYTES # BLD AUTO: 0.57 10*3/MM3 (ref 0.7–3.1)
LYMPHOCYTES NFR BLD AUTO: 7.1 % (ref 19.6–45.3)
MCH RBC QN AUTO: 28.6 PG (ref 26.6–33)
MCHC RBC AUTO-ENTMCNC: 30.6 G/DL (ref 31.5–35.7)
MCV RBC AUTO: 93.6 FL (ref 79–97)
MONOCYTES # BLD AUTO: 0.58 10*3/MM3 (ref 0.1–0.9)
MONOCYTES NFR BLD AUTO: 7.2 % (ref 5–12)
NEUTROPHILS # BLD AUTO: 6.46 10*3/MM3 (ref 1.7–7)
NEUTROPHILS NFR BLD AUTO: 80.7 % (ref 42.7–76)
NRBC BLD AUTO-RTO: 0 /100 WBC (ref 0–0.2)
PLATELET # BLD AUTO: 206 10*3/MM3 (ref 140–450)
PMV BLD AUTO: 10 FL (ref 6–12)
POTASSIUM BLD-SCNC: 4 MMOL/L (ref 3.5–5.2)
PROT SERPL-MCNC: 7.4 G/DL (ref 6–8.5)
RBC # BLD AUTO: 4.4 10*6/MM3 (ref 4.14–5.8)
SODIUM BLD-SCNC: 133 MMOL/L (ref 136–145)
VANCOMYCIN TROUGH SERPL-MCNC: 11.6 MCG/ML (ref 5–20)
WBC NRBC COR # BLD: 8.01 10*3/MM3 (ref 3.4–10.8)

## 2019-12-25 PROCEDURE — 86140 C-REACTIVE PROTEIN: CPT | Performed by: PHYSICIAN ASSISTANT

## 2019-12-25 PROCEDURE — 25010000002 HEPARIN (PORCINE) PER 1000 UNITS: Performed by: INTERNAL MEDICINE

## 2019-12-25 PROCEDURE — 80053 COMPREHEN METABOLIC PANEL: CPT | Performed by: PHYSICIAN ASSISTANT

## 2019-12-25 PROCEDURE — 25010000002 FUROSEMIDE PER 20 MG: Performed by: INTERNAL MEDICINE

## 2019-12-25 PROCEDURE — 85025 COMPLETE CBC W/AUTO DIFF WBC: CPT | Performed by: PHYSICIAN ASSISTANT

## 2019-12-25 PROCEDURE — 25010000002 VANCOMYCIN 5 G RECONSTITUTED SOLUTION 5,000 MG VIAL: Performed by: INTERNAL MEDICINE

## 2019-12-25 PROCEDURE — 94799 UNLISTED PULMONARY SVC/PX: CPT

## 2019-12-25 PROCEDURE — 25010000002 CEFTRIAXONE: Performed by: PHYSICIAN ASSISTANT

## 2019-12-25 PROCEDURE — 80202 ASSAY OF VANCOMYCIN: CPT

## 2019-12-25 PROCEDURE — 99232 SBSQ HOSP IP/OBS MODERATE 35: CPT | Performed by: INTERNAL MEDICINE

## 2019-12-25 RX ORDER — FLUTICASONE PROPIONATE 50 MCG
2 SPRAY, SUSPENSION (ML) NASAL DAILY
Status: DISCONTINUED | OUTPATIENT
Start: 2019-12-25 | End: 2020-01-01 | Stop reason: HOSPADM

## 2019-12-25 RX ORDER — L.ACID,PARA/B.BIFIDUM/S.THERM 8B CELL
1 CAPSULE ORAL DAILY
Status: DISCONTINUED | OUTPATIENT
Start: 2019-12-25 | End: 2020-01-01 | Stop reason: HOSPADM

## 2019-12-25 RX ORDER — CETIRIZINE HYDROCHLORIDE 10 MG/1
10 TABLET ORAL DAILY
Status: DISCONTINUED | OUTPATIENT
Start: 2019-12-25 | End: 2020-01-01 | Stop reason: HOSPADM

## 2019-12-25 RX ORDER — FUROSEMIDE 10 MG/ML
40 INJECTION INTRAMUSCULAR; INTRAVENOUS ONCE
Status: COMPLETED | OUTPATIENT
Start: 2019-12-25 | End: 2019-12-25

## 2019-12-25 RX ADMIN — PANTOPRAZOLE SODIUM 40 MG: 40 TABLET, DELAYED RELEASE ORAL at 05:46

## 2019-12-25 RX ADMIN — METOPROLOL TARTRATE 25 MG: 25 TABLET, FILM COATED ORAL at 20:37

## 2019-12-25 RX ADMIN — HEPARIN SODIUM 5000 UNITS: 5000 INJECTION INTRAVENOUS; SUBCUTANEOUS at 08:38

## 2019-12-25 RX ADMIN — ACETAMINOPHEN 650 MG: 325 TABLET ORAL at 08:39

## 2019-12-25 RX ADMIN — CETIRIZINE HYDROCHLORIDE 10 MG: 10 TABLET, FILM COATED ORAL at 14:29

## 2019-12-25 RX ADMIN — VANCOMYCIN HYDROCHLORIDE 2500 MG: 5 INJECTION, POWDER, LYOPHILIZED, FOR SOLUTION INTRAVENOUS at 17:07

## 2019-12-25 RX ADMIN — HEPARIN SODIUM 5000 UNITS: 5000 INJECTION INTRAVENOUS; SUBCUTANEOUS at 20:37

## 2019-12-25 RX ADMIN — SODIUM CHLORIDE, PRESERVATIVE FREE 10 ML: 5 INJECTION INTRAVENOUS at 08:38

## 2019-12-25 RX ADMIN — VANCOMYCIN HYDROCHLORIDE 2500 MG: 5 INJECTION, POWDER, LYOPHILIZED, FOR SOLUTION INTRAVENOUS at 05:45

## 2019-12-25 RX ADMIN — METOPROLOL TARTRATE 12.5 MG: 25 TABLET, FILM COATED ORAL at 08:39

## 2019-12-25 RX ADMIN — Medication 1 TABLET: at 08:39

## 2019-12-25 RX ADMIN — SODIUM CHLORIDE, PRESERVATIVE FREE 10 ML: 5 INJECTION INTRAVENOUS at 20:37

## 2019-12-25 RX ADMIN — CEFTRIAXONE 2 G: 2 INJECTION, POWDER, FOR SOLUTION INTRAMUSCULAR; INTRAVENOUS at 15:20

## 2019-12-25 RX ADMIN — FUROSEMIDE 40 MG: 10 INJECTION, SOLUTION INTRAMUSCULAR; INTRAVENOUS at 11:32

## 2019-12-26 ENCOUNTER — APPOINTMENT (OUTPATIENT)
Dept: GENERAL RADIOLOGY | Facility: HOSPITAL | Age: 44
End: 2019-12-26

## 2019-12-26 LAB
A-A DO2: 57.5 MMHG (ref 0–300)
A-A DO2: 73.3 MMHG (ref 0–300)
ALBUMIN SERPL-MCNC: 2.23 G/DL (ref 3.5–5.2)
ALBUMIN/GLOB SERPL: 0.4 G/DL
ALP SERPL-CCNC: 138 U/L (ref 39–117)
ALT SERPL W P-5'-P-CCNC: 54 U/L (ref 1–41)
ANION GAP SERPL CALCULATED.3IONS-SCNC: 10.4 MMOL/L (ref 5–15)
ARTERIAL PATENCY WRIST A: ABNORMAL
ARTERIAL PATENCY WRIST A: POSITIVE
AST SERPL-CCNC: 53 U/L (ref 1–40)
ATMOSPHERIC PRESS: 732 MMHG
ATMOSPHERIC PRESS: 732 MMHG
BACTERIA SPEC AEROBE CULT: NORMAL
BACTERIA SPEC AEROBE CULT: NORMAL
BASE EXCESS BLDA CALC-SCNC: 11.6 MMOL/L (ref 0–2)
BASE EXCESS BLDA CALC-SCNC: 8.2 MMOL/L (ref 0–2)
BASOPHILS # BLD AUTO: 0.05 10*3/MM3 (ref 0–0.2)
BASOPHILS NFR BLD AUTO: 0.6 % (ref 0–1.5)
BDY SITE: ABNORMAL
BDY SITE: ABNORMAL
BILIRUB SERPL-MCNC: 0.3 MG/DL (ref 0.2–1.2)
BODY TEMPERATURE: 0 C
BODY TEMPERATURE: 0 C
BUN BLD-MCNC: 8 MG/DL (ref 6–20)
BUN/CREAT SERPL: 18.6 (ref 7–25)
CALCIUM SPEC-SCNC: 8.7 MG/DL (ref 8.6–10.5)
CHLORIDE SERPL-SCNC: 96 MMOL/L (ref 98–107)
CO2 BLDA-SCNC: 37.9 MMOL/L (ref 22–33)
CO2 BLDA-SCNC: 40.2 MMOL/L (ref 22–33)
CO2 SERPL-SCNC: 29.6 MMOL/L (ref 22–29)
COHGB MFR BLD: 1.4 % (ref 0–5)
COHGB MFR BLD: 1.7 % (ref 0–5)
CREAT BLD-MCNC: 0.43 MG/DL (ref 0.76–1.27)
CRP SERPL-MCNC: 13.16 MG/DL (ref 0–0.5)
DEPRECATED RDW RBC AUTO: 48.6 FL (ref 37–54)
EOSINOPHIL # BLD AUTO: 0.35 10*3/MM3 (ref 0–0.4)
EOSINOPHIL NFR BLD AUTO: 4 % (ref 0.3–6.2)
ERYTHROCYTE [DISTWIDTH] IN BLOOD BY AUTOMATED COUNT: 14 % (ref 12.3–15.4)
GAS FLOW AIRWAY: 2 LPM
GAS FLOW AIRWAY: 3 LPM
GFR SERPL CREATININE-BSD FRML MDRD: >150 ML/MIN/1.73
GLOBULIN UR ELPH-MCNC: 5.4 GM/DL
GLUCOSE BLD-MCNC: 115 MG/DL (ref 65–99)
HCO3 BLDA-SCNC: 36 MMOL/L (ref 20–26)
HCO3 BLDA-SCNC: 38.4 MMOL/L (ref 20–26)
HCT VFR BLD AUTO: 43.5 % (ref 37.5–51)
HCT VFR BLD CALC: 40.3 % (ref 38–51)
HCT VFR BLD CALC: 41.4 % (ref 38–51)
HGB BLD-MCNC: 13 G/DL (ref 13–17.7)
HGB BLDA-MCNC: 13.1 G/DL (ref 14–18)
HGB BLDA-MCNC: 13.5 G/DL (ref 14–18)
HOROWITZ INDEX BLD+IHG-RTO: 28 %
HOROWITZ INDEX BLD+IHG-RTO: 32 %
IMM GRANULOCYTES # BLD AUTO: 0.1 10*3/MM3 (ref 0–0.05)
IMM GRANULOCYTES NFR BLD AUTO: 1.2 % (ref 0–0.5)
LYMPHOCYTES # BLD AUTO: 0.68 10*3/MM3 (ref 0.7–3.1)
LYMPHOCYTES NFR BLD AUTO: 7.9 % (ref 19.6–45.3)
Lab: ABNORMAL
Lab: ABNORMAL
MAGNESIUM SERPL-MCNC: 2 MG/DL (ref 1.6–2.6)
MCH RBC QN AUTO: 28.4 PG (ref 26.6–33)
MCHC RBC AUTO-ENTMCNC: 29.9 G/DL (ref 31.5–35.7)
MCV RBC AUTO: 95.2 FL (ref 79–97)
METHGB BLD QL: 0.2 % (ref 0–3)
METHGB BLD QL: 0.2 % (ref 0–3)
MODALITY: ABNORMAL
MODALITY: ABNORMAL
MONOCYTES # BLD AUTO: 0.6 10*3/MM3 (ref 0.1–0.9)
MONOCYTES NFR BLD AUTO: 6.9 % (ref 5–12)
NEUTROPHILS # BLD AUTO: 6.87 10*3/MM3 (ref 1.7–7)
NEUTROPHILS NFR BLD AUTO: 79.4 % (ref 42.7–76)
NOTE: ABNORMAL
NOTE: ABNORMAL
NRBC BLD AUTO-RTO: 0 /100 WBC (ref 0–0.2)
NT-PROBNP SERPL-MCNC: 94.2 PG/ML (ref 5–450)
OXYHGB MFR BLDV: 93.1 % (ref 94–99)
OXYHGB MFR BLDV: 93.8 % (ref 94–99)
PCO2 BLDA: 59.3 MM HG (ref 35–45)
PCO2 BLDA: 63.8 MM HG (ref 35–45)
PCO2 TEMP ADJ BLD: ABNORMAL MM[HG]
PCO2 TEMP ADJ BLD: ABNORMAL MM[HG]
PH BLDA: 7.36 PH UNITS (ref 7.35–7.45)
PH BLDA: 7.42 PH UNITS (ref 7.35–7.45)
PH, TEMP CORRECTED: ABNORMAL
PH, TEMP CORRECTED: ABNORMAL
PHOSPHATE SERPL-MCNC: 3.6 MG/DL (ref 2.5–4.5)
PLATELET # BLD AUTO: 248 10*3/MM3 (ref 140–450)
PMV BLD AUTO: 10.3 FL (ref 6–12)
PO2 BLDA: 68 MM HG (ref 83–108)
PO2 BLDA: 74.8 MM HG (ref 83–108)
PO2 TEMP ADJ BLD: ABNORMAL MM[HG]
PO2 TEMP ADJ BLD: ABNORMAL MM[HG]
POTASSIUM BLD-SCNC: 4.5 MMOL/L (ref 3.5–5.2)
PROT SERPL-MCNC: 7.6 G/DL (ref 6–8.5)
RBC # BLD AUTO: 4.57 10*6/MM3 (ref 4.14–5.8)
SAO2 % BLDCOA: 94.9 % (ref 94–99)
SAO2 % BLDCOA: 95.3 % (ref 94–99)
SODIUM BLD-SCNC: 136 MMOL/L (ref 136–145)
VENTILATOR MODE: ABNORMAL
VENTILATOR MODE: ABNORMAL
WBC NRBC COR # BLD: 8.65 10*3/MM3 (ref 3.4–10.8)

## 2019-12-26 PROCEDURE — 83050 HGB METHEMOGLOBIN QUAN: CPT

## 2019-12-26 PROCEDURE — 71045 X-RAY EXAM CHEST 1 VIEW: CPT | Performed by: RADIOLOGY

## 2019-12-26 PROCEDURE — 99233 SBSQ HOSP IP/OBS HIGH 50: CPT | Performed by: PHYSICIAN ASSISTANT

## 2019-12-26 PROCEDURE — 94799 UNLISTED PULMONARY SVC/PX: CPT

## 2019-12-26 PROCEDURE — 25010000002 FUROSEMIDE PER 20 MG: Performed by: PHYSICIAN ASSISTANT

## 2019-12-26 PROCEDURE — 83735 ASSAY OF MAGNESIUM: CPT | Performed by: INTERNAL MEDICINE

## 2019-12-26 PROCEDURE — 94660 CPAP INITIATION&MGMT: CPT

## 2019-12-26 PROCEDURE — 82375 ASSAY CARBOXYHB QUANT: CPT

## 2019-12-26 PROCEDURE — 85025 COMPLETE CBC W/AUTO DIFF WBC: CPT | Performed by: PHYSICIAN ASSISTANT

## 2019-12-26 PROCEDURE — 25010000002 VANCOMYCIN 5 G RECONSTITUTED SOLUTION 5,000 MG VIAL: Performed by: NURSE PRACTITIONER

## 2019-12-26 PROCEDURE — 36410 VNPNXR 3YR/> PHY/QHP DX/THER: CPT

## 2019-12-26 PROCEDURE — 84100 ASSAY OF PHOSPHORUS: CPT | Performed by: INTERNAL MEDICINE

## 2019-12-26 PROCEDURE — 36600 WITHDRAWAL OF ARTERIAL BLOOD: CPT

## 2019-12-26 PROCEDURE — C1751 CATH, INF, PER/CENT/MIDLINE: HCPCS

## 2019-12-26 PROCEDURE — 25010000002 FUROSEMIDE PER 20 MG: Performed by: INTERNAL MEDICINE

## 2019-12-26 PROCEDURE — 71045 X-RAY EXAM CHEST 1 VIEW: CPT

## 2019-12-26 PROCEDURE — 80053 COMPREHEN METABOLIC PANEL: CPT | Performed by: PHYSICIAN ASSISTANT

## 2019-12-26 PROCEDURE — 82805 BLOOD GASES W/O2 SATURATION: CPT

## 2019-12-26 PROCEDURE — 25010000002 VANCOMYCIN 5 G RECONSTITUTED SOLUTION 5,000 MG VIAL: Performed by: INTERNAL MEDICINE

## 2019-12-26 PROCEDURE — 83880 ASSAY OF NATRIURETIC PEPTIDE: CPT | Performed by: PHYSICIAN ASSISTANT

## 2019-12-26 PROCEDURE — 97161 PT EVAL LOW COMPLEX 20 MIN: CPT

## 2019-12-26 PROCEDURE — 25010000002 CEFTRIAXONE: Performed by: PHYSICIAN ASSISTANT

## 2019-12-26 PROCEDURE — 86140 C-REACTIVE PROTEIN: CPT | Performed by: PHYSICIAN ASSISTANT

## 2019-12-26 PROCEDURE — 25010000002 HEPARIN (PORCINE) PER 1000 UNITS: Performed by: INTERNAL MEDICINE

## 2019-12-26 RX ORDER — FUROSEMIDE 10 MG/ML
40 INJECTION INTRAMUSCULAR; INTRAVENOUS ONCE
Status: COMPLETED | OUTPATIENT
Start: 2019-12-26 | End: 2019-12-26

## 2019-12-26 RX ORDER — SODIUM CHLORIDE 0.9 % (FLUSH) 0.9 %
10 SYRINGE (ML) INJECTION EVERY 12 HOURS SCHEDULED
Status: DISCONTINUED | OUTPATIENT
Start: 2019-12-26 | End: 2020-01-01 | Stop reason: HOSPADM

## 2019-12-26 RX ORDER — METOPROLOL TARTRATE 50 MG/1
50 TABLET, FILM COATED ORAL EVERY 12 HOURS SCHEDULED
Status: DISCONTINUED | OUTPATIENT
Start: 2019-12-26 | End: 2019-12-28

## 2019-12-26 RX ORDER — SODIUM CHLORIDE 0.9 % (FLUSH) 0.9 %
10 SYRINGE (ML) INJECTION AS NEEDED
Status: DISCONTINUED | OUTPATIENT
Start: 2019-12-26 | End: 2020-01-01 | Stop reason: HOSPADM

## 2019-12-26 RX ADMIN — FUROSEMIDE 40 MG: 10 INJECTION, SOLUTION INTRAMUSCULAR; INTRAVENOUS at 15:50

## 2019-12-26 RX ADMIN — Medication 1 TABLET: at 08:43

## 2019-12-26 RX ADMIN — CEFTRIAXONE 2 G: 2 INJECTION, POWDER, FOR SOLUTION INTRAMUSCULAR; INTRAVENOUS at 15:50

## 2019-12-26 RX ADMIN — SODIUM CHLORIDE, PRESERVATIVE FREE 10 ML: 5 INJECTION INTRAVENOUS at 08:44

## 2019-12-26 RX ADMIN — HEPARIN SODIUM 5000 UNITS: 5000 INJECTION INTRAVENOUS; SUBCUTANEOUS at 20:18

## 2019-12-26 RX ADMIN — CETIRIZINE HYDROCHLORIDE 10 MG: 10 TABLET, FILM COATED ORAL at 08:43

## 2019-12-26 RX ADMIN — METOPROLOL TARTRATE 25 MG: 25 TABLET, FILM COATED ORAL at 08:43

## 2019-12-26 RX ADMIN — METOPROLOL TARTRATE 50 MG: 50 TABLET, FILM COATED ORAL at 20:17

## 2019-12-26 RX ADMIN — VANCOMYCIN HYDROCHLORIDE 2500 MG: 5 INJECTION, POWDER, LYOPHILIZED, FOR SOLUTION INTRAVENOUS at 17:29

## 2019-12-26 RX ADMIN — ACETAMINOPHEN 650 MG: 325 TABLET ORAL at 08:54

## 2019-12-26 RX ADMIN — VANCOMYCIN HYDROCHLORIDE 2500 MG: 5 INJECTION, POWDER, LYOPHILIZED, FOR SOLUTION INTRAVENOUS at 05:11

## 2019-12-26 RX ADMIN — FUROSEMIDE 40 MG: 10 INJECTION, SOLUTION INTRAMUSCULAR; INTRAVENOUS at 08:44

## 2019-12-26 RX ADMIN — SODIUM CHLORIDE, PRESERVATIVE FREE 10 ML: 5 INJECTION INTRAVENOUS at 20:18

## 2019-12-26 RX ADMIN — PANTOPRAZOLE SODIUM 40 MG: 40 TABLET, DELAYED RELEASE ORAL at 05:11

## 2019-12-26 RX ADMIN — HEPARIN SODIUM 5000 UNITS: 5000 INJECTION INTRAVENOUS; SUBCUTANEOUS at 08:43

## 2019-12-27 ENCOUNTER — APPOINTMENT (OUTPATIENT)
Dept: GENERAL RADIOLOGY | Facility: HOSPITAL | Age: 44
End: 2019-12-27

## 2019-12-27 PROBLEM — J10.1 INFLUENZA A: Status: ACTIVE | Noted: 2019-12-27

## 2019-12-27 LAB
A-A DO2: 44.4 MMHG (ref 0–300)
ALBUMIN SERPL-MCNC: 2.26 G/DL (ref 3.5–5.2)
ALBUMIN/GLOB SERPL: 0.4 G/DL
ALP SERPL-CCNC: 125 U/L (ref 39–117)
ALT SERPL W P-5'-P-CCNC: 58 U/L (ref 1–41)
ANION GAP SERPL CALCULATED.3IONS-SCNC: 10.9 MMOL/L (ref 5–15)
ARTERIAL PATENCY WRIST A: POSITIVE
AST SERPL-CCNC: 44 U/L (ref 1–40)
ATMOSPHERIC PRESS: 735 MMHG
BASE EXCESS BLDA CALC-SCNC: 11.8 MMOL/L (ref 0–2)
BASOPHILS # BLD AUTO: 0.03 10*3/MM3 (ref 0–0.2)
BASOPHILS NFR BLD AUTO: 0.4 % (ref 0–1.5)
BDY SITE: ABNORMAL
BILIRUB SERPL-MCNC: 0.2 MG/DL (ref 0.2–1.2)
BODY TEMPERATURE: 0 C
BUN BLD-MCNC: 10 MG/DL (ref 6–20)
BUN/CREAT SERPL: 22.2 (ref 7–25)
CALCIUM SPEC-SCNC: 8.7 MG/DL (ref 8.6–10.5)
CHLORIDE SERPL-SCNC: 93 MMOL/L (ref 98–107)
CO2 BLDA-SCNC: 41.3 MMOL/L (ref 22–33)
CO2 SERPL-SCNC: 33.1 MMOL/L (ref 22–29)
COHGB MFR BLD: 0.9 % (ref 0–5)
CREAT BLD-MCNC: 0.45 MG/DL (ref 0.76–1.27)
CRP SERPL-MCNC: 10.45 MG/DL (ref 0–0.5)
DEPRECATED RDW RBC AUTO: 49.1 FL (ref 37–54)
EOSINOPHIL # BLD AUTO: 0.26 10*3/MM3 (ref 0–0.4)
EOSINOPHIL NFR BLD AUTO: 3.8 % (ref 0.3–6.2)
ERYTHROCYTE [DISTWIDTH] IN BLOOD BY AUTOMATED COUNT: 13.9 % (ref 12.3–15.4)
FLUAV AG NPH QL: POSITIVE
FLUBV AG NPH QL IA: NEGATIVE
GAS FLOW AIRWAY: 2 LPM
GFR SERPL CREATININE-BSD FRML MDRD: >150 ML/MIN/1.73
GLOBULIN UR ELPH-MCNC: 5.2 GM/DL
GLUCOSE BLD-MCNC: 127 MG/DL (ref 65–99)
HCO3 BLDA-SCNC: 39.3 MMOL/L (ref 20–26)
HCT VFR BLD AUTO: 41.4 % (ref 37.5–51)
HCT VFR BLD CALC: 40.5 % (ref 38–51)
HGB BLD-MCNC: 12.4 G/DL (ref 13–17.7)
HGB BLDA-MCNC: 13.2 G/DL (ref 14–18)
HOROWITZ INDEX BLD+IHG-RTO: 28 %
IMM GRANULOCYTES # BLD AUTO: 0.11 10*3/MM3 (ref 0–0.05)
IMM GRANULOCYTES NFR BLD AUTO: 1.6 % (ref 0–0.5)
LYMPHOCYTES # BLD AUTO: 0.42 10*3/MM3 (ref 0.7–3.1)
LYMPHOCYTES NFR BLD AUTO: 6.1 % (ref 19.6–45.3)
Lab: ABNORMAL
MCH RBC QN AUTO: 28.6 PG (ref 26.6–33)
MCHC RBC AUTO-ENTMCNC: 30 G/DL (ref 31.5–35.7)
MCV RBC AUTO: 95.6 FL (ref 79–97)
METHGB BLD QL: <-0.1 % (ref 0–3)
MODALITY: ABNORMAL
MONOCYTES # BLD AUTO: 0.56 10*3/MM3 (ref 0.1–0.9)
MONOCYTES NFR BLD AUTO: 8.1 % (ref 5–12)
NEUTROPHILS # BLD AUTO: 5.54 10*3/MM3 (ref 1.7–7)
NEUTROPHILS NFR BLD AUTO: 80 % (ref 42.7–76)
NOTE: ABNORMAL
NOTIFIED BY: ABNORMAL
NOTIFIED WHO: ABNORMAL
NRBC BLD AUTO-RTO: 0 /100 WBC (ref 0–0.2)
OXYHGB MFR BLDV: 96.6 % (ref 94–99)
PCO2 BLDA: 63.6 MM HG (ref 35–45)
PCO2 TEMP ADJ BLD: ABNORMAL MM[HG]
PH BLDA: 7.4 PH UNITS (ref 7.35–7.45)
PH, TEMP CORRECTED: ABNORMAL
PLATELET # BLD AUTO: 283 10*3/MM3 (ref 140–450)
PMV BLD AUTO: 9.7 FL (ref 6–12)
PO2 BLDA: 77.2 MM HG (ref 83–108)
PO2 TEMP ADJ BLD: ABNORMAL MM[HG]
POTASSIUM BLD-SCNC: 4.2 MMOL/L (ref 3.5–5.2)
PROT SERPL-MCNC: 7.5 G/DL (ref 6–8.5)
RBC # BLD AUTO: 4.33 10*6/MM3 (ref 4.14–5.8)
SAO2 % BLDCOA: 96.2 % (ref 94–99)
SODIUM BLD-SCNC: 137 MMOL/L (ref 136–145)
VENTILATOR MODE: ABNORMAL
WBC NRBC COR # BLD: 6.92 10*3/MM3 (ref 3.4–10.8)

## 2019-12-27 PROCEDURE — 25010000002 VANCOMYCIN 5 G RECONSTITUTED SOLUTION 5,000 MG VIAL: Performed by: NURSE PRACTITIONER

## 2019-12-27 PROCEDURE — 87040 BLOOD CULTURE FOR BACTERIA: CPT | Performed by: PHYSICIAN ASSISTANT

## 2019-12-27 PROCEDURE — 25010000002 HEPARIN (PORCINE) PER 1000 UNITS: Performed by: INTERNAL MEDICINE

## 2019-12-27 PROCEDURE — 25010000002 FUROSEMIDE PER 20 MG: Performed by: PHYSICIAN ASSISTANT

## 2019-12-27 PROCEDURE — 86140 C-REACTIVE PROTEIN: CPT | Performed by: PHYSICIAN ASSISTANT

## 2019-12-27 PROCEDURE — 25010000002 CEFTRIAXONE: Performed by: PHYSICIAN ASSISTANT

## 2019-12-27 PROCEDURE — 71045 X-RAY EXAM CHEST 1 VIEW: CPT | Performed by: RADIOLOGY

## 2019-12-27 PROCEDURE — 85025 COMPLETE CBC W/AUTO DIFF WBC: CPT | Performed by: PHYSICIAN ASSISTANT

## 2019-12-27 PROCEDURE — 82375 ASSAY CARBOXYHB QUANT: CPT

## 2019-12-27 PROCEDURE — 82805 BLOOD GASES W/O2 SATURATION: CPT

## 2019-12-27 PROCEDURE — 94799 UNLISTED PULMONARY SVC/PX: CPT

## 2019-12-27 PROCEDURE — 71045 X-RAY EXAM CHEST 1 VIEW: CPT

## 2019-12-27 PROCEDURE — 99233 SBSQ HOSP IP/OBS HIGH 50: CPT | Performed by: PHYSICIAN ASSISTANT

## 2019-12-27 PROCEDURE — 80053 COMPREHEN METABOLIC PANEL: CPT | Performed by: PHYSICIAN ASSISTANT

## 2019-12-27 PROCEDURE — 94660 CPAP INITIATION&MGMT: CPT

## 2019-12-27 PROCEDURE — 36600 WITHDRAWAL OF ARTERIAL BLOOD: CPT

## 2019-12-27 PROCEDURE — 87804 INFLUENZA ASSAY W/OPTIC: CPT | Performed by: PHYSICIAN ASSISTANT

## 2019-12-27 PROCEDURE — 83050 HGB METHEMOGLOBIN QUAN: CPT

## 2019-12-27 RX ORDER — FUROSEMIDE 10 MG/ML
40 INJECTION INTRAMUSCULAR; INTRAVENOUS EVERY 12 HOURS SCHEDULED
Status: DISCONTINUED | OUTPATIENT
Start: 2019-12-27 | End: 2019-12-31

## 2019-12-27 RX ORDER — OSELTAMIVIR PHOSPHATE 75 MG/1
75 CAPSULE ORAL EVERY 12 HOURS SCHEDULED
Status: COMPLETED | OUTPATIENT
Start: 2019-12-27 | End: 2019-12-31

## 2019-12-27 RX ADMIN — VANCOMYCIN HYDROCHLORIDE 2500 MG: 5 INJECTION, POWDER, LYOPHILIZED, FOR SOLUTION INTRAVENOUS at 16:58

## 2019-12-27 RX ADMIN — SODIUM CHLORIDE, PRESERVATIVE FREE 10 ML: 5 INJECTION INTRAVENOUS at 20:02

## 2019-12-27 RX ADMIN — ACETAMINOPHEN 650 MG: 325 TABLET ORAL at 10:57

## 2019-12-27 RX ADMIN — HEPARIN SODIUM 5000 UNITS: 5000 INJECTION INTRAVENOUS; SUBCUTANEOUS at 20:02

## 2019-12-27 RX ADMIN — Medication 1 TABLET: at 08:54

## 2019-12-27 RX ADMIN — FUROSEMIDE 40 MG: 10 INJECTION, SOLUTION INTRAMUSCULAR; INTRAVENOUS at 08:54

## 2019-12-27 RX ADMIN — VANCOMYCIN HYDROCHLORIDE 2500 MG: 5 INJECTION, POWDER, LYOPHILIZED, FOR SOLUTION INTRAVENOUS at 05:12

## 2019-12-27 RX ADMIN — SODIUM CHLORIDE, PRESERVATIVE FREE 10 ML: 5 INJECTION INTRAVENOUS at 08:56

## 2019-12-27 RX ADMIN — CETIRIZINE HYDROCHLORIDE 10 MG: 10 TABLET, FILM COATED ORAL at 08:54

## 2019-12-27 RX ADMIN — SODIUM CHLORIDE, PRESERVATIVE FREE 10 ML: 5 INJECTION INTRAVENOUS at 08:55

## 2019-12-27 RX ADMIN — HEPARIN SODIUM 5000 UNITS: 5000 INJECTION INTRAVENOUS; SUBCUTANEOUS at 08:55

## 2019-12-27 RX ADMIN — OSELTAMIVIR PHOSPHATE 75 MG: 75 CAPSULE ORAL at 15:22

## 2019-12-27 RX ADMIN — ACETAMINOPHEN 650 MG: 325 TABLET ORAL at 18:32

## 2019-12-27 RX ADMIN — OSELTAMIVIR PHOSPHATE 75 MG: 75 CAPSULE ORAL at 20:02

## 2019-12-27 RX ADMIN — METOPROLOL TARTRATE 50 MG: 50 TABLET, FILM COATED ORAL at 20:02

## 2019-12-27 RX ADMIN — PANTOPRAZOLE SODIUM 40 MG: 40 TABLET, DELAYED RELEASE ORAL at 08:55

## 2019-12-27 RX ADMIN — METOPROLOL TARTRATE 50 MG: 50 TABLET, FILM COATED ORAL at 08:55

## 2019-12-27 RX ADMIN — CEFTRIAXONE 2 G: 2 INJECTION, POWDER, FOR SOLUTION INTRAMUSCULAR; INTRAVENOUS at 15:22

## 2019-12-27 RX ADMIN — FUROSEMIDE 40 MG: 10 INJECTION, SOLUTION INTRAMUSCULAR; INTRAVENOUS at 20:02

## 2019-12-28 LAB
ALBUMIN SERPL-MCNC: 2.72 G/DL (ref 3.5–5.2)
ALBUMIN/GLOB SERPL: 0.5 G/DL
ALP SERPL-CCNC: 121 U/L (ref 39–117)
ALT SERPL W P-5'-P-CCNC: 61 U/L (ref 1–41)
ANION GAP SERPL CALCULATED.3IONS-SCNC: 10.3 MMOL/L (ref 5–15)
AST SERPL-CCNC: 50 U/L (ref 1–40)
BASOPHILS # BLD AUTO: 0.02 10*3/MM3 (ref 0–0.2)
BASOPHILS NFR BLD AUTO: 0.4 % (ref 0–1.5)
BILIRUB SERPL-MCNC: 0.3 MG/DL (ref 0.2–1.2)
BUN BLD-MCNC: 8 MG/DL (ref 6–20)
BUN/CREAT SERPL: 16 (ref 7–25)
CALCIUM SPEC-SCNC: 8.8 MG/DL (ref 8.6–10.5)
CHLORIDE SERPL-SCNC: 88 MMOL/L (ref 98–107)
CO2 SERPL-SCNC: 35.7 MMOL/L (ref 22–29)
CREAT BLD-MCNC: 0.5 MG/DL (ref 0.76–1.27)
CRP SERPL-MCNC: 6.07 MG/DL (ref 0–0.5)
DEPRECATED RDW RBC AUTO: 49.6 FL (ref 37–54)
EOSINOPHIL # BLD AUTO: 0.1 10*3/MM3 (ref 0–0.4)
EOSINOPHIL NFR BLD AUTO: 1.9 % (ref 0.3–6.2)
ERYTHROCYTE [DISTWIDTH] IN BLOOD BY AUTOMATED COUNT: 14.2 % (ref 12.3–15.4)
GFR SERPL CREATININE-BSD FRML MDRD: >150 ML/MIN/1.73
GLOBULIN UR ELPH-MCNC: 5.4 GM/DL
GLUCOSE BLD-MCNC: 112 MG/DL (ref 65–99)
HCT VFR BLD AUTO: 43.4 % (ref 37.5–51)
HGB BLD-MCNC: 12.9 G/DL (ref 13–17.7)
IMM GRANULOCYTES # BLD AUTO: 0.11 10*3/MM3 (ref 0–0.05)
IMM GRANULOCYTES NFR BLD AUTO: 2.1 % (ref 0–0.5)
LYMPHOCYTES # BLD AUTO: 0.7 10*3/MM3 (ref 0.7–3.1)
LYMPHOCYTES NFR BLD AUTO: 13.2 % (ref 19.6–45.3)
MCH RBC QN AUTO: 28.2 PG (ref 26.6–33)
MCHC RBC AUTO-ENTMCNC: 29.7 G/DL (ref 31.5–35.7)
MCV RBC AUTO: 95 FL (ref 79–97)
MONOCYTES # BLD AUTO: 0.6 10*3/MM3 (ref 0.1–0.9)
MONOCYTES NFR BLD AUTO: 11.3 % (ref 5–12)
NEUTROPHILS # BLD AUTO: 3.76 10*3/MM3 (ref 1.7–7)
NEUTROPHILS NFR BLD AUTO: 71.1 % (ref 42.7–76)
NRBC BLD AUTO-RTO: 0 /100 WBC (ref 0–0.2)
PLATELET # BLD AUTO: 289 10*3/MM3 (ref 140–450)
PMV BLD AUTO: 8.9 FL (ref 6–12)
POTASSIUM BLD-SCNC: 4.2 MMOL/L (ref 3.5–5.2)
PROT SERPL-MCNC: 8.1 G/DL (ref 6–8.5)
RBC # BLD AUTO: 4.57 10*6/MM3 (ref 4.14–5.8)
SODIUM BLD-SCNC: 134 MMOL/L (ref 136–145)
WBC NRBC COR # BLD: 5.29 10*3/MM3 (ref 3.4–10.8)

## 2019-12-28 PROCEDURE — 25010000002 HEPARIN (PORCINE) PER 1000 UNITS: Performed by: INTERNAL MEDICINE

## 2019-12-28 PROCEDURE — 94799 UNLISTED PULMONARY SVC/PX: CPT

## 2019-12-28 PROCEDURE — 25010000002 FUROSEMIDE PER 20 MG: Performed by: PHYSICIAN ASSISTANT

## 2019-12-28 PROCEDURE — 85025 COMPLETE CBC W/AUTO DIFF WBC: CPT | Performed by: PHYSICIAN ASSISTANT

## 2019-12-28 PROCEDURE — 86140 C-REACTIVE PROTEIN: CPT | Performed by: PHYSICIAN ASSISTANT

## 2019-12-28 PROCEDURE — 25010000002 VANCOMYCIN 5 G RECONSTITUTED SOLUTION 5,000 MG VIAL: Performed by: NURSE PRACTITIONER

## 2019-12-28 PROCEDURE — 99232 SBSQ HOSP IP/OBS MODERATE 35: CPT | Performed by: PHYSICIAN ASSISTANT

## 2019-12-28 PROCEDURE — 94660 CPAP INITIATION&MGMT: CPT

## 2019-12-28 PROCEDURE — 80053 COMPREHEN METABOLIC PANEL: CPT | Performed by: PHYSICIAN ASSISTANT

## 2019-12-28 PROCEDURE — 25010000002 CEFTRIAXONE: Performed by: PHYSICIAN ASSISTANT

## 2019-12-28 RX ORDER — METOPROLOL TARTRATE 5 MG/5ML
5 INJECTION INTRAVENOUS ONCE
Status: COMPLETED | OUTPATIENT
Start: 2019-12-28 | End: 2019-12-28

## 2019-12-28 RX ADMIN — ACETAMINOPHEN 650 MG: 325 TABLET ORAL at 03:03

## 2019-12-28 RX ADMIN — METOPROLOL TARTRATE 5 MG: 5 INJECTION, SOLUTION INTRAVENOUS at 03:04

## 2019-12-28 RX ADMIN — PANTOPRAZOLE SODIUM 40 MG: 40 TABLET, DELAYED RELEASE ORAL at 05:09

## 2019-12-28 RX ADMIN — VANCOMYCIN HYDROCHLORIDE 2500 MG: 5 INJECTION, POWDER, LYOPHILIZED, FOR SOLUTION INTRAVENOUS at 05:09

## 2019-12-28 RX ADMIN — OSELTAMIVIR PHOSPHATE 75 MG: 75 CAPSULE ORAL at 20:33

## 2019-12-28 RX ADMIN — OSELTAMIVIR PHOSPHATE 75 MG: 75 CAPSULE ORAL at 09:11

## 2019-12-28 RX ADMIN — HEPARIN SODIUM 5000 UNITS: 5000 INJECTION INTRAVENOUS; SUBCUTANEOUS at 20:33

## 2019-12-28 RX ADMIN — METOPROLOL TARTRATE 75 MG: 50 TABLET, FILM COATED ORAL at 20:33

## 2019-12-28 RX ADMIN — FUROSEMIDE 40 MG: 10 INJECTION, SOLUTION INTRAMUSCULAR; INTRAVENOUS at 09:11

## 2019-12-28 RX ADMIN — SODIUM CHLORIDE, PRESERVATIVE FREE 10 ML: 5 INJECTION INTRAVENOUS at 09:12

## 2019-12-28 RX ADMIN — VANCOMYCIN HYDROCHLORIDE 2500 MG: 5 INJECTION, POWDER, LYOPHILIZED, FOR SOLUTION INTRAVENOUS at 17:24

## 2019-12-28 RX ADMIN — CETIRIZINE HYDROCHLORIDE 10 MG: 10 TABLET, FILM COATED ORAL at 09:11

## 2019-12-28 RX ADMIN — SODIUM CHLORIDE, PRESERVATIVE FREE 10 ML: 5 INJECTION INTRAVENOUS at 20:34

## 2019-12-28 RX ADMIN — Medication 1 TABLET: at 09:11

## 2019-12-28 RX ADMIN — CEFTRIAXONE 2 G: 2 INJECTION, POWDER, FOR SOLUTION INTRAMUSCULAR; INTRAVENOUS at 15:39

## 2019-12-28 RX ADMIN — HEPARIN SODIUM 5000 UNITS: 5000 INJECTION INTRAVENOUS; SUBCUTANEOUS at 09:11

## 2019-12-28 RX ADMIN — METOPROLOL TARTRATE 75 MG: 50 TABLET, FILM COATED ORAL at 09:11

## 2019-12-28 RX ADMIN — FUROSEMIDE 40 MG: 10 INJECTION, SOLUTION INTRAMUSCULAR; INTRAVENOUS at 20:33

## 2019-12-29 LAB
ALBUMIN SERPL-MCNC: 2.32 G/DL (ref 3.5–5.2)
ALBUMIN/GLOB SERPL: 0.4 G/DL
ALP SERPL-CCNC: 110 U/L (ref 39–117)
ALT SERPL W P-5'-P-CCNC: 67 U/L (ref 1–41)
ANION GAP SERPL CALCULATED.3IONS-SCNC: 11 MMOL/L (ref 5–15)
AST SERPL-CCNC: 60 U/L (ref 1–40)
BASOPHILS # BLD AUTO: 0.02 10*3/MM3 (ref 0–0.2)
BASOPHILS NFR BLD AUTO: 0.4 % (ref 0–1.5)
BILIRUB SERPL-MCNC: 0.2 MG/DL (ref 0.2–1.2)
BUN BLD-MCNC: 9 MG/DL (ref 6–20)
BUN/CREAT SERPL: 19.1 (ref 7–25)
CALCIUM SPEC-SCNC: 8.6 MG/DL (ref 8.6–10.5)
CHLORIDE SERPL-SCNC: 89 MMOL/L (ref 98–107)
CO2 SERPL-SCNC: 35 MMOL/L (ref 22–29)
CREAT BLD-MCNC: 0.47 MG/DL (ref 0.76–1.27)
CRP SERPL-MCNC: 2.95 MG/DL (ref 0–0.5)
DEPRECATED RDW RBC AUTO: 50.4 FL (ref 37–54)
EOSINOPHIL # BLD AUTO: 0.11 10*3/MM3 (ref 0–0.4)
EOSINOPHIL NFR BLD AUTO: 2.3 % (ref 0.3–6.2)
ERYTHROCYTE [DISTWIDTH] IN BLOOD BY AUTOMATED COUNT: 14.2 % (ref 12.3–15.4)
GFR SERPL CREATININE-BSD FRML MDRD: >150 ML/MIN/1.73
GLOBULIN UR ELPH-MCNC: 5.8 GM/DL
GLUCOSE BLD-MCNC: 126 MG/DL (ref 65–99)
HCT VFR BLD AUTO: 44.3 % (ref 37.5–51)
HGB BLD-MCNC: 13.3 G/DL (ref 13–17.7)
IMM GRANULOCYTES # BLD AUTO: 0.07 10*3/MM3 (ref 0–0.05)
IMM GRANULOCYTES NFR BLD AUTO: 1.5 % (ref 0–0.5)
LYMPHOCYTES # BLD AUTO: 0.99 10*3/MM3 (ref 0.7–3.1)
LYMPHOCYTES NFR BLD AUTO: 20.8 % (ref 19.6–45.3)
MCH RBC QN AUTO: 28.5 PG (ref 26.6–33)
MCHC RBC AUTO-ENTMCNC: 30 G/DL (ref 31.5–35.7)
MCV RBC AUTO: 95.1 FL (ref 79–97)
MONOCYTES # BLD AUTO: 0.51 10*3/MM3 (ref 0.1–0.9)
MONOCYTES NFR BLD AUTO: 10.7 % (ref 5–12)
NEUTROPHILS # BLD AUTO: 3.07 10*3/MM3 (ref 1.7–7)
NEUTROPHILS NFR BLD AUTO: 64.3 % (ref 42.7–76)
NRBC BLD AUTO-RTO: 0 /100 WBC (ref 0–0.2)
PLATELET # BLD AUTO: 289 10*3/MM3 (ref 140–450)
PMV BLD AUTO: 8.9 FL (ref 6–12)
POTASSIUM BLD-SCNC: 4.7 MMOL/L (ref 3.5–5.2)
PROT SERPL-MCNC: 8.1 G/DL (ref 6–8.5)
RBC # BLD AUTO: 4.66 10*6/MM3 (ref 4.14–5.8)
SODIUM BLD-SCNC: 135 MMOL/L (ref 136–145)
WBC NRBC COR # BLD: 4.77 10*3/MM3 (ref 3.4–10.8)

## 2019-12-29 PROCEDURE — 86140 C-REACTIVE PROTEIN: CPT | Performed by: PHYSICIAN ASSISTANT

## 2019-12-29 PROCEDURE — 94640 AIRWAY INHALATION TREATMENT: CPT

## 2019-12-29 PROCEDURE — 25010000002 CEFTRIAXONE: Performed by: PHYSICIAN ASSISTANT

## 2019-12-29 PROCEDURE — 94660 CPAP INITIATION&MGMT: CPT

## 2019-12-29 PROCEDURE — 99233 SBSQ HOSP IP/OBS HIGH 50: CPT | Performed by: PHYSICIAN ASSISTANT

## 2019-12-29 PROCEDURE — 25010000002 VANCOMYCIN 5 G RECONSTITUTED SOLUTION 5,000 MG VIAL: Performed by: NURSE PRACTITIONER

## 2019-12-29 PROCEDURE — 94799 UNLISTED PULMONARY SVC/PX: CPT

## 2019-12-29 PROCEDURE — 25010000002 HEPARIN (PORCINE) PER 1000 UNITS: Performed by: INTERNAL MEDICINE

## 2019-12-29 PROCEDURE — 85025 COMPLETE CBC W/AUTO DIFF WBC: CPT | Performed by: PHYSICIAN ASSISTANT

## 2019-12-29 PROCEDURE — 80053 COMPREHEN METABOLIC PANEL: CPT | Performed by: PHYSICIAN ASSISTANT

## 2019-12-29 PROCEDURE — 25010000002 FUROSEMIDE PER 20 MG: Performed by: PHYSICIAN ASSISTANT

## 2019-12-29 RX ORDER — METOPROLOL TARTRATE 100 MG/1
100 TABLET ORAL EVERY 12 HOURS SCHEDULED
Status: DISCONTINUED | OUTPATIENT
Start: 2019-12-29 | End: 2020-01-01 | Stop reason: HOSPADM

## 2019-12-29 RX ORDER — IPRATROPIUM BROMIDE AND ALBUTEROL SULFATE 2.5; .5 MG/3ML; MG/3ML
3 SOLUTION RESPIRATORY (INHALATION)
Status: DISCONTINUED | OUTPATIENT
Start: 2019-12-29 | End: 2020-01-01 | Stop reason: HOSPADM

## 2019-12-29 RX ORDER — LISINOPRIL 10 MG/1
10 TABLET ORAL
Status: DISCONTINUED | OUTPATIENT
Start: 2019-12-29 | End: 2020-01-01 | Stop reason: HOSPADM

## 2019-12-29 RX ORDER — GUAIFENESIN/DEXTROMETHORPHAN 100-10MG/5
10 SYRUP ORAL EVERY 4 HOURS PRN
Status: DISCONTINUED | OUTPATIENT
Start: 2019-12-29 | End: 2020-01-01 | Stop reason: HOSPADM

## 2019-12-29 RX ADMIN — GUAIFENESIN AND DEXTROMETHORPHAN 10 ML: 100; 10 SYRUP ORAL at 13:07

## 2019-12-29 RX ADMIN — METOPROLOL TARTRATE 100 MG: 100 TABLET, FILM COATED ORAL at 08:39

## 2019-12-29 RX ADMIN — Medication 1 TABLET: at 08:40

## 2019-12-29 RX ADMIN — HEPARIN SODIUM 5000 UNITS: 5000 INJECTION INTRAVENOUS; SUBCUTANEOUS at 20:55

## 2019-12-29 RX ADMIN — METOPROLOL TARTRATE 100 MG: 100 TABLET, FILM COATED ORAL at 20:55

## 2019-12-29 RX ADMIN — CETIRIZINE HYDROCHLORIDE 10 MG: 10 TABLET, FILM COATED ORAL at 08:41

## 2019-12-29 RX ADMIN — OSELTAMIVIR PHOSPHATE 75 MG: 75 CAPSULE ORAL at 20:55

## 2019-12-29 RX ADMIN — IPRATROPIUM BROMIDE AND ALBUTEROL SULFATE 3 ML: .5; 3 SOLUTION RESPIRATORY (INHALATION) at 20:00

## 2019-12-29 RX ADMIN — FUROSEMIDE 40 MG: 10 INJECTION, SOLUTION INTRAMUSCULAR; INTRAVENOUS at 20:55

## 2019-12-29 RX ADMIN — VANCOMYCIN HYDROCHLORIDE 2500 MG: 5 INJECTION, POWDER, LYOPHILIZED, FOR SOLUTION INTRAVENOUS at 05:46

## 2019-12-29 RX ADMIN — OSELTAMIVIR PHOSPHATE 75 MG: 75 CAPSULE ORAL at 08:42

## 2019-12-29 RX ADMIN — PANTOPRAZOLE SODIUM 40 MG: 40 TABLET, DELAYED RELEASE ORAL at 05:46

## 2019-12-29 RX ADMIN — HEPARIN SODIUM 5000 UNITS: 5000 INJECTION INTRAVENOUS; SUBCUTANEOUS at 08:42

## 2019-12-29 RX ADMIN — LISINOPRIL 10 MG: 10 TABLET ORAL at 08:39

## 2019-12-29 RX ADMIN — SODIUM CHLORIDE, PRESERVATIVE FREE 10 ML: 5 INJECTION INTRAVENOUS at 08:42

## 2019-12-29 RX ADMIN — IPRATROPIUM BROMIDE AND ALBUTEROL SULFATE 3 ML: .5; 3 SOLUTION RESPIRATORY (INHALATION) at 13:50

## 2019-12-29 RX ADMIN — SODIUM CHLORIDE, PRESERVATIVE FREE 10 ML: 5 INJECTION INTRAVENOUS at 20:56

## 2019-12-29 RX ADMIN — FUROSEMIDE 40 MG: 10 INJECTION, SOLUTION INTRAMUSCULAR; INTRAVENOUS at 08:41

## 2019-12-29 RX ADMIN — VANCOMYCIN HYDROCHLORIDE 2500 MG: 5 INJECTION, POWDER, LYOPHILIZED, FOR SOLUTION INTRAVENOUS at 17:27

## 2019-12-29 RX ADMIN — CEFTRIAXONE 2 G: 2 INJECTION, POWDER, FOR SOLUTION INTRAMUSCULAR; INTRAVENOUS at 16:40

## 2019-12-30 LAB
ALBUMIN SERPL-MCNC: 2.47 G/DL (ref 3.5–5.2)
ALBUMIN/GLOB SERPL: 0.4 G/DL
ALP SERPL-CCNC: 102 U/L (ref 39–117)
ALT SERPL W P-5'-P-CCNC: 76 U/L (ref 1–41)
ANION GAP SERPL CALCULATED.3IONS-SCNC: 11.2 MMOL/L (ref 5–15)
AST SERPL-CCNC: 68 U/L (ref 1–40)
BASOPHILS # BLD AUTO: 0.02 10*3/MM3 (ref 0–0.2)
BASOPHILS NFR BLD AUTO: 0.4 % (ref 0–1.5)
BILIRUB SERPL-MCNC: 0.2 MG/DL (ref 0.2–1.2)
BUN BLD-MCNC: 11 MG/DL (ref 6–20)
BUN/CREAT SERPL: 22.9 (ref 7–25)
CALCIUM SPEC-SCNC: 8.7 MG/DL (ref 8.6–10.5)
CHLORIDE SERPL-SCNC: 87 MMOL/L (ref 98–107)
CO2 SERPL-SCNC: 38.8 MMOL/L (ref 22–29)
CREAT BLD-MCNC: 0.48 MG/DL (ref 0.76–1.27)
CRP SERPL-MCNC: 1.33 MG/DL (ref 0–0.5)
DEPRECATED RDW RBC AUTO: 49.4 FL (ref 37–54)
EOSINOPHIL # BLD AUTO: 0.19 10*3/MM3 (ref 0–0.4)
EOSINOPHIL NFR BLD AUTO: 3.8 % (ref 0.3–6.2)
ERYTHROCYTE [DISTWIDTH] IN BLOOD BY AUTOMATED COUNT: 14.1 % (ref 12.3–15.4)
GFR SERPL CREATININE-BSD FRML MDRD: >150 ML/MIN/1.73
GLOBULIN UR ELPH-MCNC: 5.5 GM/DL
GLUCOSE BLD-MCNC: 99 MG/DL (ref 65–99)
HCT VFR BLD AUTO: 41.9 % (ref 37.5–51)
HGB BLD-MCNC: 12.7 G/DL (ref 13–17.7)
IMM GRANULOCYTES # BLD AUTO: 0.04 10*3/MM3 (ref 0–0.05)
IMM GRANULOCYTES NFR BLD AUTO: 0.8 % (ref 0–0.5)
LYMPHOCYTES # BLD AUTO: 0.94 10*3/MM3 (ref 0.7–3.1)
LYMPHOCYTES NFR BLD AUTO: 18.8 % (ref 19.6–45.3)
MCH RBC QN AUTO: 28.7 PG (ref 26.6–33)
MCHC RBC AUTO-ENTMCNC: 30.3 G/DL (ref 31.5–35.7)
MCV RBC AUTO: 94.8 FL (ref 79–97)
MONOCYTES # BLD AUTO: 0.42 10*3/MM3 (ref 0.1–0.9)
MONOCYTES NFR BLD AUTO: 8.4 % (ref 5–12)
NEUTROPHILS # BLD AUTO: 3.39 10*3/MM3 (ref 1.7–7)
NEUTROPHILS NFR BLD AUTO: 67.8 % (ref 42.7–76)
NRBC BLD AUTO-RTO: 0 /100 WBC (ref 0–0.2)
PLATELET # BLD AUTO: 272 10*3/MM3 (ref 140–450)
PMV BLD AUTO: 9.5 FL (ref 6–12)
POTASSIUM BLD-SCNC: 4.3 MMOL/L (ref 3.5–5.2)
PROT SERPL-MCNC: 8 G/DL (ref 6–8.5)
RBC # BLD AUTO: 4.42 10*6/MM3 (ref 4.14–5.8)
SODIUM BLD-SCNC: 137 MMOL/L (ref 136–145)
VANCOMYCIN TROUGH SERPL-MCNC: 18 MCG/ML (ref 5–20)
WBC NRBC COR # BLD: 5 10*3/MM3 (ref 3.4–10.8)

## 2019-12-30 PROCEDURE — 80053 COMPREHEN METABOLIC PANEL: CPT | Performed by: PHYSICIAN ASSISTANT

## 2019-12-30 PROCEDURE — 80202 ASSAY OF VANCOMYCIN: CPT

## 2019-12-30 PROCEDURE — 25010000002 HEPARIN (PORCINE) PER 1000 UNITS: Performed by: INTERNAL MEDICINE

## 2019-12-30 PROCEDURE — 94799 UNLISTED PULMONARY SVC/PX: CPT

## 2019-12-30 PROCEDURE — 25010000002 CEFTRIAXONE: Performed by: NURSE PRACTITIONER

## 2019-12-30 PROCEDURE — 86140 C-REACTIVE PROTEIN: CPT | Performed by: PHYSICIAN ASSISTANT

## 2019-12-30 PROCEDURE — 25010000002 VANCOMYCIN 5 G RECONSTITUTED SOLUTION 5,000 MG VIAL: Performed by: NURSE PRACTITIONER

## 2019-12-30 PROCEDURE — 85025 COMPLETE CBC W/AUTO DIFF WBC: CPT | Performed by: PHYSICIAN ASSISTANT

## 2019-12-30 PROCEDURE — 99232 SBSQ HOSP IP/OBS MODERATE 35: CPT | Performed by: INTERNAL MEDICINE

## 2019-12-30 PROCEDURE — 94660 CPAP INITIATION&MGMT: CPT

## 2019-12-30 PROCEDURE — 25010000002 FUROSEMIDE PER 20 MG: Performed by: PHYSICIAN ASSISTANT

## 2019-12-30 RX ADMIN — METOPROLOL TARTRATE 100 MG: 100 TABLET, FILM COATED ORAL at 12:35

## 2019-12-30 RX ADMIN — OSELTAMIVIR PHOSPHATE 75 MG: 75 CAPSULE ORAL at 08:44

## 2019-12-30 RX ADMIN — Medication 1 TABLET: at 08:45

## 2019-12-30 RX ADMIN — VANCOMYCIN HYDROCHLORIDE 2500 MG: 5 INJECTION, POWDER, LYOPHILIZED, FOR SOLUTION INTRAVENOUS at 06:28

## 2019-12-30 RX ADMIN — HEPARIN SODIUM 5000 UNITS: 5000 INJECTION INTRAVENOUS; SUBCUTANEOUS at 08:45

## 2019-12-30 RX ADMIN — PANTOPRAZOLE SODIUM 40 MG: 40 TABLET, DELAYED RELEASE ORAL at 06:28

## 2019-12-30 RX ADMIN — CETIRIZINE HYDROCHLORIDE 10 MG: 10 TABLET, FILM COATED ORAL at 08:45

## 2019-12-30 RX ADMIN — VANCOMYCIN HYDROCHLORIDE 2500 MG: 5 INJECTION, POWDER, LYOPHILIZED, FOR SOLUTION INTRAVENOUS at 18:55

## 2019-12-30 RX ADMIN — Medication 1 CAPSULE: at 08:45

## 2019-12-30 RX ADMIN — HEPARIN SODIUM 5000 UNITS: 5000 INJECTION INTRAVENOUS; SUBCUTANEOUS at 20:55

## 2019-12-30 RX ADMIN — SODIUM CHLORIDE, PRESERVATIVE FREE 10 ML: 5 INJECTION INTRAVENOUS at 20:57

## 2019-12-30 RX ADMIN — SODIUM CHLORIDE, PRESERVATIVE FREE 10 ML: 5 INJECTION INTRAVENOUS at 08:50

## 2019-12-30 RX ADMIN — LISINOPRIL 10 MG: 10 TABLET ORAL at 12:34

## 2019-12-30 RX ADMIN — METOPROLOL TARTRATE 100 MG: 100 TABLET, FILM COATED ORAL at 20:56

## 2019-12-30 RX ADMIN — OSELTAMIVIR PHOSPHATE 75 MG: 75 CAPSULE ORAL at 20:56

## 2019-12-30 RX ADMIN — FUROSEMIDE 40 MG: 10 INJECTION, SOLUTION INTRAMUSCULAR; INTRAVENOUS at 12:35

## 2019-12-30 RX ADMIN — IPRATROPIUM BROMIDE AND ALBUTEROL SULFATE 3 ML: .5; 3 SOLUTION RESPIRATORY (INHALATION) at 06:46

## 2019-12-30 RX ADMIN — IPRATROPIUM BROMIDE AND ALBUTEROL SULFATE 3 ML: .5; 3 SOLUTION RESPIRATORY (INHALATION) at 13:33

## 2019-12-30 RX ADMIN — FUROSEMIDE 40 MG: 10 INJECTION, SOLUTION INTRAMUSCULAR; INTRAVENOUS at 20:56

## 2019-12-30 RX ADMIN — CEFTRIAXONE 2 G: 2 INJECTION, POWDER, FOR SOLUTION INTRAMUSCULAR; INTRAVENOUS at 16:34

## 2019-12-30 RX ADMIN — IPRATROPIUM BROMIDE AND ALBUTEROL SULFATE 3 ML: .5; 3 SOLUTION RESPIRATORY (INHALATION) at 00:52

## 2019-12-31 LAB
ALBUMIN SERPL-MCNC: 2.39 G/DL (ref 3.5–5.2)
ALBUMIN/GLOB SERPL: 0.4 G/DL
ALP SERPL-CCNC: 101 U/L (ref 39–117)
ALT SERPL W P-5'-P-CCNC: 84 U/L (ref 1–41)
ANION GAP SERPL CALCULATED.3IONS-SCNC: 7.5 MMOL/L (ref 5–15)
AST SERPL-CCNC: 63 U/L (ref 1–40)
BASOPHILS # BLD AUTO: 0.01 10*3/MM3 (ref 0–0.2)
BASOPHILS NFR BLD AUTO: 0.2 % (ref 0–1.5)
BILIRUB SERPL-MCNC: 0.3 MG/DL (ref 0.2–1.2)
BUN BLD-MCNC: 16 MG/DL (ref 6–20)
BUN/CREAT SERPL: 25.8 (ref 7–25)
CALCIUM SPEC-SCNC: 8.4 MG/DL (ref 8.6–10.5)
CHLORIDE SERPL-SCNC: 88 MMOL/L (ref 98–107)
CO2 SERPL-SCNC: 36.5 MMOL/L (ref 22–29)
CREAT BLD-MCNC: 0.62 MG/DL (ref 0.76–1.27)
CRP SERPL-MCNC: 1.49 MG/DL (ref 0–0.5)
DEPRECATED RDW RBC AUTO: 48.7 FL (ref 37–54)
EOSINOPHIL # BLD AUTO: 0.19 10*3/MM3 (ref 0–0.4)
EOSINOPHIL NFR BLD AUTO: 3.3 % (ref 0.3–6.2)
ERYTHROCYTE [DISTWIDTH] IN BLOOD BY AUTOMATED COUNT: 13.9 % (ref 12.3–15.4)
GFR SERPL CREATININE-BSD FRML MDRD: 141 ML/MIN/1.73
GLOBULIN UR ELPH-MCNC: 5.5 GM/DL
GLUCOSE BLD-MCNC: 97 MG/DL (ref 65–99)
HCT VFR BLD AUTO: 41.9 % (ref 37.5–51)
HGB BLD-MCNC: 12.6 G/DL (ref 13–17.7)
IMM GRANULOCYTES # BLD AUTO: 0.04 10*3/MM3 (ref 0–0.05)
IMM GRANULOCYTES NFR BLD AUTO: 0.7 % (ref 0–0.5)
LYMPHOCYTES # BLD AUTO: 0.93 10*3/MM3 (ref 0.7–3.1)
LYMPHOCYTES NFR BLD AUTO: 16.3 % (ref 19.6–45.3)
MAGNESIUM SERPL-MCNC: 2 MG/DL (ref 1.6–2.6)
MCH RBC QN AUTO: 28.3 PG (ref 26.6–33)
MCHC RBC AUTO-ENTMCNC: 30.1 G/DL (ref 31.5–35.7)
MCV RBC AUTO: 94.2 FL (ref 79–97)
MONOCYTES # BLD AUTO: 0.41 10*3/MM3 (ref 0.1–0.9)
MONOCYTES NFR BLD AUTO: 7.2 % (ref 5–12)
NEUTROPHILS # BLD AUTO: 4.14 10*3/MM3 (ref 1.7–7)
NEUTROPHILS NFR BLD AUTO: 72.3 % (ref 42.7–76)
NRBC BLD AUTO-RTO: 0 /100 WBC (ref 0–0.2)
PHOSPHATE SERPL-MCNC: 3.3 MG/DL (ref 2.5–4.5)
PLATELET # BLD AUTO: 278 10*3/MM3 (ref 140–450)
PMV BLD AUTO: 9.2 FL (ref 6–12)
POTASSIUM BLD-SCNC: 4.1 MMOL/L (ref 3.5–5.2)
PROT SERPL-MCNC: 7.9 G/DL (ref 6–8.5)
RBC # BLD AUTO: 4.45 10*6/MM3 (ref 4.14–5.8)
SODIUM BLD-SCNC: 132 MMOL/L (ref 136–145)
WBC NRBC COR # BLD: 5.72 10*3/MM3 (ref 3.4–10.8)

## 2019-12-31 PROCEDURE — 25010000002 CEFTRIAXONE: Performed by: NURSE PRACTITIONER

## 2019-12-31 PROCEDURE — 99233 SBSQ HOSP IP/OBS HIGH 50: CPT | Performed by: PHYSICIAN ASSISTANT

## 2019-12-31 PROCEDURE — 25010000002 HEPARIN (PORCINE) PER 1000 UNITS: Performed by: INTERNAL MEDICINE

## 2019-12-31 PROCEDURE — 94799 UNLISTED PULMONARY SVC/PX: CPT

## 2019-12-31 PROCEDURE — 80053 COMPREHEN METABOLIC PANEL: CPT | Performed by: PHYSICIAN ASSISTANT

## 2019-12-31 PROCEDURE — 83735 ASSAY OF MAGNESIUM: CPT | Performed by: INTERNAL MEDICINE

## 2019-12-31 PROCEDURE — 25010000002 VANCOMYCIN 5 G RECONSTITUTED SOLUTION 5,000 MG VIAL: Performed by: NURSE PRACTITIONER

## 2019-12-31 PROCEDURE — 97110 THERAPEUTIC EXERCISES: CPT

## 2019-12-31 PROCEDURE — 84100 ASSAY OF PHOSPHORUS: CPT | Performed by: INTERNAL MEDICINE

## 2019-12-31 PROCEDURE — 85025 COMPLETE CBC W/AUTO DIFF WBC: CPT | Performed by: PHYSICIAN ASSISTANT

## 2019-12-31 PROCEDURE — 86140 C-REACTIVE PROTEIN: CPT | Performed by: PHYSICIAN ASSISTANT

## 2019-12-31 RX ORDER — FUROSEMIDE 40 MG/1
40 TABLET ORAL DAILY
Status: DISCONTINUED | OUTPATIENT
Start: 2019-12-31 | End: 2020-01-01 | Stop reason: HOSPADM

## 2019-12-31 RX ADMIN — LISINOPRIL 10 MG: 10 TABLET ORAL at 08:25

## 2019-12-31 RX ADMIN — IPRATROPIUM BROMIDE AND ALBUTEROL SULFATE 3 ML: .5; 3 SOLUTION RESPIRATORY (INHALATION) at 00:17

## 2019-12-31 RX ADMIN — HEPARIN SODIUM 5000 UNITS: 5000 INJECTION INTRAVENOUS; SUBCUTANEOUS at 21:13

## 2019-12-31 RX ADMIN — FUROSEMIDE 40 MG: 40 TABLET ORAL at 11:09

## 2019-12-31 RX ADMIN — METOPROLOL TARTRATE 100 MG: 100 TABLET, FILM COATED ORAL at 21:14

## 2019-12-31 RX ADMIN — Medication 1 TABLET: at 08:25

## 2019-12-31 RX ADMIN — IPRATROPIUM BROMIDE AND ALBUTEROL SULFATE 3 ML: .5; 3 SOLUTION RESPIRATORY (INHALATION) at 13:34

## 2019-12-31 RX ADMIN — VANCOMYCIN HYDROCHLORIDE 2500 MG: 5 INJECTION, POWDER, LYOPHILIZED, FOR SOLUTION INTRAVENOUS at 05:53

## 2019-12-31 RX ADMIN — OSELTAMIVIR PHOSPHATE 75 MG: 75 CAPSULE ORAL at 08:25

## 2019-12-31 RX ADMIN — SODIUM CHLORIDE, PRESERVATIVE FREE 10 ML: 5 INJECTION INTRAVENOUS at 21:14

## 2019-12-31 RX ADMIN — Medication 1 CAPSULE: at 08:24

## 2019-12-31 RX ADMIN — OSELTAMIVIR PHOSPHATE 75 MG: 75 CAPSULE ORAL at 21:14

## 2019-12-31 RX ADMIN — VANCOMYCIN HYDROCHLORIDE 2500 MG: 5 INJECTION, POWDER, LYOPHILIZED, FOR SOLUTION INTRAVENOUS at 17:36

## 2019-12-31 RX ADMIN — HEPARIN SODIUM 5000 UNITS: 5000 INJECTION INTRAVENOUS; SUBCUTANEOUS at 08:24

## 2019-12-31 RX ADMIN — IPRATROPIUM BROMIDE AND ALBUTEROL SULFATE 3 ML: .5; 3 SOLUTION RESPIRATORY (INHALATION) at 19:45

## 2019-12-31 RX ADMIN — CETIRIZINE HYDROCHLORIDE 10 MG: 10 TABLET, FILM COATED ORAL at 08:25

## 2019-12-31 RX ADMIN — SODIUM CHLORIDE, PRESERVATIVE FREE 10 ML: 5 INJECTION INTRAVENOUS at 08:25

## 2019-12-31 RX ADMIN — METOPROLOL TARTRATE 100 MG: 100 TABLET, FILM COATED ORAL at 08:25

## 2019-12-31 RX ADMIN — IPRATROPIUM BROMIDE AND ALBUTEROL SULFATE 3 ML: .5; 3 SOLUTION RESPIRATORY (INHALATION) at 07:49

## 2019-12-31 RX ADMIN — CEFTRIAXONE 2 G: 2 INJECTION, POWDER, FOR SOLUTION INTRAMUSCULAR; INTRAVENOUS at 16:36

## 2019-12-31 RX ADMIN — PANTOPRAZOLE SODIUM 40 MG: 40 TABLET, DELAYED RELEASE ORAL at 05:53

## 2020-01-01 VITALS
RESPIRATION RATE: 18 BRPM | BODY MASS INDEX: 45.1 KG/M2 | HEIGHT: 70 IN | OXYGEN SATURATION: 97 % | HEART RATE: 82 BPM | WEIGHT: 315 LBS | TEMPERATURE: 97.8 F | DIASTOLIC BLOOD PRESSURE: 73 MMHG | SYSTOLIC BLOOD PRESSURE: 124 MMHG

## 2020-01-01 LAB
ALBUMIN SERPL-MCNC: 2.32 G/DL (ref 3.5–5.2)
ALBUMIN/GLOB SERPL: 0.4 G/DL
ALP SERPL-CCNC: 99 U/L (ref 39–117)
ALT SERPL W P-5'-P-CCNC: 75 U/L (ref 1–41)
ANION GAP SERPL CALCULATED.3IONS-SCNC: 9.8 MMOL/L (ref 5–15)
AST SERPL-CCNC: 52 U/L (ref 1–40)
BACTERIA SPEC AEROBE CULT: NORMAL
BACTERIA SPEC AEROBE CULT: NORMAL
BASOPHILS # BLD AUTO: 0.01 10*3/MM3 (ref 0–0.2)
BASOPHILS NFR BLD AUTO: 0.2 % (ref 0–1.5)
BILIRUB SERPL-MCNC: 0.4 MG/DL (ref 0.2–1.2)
BUN BLD-MCNC: 13 MG/DL (ref 6–20)
BUN/CREAT SERPL: 25.5 (ref 7–25)
CALCIUM SPEC-SCNC: 8.5 MG/DL (ref 8.6–10.5)
CHLORIDE SERPL-SCNC: 92 MMOL/L (ref 98–107)
CO2 SERPL-SCNC: 31.2 MMOL/L (ref 22–29)
CREAT BLD-MCNC: 0.51 MG/DL (ref 0.76–1.27)
CRP SERPL-MCNC: 2.87 MG/DL (ref 0–0.5)
DEPRECATED RDW RBC AUTO: 47.9 FL (ref 37–54)
EOSINOPHIL # BLD AUTO: 0.11 10*3/MM3 (ref 0–0.4)
EOSINOPHIL NFR BLD AUTO: 2 % (ref 0.3–6.2)
ERYTHROCYTE [DISTWIDTH] IN BLOOD BY AUTOMATED COUNT: 14 % (ref 12.3–15.4)
GFR SERPL CREATININE-BSD FRML MDRD: >150 ML/MIN/1.73
GLOBULIN UR ELPH-MCNC: 5.7 GM/DL
GLUCOSE BLD-MCNC: 104 MG/DL (ref 65–99)
HCT VFR BLD AUTO: 42.9 % (ref 37.5–51)
HGB BLD-MCNC: 12.8 G/DL (ref 13–17.7)
IMM GRANULOCYTES # BLD AUTO: 0.03 10*3/MM3 (ref 0–0.05)
IMM GRANULOCYTES NFR BLD AUTO: 0.5 % (ref 0–0.5)
LYMPHOCYTES # BLD AUTO: 0.81 10*3/MM3 (ref 0.7–3.1)
LYMPHOCYTES NFR BLD AUTO: 14.4 % (ref 19.6–45.3)
MCH RBC QN AUTO: 28 PG (ref 26.6–33)
MCHC RBC AUTO-ENTMCNC: 29.8 G/DL (ref 31.5–35.7)
MCV RBC AUTO: 93.9 FL (ref 79–97)
MONOCYTES # BLD AUTO: 0.36 10*3/MM3 (ref 0.1–0.9)
MONOCYTES NFR BLD AUTO: 6.4 % (ref 5–12)
NEUTROPHILS # BLD AUTO: 4.3 10*3/MM3 (ref 1.7–7)
NEUTROPHILS NFR BLD AUTO: 76.5 % (ref 42.7–76)
NRBC BLD AUTO-RTO: 0 /100 WBC (ref 0–0.2)
PLATELET # BLD AUTO: 248 10*3/MM3 (ref 140–450)
PMV BLD AUTO: 9.5 FL (ref 6–12)
POTASSIUM BLD-SCNC: 4.3 MMOL/L (ref 3.5–5.2)
PROT SERPL-MCNC: 8 G/DL (ref 6–8.5)
RBC # BLD AUTO: 4.57 10*6/MM3 (ref 4.14–5.8)
SODIUM BLD-SCNC: 133 MMOL/L (ref 136–145)
WBC NRBC COR # BLD: 5.62 10*3/MM3 (ref 3.4–10.8)

## 2020-01-01 PROCEDURE — 86140 C-REACTIVE PROTEIN: CPT | Performed by: PHYSICIAN ASSISTANT

## 2020-01-01 PROCEDURE — 99239 HOSP IP/OBS DSCHRG MGMT >30: CPT | Performed by: PHYSICIAN ASSISTANT

## 2020-01-01 PROCEDURE — 85025 COMPLETE CBC W/AUTO DIFF WBC: CPT | Performed by: PHYSICIAN ASSISTANT

## 2020-01-01 PROCEDURE — 97110 THERAPEUTIC EXERCISES: CPT

## 2020-01-01 PROCEDURE — 25010000002 CEFTRIAXONE: Performed by: NURSE PRACTITIONER

## 2020-01-01 PROCEDURE — 80053 COMPREHEN METABOLIC PANEL: CPT | Performed by: PHYSICIAN ASSISTANT

## 2020-01-01 PROCEDURE — 97530 THERAPEUTIC ACTIVITIES: CPT

## 2020-01-01 PROCEDURE — 25010000002 HEPARIN (PORCINE) PER 1000 UNITS: Performed by: INTERNAL MEDICINE

## 2020-01-01 PROCEDURE — 25010000002 VANCOMYCIN 5 G RECONSTITUTED SOLUTION 5,000 MG VIAL: Performed by: NURSE PRACTITIONER

## 2020-01-01 PROCEDURE — 94799 UNLISTED PULMONARY SVC/PX: CPT

## 2020-01-01 RX ORDER — FLUTICASONE PROPIONATE 50 MCG
2 SPRAY, SUSPENSION (ML) NASAL DAILY
Qty: 16 G | Refills: 0 | Status: ON HOLD | OUTPATIENT
Start: 2020-01-02 | End: 2020-08-24

## 2020-01-01 RX ORDER — GUAIFENESIN DEXTROMETHORPHAN HYDROBROMIDE ORAL SOLUTION 10; 100 MG/5ML; MG/5ML
10 SOLUTION ORAL EVERY 4 HOURS PRN
Qty: 118 ML | Refills: 0 | Status: ON HOLD | OUTPATIENT
Start: 2020-01-01 | End: 2020-08-24

## 2020-01-01 RX ORDER — LISINOPRIL 10 MG/1
10 TABLET ORAL
Qty: 30 TABLET | Refills: 0 | Status: ON HOLD | OUTPATIENT
Start: 2020-01-02 | End: 2020-08-24

## 2020-01-01 RX ORDER — METOPROLOL TARTRATE 100 MG/1
100 TABLET ORAL EVERY 12 HOURS SCHEDULED
Qty: 60 TABLET | Refills: 0 | Status: ON HOLD | OUTPATIENT
Start: 2020-01-01 | End: 2020-08-24

## 2020-01-01 RX ORDER — FUROSEMIDE 40 MG/1
40 TABLET ORAL DAILY
Qty: 30 TABLET | Refills: 0 | Status: ON HOLD | OUTPATIENT
Start: 2020-01-02 | End: 2020-08-24

## 2020-01-01 RX ORDER — L.ACID,PARA/B.BIFIDUM/S.THERM 8B CELL
1 CAPSULE ORAL DAILY
Qty: 3 EACH | Refills: 0 | Status: ON HOLD | OUTPATIENT
Start: 2020-01-02 | End: 2020-08-24

## 2020-01-01 RX ORDER — CEFDINIR 300 MG/1
300 CAPSULE ORAL 2 TIMES DAILY
Qty: 6 CAPSULE | Refills: 0 | Status: SHIPPED | OUTPATIENT
Start: 2020-01-01 | End: 2020-01-04

## 2020-01-01 RX ORDER — DOXYCYCLINE HYCLATE 100 MG
100 TABLET ORAL 2 TIMES DAILY
Qty: 6 TABLET | Refills: 0 | Status: SHIPPED | OUTPATIENT
Start: 2020-01-01 | End: 2020-01-05

## 2020-01-01 RX ORDER — MULTIVITAMIN
1 TABLET ORAL DAILY
Qty: 30 TABLET | Refills: 0 | Status: ON HOLD | OUTPATIENT
Start: 2020-01-02 | End: 2020-08-24

## 2020-01-01 RX ORDER — CETIRIZINE HYDROCHLORIDE 10 MG/1
10 TABLET ORAL DAILY
Qty: 30 TABLET | Refills: 0 | Status: ON HOLD | OUTPATIENT
Start: 2020-01-02 | End: 2020-08-24

## 2020-01-01 RX ADMIN — Medication 1 TABLET: at 09:13

## 2020-01-01 RX ADMIN — VANCOMYCIN HYDROCHLORIDE 2500 MG: 5 INJECTION, POWDER, LYOPHILIZED, FOR SOLUTION INTRAVENOUS at 17:19

## 2020-01-01 RX ADMIN — ACETAMINOPHEN 650 MG: 325 TABLET ORAL at 09:13

## 2020-01-01 RX ADMIN — HEPARIN SODIUM 5000 UNITS: 5000 INJECTION INTRAVENOUS; SUBCUTANEOUS at 09:13

## 2020-01-01 RX ADMIN — FLUTICASONE PROPIONATE 2 SPRAY: 50 SPRAY, METERED NASAL at 09:14

## 2020-01-01 RX ADMIN — CETIRIZINE HYDROCHLORIDE 10 MG: 10 TABLET, FILM COATED ORAL at 09:13

## 2020-01-01 RX ADMIN — VANCOMYCIN HYDROCHLORIDE 2500 MG: 5 INJECTION, POWDER, LYOPHILIZED, FOR SOLUTION INTRAVENOUS at 06:06

## 2020-01-01 RX ADMIN — PANTOPRAZOLE SODIUM 40 MG: 40 TABLET, DELAYED RELEASE ORAL at 06:06

## 2020-01-01 RX ADMIN — SODIUM CHLORIDE, PRESERVATIVE FREE 10 ML: 5 INJECTION INTRAVENOUS at 09:14

## 2020-01-01 RX ADMIN — METOPROLOL TARTRATE 100 MG: 100 TABLET, FILM COATED ORAL at 09:14

## 2020-01-01 RX ADMIN — CEFTRIAXONE 2 G: 2 INJECTION, POWDER, FOR SOLUTION INTRAMUSCULAR; INTRAVENOUS at 15:24

## 2020-01-01 RX ADMIN — LISINOPRIL 10 MG: 10 TABLET ORAL at 09:13

## 2020-01-01 RX ADMIN — Medication 1 CAPSULE: at 09:13

## 2020-01-01 RX ADMIN — FUROSEMIDE 40 MG: 40 TABLET ORAL at 09:13

## 2020-01-01 RX ADMIN — IPRATROPIUM BROMIDE AND ALBUTEROL SULFATE 3 ML: .5; 3 SOLUTION RESPIRATORY (INHALATION) at 13:28

## 2020-01-01 RX ADMIN — IPRATROPIUM BROMIDE AND ALBUTEROL SULFATE 3 ML: .5; 3 SOLUTION RESPIRATORY (INHALATION) at 06:59

## 2020-01-01 NOTE — DISCHARGE INSTR - APPOINTMENTS
Please call as soon as possible to schedule an appointment to see KHUSHBU Champion or Belle Khanna,  in one week.  You can reach the office at [phone].

## 2020-01-01 NOTE — PLAN OF CARE
Problem: Patient Care Overview  Goal: Interprofessional Rounds/Family Conf  Outcome: Ongoing (interventions implemented as appropriate)  Flowsheets (Taken 12/31/2019 2330)  Summary: Pt doing well this shift, no complaints at this time. Pt states that he is feeling much better. Vital signs stable this shift.  Participants: nursing; patient

## 2020-01-01 NOTE — DISCHARGE SUMMARY
[facility] HOSPITALIST DISCHARGE SUMMARY    Patient Identification:  Name:  Jimmy Recinos  Age:  44 y.o.  Sex:  male  :  1975  MRN:  [id number]  Visit Number:  [id number]    Date of Admission: 2019  Date of Discharge:  2020    PCP: Nabeel Hanna APRN    Discharging Provider: DENISA Graves    Discharge Diagnoses     Discharge Diagnoses:  1. Severe sepsis  2. Cellulitis left thigh  3. Influenza A  4. Acute hypoxic and hypercarbic respiratory failure  5. Volume overload  6. Low back pain  7. Acute hypokalemia  8. Acute hyponatremia  9. Acute hypomagnesemia  10. Essential hypertension, uncontrolled  11. Elevated transaminases   12. Debility and limited mobility    Secondary Diagnoses:  1. Lymphedema  2. Muscular dystrophy  3. Charcot-Ramya-Tooth disease  4. History of VRE  5. Morbid obesity, BMI 86.26    Consults/Procedures     Consults:   Consults     Date and Time Order Name Status Description    2019 1028 Inpatient Infectious Diseases Consult Completed           Procedures Performed:  1. CT lumbar spine with and without contrast  2. CT left lower extremity with and without contrast  3. Venous Dopplers bilateral lower extremities  4. Bilateral renal ultrasound  5. Transthoracic echocardiogram    History of Presenting Illness     Chief Complaint   Patient presents with   • Leg Pain     Mr. Recinos is a 44 y.o. male presented to [facility] complaining of fever with increased pain and redness in his left thigh. He was found to be septic secondary to cellulitis of the left medial thigh superimposed on chronic lymphedema and multiple flea bites.  Please see the admitting history and physical for further details.      Hospital Course     Patient was admitted to the medical/surgical unit and placed on IV vancomycin and Zosyn for empiric antibiotic therapy.  He was placed in contact isolation for history of VRE and MRSA.  Despite starting broad-spectrum  antibiotics, he spiked a high fever with a T-max of 102.3 overnight.  Infectious disease consultation was obtained after further discussion with him, it was decided to obtain MRI of the lumbar spine and left lower extremity to evaluate for further underlying infection or abscess/osteomyelitis.  Unfortunately, due to his morbid obesity, he was unable to undergo further evaluation with MRI.  Therefore CT scans with and without contrast were obtained of the left lower extremity and lumbar spine.  These were limited study secondary to body habitus.  He was noted to have bilateral inguinal lymphadenopathy.  No large fluid collections, but there was soft tissue edema.  Small abscess not excluded due to limited exam on the left lower extremity.  Lumbar spine CT was also markedly limited due to body habitus.  There is no definitive vertebral body fracture or malalignment.  Assessment of the spinal canal and paraspinal soft tissues were nondiagnostic due to attenuation artifact from patient's body habitus.  Venous Dopplers of the bilateral lateral lower extremity showed no DVT.    Infectious disease recommended continuing IV vancomycin and changing IV Zosyn to high-dose Rocephin.  Was noted to have increased shortness of breath with signs of fluid overload on chest x-ray.  IV fluids were discontinued and he was diuresed with IV furosemide with improvement noted.  The patient is generally on room air at home, but did require 2 to 3 L O2 via nasal cannula during his stay.  He did develop some hypercarbia and was placed on BiPAP intermittently, but he was mostly unable to tolerate this.  He had a transthoracic echocardiogram  which was a technically poor quality due to body habitus.  EF was noted to be 60%.  No obvious valvular abnormalities and no pericardial effusion appreciated.  Electrolytes were replaced as needed with diuresis.  His blood pressure was uncontrolled during his admission and his home metoprolol was gradually  increased.    Despite improvement noted in his cellulitis, he developed a current fever.  Subsequent evaluation for influenza was positive for influenza A.  He was started on and completed 5 days of Tamiflu 75 mg twice daily as well as supportive therapy with zyrtec and mucinex. He had reported that between his 2 recent ED visits, he had been exposed to the flu by a friend. His symptoms have overall been showing improvement. PT/OT have been following the patient, but have had difficulty working with him as family had not brought his home leg braces and bariatric mobility equipment (wide set walker and wheelchair). The patient was very eager for discharge home and family did bring his leg braces. PT was asked to walk with the patient as he reported being able to walk some with a walker at home prior to admission. He did require 3 person assist with transitioning out of the bed. He was ambulated on room air without reported O2 desaturations. The patient reports that he does better with his own home equipment and his bed at home is easier to transition out of than the one here. The patient was very eager for discharge home that evening as his wife was off work and would be able to better assist him. PT felt that it was safest to have the patient transported home via ambulance where the family could have assistance with getting him back in the home. Ambulance transport was arranged and it was felt the patient was stable for discharge home.  Per infectious disease request, he was discharged home on 3 more days of Omnicef and doxycycline.  He was also placed on lactobacillus for gut protection.  He was continued on supportive therapy with Robitussin and Zyrtec.  Again please note that he completed 5 days of Tamiflu during his stay.  He will have a repeat CMP in 1 week due to mildly elevated transaminases which were noted to be improving prior to discharge.  Follow-up with PCP in 1 week. Recommend outpatient sleep study  for possible obstructive sleep apnea/obesity hypoventilation syndrome.     Discharge Vitals/Physical Examination     Vital Signs:  Temp:  [97.8 °F (36.6 °C)-98.1 °F (36.7 °C)] 97.8 °F (36.6 °C)  Heart Rate:  [74-88] 82  Resp:  [18-22] 18  BP: (121-139)/(73-78) 124/73  No data found.  SpO2 Percentage    01/01/20 1106 01/01/20 1328 01/01/20 1600   SpO2: 94% 95% 97%     SpO2:  [91 %-97 %] 97 %  on   ;   Device (Oxygen Therapy): room air    Body mass index is 86.26 kg/m².  Wt Readings from Last 3 Encounters:   12/21/19 (Abnormal) 273 kg (601 lb 3.2 oz)   12/18/19 (Abnormal) 227 kg (500 lb)   11/26/18 (Abnormal) 216 kg (475 lb 8 oz)       Physical Exam:  Physical Exam   Constitutional: He is oriented to person, place, and time. He appears well-developed and well-nourished. No distress.   Morbidly obese. No acute distress on room air.    HENT:   Head: Normocephalic and atraumatic.   Eyes: Conjunctivae are normal. Right eye exhibits no discharge. Left eye exhibits no discharge. No scleral icterus.   Neck: Normal range of motion. Neck supple.   Cardiovascular: Normal rate, regular rhythm and normal heart sounds. Exam reveals no gallop and no friction rub.   No murmur heard.  Pulmonary/Chest: Effort normal and breath sounds normal. No respiratory distress. He has no wheezes. He has no rales. He exhibits no tenderness.   Abdominal: Soft. Bowel sounds are normal. He exhibits no distension. There is no tenderness. There is no guarding.   Obese.    Musculoskeletal: Normal range of motion. He exhibits edema (Changes of lymphema bilateral lower extremities with some healing excoriations noted bilaterally. Left thigh erythema has improved. ).   Neurological: He is alert and oriented to person, place, and time.   Skin: Skin is warm and dry. No rash noted. He is not diaphoretic. No erythema. No pallor.   See MS exam.    Psychiatric: He has a normal mood and affect. His behavior is normal.   Nursing note and vitals  reviewed.      Pertinent Laboratory/Radiology Results     Pertinent Laboratory Results:      Results from last 7 days   Lab Units 12/26/19  0429   PROBNP pg/mL 94.2       Results from last 7 days   Lab Units 12/27/19  1029   PH, ARTERIAL pH units 7.400   PO2 ART mm Hg 77.2*   PCO2, ARTERIAL mm Hg 63.6*   HCO3 ART mmol/L 39.3*     Results from last 7 days   Lab Units 01/01/20  0435 12/31/19  0337 12/30/19  0658   CRP mg/dL 2.87* 1.49* 1.33*   WBC 10*3/mm3 5.62 5.72 5.00   HEMOGLOBIN g/dL 12.8* 12.6* 12.7*   HEMATOCRIT % 42.9 41.9 41.9   MCV fL 93.9 94.2 94.8   MCHC g/dL 29.8* 30.1* 30.3*   PLATELETS 10*3/mm3 248 278 272     Results from last 7 days   Lab Units 01/01/20  0435 12/31/19 0337 12/30/19 0658  12/26/19  0429   SODIUM mmol/L 133* 132* 137   < > 136   POTASSIUM mmol/L 4.3 4.1 4.3   < > 4.5   MAGNESIUM mg/dL  --  2.0  --   --  2.0   CHLORIDE mmol/L 92* 88* 87*   < > 96*   CO2 mmol/L 31.2* 36.5* 38.8*   < > 29.6*   BUN mg/dL 13 16 11   < > 8   CREATININE mg/dL 0.51* 0.62* 0.48*   < > 0.43*   EGFR IF NONAFRICN AM mL/min/1.73 >150 141 >150   < > >150   CALCIUM mg/dL 8.5* 8.4* 8.7   < > 8.7   GLUCOSE mg/dL 104* 97 99   < > 115*   ALBUMIN g/dL 2.32* 2.39* 2.47*   < > 2.23*   BILIRUBIN mg/dL 0.4 0.3 0.2   < > 0.3   ALK PHOS U/L 99 101 102   < > 138*   AST (SGOT) U/L 52* 63* 68*   < > 53*   ALT (SGPT) U/L 75* 84* 76*   < > 54*    < > = values in this interval not displayed.   Estimated Creatinine Clearance: 400 mL/min (A) (by C-G formula based on SCr of 0.51 mg/dL (L)).  No results found for: AMMONIA    No results found for: HGBA1C, POCGLU  Lab Results   Component Value Date    HGBA1C 5.20 12/22/2019     Lab Results   Component Value Date    TSH 0.793 12/21/2019       Blood Culture   Date Value Ref Range Status   12/27/2019 No growth at 5 days  Final   12/27/2019 No growth at 5 days  Final     No results found for: URINECX  No results found for: WOUNDCX  No results found for: STOOLCX  No results found for:  RESPCX    Pain Management Panel     Pain Management Panel Latest Ref Rng & Units 10/19/2014    AMPHETAMINES SCREEN, URINE NEGATIVE Negative    BARBITURATES SCREEN NEGATIVE Negative    BENZODIAZEPINE SCREEN, URINE NEGATIVE Negative    COCAINE SCREEN, URINE NEGATIVE Negative    METHADONE SCREEN, URINE NEGATIVE Negative        Transthoracic Echocardiogram 12/24/19      Pertinent Radiology Results:  Imaging Results (All)     Procedure Component Value Units Date/Time    XR Chest 1 View [[id number]] Collected:  12/27/19 1137     Updated:  12/27/19 1145    Narrative:       EXAMINATION: XR CHEST 1 VW-      CLINICAL INDICATION:     Dyspnea; L03.116-Cellulitis of left lower limb     TECHNIQUE:  XR CHEST 1 VW-      COMPARISON: 12/26/2019      FINDINGS:      Lungs are aerated.   Heart size, mediastinum, and pulmonary vascularity are unremarkable.   No pneumothorax.   No effusions.   No acute osseous findings.          Impression:       No radiographic evidence of acute cardiac or pulmonary  disease.     This report was finalized on 12/27/2019 11:43 AM by Dr. David Dixon MD.       XR Chest 1 View [[id number]] Collected:  12/26/19 0836     Updated:  12/26/19 0839    Narrative:       EXAMINATION: XR CHEST 1 VW-      CLINICAL INDICATION:     Shortness of Breath; L03.116-Cellulitis of left  lower limb     TECHNIQUE:  XR CHEST 1 VW-      COMPARISON: 12/24/2019      FINDINGS:   Findings which are most consistent with CHF have slightly improved from  the previous exam. Improved lung volumes noted. Mild cardiomegaly has  slightly improved. No effusion or pneumothorax. No acute bony findings.       Impression:       Mild interval improvement in findings most consistent with  CHF.     This report was finalized on 12/26/2019 8:37 AM by Dr. David Dixon MD.       CT Lower Extremity Left With & Without Contrast [[id number]] Collected:  12/24/19 1356     Updated:  12/24/19 1414    Narrative:       EXAMINATION: CT LOWER EXTREMITY LEFT W WO  CONTRAST-      CLINICAL INDICATION:Osteomyelitis, femur; L03.116-Cellulitis of left  lower limb     TECHNIQUE: Multiple axial CT images were obtained through the without IV  contrast administration.  The axial CT data set was used to generate  high resolution reformatted images in the coronal and sagittal planes to  facilitate diagnostic accuracy and treatment planning.      Radiation dose reduction techniques were utilized per ALARA protocol.  Automated exposure control was initiated through either or [employer] or  [employer] software packages by  protocol.             COMPARISON:     FINDINGS:  Due to body habitus, exam is essentially nondiagnostic for assessment of  subtle soft tissue findings as such as abscess or periosteal reaction.     Complete attenuation artifact due to the hips and thus assessment of the  hip articulations not able to be performed.     Enlarged inguinal lymph nodes are noted with a left inguinal lymph node  that is approximately 4.7 cm.     Diffuse cellulitis and/or nonspecific soft tissue edema.     No displaced fracture lines identified.       Impression:          1. Severely limited exam which is essentially nondiagnostic in the hip  regions due to body habitus.  2. Bilateral inguinal lymphadenopathy.  3. No large fluid collections but soft tissue edema is noted. Small  abscess not excluded due to exam limitation.     This report was finalized on 12/24/2019 1:57 PM by Dr. David Dixon MD.       CT Lumbar Spine With & Without Contrast [[id number]] Collected:  12/24/19 1357     Updated:  12/24/19 1413    Narrative:       EXAMINATION: CT LUMBAR SPINE W WO CONTRAST-      CLINICAL INDICATION:      Low back pain, cancer or infection suspected;  L03.116-Cellulitis of left lower limb      TECHNIQUE: Multiple axial CT images of the entire lumbar spine were  obtained without contrast administration. Reformatted images in the  coronal and/or sagittal plane(s) were generated from the  axial data set  to facilitate diagnostic accuracy and/or surgical planning.      Radiation dose reduction techniques were utilized per ALARA protocol.  Automated exposure control was initiated through either or [email or web address] or  [employer] software packages by  protocol.             COMPARISON:  None.       FINDINGS:      Exam is markedly limited due to body habitus. Subtle abnormalities such  as endplate fractures are not assessed on this exam due to patient's  body habitus and marked attenuation artifact.     VERTEBRAL BODIES: No gross fractures identified. Vertebral body height  and alignment is preserved throughout.     DISC SPACES/FORAMINA: No bony stenosis identified.     POSTERIOR ELEMENTS: Grossly intact.     LUNG BASES: Visualized lung bases are unremarkable.     SOFT TISSUES: Limited assessment. No gross abnormalities.          Impression:          1. Markedly limited exam due to patient body habitus.  2. No definite vertebral body fracture or malalignment.  3. Assessment of the spinal canal and paraspinal soft tissues  nondiagnostic due to attenuation artifact from patient's body habitus.     This report was finalized on 12/24/2019 1:59 PM by Dr. David Dixon MD.       XR Chest 1 View [[id number]] Collected:  12/24/19 0858     Updated:  12/24/19 0900    Narrative:       EXAMINATION: XR CHEST 1 VW-      CLINICAL INDICATION:     Dyspnea, please obtain when he goes down for CT  scans; L03.116-Cellulitis of left lower limb     TECHNIQUE:  XR CHEST 1 VW-      COMPARISON: 12/21/2019      FINDINGS:      Lungs are aerated.   CHF stable from previous. Mild cardiomegaly is again noted.   No pneumothorax.   No effusions.   No acute osseous findings.          Impression:       Stable CHF. No acute changes noted.     This report was finalized on 12/24/2019 8:58 AM by Dr. David Dixon MD.       US Renal Bilateral [[id number]] Collected:  12/23/19 1607     Updated:  12/23/19 1610    Narrative:        EXAMINATION: US RENAL BILATERAL-      CLINICAL INDICATION:     R/O nephrolithiasis per CT on 12/19;  L03.116-Cellulitis of left lower limb     TECHNIQUE: Multiplanar gray scale sonographic imaging of the kidneys.  Color Doppler implemented.     COMPARISON: NONE      FINDINGS:      RIGHT: The right kidney measures 12.3 cm in length. No mass,  hydronephrosis, or shadowing stone.     LEFT: The left kidney measures 11.8 cm in length. No mass,  hydronephrosis, or shadowing stone.       Impression:       Unremarkable bilateral renal ultrasound.      This report was finalized on 12/23/2019 4:07 PM by Dr. David Dixon MD.       US Venous Doppler Lower Extremity Bilateral (duplex) [[id number]] Collected:  12/23/19 1541     Updated:  12/23/19 1547    Narrative:       US VENOUS DOPPLER LOWER EXTREMITY BILATERAL (DUPLEX)-     REASON FOR EXAM:  Cellulitis, pain; L03.116-Cellulitis of left lower  limb     Multiple real-time images were obtained. The deep veins were well  demonstrated sonographically. There is good color doppler signal seen  filling the deep veins. They were completely compressed by the  ultrasound transducer. There was good spontaneous venous flow and  augmentation. There are no echoes seen along the deep veins to suggest  clot.          Impression:       No sonographic findings of DVT in the lower extremities at  this time.     This report was finalized on 12/23/2019 3:41 PM by Dr. Carl Burton II, MD.       XR Chest AP [[id number]] Collected:  12/21/19 1643     Updated:  12/21/19 1646    Narrative:       CR Chest 1 Vw 12/21/2019    INDICATION:   Sepsis and shortness of breath for 3 days.     COMPARISON:    12/19/2019    FINDINGS:  Single portable AP view(s) of the chest.  The heart is normal in size. There is poor inspiratory result with by basilar atelectasis. There is mild pulmonary vascular congestion. There are no pleural effusions. No pneumothorax.      Impression:       Mild pulmonary vascular  congestion.    Signer Name: David Doe MD   Signed: 12/21/2019 4:43 PM   Workstation Name: [id number]    [facility]          Test Results Pending at Discharge:  None.     Discharge Disposition/Discharge Medications/Discharge Appointments     Discharge Disposition:   Home or Self Care    Condition at Discharge:  Stable.    Discharge Diet:  Diet Instructions     Diet: Cardiac, Regular      Discharge Diet:   Cardiac  Regular             Discharge Activity:  Activity Instructions     Gradually Increase Activity Until at Pre-Hospitalization Level      Up WIth Assist            Code Status While Inpatient:  Code Status and Medical Interventions:   Ordered at: 12/21/19 1842     Code Status:    CPR     Medical Interventions (Level of Support Prior to Arrest):    Full       Discharge Medications:     Discharge Medications      New Medications      Instructions Start Date   cefdinir 300 MG capsule  Commonly known as:  OMNICEF   300 mg, Oral, 2 Times Daily      cetirizine 10 MG tablet  Commonly known as:  zyrTEC   10 mg, Oral, Daily   Start Date:  January 2, 2020     doxycycline 100 MG enteric coated tablet  Commonly known as:  DORYX   100 mg, Oral, 2 Times Daily      fluticasone 50 MCG/ACT nasal spray  Commonly known as:  FLONASE   2 sprays, Each Nare, Daily   Start Date:  January 2, 2020     furosemide 40 MG tablet  Commonly known as:  LASIX   40 mg, Oral, Daily   Start Date:  January 2, 2020     guaiFENesin-dextromethorphan 100-10 MG/5ML syrup  Commonly known as:  ROBITUSSIN DM   10 mL, Oral, Every 4 Hours PRN      lactobacillus acidophilus capsule capsule   1 capsule, Oral, Daily   Start Date:  January 2, 2020     lisinopril 10 MG tablet  Commonly known as:  PRINIVIL,ZESTRIL   10 mg, Oral, Every 24 Hours Scheduled   Start Date:  January 2, 2020     metoprolol tartrate 100 MG tablet  Commonly known as:  LOPRESSOR   100 mg, Oral, Every 12 Hours Scheduled      multivitamin tablet tablet   1 tablet,  Oral, Daily   Start Date:  January 2, 2020            Discharge Appointments:      Additional Instructions for the Follow-ups that You Need to Schedule     Discharge Follow-up with PCP   As directed       Currently Documented PCP:    Nabeel Hanna APRN    PCP Phone Number:    [phone]     Follow Up Details:  1 week. Cellulitis.         Comprehensive Metabolic Panel    Jan 08, 2020 (Approximate)      Results to be sent to PCP.    Order Comments:  Results to be sent to PCP.            Follow-up Information     Nabeel Hanna APRN .    Specialty:  Nurse Practitioner  Why:  1 week. Cellulitis.  Contact information:  [address] [place] [address]  [phone]             Belle Khanna DO .    Specialty:  Internal Medicine  Why:  1 week. Cellulitis.  Contact information:  [address] [place] [address]  [phone]                   Additional Instructions for the Follow-ups that You Need to Schedule     Discharge Follow-up with PCP   As directed       Currently Documented PCP:    Nabeel Hanna APRN    PCP Phone Number:    [phone]     Follow Up Details:  1 week. Cellulitis.         Comprehensive Metabolic Panel    Jan 08, 2020 (Approximate)      Results to be sent to PCP.    Order Comments:  Results to be sent to PCP.                Attestation: I personally discussed the patient's hospital course, disposition, discharge planning, and discharge medications with attending physician, Enrique Peoples MD, prior to time of discharge. I have also discussed with RN, Autumn, prior to discharge.         DENISA Graves  01/01/20  6:08 PM    Time discharge orders placed: 1807.     Time to complete discharge: >30 minutes.     Please send a copy of this dictation to the following providers:  Nabeel Hanna APRN

## 2020-01-01 NOTE — THERAPY TREATMENT NOTE
Acute Care - Physical Therapy Treatment Note   [place]     Patient Name: Jimmy Recinos  : 1975  MRN: [id number]  Today's Date: 2020  Onset of Illness/Injury or Date of Surgery: 19  Date of Referral to PT: 19  Referring Physician: Dr. Nolan    Admit Date: 2019    Visit Dx:    ICD-10-CM ICD-9-CM   1. Cellulitis of left lower extremity L03.116 682.6     Patient Active Problem List   Diagnosis   • Essential hypertension   • Obesity, morbid, BMI 50 or higher (CMS/HCC)   • Severe sepsis (CMS/HCC)   • Acute respiratory failure with hypoxia (CMS/HCC)   • Hyponatremia   • Influenza A       Therapy Treatment    Rehabilitation Treatment Summary     Row Name 20 1705             Treatment Time/Intention    Discipline  physical therapy assistant  -RG      Document Type  therapy note (daily note)  -RG      Subjective Information  no complaints  -RG      Mode of Treatment  physical therapy  -RG      Patient/Family Observations  Upon entering room Pt was sitting EOB.  Nursing stated that pt needed to be assesed using RW to transition to home safely.    -RG      Patient Effort  fair  -RG      Existing Precautions/Restrictions  fall decr skin integrity  -RG      Patient Response to Treatment  Pt performed sit to stand Max x 3 using RW.  Pt maintained standing balance for 30-45 seconds.  Pt was visibly sob.   It was determined that for pt safety, pt would be transferred home by ambulance.   -RG      Recorded by [RG] Gabe Solorio PTA 20 3413      Row Name 20 1702             Cognitive Assessment/Intervention- PT/OT    Orientation Status (Cognition)  oriented x 3  -RG      Follows Commands (Cognition)  WNL  -RG      Safety Deficit (Cognitive)  safety precautions awareness  -RG      Recorded by [RG] Gabe Solorio PTA 20      Row Name 20 5872             Safety Issues, Functional Mobility    Safety Issues Affecting Function (Mobility)  safety precaution  awareness;safety precautions follow-through/compliance  -RG      Impairments Affecting Function (Mobility)  range of motion (ROM);strength  -RG      Recorded by [RG] Gabe Solorio PTA 01/01/20 1714      Row Name 01/01/20 1705             Bed Mobility Assessment/Treatment    Bed Mobility Assessment/Treatment  rolling left;rolling right;scooting/bridging  -RG      Rolling Left Francitas (Bed Mobility)  verbal cues;moderate assist (50% patient effort);maximum assist (25% patient effort)  -RG      Rolling Right Francitas (Bed Mobility)  verbal cues;nonverbal cues (demo/gesture);moderate assist (50% patient effort);maximum assist (25% patient effort)  -RG      Bed Mobility, Safety Issues  decreased use of arms for pushing/pulling;decreased use of legs for bridging/pushing  -RG      Comment (Bed Mobility)  Dependent/Max   -RG      Recorded by [RG] Gabe Solorio PTA 01/01/20 1714      Row Name 01/01/20 1705             Transfer Assessment/Treatment    Comment (Transfers)  Max x 3 using RW  -RG      Recorded by [RG] Gabe Solorio PTA 01/01/20 1714      Row Name 01/01/20 1705             Gait/Stairs Assessment/Training    Francitas Level (Gait)  unable to assess  -RG      Recorded by [RG] Gabe Solorio PTA 01/01/20 1714      Row Name 01/01/20 1705             Therapeutic Exercise    Therapeutic Exercise  seated, lower extremities  -RG      Additional Documentation  Therapeutic Exercise (Row)  -RG      Recorded by [RG] Gabe Solorio PTA 01/01/20 1714      Row Name 01/01/20 1705             Lower Extremity Seated Therapeutic Exercise    Performed, Seated Lower Extremity (Therapeutic Exercise)  LAQ (long arc quad), knee extension;hip flexion/extension;hip abduction/adduction  -RG      Exercise Type, Seated Lower Extremity (Therapeutic Exercise)  AROM (active range of motion)  -RG      Recorded by [RG] Gabe Solorio PTA 01/01/20 1714      Row Name 01/01/20 1705             Positioning and Restraints     Pre-Treatment Position  in bed  -RG      In Bed  sitting EOB;call light within reach;notified nsg;encouraged to call for assist  -RG      Recorded by [RG] Gabe Solorio PTA 01/01/20 1714      Row Name 01/01/20 1705             Pain Scale: Numbers Pre/Post-Treatment    Pain Scale: Numbers, Pretreatment  0/10 - no pain  -RG      Pain Scale: Numbers, Post-Treatment  0/10 - no pain  -RG      Recorded by [RG] Gabe Solorio PTA 01/01/20 1714      Row Name                Wound 12/21/19 2330 Right medial gluteal Abrasion    Wound - Properties Group Date first assessed: 12/21/19 [KS] Time first assessed: 2330 [KS] Side: Right [KS] Orientation: medial [KS] Location: gluteal [KS] Primary Wound Type: Abrasion [KS] Stage, Pressure Injury: other (see comments) [KS] Recorded by:  [KS] Kyung Aguilar RN 12/22/19 0735      User Key  (r) = Recorded By, (t) = Taken By, (c) = Cosigned By    Initials Name Effective Dates Discipline    KS Kyung Aguilar RN 07/18/19 -  Nurse    RG Gabe Solorio PTA 10/31/19 -  PT          Wound 12/21/19 2330 Right medial gluteal Abrasion (Active)   Dressing Appearance open to air 1/1/2020  7:10 AM   Closure None 1/1/2020  7:10 AM   Base non-blanchable;red 1/1/2020  7:10 AM   Periwound edematous;redness 1/1/2020  7:10 AM   Periwound Temperature warm 1/1/2020  7:10 AM   Periwound Skin Turgor firm 1/1/2020  7:10 AM   Edges irregular 1/1/2020  7:10 AM   Drainage Amount none 1/1/2020  7:10 AM   Care, Wound cleansed with 1/1/2020  7:10 AM   Dressing Care, Wound open to air 1/1/2020  7:10 AM   Periwound Care, Wound dry periwound area maintained 1/1/2020  7:10 AM               PT Recommendation and Plan        Outcome Measures     Row Name 01/01/20 1700             How much help from another person do you currently need...    Turning from your back to your side while in flat bed without using bedrails?  1  -RG      Moving from lying on back to sitting on the side of a flat bed without bedrails?  1   -RG      Moving to and from a bed to a chair (including a wheelchair)?  1  -RG      Standing up from a chair using your arms (e.g., wheelchair, bedside chair)?  2  -RG      Climbing 3-5 steps with a railing?  1  -RG      To walk in hospital room?  1  -RG      AM-PAC 6 Clicks Score (PT)  7  -RG         Functional Assessment    Outcome Measure Options  AM-PAC 6 Clicks Basic Mobility (PT)  -RG        User Key  (r) = Recorded By, (t) = Taken By, (c) = Cosigned By    Initials Name Provider Type    Gabe Thakkar PTA Physical Therapy Assistant         Time Calculation:   PT Charges     Row Name 01/01/20 1715             Time Calculation    PT Received On  01/01/20  -RG         Time Calculation- PT    Total Timed Code Minutes- PT  40 minute(s)  -RG        User Key  (r) = Recorded By, (t) = Taken By, (c) = Cosigned By    Initials Name Provider Type    Gabe Thakkar PTA Physical Therapy Assistant        Therapy Charges for Today     Code Description Service Date Service Provider Modifiers Qty    15379471867 HC PT THERAPEUTIC ACT EA 15 MIN 1/1/2020 Gabe Solorio PTA GP 2    44942238827 HC PT THER PROC EA 15 MIN 1/1/2020 Gabe Solorio PTA GP 1          PT G-Codes  Outcome Measure Options: AM-PAC 6 Clicks Basic Mobility (PT)  AM-PAC 6 Clicks Score (PT): 7    Gabe Solorio PTA  1/1/2020

## 2020-01-02 ENCOUNTER — READMISSION MANAGEMENT (OUTPATIENT)
Dept: CALL CENTER | Facility: HOSPITAL | Age: 45
End: 2020-01-02

## 2020-01-02 ENCOUNTER — HOSPITAL ENCOUNTER (EMERGENCY)
Facility: HOSPITAL | Age: 45
Discharge: HOME | End: 2020-01-02
Attending: EMERGENCY MEDICINE | Admitting: EMERGENCY MEDICINE

## 2020-01-02 ENCOUNTER — APPOINTMENT (OUTPATIENT)
Dept: GENERAL RADIOLOGY | Facility: HOSPITAL | Age: 45
End: 2020-01-02

## 2020-01-02 ENCOUNTER — TELEPHONE (OUTPATIENT)
Dept: MEDSURG UNIT | Facility: HOSPITAL | Age: 45
End: 2020-01-02

## 2020-01-02 VITALS
RESPIRATION RATE: 20 BRPM | WEIGHT: 315 LBS | HEIGHT: 70 IN | TEMPERATURE: 97.8 F | OXYGEN SATURATION: 91 % | DIASTOLIC BLOOD PRESSURE: 78 MMHG | BODY MASS INDEX: 45.1 KG/M2 | HEART RATE: 99 BPM | SYSTOLIC BLOOD PRESSURE: 141 MMHG

## 2020-01-02 DIAGNOSIS — R53.1 GENERALIZED WEAKNESS: Primary | ICD-10-CM

## 2020-01-02 DIAGNOSIS — J96.01 ACUTE RESPIRATORY FAILURE WITH HYPOXIA (HCC): ICD-10-CM

## 2020-01-02 DIAGNOSIS — R06.02 SHORTNESS OF BREATH: ICD-10-CM

## 2020-01-02 DIAGNOSIS — E66.01 OBESITY, MORBID, BMI 50 OR HIGHER (HCC): ICD-10-CM

## 2020-01-02 LAB
A-A DO2: 34.6 MMHG (ref 0–300)
ALBUMIN SERPL-MCNC: 2.97 G/DL (ref 3.5–5.2)
ALBUMIN/GLOB SERPL: 0.5 G/DL
ALP SERPL-CCNC: 102 U/L (ref 39–117)
ALT SERPL W P-5'-P-CCNC: 61 U/L (ref 1–41)
ANION GAP SERPL CALCULATED.3IONS-SCNC: 15.6 MMOL/L (ref 5–15)
APTT PPP: 27.1 SECONDS (ref 23.8–36.1)
ARTERIAL PATENCY WRIST A: ABNORMAL
AST SERPL-CCNC: 37 U/L (ref 1–40)
ATMOSPHERIC PRESS: 724 MMHG
BASE EXCESS BLDA CALC-SCNC: 5.6 MMOL/L (ref 0–2)
BASOPHILS # BLD AUTO: 0.03 10*3/MM3 (ref 0–0.2)
BASOPHILS NFR BLD AUTO: 0.3 % (ref 0–1.5)
BDY SITE: ABNORMAL
BILIRUB SERPL-MCNC: 0.4 MG/DL (ref 0.2–1.2)
BODY TEMPERATURE: 0 C
BUN BLD-MCNC: 13 MG/DL (ref 6–20)
BUN/CREAT SERPL: 18.3 (ref 7–25)
CALCIUM SPEC-SCNC: 9.1 MG/DL (ref 8.6–10.5)
CHLORIDE SERPL-SCNC: 95 MMOL/L (ref 98–107)
CO2 BLDA-SCNC: 31.4 MMOL/L (ref 22–33)
CO2 SERPL-SCNC: 26.4 MMOL/L (ref 22–29)
COHGB MFR BLD: 1 % (ref 0–5)
CREAT BLD-MCNC: 0.71 MG/DL (ref 0.76–1.27)
CRP SERPL-MCNC: 4.53 MG/DL (ref 0–0.5)
D-LACTATE SERPL-SCNC: 1.4 MMOL/L (ref 0.5–2)
DEPRECATED RDW RBC AUTO: 47.8 FL (ref 37–54)
EOSINOPHIL # BLD AUTO: 0.05 10*3/MM3 (ref 0–0.4)
EOSINOPHIL NFR BLD AUTO: 0.5 % (ref 0.3–6.2)
ERYTHROCYTE [DISTWIDTH] IN BLOOD BY AUTOMATED COUNT: 14.2 % (ref 12.3–15.4)
GFR SERPL CREATININE-BSD FRML MDRD: 121 ML/MIN/1.73
GLOBULIN UR ELPH-MCNC: 5.9 GM/DL
GLUCOSE BLD-MCNC: 122 MG/DL (ref 65–99)
HCO3 BLDA-SCNC: 30.1 MMOL/L (ref 20–26)
HCT VFR BLD AUTO: 45.6 % (ref 37.5–51)
HCT VFR BLD CALC: 44.3 % (ref 38–51)
HGB BLD-MCNC: 14.3 G/DL (ref 13–17.7)
HGB BLDA-MCNC: 14.5 G/DL (ref 14–18)
HOROWITZ INDEX BLD+IHG-RTO: 21 %
IMM GRANULOCYTES # BLD AUTO: 0.03 10*3/MM3 (ref 0–0.05)
IMM GRANULOCYTES NFR BLD AUTO: 0.3 % (ref 0–0.5)
INR PPP: 1.11 (ref 0.9–1.1)
LYMPHOCYTES # BLD AUTO: 0.88 10*3/MM3 (ref 0.7–3.1)
LYMPHOCYTES NFR BLD AUTO: 9.4 % (ref 19.6–45.3)
Lab: ABNORMAL
MAGNESIUM SERPL-MCNC: 1.9 MG/DL (ref 1.6–2.6)
MCH RBC QN AUTO: 28.8 PG (ref 26.6–33)
MCHC RBC AUTO-ENTMCNC: 31.4 G/DL (ref 31.5–35.7)
MCV RBC AUTO: 91.8 FL (ref 79–97)
METHGB BLD QL: 0.3 % (ref 0–3)
MODALITY: ABNORMAL
MONOCYTES # BLD AUTO: 0.53 10*3/MM3 (ref 0.1–0.9)
MONOCYTES NFR BLD AUTO: 5.6 % (ref 5–12)
NEUTROPHILS # BLD AUTO: 7.88 10*3/MM3 (ref 1.7–7)
NEUTROPHILS NFR BLD AUTO: 83.9 % (ref 42.7–76)
NOTE: ABNORMAL
NRBC BLD AUTO-RTO: 0 /100 WBC (ref 0–0.2)
NT-PROBNP SERPL-MCNC: 59.9 PG/ML (ref 5–450)
OXYHGB MFR BLDV: 90.5 % (ref 94–99)
PCO2 BLDA: 42.6 MM HG (ref 35–45)
PCO2 TEMP ADJ BLD: ABNORMAL MM[HG]
PH BLDA: 7.46 PH UNITS (ref 7.35–7.45)
PH, TEMP CORRECTED: ABNORMAL
PLATELET # BLD AUTO: 290 10*3/MM3 (ref 140–450)
PMV BLD AUTO: 9.8 FL (ref 6–12)
PO2 BLDA: 59.4 MM HG (ref 83–108)
PO2 TEMP ADJ BLD: ABNORMAL MM[HG]
POTASSIUM BLD-SCNC: 4.5 MMOL/L (ref 3.5–5.2)
PROT SERPL-MCNC: 8.9 G/DL (ref 6–8.5)
PROTHROMBIN TIME: 14.9 SECONDS (ref 11–15.4)
RBC # BLD AUTO: 4.97 10*6/MM3 (ref 4.14–5.8)
SAO2 % BLDCOA: 91.7 % (ref 94–99)
SODIUM BLD-SCNC: 137 MMOL/L (ref 136–145)
TROPONIN T SERPL-MCNC: <0.01 NG/ML (ref 0–0.03)
VENTILATOR MODE: ABNORMAL
WBC NRBC COR # BLD: 9.4 10*3/MM3 (ref 3.4–10.8)

## 2020-01-02 PROCEDURE — 83735 ASSAY OF MAGNESIUM: CPT | Performed by: PHYSICIAN ASSISTANT

## 2020-01-02 PROCEDURE — 85730 THROMBOPLASTIN TIME PARTIAL: CPT | Performed by: PHYSICIAN ASSISTANT

## 2020-01-02 PROCEDURE — 86140 C-REACTIVE PROTEIN: CPT | Performed by: PHYSICIAN ASSISTANT

## 2020-01-02 PROCEDURE — 99285 EMERGENCY DEPT VISIT HI MDM: CPT | Performed by: INTERNAL MEDICINE

## 2020-01-02 PROCEDURE — 94640 AIRWAY INHALATION TREATMENT: CPT

## 2020-01-02 PROCEDURE — 96365 THER/PROPH/DIAG IV INF INIT: CPT

## 2020-01-02 PROCEDURE — 71045 X-RAY EXAM CHEST 1 VIEW: CPT | Performed by: RADIOLOGY

## 2020-01-02 PROCEDURE — 94799 UNLISTED PULMONARY SVC/PX: CPT

## 2020-01-02 PROCEDURE — 85610 PROTHROMBIN TIME: CPT | Performed by: PHYSICIAN ASSISTANT

## 2020-01-02 PROCEDURE — 82375 ASSAY CARBOXYHB QUANT: CPT

## 2020-01-02 PROCEDURE — 36415 COLL VENOUS BLD VENIPUNCTURE: CPT

## 2020-01-02 PROCEDURE — 82805 BLOOD GASES W/O2 SATURATION: CPT

## 2020-01-02 PROCEDURE — 25010000002 PIPERACILLIN-TAZOBACTAM: Performed by: PHYSICIAN ASSISTANT

## 2020-01-02 PROCEDURE — 93010 ELECTROCARDIOGRAM REPORT: CPT | Performed by: INTERNAL MEDICINE

## 2020-01-02 PROCEDURE — 83050 HGB METHEMOGLOBIN QUAN: CPT

## 2020-01-02 PROCEDURE — 83605 ASSAY OF LACTIC ACID: CPT | Performed by: PHYSICIAN ASSISTANT

## 2020-01-02 PROCEDURE — 87040 BLOOD CULTURE FOR BACTERIA: CPT | Performed by: PHYSICIAN ASSISTANT

## 2020-01-02 PROCEDURE — 25010000002 METHYLPREDNISOLONE PER 125 MG: Performed by: PHYSICIAN ASSISTANT

## 2020-01-02 PROCEDURE — 36600 WITHDRAWAL OF ARTERIAL BLOOD: CPT

## 2020-01-02 PROCEDURE — 83880 ASSAY OF NATRIURETIC PEPTIDE: CPT | Performed by: PHYSICIAN ASSISTANT

## 2020-01-02 PROCEDURE — 84484 ASSAY OF TROPONIN QUANT: CPT | Performed by: PHYSICIAN ASSISTANT

## 2020-01-02 PROCEDURE — 93005 ELECTROCARDIOGRAM TRACING: CPT | Performed by: EMERGENCY MEDICINE

## 2020-01-02 PROCEDURE — 71045 X-RAY EXAM CHEST 1 VIEW: CPT

## 2020-01-02 PROCEDURE — 96375 TX/PRO/DX INJ NEW DRUG ADDON: CPT

## 2020-01-02 PROCEDURE — 85025 COMPLETE CBC W/AUTO DIFF WBC: CPT | Performed by: PHYSICIAN ASSISTANT

## 2020-01-02 PROCEDURE — 99284 EMERGENCY DEPT VISIT MOD MDM: CPT

## 2020-01-02 PROCEDURE — 80053 COMPREHEN METABOLIC PANEL: CPT | Performed by: PHYSICIAN ASSISTANT

## 2020-01-02 RX ORDER — METHYLPREDNISOLONE SODIUM SUCCINATE 125 MG/2ML
125 INJECTION, POWDER, LYOPHILIZED, FOR SOLUTION INTRAMUSCULAR; INTRAVENOUS ONCE
Status: COMPLETED | OUTPATIENT
Start: 2020-01-02 | End: 2020-01-02

## 2020-01-02 RX ORDER — IPRATROPIUM BROMIDE AND ALBUTEROL SULFATE 2.5; .5 MG/3ML; MG/3ML
3 SOLUTION RESPIRATORY (INHALATION) ONCE
Status: COMPLETED | OUTPATIENT
Start: 2020-01-02 | End: 2020-01-02

## 2020-01-02 RX ORDER — SODIUM CHLORIDE 0.9 % (FLUSH) 0.9 %
10 SYRINGE (ML) INJECTION AS NEEDED
Status: DISCONTINUED | OUTPATIENT
Start: 2020-01-02 | End: 2020-01-02 | Stop reason: HOSPADM

## 2020-01-02 RX ADMIN — METHYLPREDNISOLONE SODIUM SUCCINATE 125 MG: 125 INJECTION, POWDER, FOR SOLUTION INTRAMUSCULAR; INTRAVENOUS at 13:22

## 2020-01-02 RX ADMIN — IPRATROPIUM BROMIDE AND ALBUTEROL SULFATE 3 ML: .5; 3 SOLUTION RESPIRATORY (INHALATION) at 13:15

## 2020-01-02 RX ADMIN — PIPERACILLIN AND TAZOBACTAM 4.5 G: 4; .5 INJECTION, POWDER, LYOPHILIZED, FOR SOLUTION INTRAVENOUS; PARENTERAL at 15:40

## 2020-01-02 NOTE — ED NOTES
Report given to EMS, pt leaving ED in stable condition with family. Pt has no sign of distress. Verbalized understand the medications.     Gemini Armstrong, HERNANDEZ  01/02/20 3520

## 2020-01-02 NOTE — CONSULTS
[facility] HOSPITALIST CONSULT NOTE     Hospitalist (on-call MD unless specified)  Consult performed by: Enrique Luu MD  Consult ordered by: Bianka Ellsworth PA  Reason for consult: Generalized weakness      Patient Identification:  Name:  Jimmy Recinos  Age:  44 y.o.  Sex:  male  :  1975  MRN:  [id number]  Visit Number:  [id number]  Room number:  402/02  Primary care provider:  Nabeel Hanna APRN    Chief Complaint:    Chief Complaint   Patient presents with   • Shortness of Breath   • Cough   • Weakness - Generalized       History of Presenting Illness:  43 yo male admitted 2019-2020 with left leg cellulitis and later was diagnosed with influenza during his stay; he was sent home with Omnicef and doxycycline after receiving these inpatient as well.  He was discharge yesterday; we had set up a Trilogy machine but the patient told the charge nurse that he did not want it.  He had apparently refused multiple times while hospitalized to apply the BIPAP at night.  He came in for shortness of air; he denies chest pain.  He is coughing but not producing sputum.  He was not septic in the ED.    Please note the patient did tell me and other members of the staff that he was unable to get up out of his wheelchair last night and actually slept in the wheelchair.  ---------------------------------------------------------------------------------------------------------------------  Review of Systems   Constitutional: Positive for fatigue. Negative for fever.   Respiratory: Positive for cough, shortness of breath and wheezing. Negative for stridor.    Cardiovascular: Negative for chest pain and leg swelling.      ---------------------------------------------------------------------------------------------------------------------   Past History:  Family History   Problem Relation Age of Onset   • Heart disease Mother    • Diabetes Mother    • Cancer Mother    • Hypertension Father       Past Medical History:   Diagnosis Date   • Bilateral cellulitis of lower leg 11/26/2018   • Charcot-Ramya disease    • Chronic low back pain    • Debility    • Elevated liver enzymes    • Essential hypertension 12/5/2018   • Lymphedema of both lower extremities    • MRSA nasal colonization    • Muscular dystrophy (CMS/[facility])     Since age 15 yo   • Obesity, morbid, BMI 50 or higher (CMS/[facility]) 12/5/2018   • VRE (vancomycin-resistant Enterococci)      Past Surgical History:   Procedure Laterality Date   • HERNIA REPAIR  2004    x 2, ventral   • KNEE SURGERY Right    • TONSILLECTOMY     • WRIST SURGERY Left     ORIF     Social History     Socioeconomic History   • Marital status:    Tobacco Use   • Smoking status: Never Smoker   • Smokeless tobacco: Never Used   Substance and Sexual Activity   • Alcohol use: Yes     Alcohol/week: 1.0 standard drinks     Types: 1 Shots of liquor per week     Frequency: Never     Comment: on occasion once a month   • Drug use: No   • Sexual activity: Defer     ---------------------------------------------------------------------------------------------------------------------   Allergies:  Patient has no known allergies.  ---------------------------------------------------------------------------------------------------------------------  Medications below are reported home medications pulling from within the system; at this time, these medications have not been reconciled unless otherwise specified and are in the verification process for further verifcation as current home medications.      Prior to Admission Medications     Prescriptions Last Dose Informant Patient Reported? Taking?    cefdinir (OMNICEF) 300 MG capsule   No No    Take 1 capsule by mouth 2 (Two) Times a Day for 3 days.    cetirizine (zyrTEC) 10 MG tablet   No No    Take 1 tablet by mouth Daily.    doxycycline (DORYX) 100 MG enteric coated tablet   No No    Take 1 tablet by mouth 2 (Two) Times a Day for 3 days.       fluticasone (FLONASE) 50 MCG/ACT nasal spray   No No    2 sprays by Each Nare route Daily.    furosemide (LASIX) 40 MG tablet   No No    Take 1 tablet by mouth Daily.    guaiFENesin-dextromethorphan (ROBITUSSIN DM) 100-10 MG/5ML syrup   No No    Take 10 mL by mouth Every 4 (Four) Hours As Needed for Cough.    lactobacillus acidophilus (RISAQUAD) capsule capsule   No No    Take 1 capsule by mouth Daily.    lisinopril (PRINIVIL,ZESTRIL) 10 MG tablet   No No    Take 1 tablet by mouth Daily.    metoprolol tartrate (LOPRESSOR) 100 MG tablet   No No    Take 1 tablet by mouth Every 12 (Twelve) Hours.    multivitamin (DAILY SOPHIE) tablet tablet   No No    Take 1 tablet by mouth Daily.      ---------------------------------------------------------------------------------------------------------------------   Vital Signs:  Temp:  [98 °F (36.7 °C)-98.3 °F (36.8 °C)] 98.3 °F (36.8 °C)  Heart Rate:  [] 96  Resp:  [20-24] 20  BP: (138-156)/(83-89) 156/88    SpO2:  [90 %-94 %] 90 %  Device (Oxygen Therapy): room air  Body mass index is 86.23 kg/m².    Wt Readings from Last 1 Encounters:   01/02/20 1233 (!) 273 kg (601 lb)     Wt Readings from Last 3 Encounters:   01/02/20 (!) 273 kg (601 lb)   12/21/19 (!) 273 kg (601 lb 3.2 oz)   12/18/19 (!) 227 kg (500 lb)           ---------------------------------------------------------------------------------------------------------------------   Physical exam:  Constitutional: Morbidly obese, in no acute respiratory distress.  He is able to speak complete sentences.  Please note that he does have a strong body odor during the exam.  HENT: Normocephalic, atraumatic.  Moist mucous membranes.     Eyes: Pupils are equal, round, and reactive to light.  No icterus.  No conjunctivitis.  Cardiovascular: Regular rate and rhythm.  No murmurs.  Pulmonary/Chest: He does seem to have end expiratory wheezing but it almost sounds like it is forced.  There are no crackles.  There is no prolonged  expiratory phase.  In fact, his breathing appears to sound better than yesterday.  Neurological: Alert and oriented x4.  I will follow commands.  He can move his arms and legs equally on both sides.  No facial droop.  Cranial nerves II through XII are intact.  Peripheral vascular: Strong pulses in all 4 extremities.  No cyanosis and no mottling.  ---------------------------------------------------------------------------------------------------------------------   EKG:  Sinus tachycardia with heart rates of 103, QTc 450 ms.  Possible first AV block.  No ischemic changes seen.    I have personally looked at the EKG.    Last echocardiogram:  Results for orders placed during the hospital encounter of 12/21/19   Adult Transthoracic Echo Complete W/ Cont if Necessary Per Protocol    Narrative · Technically very poor quality echo, apical four-chamber view not   available. Primarily studies being interpreted from parasternal view.  · Normal left ventricular cavity size and wall thickness noted.  · Left ventricular systolic function is normal. Estimated EF = 60%.  · Aortic valve structurally appears to be normal, no evidence of aortic   stenosis. No Doppler available to check aortic regurgitation.  · Mitral valve structurally normal with no evidence of mitral stenosis,   regurgitation cannot be evaluated without Doppler.  · There is no evidence of pericardial effusion.        ---------------------------------------------------------------------------------------------------------------------   Results from last 7 days   Lab Units 01/02/20  1335 01/01/20  7775 12/31/19  0337   CRP mg/dL 4.53* 2.87* 1.49*   LACTATE mmol/L 1.4  --   --    WBC 10*3/mm3 9.40 5.62 5.72   HEMOGLOBIN g/dL 14.3 12.8* 12.6*   HEMATOCRIT % 45.6 42.9 41.9   MCV fL 91.8 93.9 94.2   MCHC g/dL 31.4* 29.8* 30.1*   PLATELETS 10*3/mm3 290 248 278   INR  1.11*  --   --      Results from last 7 days   Lab Units 01/02/20  1319   PH, ARTERIAL pH units 7.458*      PO2 ART mm Hg 59.4*   PCO2, ARTERIAL mm Hg 42.6   HCO3 ART mmol/L 30.1*     Results from last 7 days   Lab Units 01/02/20  1335 01/01/20  0435 12/31/19  0337   SODIUM mmol/L 137 133* 132*   POTASSIUM mmol/L 4.5 4.3 4.1   MAGNESIUM mg/dL 1.9  --  2.0   CHLORIDE mmol/L 95* 92* 88*   CO2 mmol/L 26.4 31.2* 36.5*   BUN mg/dL 13 13 16   CREATININE mg/dL 0.71* 0.51* 0.62*   EGFR IF NONAFRICN AM mL/min/1.73 121 >150 141   CALCIUM mg/dL 9.1 8.5* 8.4*   PHOSPHORUS mg/dL  --   --  3.3   GLUCOSE mg/dL 122* 104* 97   ALBUMIN g/dL 2.97* 2.32* 2.39*   BILIRUBIN mg/dL 0.4 0.4 0.3   ALK PHOS U/L 102 99 101   AST (SGOT) U/L 37 52* 63*   ALT (SGPT) U/L 61* 75* 84*   Estimated Creatinine Clearance: 287.3 mL/min (A) (by C-G formula based on SCr of 0.71 mg/dL (L)).    Results from last 7 days   Lab Units 01/02/20  1335   TROPONIN T ng/mL <0.010     Results from last 7 days   Lab Units 01/02/20  1335   PROBNP pg/mL 59.9     Lab Results   Component Value Date    TSH 0.793 12/21/2019     Brief Urine Lab Results  (Last result in the past 365 days)      Color   Clarity   Blood   Leuk Est   Nitrite   Protein   CREAT   Urine HCG        12/19/19 0038 Yellow Clear Negative Trace Negative Negative             Blood Culture   Date Value Ref Range Status   12/27/2019 No growth at 5 days  Final   12/27/2019 No growth at 5 days  Final     Results from last 7 days   Lab Units 01/02/20  1335 01/01/20  0435 12/31/19  0337 12/30/19  0658 12/29/19  0631 12/28/19  0706 12/27/19  0426   LACTATE mmol/L 1.4  --   --   --   --   --   --    CRP mg/dL 4.53* 2.87* 1.49* 1.33* 2.95* 6.07* 10.45*       I have personally looked at the labs and they are summarized above.  ---------------------------------------------------------------------------------------------------------------------   Detailed radiology reports for the last 24 hours:    Imaging Results (Last 24 Hours)     Procedure Component Value Units Date/Time    XR Chest 1 View [[id number]] Collected:   01/02/20 1357     Updated:  01/02/20 1417    Narrative:       EXAMINATION:  XR CHEST 1 VW-      CLINICAL INDICATION:     sob, cough     TECHNIQUE:  XR CHEST 1 VW-       COMPARISON: NONE      FINDINGS:   Left lower lobe atelectasis and minimal airspace disease. Heart size is  stable.   No pneumothorax.   No pleural effusion.   Bony and soft tissue structures are unremarkable.          Impression:       Left lower lobe atelectasis and minimal airspace disease.     This report was finalized on 1/2/2020 2:00 PM by Dr. Alvarez Eaton MD.           Final impressions for the last 30 days of radiology reports:    Ct Lumbar Spine Without Contrast  Result Date: 12/19/2019  Extremely limited exam due to body habitus, no compelling evidence of acute abnormality. Signer Name: Nader Miranda MD  Signed: 12/19/2019 6:18 AM  Workstation Name: [email or web address]-  [facility]    Ct Lumbar Spine With & Without Contrast  Result Date: 12/24/2019   1. Markedly limited exam due to patient body habitus. 2. No definite vertebral body fracture or malalignment. 3. Assessment of the spinal canal and paraspinal soft tissues nondiagnostic due to attenuation artifact from patient's body habitus.  This report was finalized on 12/24/2019 1:59 PM by Dr. David Dixon MD.      Xr Chest 1 View  Result Date: 12/27/2019  No radiographic evidence of acute cardiac or pulmonary disease.  This report was finalized on 12/27/2019 11:43 AM by Dr. David Dixon MD.      Xr Chest 1 View  Result Date: 12/26/2019  Mild interval improvement in findings most consistent with CHF.  This report was finalized on 12/26/2019 8:37 AM by Dr. David Dixon MD.      Xr Chest 1 View  Result Date: 12/24/2019  Stable CHF. No acute changes noted.  This report was finalized on 12/24/2019 8:58 AM by Dr. David Dixon MD.      Xr Chest 1 View  Result Date: 12/19/2019  No active disease. No change since 11/26/2018. Signer Name: Francis Patterson MD  Signed:  12/19/2019 1:15 AM  Workstation Name: [id number]-  [facility]    Ct Chest Pulmonary Embolism  Result Date: 12/19/2019  1. Technically limited study as noted above. 2. No pulmonary embolism within main pulmonary arteries or largest central pulmonary arteries. 3. Lungs clear. Multifocal pulmonary air trapping. No pleural or pericardial effusion. Signer Name: Francis Patterson MD  Signed: 12/19/2019 3:59 AM  Workstation Name: [id number]-  [facility]    Us Renal Bilateral  Result Date: 12/23/2019  Unremarkable bilateral renal ultrasound.  This report was finalized on 12/23/2019 4:07 PM by Dr. David Dixon MD.      Us Venous Doppler Lower Extremity Bilateral (duplex)  Result Date: 12/23/2019  No sonographic findings of DVT in the lower extremities at this time.  This report was finalized on 12/23/2019 3:41 PM by Dr. Carl Burton II, MD.      Ct Abdomen Pelvis Stone Protocol  Result Date: 12/19/2019  Exam limited both by body habitus and by the presence of contrast material in the renal system from recent angiogram. It is impossible to prove or exclude the presence of nephrolithiasis present because the material in the collecting system may simply represent contrast. No evidence of bowel obstruction or other acute abnormality, though again assessment is markedly limited by body habitus. Signer Name: Nader Miranda MD  Signed: 12/19/2019 6:17 AM  Workstation Name: [email or web address]-  [facility]    Ct Lower Extremity Left With & Without Contrast  Result Date: 12/24/2019   1. Severely limited exam which is essentially nondiagnostic in the hip regions due to body habitus. 2. Bilateral inguinal lymphadenopathy. 3. No large fluid collections but soft tissue edema is noted. Small abscess not excluded due to exam limitation.  This report was finalized on 12/24/2019 1:57 PM by Dr. David Dixon MD.      Xr Chest Ap  Result Date: 12/21/2019  Mild pulmonary  vascular congestion. Signer Name: David Doe MD  Signed: 12/21/2019 4:43 PM  Workstation Name: [id number]  [facility]      I have personally looked at the radiology images and read the final radiology report.  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  He had not picked up his medications from [facility] yet; when I called the pharmacy, I found out that the patient has not filled any medications there since May 2019 and those medications were for an acute illness.  The patient does not receive monthly medications.  Turns out, the patient does not have prescription drug coverage.  [facility] and we are going to help him with the co-pays.  The patient also declined the trilogy machine prior to discharge; we called the [employer] company and they have delivered the trilogy machine to the emergency department.  The patient does not qualify for oxygen as his oxygen saturations on room air never dropped to 88%.  I did write an order for home health, a bariatric walker, and a Analisa lift and Anastasiya was social work was assisting the patient in receiving these services and items.    Please note that I do not feel that the patient meets inpatient criteria.  Please note that the patient's  lung exam actually sounds better than yesterday.  He has been on antibiotics since December 21, 2019.  He has 3 more days of Omnicef and doxycycline to take.  He can follow-up in the outpatient setting to see if the left lower lobe pneumonia is present or not.  I do not.  On exam though my exam is limited by the patient's body habitus.  The ER was unable to obtain a CT scan of the chest due to the patient's body habitus.    We do recommend the patient see his primary care provider as soon as possible.    Thank you for the consult.    Enrique Luu MD  Hospital Medicine Team  01/02/20  6:46 PM

## 2020-01-02 NOTE — DISCHARGE PLACEMENT REQUEST
"Lester Marley (44 y.o. Male)     Date of Birth Social Security Number Address Home Phone MRN    1975  [address] [facility] [address] [phone] [id number]    Congregation Marital Status          None        Admission Date Admission Type Admitting Provider Attending Provider Department, Room/Bed    20 Emergency  Tobi Ledesma MD [facility], 402/02    Discharge Date Discharge Disposition Discharge Destination                       Attending Provider:  Tobi Ledesma MD    Allergies:  No Known Allergies    Isolation:  None   Infection:  Influenza (19), MRSA (18)   Code Status:  Prior    Ht:  177.8 cm (70\")   Wt:  273 kg (601 lb)    Admission Cmt:  None   Principal Problem:  None                Active Insurance as of 2020     Primary Coverage     Payor Plan Insurance Group Employer/Plan Group    MEDICARE MEDICARE A & B      Payor Plan Address Payor Plan Phone Number Payor Plan Fax Number Effective Dates    [address] [phone]  2003 - None Entered    [place] [id number]       Subscriber Name Subscriber Birth Date Member ID       LESTER MARLEY 1975 [id number]                 Emergency Contacts      (Rel.) Home Phone Work Phone Mobile Phone    Alondra Marley (Father) [phone] -- --    Philly Marley (Spouse) -- -- [phone]        [facility]  [address] [facility] [address]  Phone:  [phone]  Fax:   Date: 2020      Referral to Home Health     Patient:  Lester Marley MRN:  [id number]   [address] [place] [address] :  1975  SSN:    Phone: [phone] Sex:  M      INSURANCE PAYOR PLAN GROUP # SUBSCRIBER ID   Primary:    MEDICARE [id number]   [id number]      Referring Provider Information:  MADI JUÁREZ Phone: [phone] Fax:       Referral Information:   # Visits:  1 Referral Type: Home Health [42]   Urgency:  Routine Referral " Reason: Specialty Services Required   Start Date: Jan 2, 2020 End Date:  To be determined by Insurer   Diagnosis: Shortness of breath (R06.02 [ICD-10-CM] 786.05 [ICD-9-CM])  Obesity, morbid, BMI 50 or higher (CMS/HCC) (E66.01 [ICD-10-CM] 278.01 [ICD-9-CM])  Acute respiratory failure with hypoxia (CMS/HCC) (J96.01 [ICD-10-CM] 518.81 [ICD-9-CM])      Refer to Dept:   Refer to Provider:   Refer to Facility:       Face to Face Visit Date: 1/2/2020  Follow-up provider for Plan of Care? I treated the patient in an acute care facility and will not continue treatment after discharge.  Follow-up provider: LIGIA MIGUEL [[id number]]  Reason/Clinical Findings: DANIA, COPD, morbid obesity; new oxygen and BIPAP have been ordered.  Describe mobility limitations that make leaving home difficult: He cannot drive and his body habitus is such that he cannot fit into a regular size car and it takes a considerable and taxing effort on the patient's part and several people to get the patient anywhere.  In fact, it takes an ambulance for him to travel.  Nursing/Therapeutic Services Requested: Skilled Nursing  Skilled nursing orders: O2 instruction  Skilled nursing orders: Medication education  Skilled nursing orders: Cardiopulmonary assessments  Frequency: 1 Week 1     This document serves as a request of services and does not constitute Insurance authorization or approval of services.  To determine eligibility, please contact the members Insurance carrier to verify and review coverage.     If you have medical questions regarding this request for services. Please contact [facility] at [phone] during normal business hours.       Authorizing Provider:Enrique Luu MD  Authorizing Provider's NPI: [id number]  Order Entered By: Enrique Luu MD 1/2/2020  4:19 PM     Electronically signed by: Enrique Luu MD 1/2/2020  4:19 PM            History & Physical    No notes of this type exist for  this encounter.           Current Facility-Administered Medications   Medication Dose Route Frequency Provider Last Rate Last Dose   • sodium chloride 0.9 % flush 10 mL  10 mL Intravenous PRN Bianka Ellsworth PA         Current Outpatient Medications   Medication Sig Dispense Refill   • cefdinir (OMNICEF) 300 MG capsule Take 1 capsule by mouth 2 (Two) Times a Day for 3 days. 6 capsule 0   • cetirizine (zyrTEC) 10 MG tablet Take 1 tablet by mouth Daily. 30 tablet 0   • doxycycline (DORYX) 100 MG enteric coated tablet Take 1 tablet by mouth 2 (Two) Times a Day for 3 days. 6 tablet 0   • fluticasone (FLONASE) 50 MCG/ACT nasal spray 2 sprays by Each Nare route Daily. 15.8 mL 0   • furosemide (LASIX) 40 MG tablet Take 1 tablet by mouth Daily. 30 tablet 0   • guaiFENesin-dextromethorphan (ROBITUSSIN DM) 100-10 MG/5ML syrup Take 10 mL by mouth Every 4 (Four) Hours As Needed for Cough. 118 mL 0   • lactobacillus acidophilus (RISAQUAD) capsule capsule Take 1 capsule by mouth Daily. 3 each 0   • lisinopril (PRINIVIL,ZESTRIL) 10 MG tablet Take 1 tablet by mouth Daily. 30 tablet 0   • metoprolol tartrate (LOPRESSOR) 100 MG tablet Take 1 tablet by mouth Every 12 (Twelve) Hours. 60 tablet 0   • multivitamin (DAILY SOPHIE) tablet tablet Take 1 tablet by mouth Daily. 30 tablet 0     Lab Results (most recent)     Procedure Component Value Units Date/Time    Blood Culture - Blood, Arm, Left [[id number]] Collected:  01/02/20 1520    Specimen:  Blood from Arm, Left Updated:  01/02/20 1533    Comprehensive Metabolic Panel [[id number]]  (Abnormal) Collected:  01/02/20 1335    Specimen:  Blood Updated:  01/02/20 1404     Glucose 122 mg/dL      BUN 13 mg/dL      Creatinine 0.71 mg/dL      Sodium 137 mmol/L      Potassium 4.5 mmol/L      Chloride 95 mmol/L      CO2 26.4 mmol/L      Calcium 9.1 mg/dL      Total Protein 8.9 g/dL      Albumin 2.97 g/dL      ALT (SGPT) 61 U/L      AST (SGOT) 37 U/L      Alkaline Phosphatase 102 U/L      Total  Bilirubin 0.4 mg/dL      eGFR Non African Amer 121 mL/min/1.73      Globulin 5.9 gm/dL      A/G Ratio 0.5 g/dL      BUN/Creatinine Ratio 18.3     Anion Gap 15.6 mmol/L     Narrative:       GFR Normal >60  Chronic Kidney Disease <60  Kidney Failure <15      C-reactive Protein [[id number]]  (Abnormal) Collected:  01/02/20 1335    Specimen:  Blood Updated:  01/02/20 1404     C-Reactive Protein 4.53 mg/dL     Magnesium [[id number]]  (Normal) Collected:  01/02/20 1335    Specimen:  Blood Updated:  01/02/20 1404     Magnesium 1.9 mg/dL     Troponin [[id number]]  (Normal) Collected:  01/02/20 1335    Specimen:  Blood Updated:  01/02/20 1403     Troponin T <0.010 ng/mL     Narrative:       Troponin T Reference Range:  <= 0.03 ng/mL-   Negative for AMI  >0.03 ng/mL-     Abnormal for myocardial necrosis.  Clinicians would have to utilize clinical acumen, EKG, Troponin and serial changes to determine if it is an Acute Myocardial Infarction or myocardial injury due to an underlying chronic condition.     BNP [[id number]]  (Normal) Collected:  01/02/20 1335    Specimen:  Blood Updated:  01/02/20 1401     proBNP 59.9 pg/mL     Narrative:       Among patients with dyspnea, NT-proBNP is highly sensitive for the detection of acute congestive heart failure. In addition NT-proBNP of <300 pg/ml effectively rules out acute congestive heart failure with 99% negative predictive value.      aPTT [[id number]]  (Normal) Collected:  01/02/20 1335    Specimen:  Blood Updated:  01/02/20 1358     PTT 27.1 seconds     Narrative:       PTT Heparin Therapeutic Range:  59 - 95 seconds      Protime-INR [[id number]]  (Abnormal) Collected:  01/02/20 1335    Specimen:  Blood Updated:  01/02/20 1357     Protime 14.9 Seconds      INR 1.11    Narrative:       Suggested INR therapeutic range for stable oral anticoagulant therapy:    Low Intensity therapy:   1.5-2.0  Moderate Intensity therapy:   2.0-3.0  High Intensity therapy:   2.5-4.0    Lactic Acid, Plasma  [[id number]]  (Normal) Collected:  01/02/20 1335    Specimen:  Blood Updated:  01/02/20 1357     Lactate 1.4 mmol/L     CBC & Differential [[id number]] Collected:  01/02/20 1335    Specimen:  Blood Updated:  01/02/20 1354    Narrative:       The following orders were created for panel order CBC & Differential.  Procedure                               Abnormality         Status                     ---------                               -----------         ------                     CBC Auto Differential[[id number]]        Abnormal            Final result                 Please view results for these tests on the individual orders.    CBC Auto Differential [[id number]]  (Abnormal) Collected:  01/02/20 1335    Specimen:  Blood Updated:  01/02/20 1354     WBC 9.40 10*3/mm3      RBC 4.97 10*6/mm3      Hemoglobin 14.3 g/dL      Hematocrit 45.6 %      MCV 91.8 fL      MCH 28.8 pg      MCHC 31.4 g/dL      RDW 14.2 %      RDW-SD 47.8 fl      MPV 9.8 fL      Platelets 290 10*3/mm3      Neutrophil % 83.9 %      Lymphocyte % 9.4 %      Monocyte % 5.6 %      Eosinophil % 0.5 %      Basophil % 0.3 %      Immature Grans % 0.3 %      Neutrophils, Absolute 7.88 10*3/mm3      Lymphocytes, Absolute 0.88 10*3/mm3      Monocytes, Absolute 0.53 10*3/mm3      Eosinophils, Absolute 0.05 10*3/mm3      Basophils, Absolute 0.03 10*3/mm3      Immature Grans, Absolute 0.03 10*3/mm3      nRBC 0.0 /100 WBC     Blood Culture - Blood, Arm, Left [[id number]] Collected:  01/02/20 1335    Specimen:  Blood from Arm, Left Updated:  01/02/20 1340    Blood Gas, Arterial With Co-Ox [[id number]]  (Abnormal) Collected:  01/02/20 1319    Specimen:  Arterial Blood Updated:  01/02/20 1321     Site Left Brachial     Dylan's Test N/A     pH, Arterial 7.458 pH units      Comment: 83 Value above reference range        pCO2, Arterial 42.6 mm Hg      pO2, Arterial 59.4 mm Hg      Comment: 84 Value below reference range        HCO3, Arterial 30.1 mmol/L      Base Excess,  Arterial 5.6 mmol/L      O2 Saturation, Arterial 91.7 %      Hemoglobin, Blood Gas 14.5 g/dL      Hematocrit, Blood Gas 44.3 %      Oxyhemoglobin 90.5 %      Comment: 84 Value below reference range        Methemoglobin 0.30 %      Carboxyhemoglobin 1.0 %      A-a Gradiant 34.6 mmHg      CO2 Content 31.4 mmol/L      Temperature 0.0 C      Barometric Pressure for Blood Gas 724 mmHg      Modality Room Air     FIO2 21 %      Ventilator Mode NA     Note --     Collected by [id number]     Comment: Meter: [id number]     :  [id number]        pH, Temp Corrected --     pCO2, Temperature Corrected --     pO2, Temperature Corrected --          Imaging Results (Most Recent)     Procedure Component Value Units Date/Time    XR Chest 1 View [[id number]] Collected:  01/02/20 1357     Updated:  01/02/20 1417    Narrative:       EXAMINATION:  XR CHEST 1 VW-      CLINICAL INDICATION:     sob, cough     TECHNIQUE:  XR CHEST 1 VW-       COMPARISON: NONE      FINDINGS:   Left lower lobe atelectasis and minimal airspace disease. Heart size is  stable.   No pneumothorax.   No pleural effusion.   Bony and soft tissue structures are unremarkable.          Impression:       Left lower lobe atelectasis and minimal airspace disease.     This report was finalized on 1/2/2020 2:00 PM by Dr. Alvarez Eaton MD.           Operative/Procedure Notes (most recent note)    No notes of this type exist for this encounter.         Physician Progress Notes (most recent note)    No notes of this type exist for this encounter.         Consult Notes (most recent note)    No notes of this type exist for this encounter.         Nutrition Notes (most recent note)    No notes exist for this encounter.         Physical Therapy Notes (most recent note)    No notes exist for this encounter.         Occupational Therapy Notes (most recent note)    No notes exist for this encounter.         Speech Language Pathology Notes (most recent note)    No notes exist for  this encounter.         Respiratory Therapy Notes (most recent note)    No notes exist for this encounter.         Discharge Summary    No notes of this type exist for this encounter.         Discharge Order (From admission, onward)    None

## 2020-01-02 NOTE — OUTREACH NOTE
Prep Survey      Responses   Facility patient discharged from?  [facility]   Is patient eligible?  Yes   Discharge diagnosis  sepsis d/t cellulitis left thigh, chronic lymphedema, Influenza A, morbid obesity   Does the patient have one of the following disease processes/diagnoses(primary or secondary)?  Sepsis   Does the patient have Home health ordered?  No   Is there a DME ordered?  Yes   What DME was ordered?  trilogy from [facility],  new walker   Prep survey completed?  Yes          Stephanie Lazo RN

## 2020-01-02 NOTE — ED NOTES
Pt's wife signed consent for ct scan with contrast. She verbalized that he had it done before, and understand the procedure.     Gemini Armstrong RN  01/02/20 9379

## 2020-01-02 NOTE — ED NOTES
Called pharmacy, they said they have not have pt's medications ready yet, they said call them  back in 30 minutes.   Gemini Marcial RN  01/02/20 0491

## 2020-01-02 NOTE — ED NOTES
Call Apothecary, they almost finish with pt's medications. They will bring it over soon. Bianka CRAWLEY aware.     Gemini Armstrong, HERNANDEZ  01/02/20 3467

## 2020-01-02 NOTE — PROGRESS NOTES
Discharge Planning Assessment   [facility]     Patient Name: Jimmy Recinos  MRN: [id number]  Today's Date: 1/2/2020    Admit Date: 12/21/2019    Discharge Plan     Row Name 01/02/20 1539       Plan    Final Note SS spoke to [facility] per Moni and notified her that pt was discharged home on 1/1. SS spoke to lead nurse, Lalita who states pt was declining Trilogy machine. [facility] to f/u with pt. No other needs identified.         NADINE Castillo

## 2020-01-02 NOTE — ED PROVIDER NOTES
Subjective   44-year-old male who presents to the ED today for generalized weakness and shortness of breath.  He was admitted to the hospital on December 21 for left thigh cellulitis.  He also developed influenza A while in the hospital.  He states he was discharged home from the hospital last night.  He states because of the time he was discharged home last night he has not been able to  his medications yet this includes antibiotics and blood pressure medicines.  He states when he got home last night he sat in his wheelchair for about an hour but then was too weak to get into the bed.  He states he had to sit in his wheelchair all night long.  He states because of that his legs are swollen today.  He states he feels too weak to get up.  He states he also feels short of breath today.  He states he has had a cough and it is productive but it does not have any color to the sputum.  He denies any fever today.  He denies any chest pain.  His wife reports that the cellulitis to his left thigh does not appear worse than at the time of his discharge.  The patient does not use home oxygen.  He denies being a smoker.      History provided by:  Patient  Weakness - Generalized   Severity:  Moderate  Onset quality:  Gradual  Duration: started last night.  Timing:  Constant  Progression:  Worsening  Chronicity:  New  Context: recent infection    Relieved by:  Nothing  Worsened by:  Activity  Associated symptoms: cough, difficulty walking and shortness of breath    Associated symptoms: no abdominal pain, no anorexia, no chest pain, no diarrhea, no dizziness, no dysphagia, no dysuria, no numbness in extremities, no falls, no fever, no foul-smelling urine, no frequency, no headaches, no nausea, no seizures, no sensory-motor deficit, no stroke symptoms, no syncope, no urgency, no vision change and no vomiting        Review of Systems   Constitutional: Positive for fatigue. Negative for fever.   HENT: Negative.    Eyes:  Negative.    Respiratory: Positive for cough, shortness of breath and wheezing. Negative for chest tightness.    Cardiovascular: Positive for leg swelling. Negative for chest pain and syncope.   Gastrointestinal: Negative for abdominal pain, anorexia, diarrhea, dysphagia, nausea and vomiting.   Genitourinary: Negative for dysuria, frequency and urgency.   Musculoskeletal: Negative.  Negative for falls.   Skin: Positive for color change.   Neurological: Positive for weakness. Negative for dizziness, seizures and headaches.   Psychiatric/Behavioral: Negative.    All other systems reviewed and are negative.      Past Medical History:   Diagnosis Date   • Bilateral cellulitis of lower leg 11/26/2018   • Charcot-Ramya disease    • Chronic low back pain    • Debility    • Elevated liver enzymes    • Essential hypertension 12/5/2018   • Lymphedema of both lower extremities    • MRSA nasal colonization    • Muscular dystrophy (CMS/HCC)     Since age 15 yo   • Obesity, morbid, BMI 50 or higher (CMS/HCC) 12/5/2018   • VRE (vancomycin-resistant Enterococci)        No Known Allergies    Past Surgical History:   Procedure Laterality Date   • HERNIA REPAIR  2004    x 2, ventral   • KNEE SURGERY Right    • TONSILLECTOMY     • WRIST SURGERY Left     ORIF       Family History   Problem Relation Age of Onset   • Heart disease Mother    • Diabetes Mother    • Cancer Mother    • Hypertension Father        Social History     Socioeconomic History   • Marital status:      Spouse name: Not on file   • Number of children: Not on file   • Years of education: Not on file   • Highest education level: Not on file   Tobacco Use   • Smoking status: Never Smoker   • Smokeless tobacco: Never Used   Substance and Sexual Activity   • Alcohol use: Yes     Alcohol/week: 1.0 standard drinks     Types: 1 Shots of liquor per week     Frequency: Never     Comment: on occasion once a month   • Drug use: No   • Sexual activity: Defer            Objective   Physical Exam   Constitutional: He is oriented to person, place, and time. He does not appear ill. No distress.   Morbidly obese   HENT:   Head: Normocephalic and atraumatic.   Mouth/Throat: Oropharynx is clear and moist.   Eyes: Pupils are equal, round, and reactive to light. EOM are normal.   Neck: Normal range of motion. Neck supple. No JVD present. No tracheal deviation present.   Cardiovascular: Regular rhythm, normal heart sounds and intact distal pulses. Tachycardia present.   Pulmonary/Chest: No stridor. Tachypnea noted. No respiratory distress. He has wheezes in the right upper field and the left upper field. He exhibits no tenderness.   Abdominal: Soft. Bowel sounds are normal. There is no tenderness.   Musculoskeletal:        Right lower leg: He exhibits edema.        Left lower leg: He exhibits edema.   Patient has a cellulitis to the left thigh.  It is improved based on the residual line that was drawn to initially tanisha the cellulitis.  He has some skin peeling within the area of cellulitis.  Distal pulses intact.   Neurological: He is alert and oriented to person, place, and time.   Skin: Skin is warm and dry. Capillary refill takes less than 2 seconds. There is erythema (left thigh).   Psychiatric: He has a normal mood and affect. His behavior is normal.   Nursing note and vitals reviewed.      Procedures           ED Course  ED Course as of Jan 02 1856   Thu Jan 02, 2020   1423 FINDINGS:   Left lower lobe atelectasis and minimal airspace disease. Heart size is  stable.   No pneumothorax.   No pleural effusion.   Bony and soft tissue structures are unremarkable.        IMPRESSION:  Left lower lobe atelectasis and minimal airspace disease.   XR Chest 1 View [AH]   1431 EKG performed at 12:37  Sinus tachycardia, rate of 103, 1st degree AV block  No acute ischemia, reviewed by Dr. Ledesma    [AH]   1512 Pt seen with PA.  Reviewed findings.  Agree with assessment and plan.    [BC]    1523 Dr. Ledesma has evaluated the patient.  His CRP is elevated compared to yesterday and he has new findings on his CXR that are consistent with a LLL pneumonia.  We discussed doing a CT PE protocol however he is over the weight limit for the CT table and Dr. Eaton advised it would not be diagnostic due to his body habitus.  Antibiotics have been ordered and will discuss with hospitalist service for admission.    [AH]   1533 I spoke with Dr. Jaimes.  He advised to get a VQ scan.  However when I spoke with nuclear medicine, they advised the weight limit for their machine is 400 lbs.  Dr. Jaimes advised he will give this patient to Dr. Luu since he was a recent admission.  He will notify her about the VQ scan issue and will let her decide on the next step.    [AH]   1558 I have spoke with Dr. Luu.  She advised patient was to be discharged home with a trilogy machine but when [facility] called him, he refused the machine and said that he did not want it.  The patient states that [facility] called him 2 days ago and he told them that he wasn't home from the hospital yet and there was no one there to let them in.  He states the reason he feels bad was because he was so weak that he could not get out of his wheelchair and had to sit in it all night.  Dr. uLu and Anastasiya from social work are in the ED at this time to speak with the patient.    [AH]   1619 Dr. Luu has evaluated the patient.  She advised he does not need admission at this time.  [facility] will bring the trilogy machine to the ED for the patient and then he can be discharged back home.  She also wrote a prescription for a guido lift and a walker which will be delivered to the patient's home.  Social work to set up home health for the patient.    [AH]   1647 Social work and Dr. Luu were able to have the patient's [facility] as the patient does not have prescription coverage.  We will make sure  the patient has his prescriptions before he leaves.  [facility] staff is in the ED at this time to deliver the trilogy.    [AH]      ED Course User Index  [] Bianka Ellsworth PA  [BC] Tobi Ledesma MD                                               MDM  Number of Diagnoses or Management Options  Generalized weakness:   Shortness of breath:      Amount and/or Complexity of Data Reviewed  Clinical lab tests: reviewed  Tests in the radiology section of CPT®: reviewed  Tests in the medicine section of CPT®: reviewed  Decide to obtain previous medical records or to obtain history from someone other than the patient: yes  Discuss the patient with other providers: yes    Patient Progress  Patient progress: stable      Final diagnoses:   Generalized weakness   Shortness of breath            Bianka Ellsworth PA  01/02/20 8341

## 2020-01-02 NOTE — PROGRESS NOTES
Case Management/Social Work    Patient Name:  Jimmy Recinos  YOB: 1975  MRN: [id number]  Admit Date:  1/2/2020    SS was ask by Dr. Luu to visit pt in the ED. Pt was discharged home yesterday and refused for Trilogy machine to be delivered via [facility]. Pt has informed the ED that he slept in his wheelchair due to weakness and being unable to get into his bed. Dr. Luu informed pt that he does not meet criteria to be admitted. SS contacted director, Lalita Davidson who states she will not approve for pt to be admitted if he does not medically meet admission criteria. SS and Dr. Luu informed pt that SNF rehab placement would be in [place] due to local SNF's weight capacity. Pt voices not being able to be placed in [place]. Pt is agreeable to return home and have Trilogy machine delivered to hospital prior to discharge. Pt is requesting a guido lift, bariatric rolling walker, and [facility] services. SS contacted [facility] per Moni and they are agreeable to deliver Trilogy machine to hospital today. [facility] states they delivered a bariatric rolling walker in the past and pt would not agree to keep walker. [facility] states they do not have a guido lift for pt's weight. SS contacted [facility] per Nia with referral. SS faxed referral including order to [facility]. SS contacted [facility] per Riana who states being agreeable to review guido lift and bariatric rolling walker orders and contact pt for delivery. SS faxed information including orders to [facility]. Pt does not have prescription coverage. Dr. Luu contacted [facility] and had prescriptions sent to [facility]. SS spoke to [facility] per Jammie and Chanell. Chanell is agreeable review prescriptions for possible prescription assistance. No other needs identified.       Electronically signed by:  NADINE Castillo  01/02/20  5:20 PM

## 2020-01-02 NOTE — ED NOTES
Call [facility] dispatch, accepted run back to residence, said they would send a truck our way.      Eneida Ellsworth  01/02/20 5949

## 2020-01-06 ENCOUNTER — READMISSION MANAGEMENT (OUTPATIENT)
Dept: CALL CENTER | Facility: HOSPITAL | Age: 45
End: 2020-01-06

## 2020-01-06 NOTE — OUTREACH NOTE
Sepsis Week 1 Survey      Responses   Facility patient discharged from?  [facility]   Does the patient have one of the following disease processes/diagnoses(primary or secondary)?  Sepsis   Is there a successful TCM telephone encounter documented?  No   Week 1 attempt successful?  No   Unsuccessful attempts  Attempt 1          Ranulfo Carnes RN

## 2020-01-07 LAB
BACTERIA SPEC AEROBE CULT: NORMAL
BACTERIA SPEC AEROBE CULT: NORMAL

## 2020-01-08 ENCOUNTER — READMISSION MANAGEMENT (OUTPATIENT)
Dept: CALL CENTER | Facility: HOSPITAL | Age: 45
End: 2020-01-08

## 2020-01-08 NOTE — OUTREACH NOTE
Sepsis Week 1 Survey      Responses   Facility patient discharged from?  [facility]   Does the patient have one of the following disease processes/diagnoses(primary or secondary)?  Sepsis   Is there a successful TCM telephone encounter documented?  No   Week 1 attempt successful?  No   Unsuccessful attempts  Attempt 2          Ines Muhammad RN

## 2020-01-10 ENCOUNTER — READMISSION MANAGEMENT (OUTPATIENT)
Dept: CALL CENTER | Facility: HOSPITAL | Age: 45
End: 2020-01-10

## 2020-01-10 NOTE — OUTREACH NOTE
Sepsis Week 2 Survey      Responses   Facility patient discharged from?  [facility]   Does the patient have one of the following disease processes/diagnoses(primary or secondary)?  Sepsis   Week 2 attempt successful?  No   Unsuccessful attempts  Attempt 1          Aixa Childers RN

## 2020-01-13 ENCOUNTER — READMISSION MANAGEMENT (OUTPATIENT)
Dept: CALL CENTER | Facility: HOSPITAL | Age: 45
End: 2020-01-13

## 2020-01-13 NOTE — OUTREACH NOTE
Sepsis Week 2 Survey      Responses   Facility patient discharged from?  [facility]   Does the patient have one of the following disease processes/diagnoses(primary or secondary)?  Sepsis   Week 2 attempt successful?  No   Unsuccessful attempts  Attempt 2          Maile Beebe RN

## 2020-01-20 ENCOUNTER — READMISSION MANAGEMENT (OUTPATIENT)
Dept: CALL CENTER | Facility: HOSPITAL | Age: 45
End: 2020-01-20

## 2020-01-20 NOTE — OUTREACH NOTE
Sepsis Week 3 Survey      Responses   Facility patient discharged from?  [facility]   Does the patient have one of the following disease processes/diagnoses(primary or secondary)?  Sepsis   Week 3 attempt successful?  No   Unsuccessful attempts  Attempt 1          Loyda Payton RN

## 2020-01-24 ENCOUNTER — READMISSION MANAGEMENT (OUTPATIENT)
Dept: CALL CENTER | Facility: HOSPITAL | Age: 45
End: 2020-01-24

## 2020-01-24 NOTE — OUTREACH NOTE
Sepsis Week 3 Survey      Responses   Facility patient discharged from?  [facility]   Does the patient have one of the following disease processes/diagnoses(primary or secondary)?  Sepsis   Week 3 attempt successful?  No   Unsuccessful attempts  Attempt 2          Laney Khanna RN

## 2020-08-23 ENCOUNTER — HOSPITAL ENCOUNTER (INPATIENT)
Facility: HOSPITAL | Age: 45
LOS: 16 days | Discharge: HOME HEALTH | End: 2020-09-09
Attending: FAMILY MEDICINE | Admitting: HOSPITALIST

## 2020-08-23 DIAGNOSIS — L03.115 CELLULITIS OF RIGHT LOWER EXTREMITY: Primary | ICD-10-CM

## 2020-08-23 LAB
BASOPHILS # BLD AUTO: 0.02 10*3/MM3 (ref 0–0.2)
BASOPHILS NFR BLD AUTO: 0.1 % (ref 0–1.5)
DEPRECATED RDW RBC AUTO: 49 FL (ref 37–54)
EOSINOPHIL # BLD AUTO: 0.13 10*3/MM3 (ref 0–0.4)
EOSINOPHIL NFR BLD AUTO: 0.9 % (ref 0.3–6.2)
ERYTHROCYTE [DISTWIDTH] IN BLOOD BY AUTOMATED COUNT: 14.9 % (ref 12.3–15.4)
HCT VFR BLD AUTO: 40.3 % (ref 37.5–51)
HGB BLD-MCNC: 12.2 G/DL (ref 13–17.7)
IMM GRANULOCYTES # BLD AUTO: 0.05 10*3/MM3 (ref 0–0.05)
IMM GRANULOCYTES NFR BLD AUTO: 0.4 % (ref 0–0.5)
LYMPHOCYTES # BLD AUTO: 0.57 10*3/MM3 (ref 0.7–3.1)
LYMPHOCYTES NFR BLD AUTO: 4.1 % (ref 19.6–45.3)
MCH RBC QN AUTO: 27.4 PG (ref 26.6–33)
MCHC RBC AUTO-ENTMCNC: 30.3 G/DL (ref 31.5–35.7)
MCV RBC AUTO: 90.4 FL (ref 79–97)
MONOCYTES # BLD AUTO: 0.43 10*3/MM3 (ref 0.1–0.9)
MONOCYTES NFR BLD AUTO: 3.1 % (ref 5–12)
NEUTROPHILS NFR BLD AUTO: 12.61 10*3/MM3 (ref 1.7–7)
NEUTROPHILS NFR BLD AUTO: 91.4 % (ref 42.7–76)
NRBC BLD AUTO-RTO: 0 /100 WBC (ref 0–0.2)
PLATELET # BLD AUTO: 192 10*3/MM3 (ref 140–450)
PMV BLD AUTO: 9.7 FL (ref 6–12)
RBC # BLD AUTO: 4.46 10*6/MM3 (ref 4.14–5.8)
WBC # BLD AUTO: 13.81 10*3/MM3 (ref 3.4–10.8)

## 2020-08-23 PROCEDURE — 87186 SC STD MICRODIL/AGAR DIL: CPT | Performed by: PHYSICIAN ASSISTANT

## 2020-08-23 PROCEDURE — 81001 URINALYSIS AUTO W/SCOPE: CPT | Performed by: PHYSICIAN ASSISTANT

## 2020-08-23 PROCEDURE — 80053 COMPREHEN METABOLIC PANEL: CPT | Performed by: PHYSICIAN ASSISTANT

## 2020-08-23 PROCEDURE — 87150 DNA/RNA AMPLIFIED PROBE: CPT | Performed by: PHYSICIAN ASSISTANT

## 2020-08-23 PROCEDURE — 83605 ASSAY OF LACTIC ACID: CPT | Performed by: PHYSICIAN ASSISTANT

## 2020-08-23 PROCEDURE — 87086 URINE CULTURE/COLONY COUNT: CPT | Performed by: HOSPITALIST

## 2020-08-23 PROCEDURE — 86140 C-REACTIVE PROTEIN: CPT | Performed by: PHYSICIAN ASSISTANT

## 2020-08-23 PROCEDURE — 83735 ASSAY OF MAGNESIUM: CPT | Performed by: PHYSICIAN ASSISTANT

## 2020-08-23 PROCEDURE — 87040 BLOOD CULTURE FOR BACTERIA: CPT | Performed by: PHYSICIAN ASSISTANT

## 2020-08-23 PROCEDURE — 99284 EMERGENCY DEPT VISIT MOD MDM: CPT

## 2020-08-23 PROCEDURE — 85025 COMPLETE CBC W/AUTO DIFF WBC: CPT | Performed by: PHYSICIAN ASSISTANT

## 2020-08-23 PROCEDURE — 87147 CULTURE TYPE IMMUNOLOGIC: CPT | Performed by: PHYSICIAN ASSISTANT

## 2020-08-23 PROCEDURE — 82550 ASSAY OF CK (CPK): CPT | Performed by: PHYSICIAN ASSISTANT

## 2020-08-23 PROCEDURE — 83036 HEMOGLOBIN GLYCOSYLATED A1C: CPT | Performed by: HOSPITALIST

## 2020-08-23 RX ORDER — SODIUM CHLORIDE 0.9 % (FLUSH) 0.9 %
10 SYRINGE (ML) INJECTION AS NEEDED
Status: DISCONTINUED | OUTPATIENT
Start: 2020-08-23 | End: 2020-09-09 | Stop reason: HOSPADM

## 2020-08-24 PROBLEM — L03.115 CELLULITIS OF RIGHT LOWER EXTREMITY: Status: ACTIVE | Noted: 2020-08-24

## 2020-08-24 LAB
ALBUMIN SERPL-MCNC: 2.67 G/DL (ref 3.5–5.2)
ALBUMIN SERPL-MCNC: 3 G/DL (ref 3.5–5.2)
ALBUMIN/GLOB SERPL: 0.6 G/DL
ALBUMIN/GLOB SERPL: 0.6 G/DL
ALP SERPL-CCNC: 79 U/L (ref 39–117)
ALP SERPL-CCNC: 80 U/L (ref 39–117)
ALT SERPL W P-5'-P-CCNC: 15 U/L (ref 1–41)
ALT SERPL W P-5'-P-CCNC: 16 U/L (ref 1–41)
ANION GAP SERPL CALCULATED.3IONS-SCNC: 8.4 MMOL/L (ref 5–15)
ANION GAP SERPL CALCULATED.3IONS-SCNC: 9.5 MMOL/L (ref 5–15)
AST SERPL-CCNC: 16 U/L (ref 1–40)
AST SERPL-CCNC: 18 U/L (ref 1–40)
BACTERIA BLD CULT: ABNORMAL
BACTERIA UR QL AUTO: ABNORMAL /HPF
BASOPHILS # BLD AUTO: 0.02 10*3/MM3 (ref 0–0.2)
BASOPHILS NFR BLD AUTO: 0.2 % (ref 0–1.5)
BILIRUB SERPL-MCNC: 0.8 MG/DL (ref 0–1.2)
BILIRUB SERPL-MCNC: 1 MG/DL (ref 0–1.2)
BILIRUB UR QL STRIP: ABNORMAL
BUN SERPL-MCNC: 10 MG/DL (ref 6–20)
BUN SERPL-MCNC: 13 MG/DL (ref 6–20)
BUN/CREAT SERPL: 20 (ref 7–25)
BUN/CREAT SERPL: 22.4 (ref 7–25)
CALCIUM SPEC-SCNC: 8.5 MG/DL (ref 8.6–10.5)
CALCIUM SPEC-SCNC: 8.6 MG/DL (ref 8.6–10.5)
CHLORIDE SERPL-SCNC: 100 MMOL/L (ref 98–107)
CHLORIDE SERPL-SCNC: 99 MMOL/L (ref 98–107)
CK SERPL-CCNC: 58 U/L (ref 20–200)
CLARITY UR: ABNORMAL
CO2 SERPL-SCNC: 25.6 MMOL/L (ref 22–29)
CO2 SERPL-SCNC: 27.5 MMOL/L (ref 22–29)
COLOR UR: ABNORMAL
CREAT SERPL-MCNC: 0.5 MG/DL (ref 0.76–1.27)
CREAT SERPL-MCNC: 0.58 MG/DL (ref 0.76–1.27)
CRP SERPL-MCNC: 24.77 MG/DL (ref 0–0.5)
CRP SERPL-MCNC: 26.87 MG/DL (ref 0–0.5)
D-LACTATE SERPL-SCNC: 1 MMOL/L (ref 0.5–2)
DEPRECATED RDW RBC AUTO: 49.1 FL (ref 37–54)
EOSINOPHIL # BLD AUTO: 0.2 10*3/MM3 (ref 0–0.4)
EOSINOPHIL NFR BLD AUTO: 1.8 % (ref 0.3–6.2)
ERYTHROCYTE [DISTWIDTH] IN BLOOD BY AUTOMATED COUNT: 14.8 % (ref 12.3–15.4)
GFR SERPL CREATININE-BSD FRML MDRD: >150 ML/MIN/1.73
GFR SERPL CREATININE-BSD FRML MDRD: >150 ML/MIN/1.73
GLOBULIN UR ELPH-MCNC: 4.6 GM/DL
GLOBULIN UR ELPH-MCNC: 4.7 GM/DL
GLUCOSE SERPL-MCNC: 124 MG/DL (ref 65–99)
GLUCOSE SERPL-MCNC: 139 MG/DL (ref 65–99)
GLUCOSE UR STRIP-MCNC: NEGATIVE MG/DL
GRAN CASTS URNS QL MICRO: ABNORMAL /LPF
HBA1C MFR BLD: 5 % (ref 4.8–5.6)
HCT VFR BLD AUTO: 39.2 % (ref 37.5–51)
HGB BLD-MCNC: 12 G/DL (ref 13–17.7)
HGB UR QL STRIP.AUTO: NEGATIVE
HOLD SPECIMEN: NORMAL
HOLD SPECIMEN: NORMAL
HYALINE CASTS UR QL AUTO: ABNORMAL /LPF
IMM GRANULOCYTES # BLD AUTO: 0.04 10*3/MM3 (ref 0–0.05)
IMM GRANULOCYTES NFR BLD AUTO: 0.4 % (ref 0–0.5)
KETONES UR QL STRIP: ABNORMAL
LEUKOCYTE ESTERASE UR QL STRIP.AUTO: ABNORMAL
LYMPHOCYTES # BLD AUTO: 0.43 10*3/MM3 (ref 0.7–3.1)
LYMPHOCYTES NFR BLD AUTO: 3.8 % (ref 19.6–45.3)
MAGNESIUM SERPL-MCNC: 1.7 MG/DL (ref 1.6–2.6)
MCH RBC QN AUTO: 27.3 PG (ref 26.6–33)
MCHC RBC AUTO-ENTMCNC: 30.6 G/DL (ref 31.5–35.7)
MCV RBC AUTO: 89.1 FL (ref 79–97)
MONOCYTES # BLD AUTO: 0.39 10*3/MM3 (ref 0.1–0.9)
MONOCYTES NFR BLD AUTO: 3.4 % (ref 5–12)
NEUTROPHILS NFR BLD AUTO: 10.28 10*3/MM3 (ref 1.7–7)
NEUTROPHILS NFR BLD AUTO: 90.4 % (ref 42.7–76)
NITRITE UR QL STRIP: NEGATIVE
NRBC BLD AUTO-RTO: 0 /100 WBC (ref 0–0.2)
PH UR STRIP.AUTO: 5.5 [PH] (ref 5–8)
PLATELET # BLD AUTO: 190 10*3/MM3 (ref 140–450)
PMV BLD AUTO: 9.8 FL (ref 6–12)
POTASSIUM SERPL-SCNC: 3.4 MMOL/L (ref 3.5–5.2)
POTASSIUM SERPL-SCNC: 3.8 MMOL/L (ref 3.5–5.2)
POTASSIUM SERPL-SCNC: 4.3 MMOL/L (ref 3.5–5.2)
PROT SERPL-MCNC: 7.3 G/DL (ref 6–8.5)
PROT SERPL-MCNC: 7.7 G/DL (ref 6–8.5)
PROT UR QL STRIP: ABNORMAL
RBC # BLD AUTO: 4.4 10*6/MM3 (ref 4.14–5.8)
RBC # UR: ABNORMAL /HPF
REF LAB TEST METHOD: ABNORMAL
SODIUM SERPL-SCNC: 134 MMOL/L (ref 136–145)
SODIUM SERPL-SCNC: 136 MMOL/L (ref 136–145)
SP GR UR STRIP: >=1.03 (ref 1–1.03)
SQUAMOUS #/AREA URNS HPF: ABNORMAL /HPF
UROBILINOGEN UR QL STRIP: ABNORMAL
WBC # BLD AUTO: 11.36 10*3/MM3 (ref 3.4–10.8)
WBC UR QL AUTO: ABNORMAL /HPF
WHOLE BLOOD HOLD SPECIMEN: NORMAL
WHOLE BLOOD HOLD SPECIMEN: NORMAL

## 2020-08-24 PROCEDURE — 25010000002 PIPERACILLIN SOD-TAZOBACTAM PER 1 G: Performed by: PHYSICIAN ASSISTANT

## 2020-08-24 PROCEDURE — 25010000002 HEPARIN (PORCINE) PER 1000 UNITS: Performed by: HOSPITALIST

## 2020-08-24 PROCEDURE — 93010 ELECTROCARDIOGRAM REPORT: CPT | Performed by: INTERNAL MEDICINE

## 2020-08-24 PROCEDURE — 25010000002 MAGNESIUM SULFATE 2 GM/50ML SOLUTION: Performed by: HOSPITALIST

## 2020-08-24 PROCEDURE — 86140 C-REACTIVE PROTEIN: CPT | Performed by: HOSPITALIST

## 2020-08-24 PROCEDURE — 99223 1ST HOSP IP/OBS HIGH 75: CPT | Performed by: HOSPITALIST

## 2020-08-24 PROCEDURE — 85025 COMPLETE CBC W/AUTO DIFF WBC: CPT | Performed by: HOSPITALIST

## 2020-08-24 PROCEDURE — 94799 UNLISTED PULMONARY SVC/PX: CPT

## 2020-08-24 PROCEDURE — 84132 ASSAY OF SERUM POTASSIUM: CPT | Performed by: INTERNAL MEDICINE

## 2020-08-24 PROCEDURE — 94660 CPAP INITIATION&MGMT: CPT

## 2020-08-24 PROCEDURE — 80053 COMPREHEN METABOLIC PANEL: CPT | Performed by: HOSPITALIST

## 2020-08-24 PROCEDURE — 93005 ELECTROCARDIOGRAM TRACING: CPT | Performed by: PHYSICIAN ASSISTANT

## 2020-08-24 PROCEDURE — 25010000002 PIPERACILLIN SOD-TAZOBACTAM PER 1 G: Performed by: HOSPITALIST

## 2020-08-24 PROCEDURE — 25010000002 VANCOMYCIN: Performed by: PHYSICIAN ASSISTANT

## 2020-08-24 RX ORDER — MAGNESIUM SULFATE 1 G/100ML
1 INJECTION INTRAVENOUS AS NEEDED
Status: DISCONTINUED | OUTPATIENT
Start: 2020-08-24 | End: 2020-09-09 | Stop reason: HOSPADM

## 2020-08-24 RX ORDER — SODIUM CHLORIDE 9 MG/ML
75 INJECTION, SOLUTION INTRAVENOUS CONTINUOUS
Status: DISCONTINUED | OUTPATIENT
Start: 2020-08-24 | End: 2020-08-24

## 2020-08-24 RX ORDER — CASTOR OIL AND BALSAM, PERU 788; 87 MG/G; MG/G
OINTMENT TOPICAL AS NEEDED
Status: DISCONTINUED | OUTPATIENT
Start: 2020-08-24 | End: 2020-09-09 | Stop reason: HOSPADM

## 2020-08-24 RX ORDER — MAGNESIUM SULFATE HEPTAHYDRATE 40 MG/ML
2 INJECTION, SOLUTION INTRAVENOUS ONCE
Status: COMPLETED | OUTPATIENT
Start: 2020-08-24 | End: 2020-08-24

## 2020-08-24 RX ORDER — MAGNESIUM SULFATE HEPTAHYDRATE 40 MG/ML
4 INJECTION, SOLUTION INTRAVENOUS AS NEEDED
Status: DISCONTINUED | OUTPATIENT
Start: 2020-08-24 | End: 2020-09-09 | Stop reason: HOSPADM

## 2020-08-24 RX ORDER — MAGNESIUM SULFATE HEPTAHYDRATE 40 MG/ML
2 INJECTION, SOLUTION INTRAVENOUS AS NEEDED
Status: DISCONTINUED | OUTPATIENT
Start: 2020-08-24 | End: 2020-09-09 | Stop reason: HOSPADM

## 2020-08-24 RX ORDER — HEPARIN SODIUM 5000 [USP'U]/ML
5000 INJECTION, SOLUTION INTRAVENOUS; SUBCUTANEOUS EVERY 12 HOURS SCHEDULED
Status: DISCONTINUED | OUTPATIENT
Start: 2020-08-24 | End: 2020-09-09 | Stop reason: HOSPADM

## 2020-08-24 RX ORDER — SODIUM CHLORIDE 0.9 % (FLUSH) 0.9 %
10 SYRINGE (ML) INJECTION EVERY 12 HOURS SCHEDULED
Status: DISCONTINUED | OUTPATIENT
Start: 2020-08-24 | End: 2020-09-09 | Stop reason: HOSPADM

## 2020-08-24 RX ORDER — SODIUM CHLORIDE 0.9 % (FLUSH) 0.9 %
10 SYRINGE (ML) INJECTION AS NEEDED
Status: DISCONTINUED | OUTPATIENT
Start: 2020-08-24 | End: 2020-09-09 | Stop reason: HOSPADM

## 2020-08-24 RX ORDER — L.ACID,PARA/B.BIFIDUM/S.THERM 8B CELL
1 CAPSULE ORAL DAILY
Status: DISCONTINUED | OUTPATIENT
Start: 2020-08-24 | End: 2020-09-01

## 2020-08-24 RX ORDER — IBUPROFEN 600 MG/1
600 TABLET ORAL EVERY 6 HOURS PRN
Status: DISCONTINUED | OUTPATIENT
Start: 2020-08-24 | End: 2020-09-09 | Stop reason: HOSPADM

## 2020-08-24 RX ORDER — POTASSIUM CHLORIDE 1.5 G/1.77G
40 POWDER, FOR SOLUTION ORAL AS NEEDED
Status: DISCONTINUED | OUTPATIENT
Start: 2020-08-24 | End: 2020-09-09 | Stop reason: HOSPADM

## 2020-08-24 RX ORDER — POTASSIUM CHLORIDE 7.45 MG/ML
10 INJECTION INTRAVENOUS
Status: DISCONTINUED | OUTPATIENT
Start: 2020-08-24 | End: 2020-08-24

## 2020-08-24 RX ORDER — CASTOR OIL AND BALSAM, PERU 788; 87 MG/G; MG/G
OINTMENT TOPICAL EVERY 12 HOURS SCHEDULED
Status: DISCONTINUED | OUTPATIENT
Start: 2020-08-24 | End: 2020-09-09 | Stop reason: HOSPADM

## 2020-08-24 RX ORDER — POTASSIUM CHLORIDE 20 MEQ/1
40 TABLET, EXTENDED RELEASE ORAL EVERY 4 HOURS
Status: COMPLETED | OUTPATIENT
Start: 2020-08-24 | End: 2020-08-24

## 2020-08-24 RX ORDER — POTASSIUM CHLORIDE 20 MEQ/1
40 TABLET, EXTENDED RELEASE ORAL AS NEEDED
Status: DISCONTINUED | OUTPATIENT
Start: 2020-08-24 | End: 2020-09-09 | Stop reason: HOSPADM

## 2020-08-24 RX ADMIN — VANCOMYCIN HYDROCHLORIDE 2500 MG: 1 INJECTION, POWDER, LYOPHILIZED, FOR SOLUTION INTRAVENOUS at 14:08

## 2020-08-24 RX ADMIN — VANCOMYCIN HYDROCHLORIDE 2000 MG: 1 INJECTION, POWDER, LYOPHILIZED, FOR SOLUTION INTRAVENOUS at 01:23

## 2020-08-24 RX ADMIN — PIPERACILLIN AND TAZOBACTAM 4.5 G: 4; .5 INJECTION, POWDER, LYOPHILIZED, FOR SOLUTION INTRAVENOUS; PARENTERAL at 14:12

## 2020-08-24 RX ADMIN — CASTOR OIL AND BALSAM, PERU: 788; 87 OINTMENT TOPICAL at 14:08

## 2020-08-24 RX ADMIN — HEPARIN SODIUM 5000 UNITS: 5000 INJECTION INTRAVENOUS; SUBCUTANEOUS at 21:14

## 2020-08-24 RX ADMIN — SODIUM CHLORIDE, PRESERVATIVE FREE 10 ML: 5 INJECTION INTRAVENOUS at 21:15

## 2020-08-24 RX ADMIN — PIPERACILLIN AND TAZOBACTAM 4.5 G: 4; .5 INJECTION, POWDER, LYOPHILIZED, FOR SOLUTION INTRAVENOUS; PARENTERAL at 05:27

## 2020-08-24 RX ADMIN — Medication 1 CAPSULE: at 21:14

## 2020-08-24 RX ADMIN — SODIUM CHLORIDE, PRESERVATIVE FREE 10 ML: 5 INJECTION INTRAVENOUS at 08:30

## 2020-08-24 RX ADMIN — POTASSIUM CHLORIDE 40 MEQ: 20 TABLET, EXTENDED RELEASE ORAL at 05:26

## 2020-08-24 RX ADMIN — PIPERACILLIN AND TAZOBACTAM 4.5 G: 4; .5 INJECTION, POWDER, LYOPHILIZED, FOR SOLUTION INTRAVENOUS; PARENTERAL at 21:14

## 2020-08-24 RX ADMIN — MAGNESIUM SULFATE 2 G: 2 INJECTION INTRAVENOUS at 05:27

## 2020-08-24 RX ADMIN — POTASSIUM CHLORIDE 40 MEQ: 20 TABLET, EXTENDED RELEASE ORAL at 11:00

## 2020-08-24 RX ADMIN — HEPARIN SODIUM 5000 UNITS: 5000 INJECTION INTRAVENOUS; SUBCUTANEOUS at 08:29

## 2020-08-24 RX ADMIN — CASTOR OIL AND BALSAM, PERU: 788; 87 OINTMENT TOPICAL at 21:14

## 2020-08-24 RX ADMIN — IBUPROFEN 600 MG: 600 TABLET ORAL at 12:08

## 2020-08-25 ENCOUNTER — APPOINTMENT (OUTPATIENT)
Dept: MRI IMAGING | Facility: HOSPITAL | Age: 45
End: 2020-08-25

## 2020-08-25 LAB
BACTERIA SPEC AEROBE CULT: NORMAL
BACTERIA UR QL AUTO: ABNORMAL /HPF
BASOPHILS # BLD AUTO: 0.02 10*3/MM3 (ref 0–0.2)
BASOPHILS NFR BLD AUTO: 0.2 % (ref 0–1.5)
BILIRUB UR QL STRIP: ABNORMAL
CLARITY UR: CLEAR
COLOR UR: ABNORMAL
CRP SERPL-MCNC: 26.28 MG/DL (ref 0–0.5)
DEPRECATED RDW RBC AUTO: 50.9 FL (ref 37–54)
EOSINOPHIL # BLD AUTO: 0.35 10*3/MM3 (ref 0–0.4)
EOSINOPHIL NFR BLD AUTO: 3.6 % (ref 0.3–6.2)
ERYTHROCYTE [DISTWIDTH] IN BLOOD BY AUTOMATED COUNT: 14.8 % (ref 12.3–15.4)
GLUCOSE UR STRIP-MCNC: NEGATIVE MG/DL
HCT VFR BLD AUTO: 40.9 % (ref 37.5–51)
HGB BLD-MCNC: 11.9 G/DL (ref 13–17.7)
HGB UR QL STRIP.AUTO: NEGATIVE
HYALINE CASTS UR QL AUTO: ABNORMAL /LPF
IMM GRANULOCYTES # BLD AUTO: 0.04 10*3/MM3 (ref 0–0.05)
IMM GRANULOCYTES NFR BLD AUTO: 0.4 % (ref 0–0.5)
KETONES UR QL STRIP: NEGATIVE
LEUKOCYTE ESTERASE UR QL STRIP.AUTO: NEGATIVE
LYMPHOCYTES # BLD AUTO: 0.58 10*3/MM3 (ref 0.7–3.1)
LYMPHOCYTES NFR BLD AUTO: 6 % (ref 19.6–45.3)
MAGNESIUM SERPL-MCNC: 2 MG/DL (ref 1.6–2.6)
MCH RBC QN AUTO: 26.9 PG (ref 26.6–33)
MCHC RBC AUTO-ENTMCNC: 29.1 G/DL (ref 31.5–35.7)
MCV RBC AUTO: 92.5 FL (ref 79–97)
MONOCYTES # BLD AUTO: 0.58 10*3/MM3 (ref 0.1–0.9)
MONOCYTES NFR BLD AUTO: 6 % (ref 5–12)
NEUTROPHILS NFR BLD AUTO: 8.14 10*3/MM3 (ref 1.7–7)
NEUTROPHILS NFR BLD AUTO: 83.8 % (ref 42.7–76)
NITRITE UR QL STRIP: NEGATIVE
NRBC BLD AUTO-RTO: 0 /100 WBC (ref 0–0.2)
PH UR STRIP.AUTO: 5.5 [PH] (ref 5–8)
PLATELET # BLD AUTO: 168 10*3/MM3 (ref 140–450)
PMV BLD AUTO: 9.9 FL (ref 6–12)
PROT UR QL STRIP: ABNORMAL
RBC # BLD AUTO: 4.42 10*6/MM3 (ref 4.14–5.8)
RBC # UR: ABNORMAL /HPF
REF LAB TEST METHOD: ABNORMAL
SP GR UR STRIP: >=1.03 (ref 1–1.03)
SQUAMOUS #/AREA URNS HPF: ABNORMAL /HPF
UROBILINOGEN UR QL STRIP: ABNORMAL
WBC # BLD AUTO: 9.71 10*3/MM3 (ref 3.4–10.8)
WBC UR QL AUTO: ABNORMAL /HPF

## 2020-08-25 PROCEDURE — 99232 SBSQ HOSP IP/OBS MODERATE 35: CPT | Performed by: INTERNAL MEDICINE

## 2020-08-25 PROCEDURE — 94799 UNLISTED PULMONARY SVC/PX: CPT

## 2020-08-25 PROCEDURE — 83735 ASSAY OF MAGNESIUM: CPT | Performed by: HOSPITALIST

## 2020-08-25 PROCEDURE — 86140 C-REACTIVE PROTEIN: CPT | Performed by: NURSE PRACTITIONER

## 2020-08-25 PROCEDURE — 84153 ASSAY OF PSA TOTAL: CPT | Performed by: NURSE PRACTITIONER

## 2020-08-25 PROCEDURE — 25010000002 VANCOMYCIN 5 G RECONSTITUTED SOLUTION: Performed by: HOSPITALIST

## 2020-08-25 PROCEDURE — 25010000002 PIPERACILLIN SOD-TAZOBACTAM PER 1 G: Performed by: HOSPITALIST

## 2020-08-25 PROCEDURE — 25010000002 HEPARIN (PORCINE) PER 1000 UNITS: Performed by: HOSPITALIST

## 2020-08-25 PROCEDURE — 85025 COMPLETE CBC W/AUTO DIFF WBC: CPT | Performed by: NURSE PRACTITIONER

## 2020-08-25 PROCEDURE — 81001 URINALYSIS AUTO W/SCOPE: CPT | Performed by: NURSE PRACTITIONER

## 2020-08-25 PROCEDURE — 87040 BLOOD CULTURE FOR BACTERIA: CPT | Performed by: NURSE PRACTITIONER

## 2020-08-25 RX ADMIN — SODIUM CHLORIDE, PRESERVATIVE FREE 10 ML: 5 INJECTION INTRAVENOUS at 09:22

## 2020-08-25 RX ADMIN — HEPARIN SODIUM 5000 UNITS: 5000 INJECTION INTRAVENOUS; SUBCUTANEOUS at 22:07

## 2020-08-25 RX ADMIN — PIPERACILLIN AND TAZOBACTAM 4.5 G: 4; .5 INJECTION, POWDER, LYOPHILIZED, FOR SOLUTION INTRAVENOUS; PARENTERAL at 05:55

## 2020-08-25 RX ADMIN — HEPARIN SODIUM 5000 UNITS: 5000 INJECTION INTRAVENOUS; SUBCUTANEOUS at 09:21

## 2020-08-25 RX ADMIN — IBUPROFEN 600 MG: 600 TABLET ORAL at 22:12

## 2020-08-25 RX ADMIN — VANCOMYCIN HYDROCHLORIDE 2500 MG: 1 INJECTION, POWDER, LYOPHILIZED, FOR SOLUTION INTRAVENOUS at 15:21

## 2020-08-25 RX ADMIN — CASTOR OIL AND BALSAM, PERU: 788; 87 OINTMENT TOPICAL at 09:22

## 2020-08-25 RX ADMIN — Medication 1 CAPSULE: at 09:21

## 2020-08-25 RX ADMIN — CASTOR OIL AND BALSAM, PERU: 788; 87 OINTMENT TOPICAL at 22:07

## 2020-08-25 RX ADMIN — VANCOMYCIN HYDROCHLORIDE 2500 MG: 1 INJECTION, POWDER, LYOPHILIZED, FOR SOLUTION INTRAVENOUS at 02:08

## 2020-08-25 RX ADMIN — IBUPROFEN 600 MG: 600 TABLET ORAL at 00:39

## 2020-08-26 ENCOUNTER — APPOINTMENT (OUTPATIENT)
Dept: GENERAL RADIOLOGY | Facility: HOSPITAL | Age: 45
End: 2020-08-26

## 2020-08-26 LAB
ANION GAP SERPL CALCULATED.3IONS-SCNC: 10.5 MMOL/L (ref 5–15)
BUN SERPL-MCNC: 9 MG/DL (ref 6–20)
BUN/CREAT SERPL: 21.4 (ref 7–25)
CALCIUM SPEC-SCNC: 7.8 MG/DL (ref 8.6–10.5)
CHLORIDE SERPL-SCNC: 100 MMOL/L (ref 98–107)
CO2 SERPL-SCNC: 22.5 MMOL/L (ref 22–29)
CREAT SERPL-MCNC: 0.42 MG/DL (ref 0.76–1.27)
CRP SERPL-MCNC: 17.32 MG/DL (ref 0–0.5)
DEPRECATED RDW RBC AUTO: 50 FL (ref 37–54)
ERYTHROCYTE [DISTWIDTH] IN BLOOD BY AUTOMATED COUNT: 14.7 % (ref 12.3–15.4)
GFR SERPL CREATININE-BSD FRML MDRD: >150 ML/MIN/1.73
GLUCOSE SERPL-MCNC: 99 MG/DL (ref 65–99)
HCT VFR BLD AUTO: 37.8 % (ref 37.5–51)
HGB BLD-MCNC: 11.1 G/DL (ref 13–17.7)
MCH RBC QN AUTO: 26.9 PG (ref 26.6–33)
MCHC RBC AUTO-ENTMCNC: 29.4 G/DL (ref 31.5–35.7)
MCV RBC AUTO: 91.7 FL (ref 79–97)
PLATELET # BLD AUTO: 180 10*3/MM3 (ref 140–450)
PMV BLD AUTO: 9.9 FL (ref 6–12)
POTASSIUM SERPL-SCNC: 3.8 MMOL/L (ref 3.5–5.2)
PSA SERPL-MCNC: 0.14 NG/ML (ref 0–4)
RBC # BLD AUTO: 4.12 10*6/MM3 (ref 4.14–5.8)
SODIUM SERPL-SCNC: 133 MMOL/L (ref 136–145)
WBC # BLD AUTO: 9.28 10*3/MM3 (ref 3.4–10.8)

## 2020-08-26 PROCEDURE — 73610 X-RAY EXAM OF ANKLE: CPT

## 2020-08-26 PROCEDURE — 73610 X-RAY EXAM OF ANKLE: CPT | Performed by: RADIOLOGY

## 2020-08-26 PROCEDURE — 25010000002 VANCOMYCIN 5 G RECONSTITUTED SOLUTION: Performed by: HOSPITALIST

## 2020-08-26 PROCEDURE — 25010000002 HEPARIN (PORCINE) PER 1000 UNITS: Performed by: HOSPITALIST

## 2020-08-26 PROCEDURE — 94660 CPAP INITIATION&MGMT: CPT

## 2020-08-26 PROCEDURE — 94799 UNLISTED PULMONARY SVC/PX: CPT

## 2020-08-26 PROCEDURE — 25010000002 CEFTRIAXONE PER 250 MG: Performed by: EMERGENCY MEDICINE

## 2020-08-26 PROCEDURE — 99232 SBSQ HOSP IP/OBS MODERATE 35: CPT | Performed by: EMERGENCY MEDICINE

## 2020-08-26 PROCEDURE — 86140 C-REACTIVE PROTEIN: CPT | Performed by: NURSE PRACTITIONER

## 2020-08-26 PROCEDURE — 80048 BASIC METABOLIC PNL TOTAL CA: CPT | Performed by: INTERNAL MEDICINE

## 2020-08-26 PROCEDURE — 85027 COMPLETE CBC AUTOMATED: CPT | Performed by: INTERNAL MEDICINE

## 2020-08-26 RX ADMIN — SODIUM CHLORIDE, PRESERVATIVE FREE 10 ML: 5 INJECTION INTRAVENOUS at 09:23

## 2020-08-26 RX ADMIN — VANCOMYCIN HYDROCHLORIDE 2500 MG: 1 INJECTION, POWDER, LYOPHILIZED, FOR SOLUTION INTRAVENOUS at 15:55

## 2020-08-26 RX ADMIN — HEPARIN SODIUM 5000 UNITS: 5000 INJECTION INTRAVENOUS; SUBCUTANEOUS at 21:03

## 2020-08-26 RX ADMIN — CEFTRIAXONE 2 G: 2 INJECTION, POWDER, FOR SOLUTION INTRAMUSCULAR; INTRAVENOUS at 18:21

## 2020-08-26 RX ADMIN — SODIUM CHLORIDE, PRESERVATIVE FREE 10 ML: 5 INJECTION INTRAVENOUS at 21:06

## 2020-08-26 RX ADMIN — CASTOR OIL AND BALSAM, PERU: 788; 87 OINTMENT TOPICAL at 09:23

## 2020-08-26 RX ADMIN — CASTOR OIL AND BALSAM, PERU: 788; 87 OINTMENT TOPICAL at 21:05

## 2020-08-26 RX ADMIN — HEPARIN SODIUM 5000 UNITS: 5000 INJECTION INTRAVENOUS; SUBCUTANEOUS at 09:23

## 2020-08-26 RX ADMIN — Medication 1 CAPSULE: at 09:23

## 2020-08-26 RX ADMIN — IBUPROFEN 600 MG: 600 TABLET ORAL at 21:03

## 2020-08-26 RX ADMIN — VANCOMYCIN HYDROCHLORIDE 2500 MG: 1 INJECTION, POWDER, LYOPHILIZED, FOR SOLUTION INTRAVENOUS at 01:50

## 2020-08-27 ENCOUNTER — APPOINTMENT (OUTPATIENT)
Dept: MRI IMAGING | Facility: HOSPITAL | Age: 45
End: 2020-08-27

## 2020-08-27 LAB
ALBUMIN SERPL-MCNC: 2.19 G/DL (ref 3.5–5.2)
ANION GAP SERPL CALCULATED.3IONS-SCNC: 8.9 MMOL/L (ref 5–15)
BACTERIA SPEC AEROBE CULT: ABNORMAL
BASOPHILS # BLD AUTO: 0.03 10*3/MM3 (ref 0–0.2)
BASOPHILS NFR BLD AUTO: 0.4 % (ref 0–1.5)
BUN SERPL-MCNC: 8 MG/DL (ref 6–20)
BUN/CREAT SERPL: 20.5 (ref 7–25)
CALCIUM SPEC-SCNC: 7.9 MG/DL (ref 8.6–10.5)
CHLORIDE SERPL-SCNC: 100 MMOL/L (ref 98–107)
CO2 SERPL-SCNC: 26.1 MMOL/L (ref 22–29)
CREAT SERPL-MCNC: 0.39 MG/DL (ref 0.76–1.27)
CRP SERPL-MCNC: 11.14 MG/DL (ref 0–0.5)
DEPRECATED RDW RBC AUTO: 49.9 FL (ref 37–54)
EOSINOPHIL # BLD AUTO: 0.49 10*3/MM3 (ref 0–0.4)
EOSINOPHIL NFR BLD AUTO: 7.2 % (ref 0.3–6.2)
ERYTHROCYTE [DISTWIDTH] IN BLOOD BY AUTOMATED COUNT: 14.6 % (ref 12.3–15.4)
GFR SERPL CREATININE-BSD FRML MDRD: >150 ML/MIN/1.73
GLUCOSE SERPL-MCNC: 96 MG/DL (ref 65–99)
GRAM STN SPEC: ABNORMAL
HCT VFR BLD AUTO: 37.5 % (ref 37.5–51)
HGB BLD-MCNC: 11 G/DL (ref 13–17.7)
IMM GRANULOCYTES # BLD AUTO: 0.11 10*3/MM3 (ref 0–0.05)
IMM GRANULOCYTES NFR BLD AUTO: 1.6 % (ref 0–0.5)
ISOLATED FROM: ABNORMAL
ISOLATED FROM: ABNORMAL
LYMPHOCYTES # BLD AUTO: 0.8 10*3/MM3 (ref 0.7–3.1)
LYMPHOCYTES NFR BLD AUTO: 11.7 % (ref 19.6–45.3)
MCH RBC QN AUTO: 27 PG (ref 26.6–33)
MCHC RBC AUTO-ENTMCNC: 29.3 G/DL (ref 31.5–35.7)
MCV RBC AUTO: 92.1 FL (ref 79–97)
MONOCYTES # BLD AUTO: 0.49 10*3/MM3 (ref 0.1–0.9)
MONOCYTES NFR BLD AUTO: 7.2 % (ref 5–12)
NEUTROPHILS NFR BLD AUTO: 4.92 10*3/MM3 (ref 1.7–7)
NEUTROPHILS NFR BLD AUTO: 71.9 % (ref 42.7–76)
NRBC BLD AUTO-RTO: 0 /100 WBC (ref 0–0.2)
PHOSPHATE SERPL-MCNC: 3.5 MG/DL (ref 2.5–4.5)
PLATELET # BLD AUTO: 213 10*3/MM3 (ref 140–450)
PMV BLD AUTO: 9.7 FL (ref 6–12)
POTASSIUM SERPL-SCNC: 4.1 MMOL/L (ref 3.5–5.2)
RBC # BLD AUTO: 4.07 10*6/MM3 (ref 4.14–5.8)
SODIUM SERPL-SCNC: 135 MMOL/L (ref 136–145)
VANCOMYCIN SERPL-MCNC: 21.9 MCG/ML (ref 5–40)
VANCOMYCIN TROUGH SERPL-MCNC: 11.2 MCG/ML (ref 5–20)
WBC # BLD AUTO: 6.84 10*3/MM3 (ref 3.4–10.8)

## 2020-08-27 PROCEDURE — 25010000002 HEPARIN (PORCINE) PER 1000 UNITS: Performed by: HOSPITALIST

## 2020-08-27 PROCEDURE — 94799 UNLISTED PULMONARY SVC/PX: CPT

## 2020-08-27 PROCEDURE — 94660 CPAP INITIATION&MGMT: CPT

## 2020-08-27 PROCEDURE — 97167 OT EVAL HIGH COMPLEX 60 MIN: CPT

## 2020-08-27 PROCEDURE — 80202 ASSAY OF VANCOMYCIN: CPT

## 2020-08-27 PROCEDURE — 25010000002 CEFTRIAXONE PER 250 MG: Performed by: EMERGENCY MEDICINE

## 2020-08-27 PROCEDURE — 85025 COMPLETE CBC W/AUTO DIFF WBC: CPT | Performed by: EMERGENCY MEDICINE

## 2020-08-27 PROCEDURE — 86140 C-REACTIVE PROTEIN: CPT | Performed by: NURSE PRACTITIONER

## 2020-08-27 PROCEDURE — 25010000002 VANCOMYCIN 5 G RECONSTITUTED SOLUTION: Performed by: HOSPITALIST

## 2020-08-27 PROCEDURE — 80069 RENAL FUNCTION PANEL: CPT | Performed by: EMERGENCY MEDICINE

## 2020-08-27 PROCEDURE — 25010000002 FUROSEMIDE PER 20 MG: Performed by: EMERGENCY MEDICINE

## 2020-08-27 PROCEDURE — 97162 PT EVAL MOD COMPLEX 30 MIN: CPT

## 2020-08-27 PROCEDURE — 99232 SBSQ HOSP IP/OBS MODERATE 35: CPT | Performed by: EMERGENCY MEDICINE

## 2020-08-27 RX ORDER — FUROSEMIDE 10 MG/ML
20 INJECTION INTRAMUSCULAR; INTRAVENOUS EVERY 6 HOURS
Status: DISCONTINUED | OUTPATIENT
Start: 2020-08-27 | End: 2020-08-29

## 2020-08-27 RX ADMIN — CASTOR OIL AND BALSAM, PERU: 788; 87 OINTMENT TOPICAL at 09:08

## 2020-08-27 RX ADMIN — CASTOR OIL AND BALSAM, PERU: 788; 87 OINTMENT TOPICAL at 21:36

## 2020-08-27 RX ADMIN — Medication 1 CAPSULE: at 09:06

## 2020-08-27 RX ADMIN — SODIUM CHLORIDE, PRESERVATIVE FREE 10 ML: 5 INJECTION INTRAVENOUS at 21:38

## 2020-08-27 RX ADMIN — VANCOMYCIN HYDROCHLORIDE 2500 MG: 1 INJECTION, POWDER, LYOPHILIZED, FOR SOLUTION INTRAVENOUS at 04:55

## 2020-08-27 RX ADMIN — IBUPROFEN 600 MG: 600 TABLET ORAL at 21:36

## 2020-08-27 RX ADMIN — HEPARIN SODIUM 5000 UNITS: 5000 INJECTION INTRAVENOUS; SUBCUTANEOUS at 09:06

## 2020-08-27 RX ADMIN — CEFTRIAXONE 2 G: 2 INJECTION, POWDER, FOR SOLUTION INTRAMUSCULAR; INTRAVENOUS at 18:06

## 2020-08-27 RX ADMIN — FUROSEMIDE 20 MG: 20 INJECTION, SOLUTION INTRAMUSCULAR; INTRAVENOUS at 18:05

## 2020-08-27 RX ADMIN — SODIUM CHLORIDE, PRESERVATIVE FREE 10 ML: 5 INJECTION INTRAVENOUS at 09:07

## 2020-08-27 RX ADMIN — HEPARIN SODIUM 5000 UNITS: 5000 INJECTION INTRAVENOUS; SUBCUTANEOUS at 21:36

## 2020-08-27 RX ADMIN — FUROSEMIDE 20 MG: 20 INJECTION, SOLUTION INTRAMUSCULAR; INTRAVENOUS at 14:47

## 2020-08-27 RX ADMIN — IBUPROFEN 600 MG: 600 TABLET ORAL at 09:06

## 2020-08-27 RX ADMIN — VANCOMYCIN HYDROCHLORIDE 2500 MG: 1 INJECTION, POWDER, LYOPHILIZED, FOR SOLUTION INTRAVENOUS at 16:47

## 2020-08-28 ENCOUNTER — APPOINTMENT (OUTPATIENT)
Dept: CT IMAGING | Facility: HOSPITAL | Age: 45
End: 2020-08-28

## 2020-08-28 LAB
ALBUMIN SERPL-MCNC: 2.02 G/DL (ref 3.5–5.2)
ANION GAP SERPL CALCULATED.3IONS-SCNC: 9.2 MMOL/L (ref 5–15)
BASOPHILS # BLD AUTO: 0.03 10*3/MM3 (ref 0–0.2)
BASOPHILS NFR BLD AUTO: 0.4 % (ref 0–1.5)
BUN SERPL-MCNC: 8 MG/DL (ref 6–20)
BUN/CREAT SERPL: 18.6 (ref 7–25)
CALCIUM SPEC-SCNC: 8.1 MG/DL (ref 8.6–10.5)
CHLORIDE SERPL-SCNC: 99 MMOL/L (ref 98–107)
CO2 SERPL-SCNC: 27.8 MMOL/L (ref 22–29)
CREAT SERPL-MCNC: 0.43 MG/DL (ref 0.76–1.27)
CRP SERPL-MCNC: 9.84 MG/DL (ref 0–0.5)
DEPRECATED RDW RBC AUTO: 48.3 FL (ref 37–54)
EOSINOPHIL # BLD AUTO: 0.49 10*3/MM3 (ref 0–0.4)
EOSINOPHIL NFR BLD AUTO: 7.1 % (ref 0.3–6.2)
ERYTHROCYTE [DISTWIDTH] IN BLOOD BY AUTOMATED COUNT: 14.6 % (ref 12.3–15.4)
GFR SERPL CREATININE-BSD FRML MDRD: >150 ML/MIN/1.73
GLUCOSE SERPL-MCNC: 101 MG/DL (ref 65–99)
HCT VFR BLD AUTO: 37.5 % (ref 37.5–51)
HGB BLD-MCNC: 11.2 G/DL (ref 13–17.7)
IMM GRANULOCYTES # BLD AUTO: 0.16 10*3/MM3 (ref 0–0.05)
IMM GRANULOCYTES NFR BLD AUTO: 2.3 % (ref 0–0.5)
LYMPHOCYTES # BLD AUTO: 0.82 10*3/MM3 (ref 0.7–3.1)
LYMPHOCYTES NFR BLD AUTO: 11.8 % (ref 19.6–45.3)
MCH RBC QN AUTO: 27.1 PG (ref 26.6–33)
MCHC RBC AUTO-ENTMCNC: 29.9 G/DL (ref 31.5–35.7)
MCV RBC AUTO: 90.6 FL (ref 79–97)
MONOCYTES # BLD AUTO: 0.43 10*3/MM3 (ref 0.1–0.9)
MONOCYTES NFR BLD AUTO: 6.2 % (ref 5–12)
NEUTROPHILS NFR BLD AUTO: 5 10*3/MM3 (ref 1.7–7)
NEUTROPHILS NFR BLD AUTO: 72.2 % (ref 42.7–76)
NRBC BLD AUTO-RTO: 0.4 /100 WBC (ref 0–0.2)
PHOSPHATE SERPL-MCNC: 4.3 MG/DL (ref 2.5–4.5)
PLATELET # BLD AUTO: 251 10*3/MM3 (ref 140–450)
PMV BLD AUTO: 9.6 FL (ref 6–12)
POTASSIUM SERPL-SCNC: 4 MMOL/L (ref 3.5–5.2)
RBC # BLD AUTO: 4.14 10*6/MM3 (ref 4.14–5.8)
SODIUM SERPL-SCNC: 136 MMOL/L (ref 136–145)
VANCOMYCIN SERPL-MCNC: 13 MCG/ML (ref 5–40)
WBC # BLD AUTO: 6.93 10*3/MM3 (ref 3.4–10.8)

## 2020-08-28 PROCEDURE — 25010000002 FUROSEMIDE PER 20 MG: Performed by: EMERGENCY MEDICINE

## 2020-08-28 PROCEDURE — 94799 UNLISTED PULMONARY SVC/PX: CPT

## 2020-08-28 PROCEDURE — 99232 SBSQ HOSP IP/OBS MODERATE 35: CPT | Performed by: EMERGENCY MEDICINE

## 2020-08-28 PROCEDURE — 25010000002 CEFTRIAXONE PER 250 MG: Performed by: EMERGENCY MEDICINE

## 2020-08-28 PROCEDURE — 85025 COMPLETE CBC W/AUTO DIFF WBC: CPT | Performed by: EMERGENCY MEDICINE

## 2020-08-28 PROCEDURE — 80069 RENAL FUNCTION PANEL: CPT | Performed by: EMERGENCY MEDICINE

## 2020-08-28 PROCEDURE — 25010000002 HEPARIN (PORCINE) PER 1000 UNITS: Performed by: HOSPITALIST

## 2020-08-28 PROCEDURE — 80202 ASSAY OF VANCOMYCIN: CPT

## 2020-08-28 PROCEDURE — 94660 CPAP INITIATION&MGMT: CPT

## 2020-08-28 PROCEDURE — 86140 C-REACTIVE PROTEIN: CPT | Performed by: NURSE PRACTITIONER

## 2020-08-28 RX ADMIN — HEPARIN SODIUM 5000 UNITS: 5000 INJECTION INTRAVENOUS; SUBCUTANEOUS at 08:36

## 2020-08-28 RX ADMIN — FUROSEMIDE 20 MG: 20 INJECTION, SOLUTION INTRAMUSCULAR; INTRAVENOUS at 12:30

## 2020-08-28 RX ADMIN — SODIUM CHLORIDE, PRESERVATIVE FREE 10 ML: 5 INJECTION INTRAVENOUS at 08:36

## 2020-08-28 RX ADMIN — SODIUM CHLORIDE, PRESERVATIVE FREE 10 ML: 5 INJECTION INTRAVENOUS at 20:08

## 2020-08-28 RX ADMIN — VANCOMYCIN HYDROCHLORIDE 2500 MG: 1 INJECTION, POWDER, LYOPHILIZED, FOR SOLUTION INTRAVENOUS at 05:00

## 2020-08-28 RX ADMIN — FUROSEMIDE 20 MG: 20 INJECTION, SOLUTION INTRAMUSCULAR; INTRAVENOUS at 18:06

## 2020-08-28 RX ADMIN — HEPARIN SODIUM 5000 UNITS: 5000 INJECTION INTRAVENOUS; SUBCUTANEOUS at 20:08

## 2020-08-28 RX ADMIN — IBUPROFEN 600 MG: 600 TABLET ORAL at 23:18

## 2020-08-28 RX ADMIN — CEFTRIAXONE 2 G: 2 INJECTION, POWDER, FOR SOLUTION INTRAMUSCULAR; INTRAVENOUS at 18:06

## 2020-08-28 RX ADMIN — CASTOR OIL AND BALSAM, PERU: 788; 87 OINTMENT TOPICAL at 20:08

## 2020-08-28 RX ADMIN — CASTOR OIL AND BALSAM, PERU: 788; 87 OINTMENT TOPICAL at 08:37

## 2020-08-28 RX ADMIN — Medication 1 CAPSULE: at 08:36

## 2020-08-28 RX ADMIN — VANCOMYCIN HYDROCHLORIDE 2500 MG: 1 INJECTION, POWDER, LYOPHILIZED, FOR SOLUTION INTRAVENOUS at 16:24

## 2020-08-28 RX ADMIN — FUROSEMIDE 20 MG: 20 INJECTION, SOLUTION INTRAMUSCULAR; INTRAVENOUS at 01:43

## 2020-08-28 RX ADMIN — FUROSEMIDE 20 MG: 20 INJECTION, SOLUTION INTRAMUSCULAR; INTRAVENOUS at 06:01

## 2020-08-29 LAB
ALBUMIN SERPL-MCNC: 2.56 G/DL (ref 3.5–5.2)
ANION GAP SERPL CALCULATED.3IONS-SCNC: 8.8 MMOL/L (ref 5–15)
BUN SERPL-MCNC: 8 MG/DL (ref 6–20)
BUN/CREAT SERPL: 19 (ref 7–25)
CALCIUM SPEC-SCNC: 8.5 MG/DL (ref 8.6–10.5)
CHLORIDE SERPL-SCNC: 97 MMOL/L (ref 98–107)
CO2 SERPL-SCNC: 30.2 MMOL/L (ref 22–29)
CREAT SERPL-MCNC: 0.42 MG/DL (ref 0.76–1.27)
CRP SERPL-MCNC: 7.84 MG/DL (ref 0–0.5)
GFR SERPL CREATININE-BSD FRML MDRD: >150 ML/MIN/1.73
GLUCOSE SERPL-MCNC: 124 MG/DL (ref 65–99)
MAGNESIUM SERPL-MCNC: 2 MG/DL (ref 1.6–2.6)
PHOSPHATE SERPL-MCNC: 3.9 MG/DL (ref 2.5–4.5)
POTASSIUM SERPL-SCNC: 4.9 MMOL/L (ref 3.5–5.2)
SODIUM SERPL-SCNC: 136 MMOL/L (ref 136–145)

## 2020-08-29 PROCEDURE — 25010000002 CEFTRIAXONE PER 250 MG: Performed by: EMERGENCY MEDICINE

## 2020-08-29 PROCEDURE — 94799 UNLISTED PULMONARY SVC/PX: CPT

## 2020-08-29 PROCEDURE — 83735 ASSAY OF MAGNESIUM: CPT | Performed by: EMERGENCY MEDICINE

## 2020-08-29 PROCEDURE — 25010000002 HEPARIN (PORCINE) PER 1000 UNITS: Performed by: HOSPITALIST

## 2020-08-29 PROCEDURE — 99232 SBSQ HOSP IP/OBS MODERATE 35: CPT | Performed by: EMERGENCY MEDICINE

## 2020-08-29 PROCEDURE — 80069 RENAL FUNCTION PANEL: CPT | Performed by: EMERGENCY MEDICINE

## 2020-08-29 PROCEDURE — 25010000002 FUROSEMIDE PER 20 MG: Performed by: EMERGENCY MEDICINE

## 2020-08-29 PROCEDURE — 94660 CPAP INITIATION&MGMT: CPT

## 2020-08-29 PROCEDURE — 86140 C-REACTIVE PROTEIN: CPT | Performed by: NURSE PRACTITIONER

## 2020-08-29 PROCEDURE — 25010000002 VANCOMYCIN 5 G RECONSTITUTED SOLUTION: Performed by: NURSE PRACTITIONER

## 2020-08-29 RX ORDER — FUROSEMIDE 10 MG/ML
40 INJECTION INTRAMUSCULAR; INTRAVENOUS EVERY 6 HOURS
Status: COMPLETED | OUTPATIENT
Start: 2020-08-29 | End: 2020-08-31

## 2020-08-29 RX ORDER — METOLAZONE 2.5 MG/1
2.5 TABLET ORAL DAILY
Status: DISCONTINUED | OUTPATIENT
Start: 2020-08-29 | End: 2020-08-30

## 2020-08-29 RX ADMIN — HEPARIN SODIUM 5000 UNITS: 5000 INJECTION INTRAVENOUS; SUBCUTANEOUS at 20:52

## 2020-08-29 RX ADMIN — FUROSEMIDE 20 MG: 20 INJECTION, SOLUTION INTRAMUSCULAR; INTRAVENOUS at 00:41

## 2020-08-29 RX ADMIN — CASTOR OIL AND BALSAM, PERU: 788; 87 OINTMENT TOPICAL at 20:52

## 2020-08-29 RX ADMIN — FUROSEMIDE 40 MG: 10 INJECTION INTRAMUSCULAR; INTRAVENOUS at 23:25

## 2020-08-29 RX ADMIN — SODIUM CHLORIDE, PRESERVATIVE FREE 10 ML: 5 INJECTION INTRAVENOUS at 09:33

## 2020-08-29 RX ADMIN — FUROSEMIDE 40 MG: 10 INJECTION INTRAMUSCULAR; INTRAVENOUS at 12:52

## 2020-08-29 RX ADMIN — VANCOMYCIN HYDROCHLORIDE 2500 MG: 1 INJECTION, POWDER, LYOPHILIZED, FOR SOLUTION INTRAVENOUS at 16:15

## 2020-08-29 RX ADMIN — HEPARIN SODIUM 5000 UNITS: 5000 INJECTION INTRAVENOUS; SUBCUTANEOUS at 09:33

## 2020-08-29 RX ADMIN — FUROSEMIDE 20 MG: 20 INJECTION, SOLUTION INTRAMUSCULAR; INTRAVENOUS at 06:18

## 2020-08-29 RX ADMIN — FUROSEMIDE 40 MG: 10 INJECTION INTRAMUSCULAR; INTRAVENOUS at 18:44

## 2020-08-29 RX ADMIN — CASTOR OIL AND BALSAM, PERU: 788; 87 OINTMENT TOPICAL at 09:33

## 2020-08-29 RX ADMIN — Medication 1 CAPSULE: at 09:33

## 2020-08-29 RX ADMIN — METOLAZONE 2.5 MG: 2.5 TABLET ORAL at 14:36

## 2020-08-29 RX ADMIN — VANCOMYCIN HYDROCHLORIDE 2500 MG: 1 INJECTION, POWDER, LYOPHILIZED, FOR SOLUTION INTRAVENOUS at 03:20

## 2020-08-29 RX ADMIN — CEFTRIAXONE 2 G: 2 INJECTION, POWDER, FOR SOLUTION INTRAMUSCULAR; INTRAVENOUS at 20:54

## 2020-08-29 RX ADMIN — SODIUM CHLORIDE, PRESERVATIVE FREE 10 ML: 5 INJECTION INTRAVENOUS at 20:53

## 2020-08-30 LAB
ALBUMIN SERPL-MCNC: 2.9 G/DL (ref 3.5–5.2)
ANION GAP SERPL CALCULATED.3IONS-SCNC: 8.8 MMOL/L (ref 5–15)
BACTERIA SPEC AEROBE CULT: NORMAL
BACTERIA SPEC AEROBE CULT: NORMAL
BUN SERPL-MCNC: 10 MG/DL (ref 6–20)
BUN/CREAT SERPL: 18.9 (ref 7–25)
CALCIUM SPEC-SCNC: 9.2 MG/DL (ref 8.6–10.5)
CHLORIDE SERPL-SCNC: 92 MMOL/L (ref 98–107)
CO2 SERPL-SCNC: 35.2 MMOL/L (ref 22–29)
CREAT SERPL-MCNC: 0.53 MG/DL (ref 0.76–1.27)
CRP SERPL-MCNC: 5.78 MG/DL (ref 0–0.5)
GFR SERPL CREATININE-BSD FRML MDRD: >150 ML/MIN/1.73
GLUCOSE SERPL-MCNC: 122 MG/DL (ref 65–99)
MAGNESIUM SERPL-MCNC: 2.1 MG/DL (ref 1.6–2.6)
PHOSPHATE SERPL-MCNC: 4.4 MG/DL (ref 2.5–4.5)
POTASSIUM SERPL-SCNC: 3.7 MMOL/L (ref 3.5–5.2)
SODIUM SERPL-SCNC: 136 MMOL/L (ref 136–145)

## 2020-08-30 PROCEDURE — 86140 C-REACTIVE PROTEIN: CPT | Performed by: NURSE PRACTITIONER

## 2020-08-30 PROCEDURE — 94799 UNLISTED PULMONARY SVC/PX: CPT

## 2020-08-30 PROCEDURE — 94660 CPAP INITIATION&MGMT: CPT

## 2020-08-30 PROCEDURE — 25010000002 VANCOMYCIN 5 G RECONSTITUTED SOLUTION: Performed by: NURSE PRACTITIONER

## 2020-08-30 PROCEDURE — 83735 ASSAY OF MAGNESIUM: CPT | Performed by: EMERGENCY MEDICINE

## 2020-08-30 PROCEDURE — 25010000002 HEPARIN (PORCINE) PER 1000 UNITS: Performed by: HOSPITALIST

## 2020-08-30 PROCEDURE — 25010000002 FUROSEMIDE PER 20 MG: Performed by: EMERGENCY MEDICINE

## 2020-08-30 PROCEDURE — 80069 RENAL FUNCTION PANEL: CPT | Performed by: EMERGENCY MEDICINE

## 2020-08-30 PROCEDURE — 99232 SBSQ HOSP IP/OBS MODERATE 35: CPT | Performed by: EMERGENCY MEDICINE

## 2020-08-30 PROCEDURE — 25010000002 CEFTRIAXONE PER 250 MG: Performed by: EMERGENCY MEDICINE

## 2020-08-30 RX ORDER — METOLAZONE 2.5 MG/1
5 TABLET ORAL DAILY
Status: COMPLETED | OUTPATIENT
Start: 2020-08-30 | End: 2020-08-31

## 2020-08-30 RX ADMIN — FUROSEMIDE 40 MG: 10 INJECTION INTRAMUSCULAR; INTRAVENOUS at 23:27

## 2020-08-30 RX ADMIN — FUROSEMIDE 40 MG: 10 INJECTION INTRAMUSCULAR; INTRAVENOUS at 17:56

## 2020-08-30 RX ADMIN — CASTOR OIL AND BALSAM, PERU: 788; 87 OINTMENT TOPICAL at 20:43

## 2020-08-30 RX ADMIN — SODIUM CHLORIDE, PRESERVATIVE FREE 10 ML: 5 INJECTION INTRAVENOUS at 20:43

## 2020-08-30 RX ADMIN — FUROSEMIDE 40 MG: 10 INJECTION INTRAMUSCULAR; INTRAVENOUS at 11:27

## 2020-08-30 RX ADMIN — HEPARIN SODIUM 5000 UNITS: 5000 INJECTION INTRAVENOUS; SUBCUTANEOUS at 08:27

## 2020-08-30 RX ADMIN — VANCOMYCIN HYDROCHLORIDE 2500 MG: 1 INJECTION, POWDER, LYOPHILIZED, FOR SOLUTION INTRAVENOUS at 16:47

## 2020-08-30 RX ADMIN — HEPARIN SODIUM 5000 UNITS: 5000 INJECTION INTRAVENOUS; SUBCUTANEOUS at 20:43

## 2020-08-30 RX ADMIN — FUROSEMIDE 40 MG: 10 INJECTION INTRAMUSCULAR; INTRAVENOUS at 05:12

## 2020-08-30 RX ADMIN — Medication 1 CAPSULE: at 08:27

## 2020-08-30 RX ADMIN — VANCOMYCIN HYDROCHLORIDE 2500 MG: 1 INJECTION, POWDER, LYOPHILIZED, FOR SOLUTION INTRAVENOUS at 03:23

## 2020-08-30 RX ADMIN — CASTOR OIL AND BALSAM, PERU: 788; 87 OINTMENT TOPICAL at 08:27

## 2020-08-30 RX ADMIN — CEFTRIAXONE 2 G: 2 INJECTION, POWDER, FOR SOLUTION INTRAMUSCULAR; INTRAVENOUS at 20:43

## 2020-08-30 RX ADMIN — METOLAZONE 5 MG: 2.5 TABLET ORAL at 08:26

## 2020-08-30 RX ADMIN — IBUPROFEN 600 MG: 600 TABLET ORAL at 15:43

## 2020-08-30 RX ADMIN — SODIUM CHLORIDE, PRESERVATIVE FREE 10 ML: 5 INJECTION INTRAVENOUS at 08:28

## 2020-08-31 LAB
ALBUMIN SERPL-MCNC: 2.96 G/DL (ref 3.5–5.2)
ANION GAP SERPL CALCULATED.3IONS-SCNC: 11.8 MMOL/L (ref 5–15)
BUN SERPL-MCNC: 15 MG/DL (ref 6–20)
BUN/CREAT SERPL: 27.8 (ref 7–25)
CALCIUM SPEC-SCNC: 9.5 MG/DL (ref 8.6–10.5)
CHLORIDE SERPL-SCNC: 88 MMOL/L (ref 98–107)
CO2 SERPL-SCNC: 34.2 MMOL/L (ref 22–29)
CREAT SERPL-MCNC: 0.54 MG/DL (ref 0.76–1.27)
CRP SERPL-MCNC: 3.76 MG/DL (ref 0–0.5)
GFR SERPL CREATININE-BSD FRML MDRD: >150 ML/MIN/1.73
GLUCOSE SERPL-MCNC: 114 MG/DL (ref 65–99)
MAGNESIUM SERPL-MCNC: 2.1 MG/DL (ref 1.6–2.6)
PHOSPHATE SERPL-MCNC: 5.1 MG/DL (ref 2.5–4.5)
POTASSIUM SERPL-SCNC: 3.9 MMOL/L (ref 3.5–5.2)
SODIUM SERPL-SCNC: 134 MMOL/L (ref 136–145)

## 2020-08-31 PROCEDURE — 25010000002 CEFTRIAXONE PER 250 MG: Performed by: EMERGENCY MEDICINE

## 2020-08-31 PROCEDURE — 83735 ASSAY OF MAGNESIUM: CPT | Performed by: EMERGENCY MEDICINE

## 2020-08-31 PROCEDURE — 99232 SBSQ HOSP IP/OBS MODERATE 35: CPT | Performed by: INTERNAL MEDICINE

## 2020-08-31 PROCEDURE — 94799 UNLISTED PULMONARY SVC/PX: CPT

## 2020-08-31 PROCEDURE — 80069 RENAL FUNCTION PANEL: CPT | Performed by: EMERGENCY MEDICINE

## 2020-08-31 PROCEDURE — 25010000002 HEPARIN (PORCINE) PER 1000 UNITS: Performed by: HOSPITALIST

## 2020-08-31 PROCEDURE — 94660 CPAP INITIATION&MGMT: CPT

## 2020-08-31 PROCEDURE — 25010000002 VANCOMYCIN 5 G RECONSTITUTED SOLUTION: Performed by: NURSE PRACTITIONER

## 2020-08-31 PROCEDURE — 25010000002 FUROSEMIDE PER 20 MG: Performed by: EMERGENCY MEDICINE

## 2020-08-31 PROCEDURE — 86140 C-REACTIVE PROTEIN: CPT | Performed by: NURSE PRACTITIONER

## 2020-08-31 RX ADMIN — FUROSEMIDE 40 MG: 10 INJECTION INTRAMUSCULAR; INTRAVENOUS at 05:28

## 2020-08-31 RX ADMIN — HEPARIN SODIUM 5000 UNITS: 5000 INJECTION INTRAVENOUS; SUBCUTANEOUS at 08:25

## 2020-08-31 RX ADMIN — FUROSEMIDE 40 MG: 10 INJECTION INTRAMUSCULAR; INTRAVENOUS at 17:08

## 2020-08-31 RX ADMIN — HEPARIN SODIUM 5000 UNITS: 5000 INJECTION INTRAVENOUS; SUBCUTANEOUS at 20:49

## 2020-08-31 RX ADMIN — VANCOMYCIN HYDROCHLORIDE 2500 MG: 1 INJECTION, POWDER, LYOPHILIZED, FOR SOLUTION INTRAVENOUS at 16:17

## 2020-08-31 RX ADMIN — METOLAZONE 5 MG: 2.5 TABLET ORAL at 08:26

## 2020-08-31 RX ADMIN — Medication 1 CAPSULE: at 08:26

## 2020-08-31 RX ADMIN — CASTOR OIL AND BALSAM, PERU: 788; 87 OINTMENT TOPICAL at 08:26

## 2020-08-31 RX ADMIN — IBUPROFEN 600 MG: 600 TABLET ORAL at 11:41

## 2020-08-31 RX ADMIN — SODIUM CHLORIDE, PRESERVATIVE FREE 10 ML: 5 INJECTION INTRAVENOUS at 08:26

## 2020-08-31 RX ADMIN — CASTOR OIL AND BALSAM, PERU: 788; 87 OINTMENT TOPICAL at 20:50

## 2020-08-31 RX ADMIN — CEFTRIAXONE 2 G: 2 INJECTION, POWDER, FOR SOLUTION INTRAMUSCULAR; INTRAVENOUS at 20:53

## 2020-08-31 RX ADMIN — SODIUM CHLORIDE, PRESERVATIVE FREE 10 ML: 5 INJECTION INTRAVENOUS at 20:50

## 2020-08-31 RX ADMIN — FUROSEMIDE 40 MG: 10 INJECTION INTRAMUSCULAR; INTRAVENOUS at 11:41

## 2020-08-31 RX ADMIN — VANCOMYCIN HYDROCHLORIDE 2500 MG: 1 INJECTION, POWDER, LYOPHILIZED, FOR SOLUTION INTRAVENOUS at 03:21

## 2020-09-01 ENCOUNTER — APPOINTMENT (OUTPATIENT)
Dept: GENERAL RADIOLOGY | Facility: HOSPITAL | Age: 45
End: 2020-09-01

## 2020-09-01 LAB
ALBUMIN SERPL-MCNC: 3.14 G/DL (ref 3.5–5.2)
ANION GAP SERPL CALCULATED.3IONS-SCNC: 11.4 MMOL/L (ref 5–15)
BASOPHILS # BLD AUTO: 0.04 10*3/MM3 (ref 0–0.2)
BASOPHILS NFR BLD AUTO: 0.4 % (ref 0–1.5)
BUN SERPL-MCNC: 21 MG/DL (ref 6–20)
BUN/CREAT SERPL: 36.2 (ref 7–25)
CALCIUM SPEC-SCNC: 9.6 MG/DL (ref 8.6–10.5)
CHLORIDE SERPL-SCNC: 88 MMOL/L (ref 98–107)
CO2 SERPL-SCNC: 32.6 MMOL/L (ref 22–29)
CREAT SERPL-MCNC: 0.58 MG/DL (ref 0.76–1.27)
CRP SERPL-MCNC: 2.37 MG/DL (ref 0–0.5)
DEPRECATED RDW RBC AUTO: 46.5 FL (ref 37–54)
EOSINOPHIL # BLD AUTO: 0.43 10*3/MM3 (ref 0–0.4)
EOSINOPHIL NFR BLD AUTO: 4.8 % (ref 0.3–6.2)
ERYTHROCYTE [DISTWIDTH] IN BLOOD BY AUTOMATED COUNT: 14.5 % (ref 12.3–15.4)
GFR SERPL CREATININE-BSD FRML MDRD: >150 ML/MIN/1.73
GLUCOSE SERPL-MCNC: 118 MG/DL (ref 65–99)
HCT VFR BLD AUTO: 46.2 % (ref 37.5–51)
HGB BLD-MCNC: 14 G/DL (ref 13–17.7)
IMM GRANULOCYTES # BLD AUTO: 0.14 10*3/MM3 (ref 0–0.05)
IMM GRANULOCYTES NFR BLD AUTO: 1.6 % (ref 0–0.5)
LYMPHOCYTES # BLD AUTO: 0.93 10*3/MM3 (ref 0.7–3.1)
LYMPHOCYTES NFR BLD AUTO: 10.3 % (ref 19.6–45.3)
MAGNESIUM SERPL-MCNC: 2.1 MG/DL (ref 1.6–2.6)
MCH RBC QN AUTO: 26.8 PG (ref 26.6–33)
MCHC RBC AUTO-ENTMCNC: 30.3 G/DL (ref 31.5–35.7)
MCV RBC AUTO: 88.5 FL (ref 79–97)
MONOCYTES # BLD AUTO: 0.59 10*3/MM3 (ref 0.1–0.9)
MONOCYTES NFR BLD AUTO: 6.6 % (ref 5–12)
NEUTROPHILS NFR BLD AUTO: 6.87 10*3/MM3 (ref 1.7–7)
NEUTROPHILS NFR BLD AUTO: 76.3 % (ref 42.7–76)
NRBC BLD AUTO-RTO: 0 /100 WBC (ref 0–0.2)
PHOSPHATE SERPL-MCNC: 4.2 MG/DL (ref 2.5–4.5)
PLATELET # BLD AUTO: 379 10*3/MM3 (ref 140–450)
PMV BLD AUTO: 9.2 FL (ref 6–12)
POTASSIUM SERPL-SCNC: 3.6 MMOL/L (ref 3.5–5.2)
POTASSIUM SERPL-SCNC: 4 MMOL/L (ref 3.5–5.2)
RBC # BLD AUTO: 5.22 10*6/MM3 (ref 4.14–5.8)
SODIUM SERPL-SCNC: 132 MMOL/L (ref 136–145)
WBC # BLD AUTO: 9 10*3/MM3 (ref 3.4–10.8)

## 2020-09-01 PROCEDURE — 94799 UNLISTED PULMONARY SVC/PX: CPT

## 2020-09-01 PROCEDURE — 73620 X-RAY EXAM OF FOOT: CPT | Performed by: RADIOLOGY

## 2020-09-01 PROCEDURE — 99232 SBSQ HOSP IP/OBS MODERATE 35: CPT | Performed by: INTERNAL MEDICINE

## 2020-09-01 PROCEDURE — 80069 RENAL FUNCTION PANEL: CPT | Performed by: EMERGENCY MEDICINE

## 2020-09-01 PROCEDURE — 85025 COMPLETE CBC W/AUTO DIFF WBC: CPT | Performed by: NURSE PRACTITIONER

## 2020-09-01 PROCEDURE — 84132 ASSAY OF SERUM POTASSIUM: CPT | Performed by: INTERNAL MEDICINE

## 2020-09-01 PROCEDURE — 25010000002 HEPARIN (PORCINE) PER 1000 UNITS: Performed by: HOSPITALIST

## 2020-09-01 PROCEDURE — 83735 ASSAY OF MAGNESIUM: CPT | Performed by: EMERGENCY MEDICINE

## 2020-09-01 PROCEDURE — 86140 C-REACTIVE PROTEIN: CPT | Performed by: EMERGENCY MEDICINE

## 2020-09-01 PROCEDURE — 73620 X-RAY EXAM OF FOOT: CPT

## 2020-09-01 RX ORDER — BACLOFEN 10 MG/1
5 TABLET ORAL EVERY 12 HOURS PRN
Status: DISCONTINUED | OUTPATIENT
Start: 2020-09-01 | End: 2020-09-09 | Stop reason: HOSPADM

## 2020-09-01 RX ORDER — POTASSIUM CHLORIDE 20 MEQ/1
40 TABLET, EXTENDED RELEASE ORAL EVERY 4 HOURS
Status: COMPLETED | OUTPATIENT
Start: 2020-09-01 | End: 2020-09-01

## 2020-09-01 RX ADMIN — CASTOR OIL AND BALSAM, PERU: 788; 87 OINTMENT TOPICAL at 22:28

## 2020-09-01 RX ADMIN — POTASSIUM CHLORIDE 40 MEQ: 1500 TABLET, EXTENDED RELEASE ORAL at 13:26

## 2020-09-01 RX ADMIN — Medication 1 CAPSULE: at 08:33

## 2020-09-01 RX ADMIN — SODIUM CHLORIDE, PRESERVATIVE FREE 10 ML: 5 INJECTION INTRAVENOUS at 22:28

## 2020-09-01 RX ADMIN — VANCOMYCIN HYDROCHLORIDE 2500 MG: 1 INJECTION, POWDER, LYOPHILIZED, FOR SOLUTION INTRAVENOUS at 04:19

## 2020-09-01 RX ADMIN — VANCOMYCIN HYDROCHLORIDE 2500 MG: 1 INJECTION, POWDER, LYOPHILIZED, FOR SOLUTION INTRAVENOUS at 16:04

## 2020-09-01 RX ADMIN — CASTOR OIL AND BALSAM, PERU: 788; 87 OINTMENT TOPICAL at 08:33

## 2020-09-01 RX ADMIN — POTASSIUM CHLORIDE 40 MEQ: 1500 TABLET, EXTENDED RELEASE ORAL at 16:04

## 2020-09-01 RX ADMIN — SODIUM CHLORIDE, PRESERVATIVE FREE 10 ML: 5 INJECTION INTRAVENOUS at 08:34

## 2020-09-01 RX ADMIN — HEPARIN SODIUM 5000 UNITS: 5000 INJECTION INTRAVENOUS; SUBCUTANEOUS at 08:33

## 2020-09-01 RX ADMIN — HEPARIN SODIUM 5000 UNITS: 5000 INJECTION INTRAVENOUS; SUBCUTANEOUS at 22:29

## 2020-09-01 NOTE — PROGRESS NOTES
PROGRESS NOTE         Patient Identification:  Name:  Jimmy Recinos  Age:  44 y.o.  Sex:  male  :  1975  MRN:  [id number]  Visit Number:  [id number]  Primary Care Provider:  No primary care provider on file.         LOS: 8 days       ----------------------------------------------------------------------------------------------------------------------  Subjective       Chief Complaints:    No chief complaint on file.        Interval History:      Patient reports he is feeling better today.  Afebrile, no diarrhea.  He states generalized muscle cramping.  WBC normal.  CRP improving at 2.37.    Review of Systems:    Constitutional: no fever, chills and night sweats. No appetite change or unexpected weight change. No fatigue.  Eyes: no eye drainage, itching or redness.  HEENT: no mouth sores, dysphagia or nose bleed.  Respiratory: no for shortness of breath, cough or production of sputum.  Cardiovascular: no chest pain, no palpitations, no orthopnea.  Gastrointestinal: no nausea, vomiting or diarrhea. No abdominal pain, hematemesis or rectal bleeding.  Genitourinary: no dysuria or polyuria.  Hematologic/lymphatic: no lymph node abnormalities, no easy bruising or easy bleeding.  Musculoskeletal: Right lower extremity pain.  Skin: No rash and no itching.  Neurological: no loss of consciousness, no seizure, no headache.  Behavioral/Psych: no depression or suicidal ideation.  Endocrine: no hot flashes.  Immunologic: negative.     ----------------------------------------------------------------------------------------------------------------------      Objective       Current [facility] Meds:    castor oil-balsam peru  Topical Q12H   heparin (porcine) 5,000 Units Subcutaneous Q12H   lactobacillus acidophilus 1 capsule Oral Daily   sodium chloride 10 mL Intravenous Q12H   vancomycin 2,500 mg Intravenous Q12H           ----------------------------------------------------------------------------------------------------------------------    Vital Signs:  Temp:  [97.7 °F (36.5 °C)-98.2 °F (36.8 °C)] 97.7 °F (36.5 °C)  Heart Rate:  [83-91] 83  Resp:  [18-20] 18  BP: (106-139)/(47-86) 139/86  No data found.  SpO2 Percentage    08/31/20 2000 09/01/20 0040 09/01/20 0730   SpO2: 94% 95% 96%     SpO2:  [94 %-96 %] 96 %  on   ;   Device (Oxygen Therapy): room air    Body mass index is 93.27 kg/m².  Wt Readings from Last 3 Encounters:   08/23/20 (!) 295 kg (650 lb)   01/02/20 (!) 273 kg (601 lb)   12/21/19 (!) 273 kg (601 lb 3.2 oz)        Intake/Output Summary (Last 24 hours) at 9/1/2020 1048  Last data filed at 9/1/2020 1046  Gross per 24 hour   Intake 1040.28 ml   Output 2430 ml   Net -1389.72 ml     Diet Regular; Cardiac  ----------------------------------------------------------------------------------------------------------------------      Physical Exam:    Constitutional:  Well-developed and well-nourished.  No respiratory distress.  Morbidly obese.  HENT:  Head: Normocephalic and atraumatic.  Mouth:  Moist mucous membranes.    Eyes:  Conjunctivae and EOM are normal.  No scleral icterus.  Neck:  Neck supple.  No JVD present.    Cardiovascular:  Normal rate, regular rhythm and normal heart sounds with no murmur. No edema.  Pulmonary/Chest:  No respiratory distress, no wheezes, no crackles, with normal breath sounds and good air movement.  Abdominal:  Soft.  Bowel sounds are normal.  No distension and no tenderness.   Musculoskeletal: bilateral lower extremity lymphedema.  Right lower extremity with erythema and edema extending from right foot to in her right groin, blisters noted to right foot.    Neurological:  Alert and oriented to person, place, and time.  No facial droop.  No slurred speech.   Skin:  Skin is warm and dry.  No rash noted.  No pallor. bilateral lower extremity lymphedema.  Right lower extremity with erythema and  edema extending from right foot to in her right groin, blisters noted to right foot.    Psychiatric:  Normal mood and affect.  Behavior is normal.    ----------------------------------------------------------------------------------------------------------------------            Results from last 7 days   Lab Units 09/01/20  0745 09/01/20  0652 08/31/20  0719 08/30/20  0543  08/28/20  0250 08/27/20  0341   CRP mg/dL 2.37*  --  3.76* 5.78*   < > 9.84*  --    WBC 10*3/mm3  --  9.00  --   --   --  6.93 6.84   HEMOGLOBIN g/dL  --  14.0  --   --   --  11.2* 11.0*   HEMATOCRIT %  --  46.2  --   --   --  37.5 37.5   MCV fL  --  88.5  --   --   --  90.6 92.1   MCHC g/dL  --  30.3*  --   --   --  29.9* 29.3*   PLATELETS 10*3/mm3  --  379  --   --   --  251 213    < > = values in this interval not displayed.     Results from last 7 days   Lab Units 09/01/20  0745 08/31/20 0719 08/30/20  0543   SODIUM mmol/L 132* 134* 136   POTASSIUM mmol/L 3.6 3.9 3.7   MAGNESIUM mg/dL 2.1 2.1 2.1   CHLORIDE mmol/L 88* 88* 92*   CO2 mmol/L 32.6* 34.2* 35.2*   BUN mg/dL 21* 15 10   CREATININE mg/dL 0.58* 0.54* 0.53*   EGFR IF NONAFRICN AM mL/min/1.73 >150 >150 >150   CALCIUM mg/dL 9.6 9.5 9.2   GLUCOSE mg/dL 118* 114* 122*   ALBUMIN g/dL 3.14* 2.96* 2.90*   Estimated Creatinine Clearance: 372.4 mL/min (A) (by C-G formula based on SCr of 0.58 mg/dL (L)).  No results found for: AMMONIA    No results found for: HGBA1C, POCGLU  Lab Results   Component Value Date    HGBA1C 5.00 08/23/2020     Lab Results   Component Value Date    TSH 0.793 12/21/2019       Blood Culture   Date Value Ref Range Status   08/23/2020 Staphylococcus, coagulase negative (C)  Preliminary   08/23/2020 Staphylococcus, coagulase negative (C)  Preliminary     Urine Culture   Date Value Ref Range Status   08/23/2020 >100,000 CFU/mL Mixed Robyn Isolated  Final     No results found for: WOUNDCX  No results found for: STOOLCX  No results found for: RESPCX  Pain Management Panel       Pain Management Panel Latest Ref Rng & Units 10/19/2014    AMPHETAMINES SCREEN, URINE NEGATIVE Negative    BARBITURATES SCREEN NEGATIVE Negative    BENZODIAZEPINE SCREEN, URINE NEGATIVE Negative    COCAINE SCREEN, URINE NEGATIVE Negative    METHADONE SCREEN, URINE NEGATIVE Negative            ----------------------------------------------------------------------------------------------------------------------  Imaging Results (Last 24 Hours)     ** No results found for the last 24 hours. **          ----------------------------------------------------------------------------------------------------------------------    Assessment/Plan       Assessment/Plan     ASSESSMENT:    1.  Right lower extremity cellulitis  2.  Bacteremia  3.  UTI    PLAN:    Patient reports he is feeling better today.  Afebrile, no diarrhea.  He states generalized muscle cramping.  WBC normal.  CRP improving at 2.37.    PSA normal.  Repeat urinalysis from 8/25/2020 unremarkable.    Blood cultures from 8/23/2020 2 out of 2 sets positive for staph species not aureus.  Blood cultures from 8/25/2020 finalized no growth.    Recommend to continue vancomycin pharmacy to dose monotherapy.  Rocephin has completed.    We remain concerned about the appearance of his right foot.  Recommend imaging of right foot with a either CT scan and bone scan to rule out osteomyelitis or MRI.  However due to patient's morbid obesity unsure if patient would be able to be scanned here.  Primary team attempting to arrange for open MRI.    Code Status:   Code Status and Medical Interventions:   Ordered at: 08/24/20 0102     Level Of Support Discussed With:    Patient     Code Status:    CPR     Medical Interventions (Level of Support Prior to Arrest):    Full       KHUSHBU Boothe  09/01/20  10:48

## 2020-09-01 NOTE — PROGRESS NOTES
[facility] HOSPITALIST PROGRESS NOTE     Patient Identification:  Name:  Jimmy Recinos  Age:  44 y.o.  Sex:  male  :  1975  MRN:  [id number]  Visit Number:  [id number]  Primary Care Provider:  No primary care provider on file.    Length of stay:  8    Chief complaint: Right lower extremity and right foot pain    Subjective:    Patient states he feels better today than he has been almost 1 week.  He denies any right foot pain.  Reports swelling has decreased in his right lower extremity.  Denies any nausea, vomiting, fevers, chills, sweats or rigors.  No new events overnight.  ----------------------------------------------------------------------------------------------------------------------  Current Hospital Meds:    castor oil-balsam peru  Topical Q12H   heparin (porcine) 5,000 Units Subcutaneous Q12H   lactobacillus acidophilus 1 capsule Oral Daily   sodium chloride 10 mL Intravenous Q12H   vancomycin 2,500 mg Intravenous Q12H        ----------------------------------------------------------------------------------------------------------------------  Vital Signs:  Temp:  [97.7 °F (36.5 °C)-98.5 °F (36.9 °C)] 98.5 °F (36.9 °C)  Heart Rate:  [83-90] 90  Resp:  [18-20] 18  BP: (106-139)/(59-86) 110/68      20  8403   Weight: (!) 295 kg (650 lb)     Body mass index is 93.27 kg/m².    Intake/Output Summary (Last 24 hours) at 2020 1712  Last data filed at 2020 1621  Gross per 24 hour   Intake 600 ml   Output 1830 ml   Net -1230 ml     Diet Regular; Cardiac  ----------------------------------------------------------------------------------------------------------------------  Physical exam:  Constitutional: Super morbidly obese  male in no apparent distress.     HENT:  Head:  Normocephalic and atraumatic.  Mouth:  Moist mucous membranes.    Eyes:  Conjunctivae and EOM are normal.  Pupils are equal, round, and reactive to light.  No scleral icterus.    Neck:  Neck  supple. No thyromegaly.  No JVD present.    Cardiovascular:  Regular rate and rhythm with no murmurs, rubs, clicks or gallops appreciated.  Pulmonary/Chest:  Clear to auscultation bilaterally with no crackles, wheezes or rhonchi appreciated.  Abdominal:  Soft. Nontender. Nondistended  Bowel sounds are normal in all four quadrants. No organomegally appreciated.   Musculoskeletal: Nonpitting bilateral lower extremity edema, mild tenderness to right lower extremity.  No red or swollen joints anywhere.    Neurological:  Alert and oriented to person, place, and time. Cranial nerves II-XII intact with no focal deficits.  No facial droop.  No slurred speech.   Skin:  Warm and dry to palpation, very large blister covering almost entire plantar surface of right foot with cellulitis extending onto medial aspect of right lower extremity above the ankle.   Peripheral vascular:  2+ radial and pedal pulses in bilateral upper and lower extremities.  Psychiatric:  Alert and oriented x3, demonstrates appropriate judgement and insight.  ----------------------------------------------------------------------------------------------------------------------  Tele:    ----------------------------------------------------------------------------------------------------------------------      Results from last 7 days   Lab Units 09/01/20  0745 09/01/20  0652 08/31/20  0719 08/30/20  0543  08/28/20  0250 08/27/20  0341   CRP mg/dL 2.37*  --  3.76* 5.78*   < > 9.84*  --    WBC 10*3/mm3  --  9.00  --   --   --  6.93 6.84   HEMOGLOBIN g/dL  --  14.0  --   --   --  11.2* 11.0*   HEMATOCRIT %  --  46.2  --   --   --  37.5 37.5   MCV fL  --  88.5  --   --   --  90.6 92.1   MCHC g/dL  --  30.3*  --   --   --  29.9* 29.3*   PLATELETS 10*3/mm3  --  379  --   --   --  251 213    < > = values in this interval not displayed.         Results from last 7 days   Lab Units 09/01/20  0745 08/31/20  0719 08/30/20  0543   SODIUM mmol/L 132* 134* 136   POTASSIUM  mmol/L 3.6 3.9 3.7   MAGNESIUM mg/dL 2.1 2.1 2.1   CHLORIDE mmol/L 88* 88* 92*   CO2 mmol/L 32.6* 34.2* 35.2*   BUN mg/dL 21* 15 10   CREATININE mg/dL 0.58* 0.54* 0.53*   EGFR IF NONAFRICN AM mL/min/1.73 >150 >150 >150   CALCIUM mg/dL 9.6 9.5 9.2   GLUCOSE mg/dL 118* 114* 122*   ALBUMIN g/dL 3.14* 2.96* 2.90*   Estimated Creatinine Clearance: 372.4 mL/min (A) (by C-G formula based on SCr of 0.58 mg/dL (L)).    No results found for: AMMONIA      Blood Culture   Date Value Ref Range Status   08/25/2020 No growth at 5 days  Final   08/25/2020 No growth at 5 days  Final     No results found for: URINECX  No results found for: WOUNDCX  No results found for: STOOLCX    I have personally looked at the labs and they are summarized above.  ----------------------------------------------------------------------------------------------------------------------  Imaging Results (Last 24 Hours)     ** No results found for the last 24 hours. **        ----------------------------------------------------------------------------------------------------------------------  Assessment and Plan:    1.  Right lower extremity cellulitis -clinical exam quite concerning for possible underlying osteomyelitis.  Unfortunately, secondary to body habitus patient is not compatible with any MRI or bone scan imaging modalities available in our facility.  Unable to find any other facility that can offer imaging modalities for this patient.  Will consult general surgery for consideration of bedside I&D for further investigation into possible clinical osteomyelitis.    2.  Staph bacteremia -appreciate infectious disease recommendations, repeat blood cultures from 8/25/2020 demonstrated no growth to date.  Continue Rocephin and vancomycin.    3.  Sepsis -present on admission, secondary to right lower extremity cellulitis and staph bacteremia, continue to treat underlying etiologies    4.  Super morbid obesity -complicates all manner of healthcare  delivery    5.  Essential hypertension - well controlled            Nader Noel,   09/01/20  17:12

## 2020-09-01 NOTE — PLAN OF CARE
Right foot cellulitis stable. No changes, large blister covering most of the bottom foot still in tact. Urine bag replaced last night. Venelex applied to wound. VSS. No complaints from patient at this time.

## 2020-09-01 NOTE — PLAN OF CARE
Problem: Patient Care Overview  Goal: Plan of Care Review  Flowsheets  Taken 9/1/2020 1505  Progress: no change  Taken 9/1/2020 0900  Plan of Care Reviewed With: patient  Note:   Patient has rested in bed comfortably throughout shift. Patient has had no complaints of pain. VSS. No acute changes. Will continue to monitor.

## 2020-09-01 NOTE — PROGRESS NOTES
Antimicrobial Length of Therapy:    Day 9 vancomycin IV (ends on 9/6)    Thank you.  Daniela He, Pharm.D.  9/1/2020  11:40

## 2020-09-02 LAB
ALBUMIN SERPL-MCNC: 3.54 G/DL (ref 3.5–5.2)
ANION GAP SERPL CALCULATED.3IONS-SCNC: 8.6 MMOL/L (ref 5–15)
BASOPHILS # BLD AUTO: 0.02 10*3/MM3 (ref 0–0.2)
BASOPHILS NFR BLD AUTO: 0.2 % (ref 0–1.5)
BUN SERPL-MCNC: 17 MG/DL (ref 6–20)
BUN/CREAT SERPL: 29.8 (ref 7–25)
CALCIUM SPEC-SCNC: 9.8 MG/DL (ref 8.6–10.5)
CHLORIDE SERPL-SCNC: 93 MMOL/L (ref 98–107)
CO2 SERPL-SCNC: 32.4 MMOL/L (ref 22–29)
CREAT SERPL-MCNC: 0.57 MG/DL (ref 0.76–1.27)
CRP SERPL-MCNC: 1.98 MG/DL (ref 0–0.5)
DEPRECATED RDW RBC AUTO: 46.9 FL (ref 37–54)
EOSINOPHIL # BLD AUTO: 0.33 10*3/MM3 (ref 0–0.4)
EOSINOPHIL NFR BLD AUTO: 4.1 % (ref 0.3–6.2)
ERYTHROCYTE [DISTWIDTH] IN BLOOD BY AUTOMATED COUNT: 14.6 % (ref 12.3–15.4)
GFR SERPL CREATININE-BSD FRML MDRD: >150 ML/MIN/1.73
GLUCOSE SERPL-MCNC: 121 MG/DL (ref 65–99)
HCT VFR BLD AUTO: 46.7 % (ref 37.5–51)
HGB BLD-MCNC: 14.2 G/DL (ref 13–17.7)
IMM GRANULOCYTES # BLD AUTO: 0.08 10*3/MM3 (ref 0–0.05)
IMM GRANULOCYTES NFR BLD AUTO: 1 % (ref 0–0.5)
LYMPHOCYTES # BLD AUTO: 0.97 10*3/MM3 (ref 0.7–3.1)
LYMPHOCYTES NFR BLD AUTO: 11.9 % (ref 19.6–45.3)
MAGNESIUM SERPL-MCNC: 2.1 MG/DL (ref 1.6–2.6)
MCH RBC QN AUTO: 26.9 PG (ref 26.6–33)
MCHC RBC AUTO-ENTMCNC: 30.4 G/DL (ref 31.5–35.7)
MCV RBC AUTO: 88.6 FL (ref 79–97)
MONOCYTES # BLD AUTO: 0.46 10*3/MM3 (ref 0.1–0.9)
MONOCYTES NFR BLD AUTO: 5.7 % (ref 5–12)
NEUTROPHILS NFR BLD AUTO: 6.28 10*3/MM3 (ref 1.7–7)
NEUTROPHILS NFR BLD AUTO: 77.1 % (ref 42.7–76)
NRBC BLD AUTO-RTO: 0 /100 WBC (ref 0–0.2)
PHOSPHATE SERPL-MCNC: 3.4 MG/DL (ref 2.5–4.5)
PLATELET # BLD AUTO: 363 10*3/MM3 (ref 140–450)
PMV BLD AUTO: 9.2 FL (ref 6–12)
POTASSIUM SERPL-SCNC: 4.1 MMOL/L (ref 3.5–5.2)
RBC # BLD AUTO: 5.27 10*6/MM3 (ref 4.14–5.8)
SODIUM SERPL-SCNC: 134 MMOL/L (ref 136–145)
VANCOMYCIN TROUGH SERPL-MCNC: 25.4 MCG/ML (ref 5–20)
WBC # BLD AUTO: 8.14 10*3/MM3 (ref 3.4–10.8)

## 2020-09-02 PROCEDURE — 87205 SMEAR GRAM STAIN: CPT | Performed by: INTERNAL MEDICINE

## 2020-09-02 PROCEDURE — 87070 CULTURE OTHR SPECIMN AEROBIC: CPT | Performed by: INTERNAL MEDICINE

## 2020-09-02 PROCEDURE — 25010000002 HEPARIN (PORCINE) PER 1000 UNITS: Performed by: HOSPITALIST

## 2020-09-02 PROCEDURE — 94799 UNLISTED PULMONARY SVC/PX: CPT

## 2020-09-02 PROCEDURE — 80202 ASSAY OF VANCOMYCIN: CPT | Performed by: INTERNAL MEDICINE

## 2020-09-02 PROCEDURE — 80069 RENAL FUNCTION PANEL: CPT | Performed by: EMERGENCY MEDICINE

## 2020-09-02 PROCEDURE — 85025 COMPLETE CBC W/AUTO DIFF WBC: CPT | Performed by: NURSE PRACTITIONER

## 2020-09-02 PROCEDURE — 83735 ASSAY OF MAGNESIUM: CPT | Performed by: EMERGENCY MEDICINE

## 2020-09-02 PROCEDURE — 99232 SBSQ HOSP IP/OBS MODERATE 35: CPT | Performed by: INTERNAL MEDICINE

## 2020-09-02 PROCEDURE — 86140 C-REACTIVE PROTEIN: CPT | Performed by: NURSE PRACTITIONER

## 2020-09-02 RX ADMIN — HEPARIN SODIUM 5000 UNITS: 5000 INJECTION INTRAVENOUS; SUBCUTANEOUS at 22:11

## 2020-09-02 RX ADMIN — CASTOR OIL AND BALSAM, PERU: 788; 87 OINTMENT TOPICAL at 22:10

## 2020-09-02 RX ADMIN — CASTOR OIL AND BALSAM, PERU: 788; 87 OINTMENT TOPICAL at 09:38

## 2020-09-02 RX ADMIN — SODIUM CHLORIDE, PRESERVATIVE FREE 10 ML: 5 INJECTION INTRAVENOUS at 09:38

## 2020-09-02 RX ADMIN — SODIUM CHLORIDE, PRESERVATIVE FREE 10 ML: 5 INJECTION INTRAVENOUS at 22:11

## 2020-09-02 RX ADMIN — HEPARIN SODIUM 5000 UNITS: 5000 INJECTION INTRAVENOUS; SUBCUTANEOUS at 09:39

## 2020-09-02 RX ADMIN — VANCOMYCIN HYDROCHLORIDE 2500 MG: 1 INJECTION, POWDER, LYOPHILIZED, FOR SOLUTION INTRAVENOUS at 03:26

## 2020-09-02 NOTE — CONSULTS
Consulting physician:  Dr. Patterson    Referring physician: Dr. Noel    Date of consultation: 09/02/20     Chief complaint blisters on plantar aspect of right foot    Subjective     Patient is a 44 y.o. morbidly obese male male who was admitted on 8/24 with swelling and edema of the right foot.  Surgical consult was requested for evaluation of the blistering on the bottom of the right foot.  Patient states he has not been able to walk on this foot.  He states the blisters just came up.  He did state that he had some on the anterior surface of his left foot at what point in time and they dried up on their own with no significant sequelae.      Review of Systems  Review of Systems - General ROS: negative for - weight loss  Psychological ROS: negative for - behavioral disorder  Ophthalmic ROS: negative for - dry eyes  ENT ROS: negative for - vertigo or vocal changes  Hematological and Lymphatic ROS: negative for - swollen lymph nodes, DVT, PE.   Respiratory ROS: negative for - sputum changes or stridor.  Cardiovascular ROS: negative for - irregular heartbeat or murmur  Gastrointestinal ROS: negative for - blood in stools or change in stools  Genito-Urinary ROS: negative for - hematuria or incontinence  Musculoskeletal ROS: Positive for - gait disturbance      History  Past Medical History:   Diagnosis Date   • Bilateral cellulitis of lower leg 11/26/2018   • Charcot-Ramya disease    • Chronic low back pain    • Debility    • Elevated liver enzymes    • Essential hypertension 12/5/2018   • Lymphedema of both lower extremities    • MRSA nasal colonization    • Muscular dystrophy (CMS/HCC)     Since age 15 yo   • Obesity, morbid, BMI 50 or higher (CMS/HCC) 12/5/2018   • VRE (vancomycin-resistant Enterococci)      Past Surgical History:   Procedure Laterality Date   • HERNIA REPAIR  2004    x 2, ventral   • KNEE SURGERY Right    • TONSILLECTOMY     • WRIST SURGERY Left     ORIF     Family History   Problem Relation  Age of Onset   • Heart disease Mother    • Diabetes Mother    • Cancer Mother    • Hypertension Father      Social History     Tobacco Use   • Smoking status: Never Smoker   • Smokeless tobacco: Never Used   Substance Use Topics   • Alcohol use: Yes     Alcohol/week: 1.0 standard drinks     Types: 1 Shots of liquor per week     Frequency: Never     Comment: on occasion once a month   • Drug use: No     No medications prior to admission.     Allergies:  Patient has no known allergies.    Objective     Vital Signs  Temp:  [97.8 °F (36.6 °C)-98.5 °F (36.9 °C)] 98.1 °F (36.7 °C)  Heart Rate:  [81-92] 83  Resp:  [18-24] 22  BP: (110-148)/(68-85) 138/74    Physical Exam:  On examination this is a morbidly obese male in no acute distress  HEENT examination: Within normal limits.  Conjunctiva pink.  Nose and ears appear normal.  Neck: Supple, full range of motion.  No JVD.  Musculoskeletal: Unable to assess gait.  Patient moves all extremities  Psych: Patient is alert, oriented x3. Mood and affect are appropriate.  Skin: Patient has chronic venous insufficiency changes of the lower extremities.  On the plantar surface of the right foot there is a large blister which essentially encompasses the entire foot.  This was unroofed and debrided.  Fluid was sent for culture.  Underlying tissue appears viable.  There is one area which is more tender overlying the metatarsal head which may extend deeper into the soft tissue.  No definite clinical signs of osteomyelitis  Results Review:       Results from last 7 days   Lab Units 09/02/20  1142 09/01/20  0745 09/01/20  0652 08/31/20  0719 08/30/20  0543  08/28/20  0250   CRP mg/dL  --  2.37*  --  3.76* 5.78*   < > 9.84*   WBC 10*3/mm3 8.14  --  9.00  --   --   --  6.93   HEMOGLOBIN g/dL 14.2  --  14.0  --   --   --  11.2*   HEMATOCRIT % 46.7  --  46.2  --   --   --  37.5   PLATELETS 10*3/mm3 363  --  379  --   --   --  251    < > = values in this interval not displayed.         Results  from last 7 days   Lab Units 09/01/20  2256 09/01/20  0745 08/31/20  0719 08/30/20  0543   SODIUM mmol/L  --  132* 134* 136   POTASSIUM mmol/L 4.0 3.6 3.9 3.7   MAGNESIUM mg/dL  --  2.1 2.1 2.1   CHLORIDE mmol/L  --  88* 88* 92*   CO2 mmol/L  --  32.6* 34.2* 35.2*   BUN mg/dL  --  21* 15 10   CREATININE mg/dL  --  0.58* 0.54* 0.53*   EGFR IF NONAFRICN AM mL/min/1.73  --  >150 >150 >150   CALCIUM mg/dL  --  9.6 9.5 9.2   GLUCOSE mg/dL  --  118* 114* 122*   ALBUMIN g/dL  --  3.14* 2.96* 2.90*   Estimated Creatinine Clearance: 372.4 mL/min (A) (by C-G formula based on SCr of 0.58 mg/dL (L)).  No results found for: AMMONIA      No results found for: BLOODCX  No results found for: URINECX  No results found for: WOUNDCX  No results found for: STOOLCX    Imaging:  Imaging Results (Last 24 Hours)     Procedure Component Value Units Date/Time    XR Foot 2 View Right [[id number]] Collected:  09/01/20 2040     Updated:  09/01/20 2043    Narrative:       EXAMINATION: XR FOOT 2 VW RIGHT-      CLINICAL INDICATION:possible gangrene; L03.115-Cellulitis of right lower  limb     COMPARISON: None      TECHNIQUE: 2 views right foot     FINDINGS:   Osteolysis noted of the midfoot bony structures which radiographically  are most consistent with osteomyelitis with destructive changes. There  is diffuse soft tissue edema noted but no soft tissue emphysema  identified on radiograph. No acute fracture identified. No dislocation.       Impression:       Diffuse soft tissue edema with osteolysis of the midfoot bony structures  suspicious for osteomyelitis. No acute fracture.     This report was finalized on 9/1/2020 8:41 PM by Dr. David Dixon MD.               Impression:  Patient Active Problem List   Diagnosis Code   • Essential hypertension I10   • Obesity, morbid, BMI 50 or higher (CMS/[facility]) E66.01   • Severe sepsis (CMS/[facility]) A41.9, R65.20   • Acute respiratory failure with hypoxia (CMS/[facility]) J96.01   • Hyponatremia E87.1   • Influenza A  J10.1   • Cellulitis of right lower extremity L03.115       Plan:  Await culture result.  Continue to monitor wound    Discussion:    Gillian Patterson MD  09/02/20  12:13    Time: Time spent:    Please note that portions of this note were completed with a voice recognition program.

## 2020-09-02 NOTE — PLAN OF CARE
Problem: Patient Care Overview  Goal: Plan of Care Review  Outcome: Ongoing (interventions implemented as appropriate)  Flowsheets  Taken 9/2/2020 0433 by Terra Ribera, RN  Progress: no change  Taken 9/1/2020 0900 by Peggy Eaton, RN  Plan of Care Reviewed With: patient  Note:   Patient is resting well, no changes, will continue to monitor

## 2020-09-02 NOTE — PROGRESS NOTES
PROGRESS NOTE         Patient Identification:  Name:  Jimmy Recinos  Age:  44 y.o.  Sex:  male  :  1975  MRN:  [id number]  Visit Number:  [id number]  Primary Care Provider:  No primary care provider on file.         LOS: 9 days       ----------------------------------------------------------------------------------------------------------------------  Subjective       Chief Complaints:    No chief complaint on file.        Interval History:      Patient feeling well today.  He reports some improvement in muscle cramping.  Afebrile, no diarrhea.  WBC normal.  CRP improving at 2.37.  X-ray of right foot on 2020 was suspicious for osteomyelitis.  General surgery was consulted with plans to do I&D at bedside today.    Review of Systems:    Constitutional: no fever, chills and night sweats. No appetite change or unexpected weight change. No fatigue.  Eyes: no eye drainage, itching or redness.  HEENT: no mouth sores, dysphagia or nose bleed.  Respiratory: no for shortness of breath, cough or production of sputum.  Cardiovascular: no chest pain, no palpitations, no orthopnea.  Gastrointestinal: no nausea, vomiting or diarrhea. No abdominal pain, hematemesis or rectal bleeding.  Genitourinary: no dysuria or polyuria.  Hematologic/lymphatic: no lymph node abnormalities, no easy bruising or easy bleeding.  Musculoskeletal: Right lower extremity pain.  Skin: No rash and no itching.  Neurological: no loss of consciousness, no seizure, no headache.  Behavioral/Psych: no depression or suicidal ideation.  Endocrine: no hot flashes.  Immunologic: negative.     ----------------------------------------------------------------------------------------------------------------------      Objective       Current Hospital Meds:    castor oil-balsam peru  Topical Q12H   heparin (porcine) 5,000 Units Subcutaneous Q12H   sodium chloride 10 mL Intravenous Q12H   vancomycin 2,500 mg Intravenous Q12H           ----------------------------------------------------------------------------------------------------------------------    Vital Signs:  Temp:  [97.8 °F (36.6 °C)-98.5 °F (36.9 °C)] 98.1 °F (36.7 °C)  Heart Rate:  [81-92] 83  Resp:  [18-24] 22  BP: (110-148)/(68-85) 138/74  Mean Arterial Pressure (Non-Invasive) for the past 24 hrs (Last 3 readings):   Noninvasive MAP (mmHg)   09/02/20 1012 93   09/02/20 0742 103     SpO2 Percentage    09/02/20 0702 09/02/20 0742 09/02/20 1012   SpO2: 94% 93% 93%     SpO2:  [93 %-96 %] 93 %  on   ;   Device (Oxygen Therapy): NPPV/NIV    Body mass index is 93.27 kg/m².  Wt Readings from Last 3 Encounters:   08/23/20 (!) 295 kg (650 lb)   01/02/20 (!) 273 kg (601 lb)   12/21/19 (!) 273 kg (601 lb 3.2 oz)        Intake/Output Summary (Last 24 hours) at 9/2/2020 1300  Last data filed at 9/2/2020 1012  Gross per 24 hour   Intake 840 ml   Output 775 ml   Net 65 ml     Diet Regular; Cardiac  ----------------------------------------------------------------------------------------------------------------------      Physical Exam:    Constitutional:  Well-developed and well-nourished.  No respiratory distress.  Morbidly obese.  HENT:  Head: Normocephalic and atraumatic.  Mouth:  Moist mucous membranes.    Eyes:  Conjunctivae and EOM are normal.  No scleral icterus.  Neck:  Neck supple.  No JVD present.    Cardiovascular:  Normal rate, regular rhythm and normal heart sounds with no murmur. No edema.  Pulmonary/Chest:  No respiratory distress, no wheezes, no crackles, with normal breath sounds and good air movement.  Abdominal:  Soft.  Bowel sounds are normal.  No distension and no tenderness.   Musculoskeletal: bilateral lower extremity lymphedema.  Right lower extremity with erythema and edema extending from right foot to in her right groin, blisters noted to right foot.    Neurological:  Alert and oriented to person, place, and time.  No facial droop.  No slurred speech.   Skin:  Skin is  warm and dry.  No rash noted.  No pallor. bilateral lower extremity lymphedema.  Right lower extremity with erythema and edema extending from right foot to in her right groin, blisters noted to right foot.    Psychiatric:  Normal mood and affect.  Behavior is normal.    ----------------------------------------------------------------------------------------------------------------------            Results from last 7 days   Lab Units 09/02/20  1142 09/01/20  0745 09/01/20  0652 08/31/20  0719  08/28/20  0250   CRP mg/dL 1.98* 2.37*  --  3.76*   < > 9.84*   WBC 10*3/mm3 8.14  --  9.00  --   --  6.93   HEMOGLOBIN g/dL 14.2  --  14.0  --   --  11.2*   HEMATOCRIT % 46.7  --  46.2  --   --  37.5   MCV fL 88.6  --  88.5  --   --  90.6   MCHC g/dL 30.4*  --  30.3*  --   --  29.9*   PLATELETS 10*3/mm3 363  --  379  --   --  251    < > = values in this interval not displayed.     Results from last 7 days   Lab Units 09/02/20  1142 09/01/20  2256 09/01/20  0745 08/31/20  0719   SODIUM mmol/L 134*  --  132* 134*   POTASSIUM mmol/L 4.1 4.0 3.6 3.9   MAGNESIUM mg/dL 2.1  --  2.1 2.1   CHLORIDE mmol/L 93*  --  88* 88*   CO2 mmol/L 32.4*  --  32.6* 34.2*   BUN mg/dL 17  --  21* 15   CREATININE mg/dL 0.57*  --  0.58* 0.54*   EGFR IF NONAFRICN AM mL/min/1.73 >150  --  >150 >150   CALCIUM mg/dL 9.8  --  9.6 9.5   GLUCOSE mg/dL 121*  --  118* 114*   ALBUMIN g/dL 3.54  --  3.14* 2.96*   Estimated Creatinine Clearance: 378.9 mL/min (A) (by C-G formula based on SCr of 0.57 mg/dL (L)).  No results found for: AMMONIA    No results found for: HGBA1C, POCGLU  Lab Results   Component Value Date    HGBA1C 5.00 08/23/2020     Lab Results   Component Value Date    TSH 0.793 12/21/2019       Blood Culture   Date Value Ref Range Status   08/23/2020 Staphylococcus, coagulase negative (C)  Preliminary   08/23/2020 Staphylococcus, coagulase negative (C)  Preliminary     Urine Culture   Date Value Ref Range Status   08/23/2020 >100,000 CFU/mL Mixed  Robyn Isolated  Final     No results found for: WOUNDCX  No results found for: STOOLCX  No results found for: RESPCX  Pain Management Panel     Pain Management Panel Latest Ref Rng & Units 10/19/2014    AMPHETAMINES SCREEN, URINE NEGATIVE Negative    BARBITURATES SCREEN NEGATIVE Negative    BENZODIAZEPINE SCREEN, URINE NEGATIVE Negative    COCAINE SCREEN, URINE NEGATIVE Negative    METHADONE SCREEN, URINE NEGATIVE Negative            ----------------------------------------------------------------------------------------------------------------------  Imaging Results (Last 24 Hours)     Procedure Component Value Units Date/Time    XR Foot 2 View Right [[id number]] Collected:  09/01/20 2040     Updated:  09/01/20 2043    Narrative:       EXAMINATION: XR FOOT 2 VW RIGHT-      CLINICAL INDICATION:possible gangrene; L03.115-Cellulitis of right lower  limb     COMPARISON: None      TECHNIQUE: 2 views right foot     FINDINGS:   Osteolysis noted of the midfoot bony structures which radiographically  are most consistent with osteomyelitis with destructive changes. There  is diffuse soft tissue edema noted but no soft tissue emphysema  identified on radiograph. No acute fracture identified. No dislocation.       Impression:       Diffuse soft tissue edema with osteolysis of the midfoot bony structures  suspicious for osteomyelitis. No acute fracture.     This report was finalized on 9/1/2020 8:41 PM by Dr. David Dixon MD.             ----------------------------------------------------------------------------------------------------------------------    Assessment/Plan       Assessment/Plan     ASSESSMENT:    1.  Right lower extremity cellulitis  2.  Bacteremia  3.  UTI    PLAN:    Patient feeling well today.  He reports some improvement in muscle cramping.  Afebrile, no diarrhea.  WBC normal.  CRP improving at 2.37.  X-ray of right foot on 9/1/2020 was suspicious for osteomyelitis.  General surgery was consulted with  plans to do I&D at bedside today.    PSA normal.  Repeat urinalysis from 8/25/2020 unremarkable.    Blood cultures from 8/23/2020 2 out of 2 sets positive for staph species not aureus.  Blood cultures from 8/25/2020 finalized no growth.    Recommend to continue vancomycin pharmacy to dose monotherapy.  Rocephin has completed.    As evidenced by x-ray results of right foot would recommend open MRI or bone scan as well as bone biopsy to confirm pathogen. However due to patient's morbid obesity unsure if patient would be able to be scanned here.    Code Status:   Code Status and Medical Interventions:   Ordered at: 08/24/20 0102     Level Of Support Discussed With:    Patient     Code Status:    CPR     Medical Interventions (Level of Support Prior to Arrest):    Full       KHUSHBU Boothe  09/02/20  13:00

## 2020-09-02 NOTE — PLAN OF CARE
Patient resting in bed. Dr. Patterson completed debridement of blister of right foot at bedside. Vancomycin trough 25.4, pharmacy and MD aware. Will continue to monitor.

## 2020-09-02 NOTE — PROGRESS NOTES
Pharmacokinetics Service Note:    Mr. Recinos is on day 10 of vancomycin 2500 mg q12h for RLE cellulitis and Staph bacteremia.  A 9.5 hr post 2-hr infusion trough was drawn and resulted at 25.4 mg/L.  This is higher than desired and consistent with poor clearance of the drug.  Will allow time for drug to be cleared from his system and resume dosing at midnight tonight.  Will change dose to 1500 mg q12h to target an AUC ~500 mg/L.hr and a trough between 15-20 mg/L.  Will continue to monitor and make adjustments as necessary.     Thank you,   Eulalio Luevano, PharmD  Pharmacy Resident   9/2/2020  16:34

## 2020-09-02 NOTE — PROGRESS NOTES
[facility] HOSPITALIST PROGRESS NOTE     Patient Identification:  Name:  Jimmy Recinos  Age:  44 y.o.  Sex:  male  :  1975  MRN:  [id number]  Visit Number:  [id number]  Primary Care Provider:  No primary care provider on file.    Length of stay:  9    Chief complaint: Right lower extremity and right foot pain    Subjective:    Patient states that he feels back to his baseline functioning status.  Patient is eager to be discharged from the hospital in the near future.  He denies any fevers, chills, sweats, rigors, nausea, vomiting, pain or any other symptoms at this time.  ----------------------------------------------------------------------------------------------------------------------  Current Hospital Meds:    castor oil-balsam peru  Topical Q12H   heparin (porcine) 5,000 Units Subcutaneous Q12H   sodium chloride 10 mL Intravenous Q12H   vancomycin 2,500 mg Intravenous Q12H        ----------------------------------------------------------------------------------------------------------------------  Vital Signs:  Temp:  [97.8 °F (36.6 °C)-98.5 °F (36.9 °C)] 98.1 °F (36.7 °C)  Heart Rate:  [81-92] 83  Resp:  [18-24] 22  BP: (110-148)/(68-85) 138/74      08/23/20  2304   Weight: (!) 295 kg (650 lb)     Body mass index is 93.27 kg/m².    Intake/Output Summary (Last 24 hours) at 2020 1252  Last data filed at 2020 1012  Gross per 24 hour   Intake 840 ml   Output 775 ml   Net 65 ml     Diet Regular; Cardiac  ----------------------------------------------------------------------------------------------------------------------  Physical exam:  Constitutional: Super morbidly obese  male in no apparent distress.     HENT:  Head:  Normocephalic and atraumatic.  Mouth:  Moist mucous membranes.    Eyes:  Conjunctivae and EOM are normal.  Pupils are equal, round, and reactive to light.  No scleral icterus.    Neck:  Neck supple. No thyromegaly.  No JVD present.    Cardiovascular:   Regular rate and rhythm with no murmurs, rubs, clicks or gallops appreciated.  Pulmonary/Chest:  Clear to auscultation bilaterally with no crackles, wheezes or rhonchi appreciated.  Abdominal:  Soft. Nontender. Nondistended  Bowel sounds are normal in all four quadrants. No organomegally appreciated.   Musculoskeletal: Nonpitting bilateral lower extremity edema, mild tenderness to right lower extremity.  No red or swollen joints anywhere.    Neurological:  Alert and oriented to person, place, and time. Cranial nerves II-XII intact with no focal deficits.  No facial droop.  No slurred speech.   Skin:  Warm and dry to palpation, very large blister covering almost entire plantar surface of right foot with cellulitis extending onto medial aspect of right lower extremity above the ankle.   Peripheral vascular:  2+ radial and pedal pulses in bilateral upper and lower extremities.  Psychiatric:  Alert and oriented x3, demonstrates appropriate judgement and insight.  ----------------------------------------------------------------------------------------------------------------------  Tele:    ----------------------------------------------------------------------------------------------------------------------      Results from last 7 days   Lab Units 09/02/20  1142 09/01/20  0745 09/01/20  0652 08/31/20  0719  08/28/20  0250   CRP mg/dL 1.98* 2.37*  --  3.76*   < > 9.84*   WBC 10*3/mm3 8.14  --  9.00  --   --  6.93   HEMOGLOBIN g/dL 14.2  --  14.0  --   --  11.2*   HEMATOCRIT % 46.7  --  46.2  --   --  37.5   MCV fL 88.6  --  88.5  --   --  90.6   MCHC g/dL 30.4*  --  30.3*  --   --  29.9*   PLATELETS 10*3/mm3 363  --  379  --   --  251    < > = values in this interval not displayed.         Results from last 7 days   Lab Units 09/02/20  1142 09/01/20  2256 09/01/20  0745 08/31/20  0719   SODIUM mmol/L 134*  --  132* 134*   POTASSIUM mmol/L 4.1 4.0 3.6 3.9   MAGNESIUM mg/dL 2.1  --  2.1 2.1   CHLORIDE mmol/L 93*  --  88*  88*   CO2 mmol/L 32.4*  --  32.6* 34.2*   BUN mg/dL 17  --  21* 15   CREATININE mg/dL 0.57*  --  0.58* 0.54*   EGFR IF NONAFRICN AM mL/min/1.73 >150  --  >150 >150   CALCIUM mg/dL 9.8  --  9.6 9.5   GLUCOSE mg/dL 121*  --  118* 114*   ALBUMIN g/dL 3.54  --  3.14* 2.96*   Estimated Creatinine Clearance: 378.9 mL/min (A) (by C-G formula based on SCr of 0.57 mg/dL (L)).    No results found for: AMMONIA      Blood Culture   Date Value Ref Range Status   08/25/2020 No growth at 5 days  Final   08/25/2020 No growth at 5 days  Final     No results found for: URINECX  No results found for: WOUNDCX  No results found for: STOOLCX    I have personally looked at the labs and they are summarized above.  ----------------------------------------------------------------------------------------------------------------------  Imaging Results (Last 24 Hours)     Procedure Component Value Units Date/Time    XR Foot 2 View Right [[id number]] Collected:  09/01/20 2040     Updated:  09/01/20 2043    Narrative:       EXAMINATION: XR FOOT 2 VW RIGHT-      CLINICAL INDICATION:possible gangrene; L03.115-Cellulitis of right lower  limb     COMPARISON: None      TECHNIQUE: 2 views right foot     FINDINGS:   Osteolysis noted of the midfoot bony structures which radiographically  are most consistent with osteomyelitis with destructive changes. There  is diffuse soft tissue edema noted but no soft tissue emphysema  identified on radiograph. No acute fracture identified. No dislocation.       Impression:       Diffuse soft tissue edema with osteolysis of the midfoot bony structures  suspicious for osteomyelitis. No acute fracture.     This report was finalized on 9/1/2020 8:41 PM by Dr. David Dixon MD.           ----------------------------------------------------------------------------------------------------------------------  Assessment and Plan:    1.  Right lower extremity cellulitis -General surgery consulted, patient to have I&D at  bedside today.  Will culture fluid retrieved to help further direct plan of care.  Continue current antibiotic regimen with vancomycin, patient has completed Rocephin.    2.  Staph bacteremia -appreciate infectious disease recommendations, repeat blood cultures from 8/25/2020 demonstrated no growth to date.  Continue  vancomycin.    3.  Sepsis -present on admission, secondary to right lower extremity cellulitis and staph bacteremia, now resolved    4.  Super morbid obesity -complicates all manner of healthcare delivery    5.  Essential hypertension - well controlled            Nader Noel DO  09/02/20  12:52

## 2020-09-02 NOTE — PROGRESS NOTES
Discharge Planning Assessment   [facility]     Patient Name: Jimmy Recinos  MRN: [id number]  Today's Date: 9/2/2020    Admit Date: 8/23/2020    Discharge Plan     Row Name 09/02/20 1800       Plan    Plan Pt lives at home with his spouse and he plans to return home at discharge. Pt is requesting [facility] for skilled nursing and therapy services. [facility] [phone] to be arranged with a dr. terry. Pt has a rolling walker, straight cane, and Trilogy machine at home. EMS will need to be arranged at discharge [phone]. SS to follow.      NADINE Castillo

## 2020-09-03 LAB
ALBUMIN SERPL-MCNC: 3.1 G/DL (ref 3.5–5.2)
ANION GAP SERPL CALCULATED.3IONS-SCNC: 8.6 MMOL/L (ref 5–15)
BASOPHILS # BLD AUTO: 0.03 10*3/MM3 (ref 0–0.2)
BASOPHILS NFR BLD AUTO: 0.5 % (ref 0–1.5)
BUN SERPL-MCNC: 15 MG/DL (ref 6–20)
BUN/CREAT SERPL: 28.3 (ref 7–25)
CALCIUM SPEC-SCNC: 9.3 MG/DL (ref 8.6–10.5)
CHLORIDE SERPL-SCNC: 97 MMOL/L (ref 98–107)
CO2 SERPL-SCNC: 28.4 MMOL/L (ref 22–29)
CREAT SERPL-MCNC: 0.53 MG/DL (ref 0.76–1.27)
DEPRECATED RDW RBC AUTO: 48.9 FL (ref 37–54)
EOSINOPHIL # BLD AUTO: 0.31 10*3/MM3 (ref 0–0.4)
EOSINOPHIL NFR BLD AUTO: 5.1 % (ref 0.3–6.2)
ERYTHROCYTE [DISTWIDTH] IN BLOOD BY AUTOMATED COUNT: 14.9 % (ref 12.3–15.4)
GFR SERPL CREATININE-BSD FRML MDRD: >150 ML/MIN/1.73
GLUCOSE SERPL-MCNC: 105 MG/DL (ref 65–99)
HCT VFR BLD AUTO: 46.2 % (ref 37.5–51)
HGB BLD-MCNC: 14 G/DL (ref 13–17.7)
IMM GRANULOCYTES # BLD AUTO: 0.06 10*3/MM3 (ref 0–0.05)
IMM GRANULOCYTES NFR BLD AUTO: 1 % (ref 0–0.5)
LYMPHOCYTES # BLD AUTO: 0.85 10*3/MM3 (ref 0.7–3.1)
LYMPHOCYTES NFR BLD AUTO: 14 % (ref 19.6–45.3)
MAGNESIUM SERPL-MCNC: 2.3 MG/DL (ref 1.6–2.6)
MCH RBC QN AUTO: 27.2 PG (ref 26.6–33)
MCHC RBC AUTO-ENTMCNC: 30.3 G/DL (ref 31.5–35.7)
MCV RBC AUTO: 89.9 FL (ref 79–97)
MONOCYTES # BLD AUTO: 0.44 10*3/MM3 (ref 0.1–0.9)
MONOCYTES NFR BLD AUTO: 7.2 % (ref 5–12)
NEUTROPHILS NFR BLD AUTO: 4.38 10*3/MM3 (ref 1.7–7)
NEUTROPHILS NFR BLD AUTO: 72.2 % (ref 42.7–76)
NRBC BLD AUTO-RTO: 0 /100 WBC (ref 0–0.2)
PHOSPHATE SERPL-MCNC: 4.3 MG/DL (ref 2.5–4.5)
PLATELET # BLD AUTO: 306 10*3/MM3 (ref 140–450)
PMV BLD AUTO: 9.3 FL (ref 6–12)
POTASSIUM SERPL-SCNC: 4 MMOL/L (ref 3.5–5.2)
RBC # BLD AUTO: 5.14 10*6/MM3 (ref 4.14–5.8)
SODIUM SERPL-SCNC: 134 MMOL/L (ref 136–145)
WBC # BLD AUTO: 6.07 10*3/MM3 (ref 3.4–10.8)

## 2020-09-03 PROCEDURE — 25010000002 HEPARIN (PORCINE) PER 1000 UNITS: Performed by: HOSPITALIST

## 2020-09-03 PROCEDURE — 99231 SBSQ HOSP IP/OBS SF/LOW 25: CPT | Performed by: SURGERY

## 2020-09-03 PROCEDURE — 94799 UNLISTED PULMONARY SVC/PX: CPT

## 2020-09-03 PROCEDURE — 36410 VNPNXR 3YR/> PHY/QHP DX/THER: CPT

## 2020-09-03 PROCEDURE — 80069 RENAL FUNCTION PANEL: CPT | Performed by: EMERGENCY MEDICINE

## 2020-09-03 PROCEDURE — 25010000002 VANCOMYCIN: Performed by: INTERNAL MEDICINE

## 2020-09-03 PROCEDURE — 85025 COMPLETE CBC W/AUTO DIFF WBC: CPT | Performed by: NURSE PRACTITIONER

## 2020-09-03 PROCEDURE — 94660 CPAP INITIATION&MGMT: CPT

## 2020-09-03 PROCEDURE — 83735 ASSAY OF MAGNESIUM: CPT | Performed by: EMERGENCY MEDICINE

## 2020-09-03 PROCEDURE — 99232 SBSQ HOSP IP/OBS MODERATE 35: CPT | Performed by: INTERNAL MEDICINE

## 2020-09-03 PROCEDURE — C1751 CATH, INF, PER/CENT/MIDLINE: HCPCS

## 2020-09-03 RX ADMIN — SODIUM CHLORIDE, PRESERVATIVE FREE 10 ML: 5 INJECTION INTRAVENOUS at 09:01

## 2020-09-03 RX ADMIN — VANCOMYCIN HYDROCHLORIDE 1500 MG: 10 INJECTION, POWDER, LYOPHILIZED, FOR SOLUTION INTRAVENOUS at 23:17

## 2020-09-03 RX ADMIN — CASTOR OIL AND BALSAM, PERU: 788; 87 OINTMENT TOPICAL at 09:01

## 2020-09-03 RX ADMIN — HEPARIN SODIUM 5000 UNITS: 5000 INJECTION INTRAVENOUS; SUBCUTANEOUS at 09:01

## 2020-09-03 RX ADMIN — VANCOMYCIN HYDROCHLORIDE 1500 MG: 10 INJECTION, POWDER, LYOPHILIZED, FOR SOLUTION INTRAVENOUS at 00:20

## 2020-09-03 RX ADMIN — HEPARIN SODIUM 5000 UNITS: 5000 INJECTION INTRAVENOUS; SUBCUTANEOUS at 21:35

## 2020-09-03 RX ADMIN — SODIUM CHLORIDE, PRESERVATIVE FREE 10 ML: 5 INJECTION INTRAVENOUS at 21:36

## 2020-09-03 RX ADMIN — VANCOMYCIN HYDROCHLORIDE 1500 MG: 10 INJECTION, POWDER, LYOPHILIZED, FOR SOLUTION INTRAVENOUS at 11:22

## 2020-09-03 RX ADMIN — CASTOR OIL AND BALSAM, PERU: 788; 87 OINTMENT TOPICAL at 21:36

## 2020-09-03 NOTE — PROGRESS NOTES
PROGRESS NOTE         Patient Identification:  Name:  Jimmy Recinos  Age:  44 y.o.  Sex:  male  :  1975  MRN:  [id number]  Visit Number:  [id number]  Primary Care Provider:  No primary care provider on file.         LOS: 10 days       ----------------------------------------------------------------------------------------------------------------------  Subjective       Chief Complaints:    No chief complaint on file.        Interval History:      Patient sitting up in bed today.  Status post bedside I&D.  Afebrile, no diarrhea. WBC normal.  Right foot wound culture shows no growth thus far.    Review of Systems:    Constitutional: no fever, chills and night sweats. No appetite change or unexpected weight change. No fatigue.  Eyes: no eye drainage, itching or redness.  HEENT: no mouth sores, dysphagia or nose bleed.  Respiratory: no for shortness of breath, cough or production of sputum.  Cardiovascular: no chest pain, no palpitations, no orthopnea.  Gastrointestinal: no nausea, vomiting or diarrhea. No abdominal pain, hematemesis or rectal bleeding.  Genitourinary: no dysuria or polyuria.  Hematologic/lymphatic: no lymph node abnormalities, no easy bruising or easy bleeding.  Musculoskeletal: Right lower extremity pain.  Skin: No rash and no itching.  Neurological: no loss of consciousness, no seizure, no headache.  Behavioral/Psych: no depression or suicidal ideation.  Endocrine: no hot flashes.  Immunologic: negative.     ----------------------------------------------------------------------------------------------------------------------      Objective       Current Hospital Meds:    castor oil-balsam peru  Topical Q12H   heparin (porcine) 5,000 Units Subcutaneous Q12H   sodium chloride 10 mL Intravenous Q12H   vancomycin 1,500 mg Intravenous Q12H        ----------------------------------------------------------------------------------------------------------------------    Vital  Signs:  Temp:  [97.6 °F (36.4 °C)-98.4 °F (36.9 °C)] 97.6 °F (36.4 °C)  Heart Rate:  [69-91] 75  Resp:  [20-27] 22  BP: (119-139)/(65-85) 139/85  Mean Arterial Pressure (Non-Invasive) for the past 24 hrs (Last 3 readings):   Noninvasive MAP (mmHg)   09/02/20 1656 99     SpO2 Percentage    09/03/20 0437 09/03/20 0640 09/03/20 0700   SpO2: 96% 96% 96%     SpO2:  [94 %-99 %] 96 %  on   ;   Device (Oxygen Therapy): nasal cannula    Body mass index is 93.27 kg/m².  Wt Readings from Last 3 Encounters:   08/23/20 (!) 295 kg (650 lb)   01/02/20 (!) 273 kg (601 lb)   12/21/19 (!) 273 kg (601 lb 3.2 oz)        Intake/Output Summary (Last 24 hours) at 9/3/2020 1118  Last data filed at 9/3/2020 0955  Gross per 24 hour   Intake 2049.91 ml   Output 1600 ml   Net 449.91 ml     Diet Regular; Cardiac  ----------------------------------------------------------------------------------------------------------------------      Physical Exam:    Constitutional:  Well-developed and well-nourished.  No respiratory distress.  Morbidly obese.  HENT:  Head: Normocephalic and atraumatic.  Mouth:  Moist mucous membranes.    Eyes:  Conjunctivae and EOM are normal.  No scleral icterus.  Neck:  Neck supple.  No JVD present.    Cardiovascular:  Normal rate, regular rhythm and normal heart sounds with no murmur. No edema.  Pulmonary/Chest:  No respiratory distress, no wheezes, no crackles, with normal breath sounds and good air movement.  Abdominal:  Soft.  Bowel sounds are normal.  No distension and no tenderness.   Musculoskeletal: bilateral lower extremity lymphedema.    Right foot and ankle edema and erythema, improving.  Neurological:  Alert and oriented to person, place, and time.  No facial droop.  No slurred speech.   Skin:  Skin is warm and dry.  No rash noted.  No pallor. bilateral lower extremity lymphedema.  Right foot and ankle edema and erythema, improving.  Psychiatric:  Normal mood and affect.  Behavior is  normal.    ----------------------------------------------------------------------------------------------------------------------            Results from last 7 days   Lab Units 09/03/20 0732 09/02/20  1142 09/01/20  0745 09/01/20  0652 08/31/20  0719   CRP mg/dL  --  1.98* 2.37*  --  3.76*   WBC 10*3/mm3 6.07 8.14  --  9.00  --    HEMOGLOBIN g/dL 14.0 14.2  --  14.0  --    HEMATOCRIT % 46.2 46.7  --  46.2  --    MCV fL 89.9 88.6  --  88.5  --    MCHC g/dL 30.3* 30.4*  --  30.3*  --    PLATELETS 10*3/mm3 306 363  --  379  --      Results from last 7 days   Lab Units 09/03/20 0732 09/02/20  1142 09/01/20  2256 09/01/20  0745   SODIUM mmol/L 134* 134*  --  132*   POTASSIUM mmol/L 4.0 4.1 4.0 3.6   MAGNESIUM mg/dL 2.3 2.1  --  2.1   CHLORIDE mmol/L 97* 93*  --  88*   CO2 mmol/L 28.4 32.4*  --  32.6*   BUN mg/dL 15 17  --  21*   CREATININE mg/dL 0.53* 0.57*  --  0.58*   EGFR IF NONAFRICN AM mL/min/1.73 >150 >150  --  >150   CALCIUM mg/dL 9.3 9.8  --  9.6   GLUCOSE mg/dL 105* 121*  --  118*   ALBUMIN g/dL 3.10* 3.54  --  3.14*   Estimated Creatinine Clearance: 407.5 mL/min (A) (by C-G formula based on SCr of 0.53 mg/dL (L)).  No results found for: AMMONIA    No results found for: HGBA1C, POCGLU  Lab Results   Component Value Date    HGBA1C 5.00 08/23/2020     Lab Results   Component Value Date    TSH 0.793 12/21/2019       Blood Culture   Date Value Ref Range Status   08/23/2020 Staphylococcus, coagulase negative (C)  Preliminary   08/23/2020 Staphylococcus, coagulase negative (C)  Preliminary     Urine Culture   Date Value Ref Range Status   08/23/2020 >100,000 CFU/mL Mixed Robyn Isolated  Final     No results found for: WOUNDCX  No results found for: STOOLCX  No results found for: RESPCX  Pain Management Panel     Pain Management Panel Latest Ref Rng & Units 10/19/2014    AMPHETAMINES SCREEN, URINE NEGATIVE Negative    BARBITURATES SCREEN NEGATIVE Negative    BENZODIAZEPINE SCREEN, URINE NEGATIVE Negative    COCAINE  SCREEN, URINE NEGATIVE Negative    METHADONE SCREEN, URINE NEGATIVE Negative            ----------------------------------------------------------------------------------------------------------------------  Imaging Results (Last 24 Hours)     ** No results found for the last 24 hours. **          ----------------------------------------------------------------------------------------------------------------------    Assessment/Plan       Assessment/Plan     ASSESSMENT:    1.  Right lower extremity cellulitis  2.  Bacteremia  3.  UTI    PLAN:    Patient sitting up in bed today.  Status post bedside I&D.  Afebrile, no diarrhea. WBC normal.  Right foot wound culture shows no growth thus far.    PSA normal.  Repeat urinalysis from 8/25/2020 unremarkable.    Blood cultures from 8/23/2020 2 out of 2 sets positive for staph species not aureus.  Blood cultures from 8/25/2020 finalized no growth.    Recommend to continue vancomycin pharmacy to dose monotherapy.     As evidenced by x-ray results of right foot would recommend open MRI or bone scan as well as bone biopsy to confirm pathogen. However due to patient's morbid obesity unsure if patient would be able to be scanned here.    Code Status:   Code Status and Medical Interventions:   Ordered at: 08/24/20 0102     Level Of Support Discussed With:    Patient     Code Status:    CPR     Medical Interventions (Level of Support Prior to Arrest):    Full       Yolanda Gross, KHUSHBU  09/03/20  11:18

## 2020-09-03 NOTE — PROGRESS NOTES
[facility] HOSPITALIST PROGRESS NOTE     Patient Identification:  Name:  Jimmy Recinos  Age:  44 y.o.  Sex:  male  :  1975  MRN:  [id number]  Visit Number:  [id number]  Primary Care Provider:  No primary care provider on file.    Length of stay:  10    Chief complaint: Right lower extremity and right foot pain    Subjective:    Patient reports that his right lower extremity and right foot pain have completely resolved.  He has no complaints today.  He states he feels better today than he has in several weeks.  Did discuss with patient the inability to provide him with MRI of foot to definitively rule out osteomyelitis.  As such, patient must be treated conservatively and as though he does have osteomyelitis.  Did discuss the need for wound care and prolonged IV antibiotic therapy and his subsequent candidacy for LTAC placement.  Patient is agreeable to this course of action.  ----------------------------------------------------------------------------------------------------------------------  Current Hospital Meds:    castor oil-balsam peru  Topical Q12H   heparin (porcine) 5,000 Units Subcutaneous Q12H   sodium chloride 10 mL Intravenous Q12H   vancomycin 1,500 mg Intravenous Q12H        ----------------------------------------------------------------------------------------------------------------------  Vital Signs:  Temp:  [97.6 °F (36.4 °C)-98.4 °F (36.9 °C)] 98.1 °F (36.7 °C)  Heart Rate:  [69-91] 84  Resp:  [20-27] 20  BP: (119-144)/(65-85) 132/65      20  2304   Weight: (!) 295 kg (650 lb)     Body mass index is 93.27 kg/m².    Intake/Output Summary (Last 24 hours) at 9/3/2020 1541  Last data filed at 9/3/2020 1300  Gross per 24 hour   Intake 2529.91 ml   Output 1800 ml   Net 729.91 ml     Diet Regular; Cardiac  ----------------------------------------------------------------------------------------------------------------------  Physical exam:  Constitutional: Super morbidly  obese  male in no apparent distress.     HENT:  Head:  Normocephalic and atraumatic.  Mouth:  Moist mucous membranes.    Eyes:  Conjunctivae and EOM are normal.  Pupils are equal, round, and reactive to light.  No scleral icterus.    Neck:  Neck supple. No thyromegaly.  No JVD present.    Cardiovascular:  Regular rate and rhythm with no murmurs, rubs, clicks or gallops appreciated.  Pulmonary/Chest:  Clear to auscultation bilaterally with no crackles, wheezes or rhonchi appreciated.  Abdominal:  Soft. Nontender. Nondistended  Bowel sounds are normal in all four quadrants. No organomegally appreciated.   Musculoskeletal: Nonpitting bilateral lower extremity edema, mild tenderness to right lower extremity.  No red or swollen joints anywhere.    Neurological:  Alert and oriented to person, place, and time. Cranial nerves II-XII intact with no focal deficits.  No facial droop.  No slurred speech.   Skin:  Warm and dry to palpation, right foot covered in Ace bandage.  Peripheral vascular:  2+ radial and pedal pulses in bilateral upper and lower extremities.  Psychiatric:  Alert and oriented x3, demonstrates appropriate judgement and insight.  ----------------------------------------------------------------------------------------------------------------------  Tele:    ----------------------------------------------------------------------------------------------------------------------      Results from last 7 days   Lab Units 09/03/20  0732 09/02/20  1142 09/01/20  0745 09/01/20  0652 08/31/20  0719   CRP mg/dL  --  1.98* 2.37*  --  3.76*   WBC 10*3/mm3 6.07 8.14  --  9.00  --    HEMOGLOBIN g/dL 14.0 14.2  --  14.0  --    HEMATOCRIT % 46.2 46.7  --  46.2  --    MCV fL 89.9 88.6  --  88.5  --    MCHC g/dL 30.3* 30.4*  --  30.3*  --    PLATELETS 10*3/mm3 306 363  --  379  --          Results from last 7 days   Lab Units 09/03/20  0732 09/02/20  1142 09/01/20  2256 09/01/20  0745   SODIUM mmol/L 134* 134*  --   132*   POTASSIUM mmol/L 4.0 4.1 4.0 3.6   MAGNESIUM mg/dL 2.3 2.1  --  2.1   CHLORIDE mmol/L 97* 93*  --  88*   CO2 mmol/L 28.4 32.4*  --  32.6*   BUN mg/dL 15 17  --  21*   CREATININE mg/dL 0.53* 0.57*  --  0.58*   EGFR IF NONAFRICN AM mL/min/1.73 >150 >150  --  >150   CALCIUM mg/dL 9.3 9.8  --  9.6   GLUCOSE mg/dL 105* 121*  --  118*   ALBUMIN g/dL 3.10* 3.54  --  3.14*   Estimated Creatinine Clearance: 407.5 mL/min (A) (by C-G formula based on SCr of 0.53 mg/dL (L)).    No results found for: AMMONIA      Blood Culture   Date Value Ref Range Status   08/25/2020 No growth at 5 days  Final   08/25/2020 No growth at 5 days  Final     No results found for: URINECX  No results found for: WOUNDCX  No results found for: STOOLCX    I have personally looked at the labs and they are summarized above.  ----------------------------------------------------------------------------------------------------------------------  Imaging Results (Last 24 Hours)     ** No results found for the last 24 hours. **        ----------------------------------------------------------------------------------------------------------------------  Assessment and Plan:    1.  Right lower extremity cellulitis -right lower extremity cellulitis is much improved, right foot currently wrapped in Ace bandage.  Patient did have I&D at bedside yesterday, wound culture with no growth to date.  Continue empiric antibiotic therapy with vancomycin.  Again, it is not possible to provide patient with any imaging to rule out osteomyelitis.  Have attempted to contact multiple hospitals and [place] centers as far as [facility] and beyond.    2.  Staph bacteremia -appreciate infectious disease recommendations, repeat blood cultures from 8/25/2020 demonstrated no growth to date.  Continue  vancomycin.    3.  Sepsis -present on admission, secondary to right lower extremity cellulitis and staph bacteremia, now resolved    4.  Super morbid obesity -complicates  all manner of healthcare delivery    5.  Essential hypertension - well controlled        Currently pending LTAC evaluation.    Nader Noel,   09/03/20  15:41

## 2020-09-03 NOTE — PLAN OF CARE
Problem: Patient Care Overview  Goal: Plan of Care Review  Outcome: Ongoing (interventions implemented as appropriate)  Flowsheets  Taken 9/2/2020 3813  Progress: no change  Taken 9/2/2020 2208  Plan of Care Reviewed With: patient  Note:   Patient seems to be resting well, no complaints of pain/distress, says only feels pressure on Left foot and no pain, no other changes, will continue to monitor

## 2020-09-03 NOTE — PLAN OF CARE
Patient resting in bed. Midline placed by HERNANDEZ Hairston. No acute changes. Will continue to monitor.

## 2020-09-03 NOTE — PROGRESS NOTES
Discharge Planning Assessment  [facility]     Patient Name: Jimmy Recinos  MRN: [id number]  Today's Date: 9/3/2020    Admit Date: 8/23/2020    Discharge Plan     Row Name 09/03/20 1603       Plan    Plan Dr. Noel ordered a [facility] consult. SS notified [facility] per Lidya. SS to follow.         Destination      Service Provider Request Status Selected Services Address Phone Number Fax Number    [facility] Pending - No Request Sent N/A [address] [facility] [place] [address] [phone]     NADINE Castillo

## 2020-09-03 NOTE — PROGRESS NOTES
LOS: 10 days   No care team member to display    Surgery follow up for right foot blister    Subjective     Interval History:  Patient reports no complaints today to his feet.    History taken from patient.      Review of Systems:   Review of systems negative except for history of present illness    Objective     Vital Signs  Temp:  [97.6 °F (36.4 °C)-98.4 °F (36.9 °C)] 98.1 °F (36.7 °C)  Heart Rate:  [69-91] 84  Resp:  [20-27] 20  BP: (119-144)/(65-85) 132/65    Physical Exam:  On examination this is a morbidly obese male in no acute distress  HEENT examination: Within normal limits.  Conjunctiva pink.  Nose and ears appear normal.  Neck: Supple, full range of motion.  No JVD.  Musculoskeletal: Full range of motion all extremities without focal weakness. Normal gait. No digital cyanosis.  Psych: Patient is alert, oriented x3. Mood and affect are appropriate.  Skin: Examination of the right foot demonstrates the tissue to be viable.  There was an area on the plantar surface which was more soft overlying the distal third or fourth metatarsal head.  Yesterday the patient reported that this area tender to palpation but today states there is no tenderness at all.  No drainage.     Results Review:        Results from last 7 days   Lab Units 09/03/20  0732 09/02/20  1142 09/01/20  0745 09/01/20  0652 08/31/20  0719   CRP mg/dL  --  1.98* 2.37*  --  3.76*   WBC 10*3/mm3 6.07 8.14  --  9.00  --    HEMOGLOBIN g/dL 14.0 14.2  --  14.0  --    HEMATOCRIT % 46.2 46.7  --  46.2  --    PLATELETS 10*3/mm3 306 363  --  379  --          Results from last 7 days   Lab Units 09/03/20  0732 09/02/20  1142 09/01/20  2256 09/01/20  0745   SODIUM mmol/L 134* 134*  --  132*   POTASSIUM mmol/L 4.0 4.1 4.0 3.6   MAGNESIUM mg/dL 2.3 2.1  --  2.1   CHLORIDE mmol/L 97* 93*  --  88*   CO2 mmol/L 28.4 32.4*  --  32.6*   BUN mg/dL 15 17  --  21*   CREATININE mg/dL 0.53* 0.57*  --  0.58*   EGFR IF NONAFRICN AM mL/min/1.73 >150 >150  --  >150    CALCIUM mg/dL 9.3 9.8  --  9.6   GLUCOSE mg/dL 105* 121*  --  118*   ALBUMIN g/dL 3.10* 3.54  --  3.14*   Estimated Creatinine Clearance: 407.5 mL/min (A) (by C-G formula based on SCr of 0.53 mg/dL (L)).  No results found for: AMMONIA      No results found for: BLOODCX  No results found for: URINECX  Wound Culture   Date Value Ref Range Status   09/02/2020 No growth  Preliminary     No results found for: STOOLCX    Imaging:  Imaging Results (Last 24 Hours)     ** No results found for the last 24 hours. **               Impression:  Large blister on the plantar surface of the right foot which was unroofed yesterday is stable.  Etiology for this is unclear.  There is one area which is softer and is potentially concerning for a deeper process.  Will need to continue to monitor this.    Plan:  Continue present care.  Will reevaluate in a.m.  Plan to transfer patient to LTAC noted.    Gillian Patterson MD  09/03/20  17:18      Please note that portions of this note were completed with a voice recognition program.

## 2020-09-04 LAB
ALBUMIN SERPL-MCNC: 3.18 G/DL (ref 3.5–5.2)
ANION GAP SERPL CALCULATED.3IONS-SCNC: 9.6 MMOL/L (ref 5–15)
BASOPHILS # BLD AUTO: 0.03 10*3/MM3 (ref 0–0.2)
BASOPHILS NFR BLD AUTO: 0.4 % (ref 0–1.5)
BUN SERPL-MCNC: 17 MG/DL (ref 6–20)
BUN/CREAT SERPL: 43.6 (ref 7–25)
CALCIUM SPEC-SCNC: 9.1 MG/DL (ref 8.6–10.5)
CHLORIDE SERPL-SCNC: 99 MMOL/L (ref 98–107)
CO2 SERPL-SCNC: 30.4 MMOL/L (ref 22–29)
CREAT SERPL-MCNC: 0.39 MG/DL (ref 0.76–1.27)
CRP SERPL-MCNC: 1.35 MG/DL (ref 0–0.5)
DEPRECATED RDW RBC AUTO: 50.3 FL (ref 37–54)
EOSINOPHIL # BLD AUTO: 0.3 10*3/MM3 (ref 0–0.4)
EOSINOPHIL NFR BLD AUTO: 4.5 % (ref 0.3–6.2)
ERYTHROCYTE [DISTWIDTH] IN BLOOD BY AUTOMATED COUNT: 15 % (ref 12.3–15.4)
GFR SERPL CREATININE-BSD FRML MDRD: >150 ML/MIN/1.73
GLUCOSE SERPL-MCNC: 111 MG/DL (ref 65–99)
HCT VFR BLD AUTO: 44.7 % (ref 37.5–51)
HGB BLD-MCNC: 13.2 G/DL (ref 13–17.7)
IMM GRANULOCYTES # BLD AUTO: 0.05 10*3/MM3 (ref 0–0.05)
IMM GRANULOCYTES NFR BLD AUTO: 0.7 % (ref 0–0.5)
LYMPHOCYTES # BLD AUTO: 0.95 10*3/MM3 (ref 0.7–3.1)
LYMPHOCYTES NFR BLD AUTO: 14.2 % (ref 19.6–45.3)
MAGNESIUM SERPL-MCNC: 2.2 MG/DL (ref 1.6–2.6)
MCH RBC QN AUTO: 27.1 PG (ref 26.6–33)
MCHC RBC AUTO-ENTMCNC: 29.5 G/DL (ref 31.5–35.7)
MCV RBC AUTO: 91.8 FL (ref 79–97)
MONOCYTES # BLD AUTO: 0.46 10*3/MM3 (ref 0.1–0.9)
MONOCYTES NFR BLD AUTO: 6.9 % (ref 5–12)
NEUTROPHILS NFR BLD AUTO: 4.91 10*3/MM3 (ref 1.7–7)
NEUTROPHILS NFR BLD AUTO: 73.3 % (ref 42.7–76)
NRBC BLD AUTO-RTO: 0 /100 WBC (ref 0–0.2)
PHOSPHATE SERPL-MCNC: 4.1 MG/DL (ref 2.5–4.5)
PLATELET # BLD AUTO: 281 10*3/MM3 (ref 140–450)
PMV BLD AUTO: 9.3 FL (ref 6–12)
POTASSIUM SERPL-SCNC: 4 MMOL/L (ref 3.5–5.2)
RBC # BLD AUTO: 4.87 10*6/MM3 (ref 4.14–5.8)
SODIUM SERPL-SCNC: 139 MMOL/L (ref 136–145)
VANCOMYCIN TROUGH SERPL-MCNC: 12.5 MCG/ML (ref 5–20)
WBC # BLD AUTO: 6.7 10*3/MM3 (ref 3.4–10.8)

## 2020-09-04 PROCEDURE — 80202 ASSAY OF VANCOMYCIN: CPT | Performed by: INTERNAL MEDICINE

## 2020-09-04 PROCEDURE — 86140 C-REACTIVE PROTEIN: CPT | Performed by: NURSE PRACTITIONER

## 2020-09-04 PROCEDURE — 80069 RENAL FUNCTION PANEL: CPT | Performed by: EMERGENCY MEDICINE

## 2020-09-04 PROCEDURE — 83735 ASSAY OF MAGNESIUM: CPT | Performed by: EMERGENCY MEDICINE

## 2020-09-04 PROCEDURE — 85025 COMPLETE CBC W/AUTO DIFF WBC: CPT | Performed by: NURSE PRACTITIONER

## 2020-09-04 PROCEDURE — 94799 UNLISTED PULMONARY SVC/PX: CPT

## 2020-09-04 PROCEDURE — 25010000002 VANCOMYCIN 5 G RECONSTITUTED SOLUTION: Performed by: INTERNAL MEDICINE

## 2020-09-04 PROCEDURE — 99232 SBSQ HOSP IP/OBS MODERATE 35: CPT | Performed by: SURGERY

## 2020-09-04 PROCEDURE — 99232 SBSQ HOSP IP/OBS MODERATE 35: CPT | Performed by: INTERNAL MEDICINE

## 2020-09-04 PROCEDURE — 25010000002 HEPARIN (PORCINE) PER 1000 UNITS: Performed by: HOSPITALIST

## 2020-09-04 PROCEDURE — 94660 CPAP INITIATION&MGMT: CPT

## 2020-09-04 RX ADMIN — SODIUM CHLORIDE, PRESERVATIVE FREE 10 ML: 5 INJECTION INTRAVENOUS at 09:25

## 2020-09-04 RX ADMIN — HEPARIN SODIUM 5000 UNITS: 5000 INJECTION INTRAVENOUS; SUBCUTANEOUS at 20:06

## 2020-09-04 RX ADMIN — CASTOR OIL AND BALSAM, PERU: 788; 87 OINTMENT TOPICAL at 20:06

## 2020-09-04 RX ADMIN — SODIUM CHLORIDE, PRESERVATIVE FREE 10 ML: 5 INJECTION INTRAVENOUS at 20:07

## 2020-09-04 RX ADMIN — CASTOR OIL AND BALSAM, PERU: 788; 87 OINTMENT TOPICAL at 09:24

## 2020-09-04 RX ADMIN — HEPARIN SODIUM 5000 UNITS: 5000 INJECTION INTRAVENOUS; SUBCUTANEOUS at 09:24

## 2020-09-04 RX ADMIN — VANCOMYCIN HYDROCHLORIDE 1500 MG: 10 INJECTION, POWDER, LYOPHILIZED, FOR SOLUTION INTRAVENOUS at 14:38

## 2020-09-04 NOTE — PROGRESS NOTES
Antimicrobial Length of Therapy:    Day 12 of 14 vancomycin       Pharmacokinetics Service Note:    Mr. Recinos continues on day 12 of vancomycin 1.5 gm q12h for RLE cellulitis and Staph bacteremia.  A 12 hour post 2-hr infusion trough was drawn and resulted at 12.5 mg/L.  This correlates with an AUC ~500 mg/L.hr and trough ~15 mg/L.  Will continue with current regimen of 1.5 gm q12h.  Will continue to monitor and make adjustments as necessary.     Thank you,   Eulalio Luevano, PharmD  Pharmacy Resident   9/4/2020  14:35

## 2020-09-04 NOTE — PROGRESS NOTES
[facility] HOSPITALIST PROGRESS NOTE     Patient Identification:  Name:  Jimmy Recinos  Age:  44 y.o.  Sex:  male  :  1975  MRN:  [id number]  Visit Number:  [id number]  Primary Care Provider:  No primary care provider on file.    Length of stay:  11    Chief complaint: Right ankle pain    Subjective:    Patient states he is doing well and in good spirits.  He reports only mild right ankle pain from his foot being in an awkward position during bedside I&D.  Otherwise, patient has no new symptoms.  He has no new events overnight.  Patient is still agreeable to go to LTAC once available.  ----------------------------------------------------------------------------------------------------------------------  Current Hospital Meds:    castor oil-balsam peru  Topical Q12H   heparin (porcine) 5,000 Units Subcutaneous Q12H   sodium chloride 10 mL Intravenous Q12H   vancomycin 1,500 mg Intravenous Q12H        ----------------------------------------------------------------------------------------------------------------------  Vital Signs:  Temp:  [97.9 °F (36.6 °C)-98.4 °F (36.9 °C)] 98 °F (36.7 °C)  Heart Rate:  [69-90] 81  Resp:  [18-29] 18  BP: (132-177)/(65-83) 140/74      08/23/20  2304   Weight: (!) 295 kg (650 lb)     Body mass index is 93.27 kg/m².    Intake/Output Summary (Last 24 hours) at 2020 1517  Last data filed at 2020 1300  Gross per 24 hour   Intake 2898.09 ml   Output 650 ml   Net 2248.09 ml     Diet Regular; Cardiac  ----------------------------------------------------------------------------------------------------------------------  Physical exam:  Constitutional: Super morbidly obese  male in no apparent distress.     HENT:  Head:  Normocephalic and atraumatic.  Mouth:  Moist mucous membranes.    Eyes:  Conjunctivae and EOM are normal.  Pupils are equal, round, and reactive to light.  No scleral icterus.    Neck:  Neck supple. No thyromegaly.  No JVD present.      Cardiovascular:  Regular rate and rhythm with no murmurs, rubs, clicks or gallops appreciated.  Pulmonary/Chest:  Clear to auscultation bilaterally with no crackles, wheezes or rhonchi appreciated.  Abdominal:  Soft. Nontender. Nondistended  Bowel sounds are normal in all four quadrants. No organomegally appreciated.   Musculoskeletal: Nonpitting bilateral lower extremity edema, mild tenderness to right lower extremity.  No red or swollen joints anywhere.    Neurological:  Alert and oriented to person, place, and time. Cranial nerves II-XII intact with no focal deficits.  No facial droop.  No slurred speech.   Skin:  Warm and dry to palpation, right foot covered in Ace bandage.  Peripheral vascular:  2+ radial and pedal pulses in bilateral upper and lower extremities.  Psychiatric:  Alert and oriented x3, demonstrates appropriate judgement and insight.  ----------------------------------------------------------------------------------------------------------------------  Tele:    ----------------------------------------------------------------------------------------------------------------------      Results from last 7 days   Lab Units 09/04/20  0502 09/03/20  0732 09/02/20  1142 09/01/20  0745   CRP mg/dL 1.35*  --  1.98* 2.37*   WBC 10*3/mm3 6.70 6.07 8.14  --    HEMOGLOBIN g/dL 13.2 14.0 14.2  --    HEMATOCRIT % 44.7 46.2 46.7  --    MCV fL 91.8 89.9 88.6  --    MCHC g/dL 29.5* 30.3* 30.4*  --    PLATELETS 10*3/mm3 281 306 363  --          Results from last 7 days   Lab Units 09/04/20  0502 09/03/20  0732 09/02/20  1142   SODIUM mmol/L 139 134* 134*   POTASSIUM mmol/L 4.0 4.0 4.1   MAGNESIUM mg/dL 2.2 2.3 2.1   CHLORIDE mmol/L 99 97* 93*   CO2 mmol/L 30.4* 28.4 32.4*   BUN mg/dL 17 15 17   CREATININE mg/dL 0.39* 0.53* 0.57*   EGFR IF NONAFRICN AM mL/min/1.73 >150 >150 >150   CALCIUM mg/dL 9.1 9.3 9.8   GLUCOSE mg/dL 111* 105* 121*   ALBUMIN g/dL 3.18* 3.10* 3.54   Estimated Creatinine Clearance: 553.8 mL/min  (A) (by C-G formula based on SCr of 0.39 mg/dL (L)).    No results found for: AMMONIA      Blood Culture   Date Value Ref Range Status   08/25/2020 No growth at 5 days  Final   08/25/2020 No growth at 5 days  Final     No results found for: URINECX  No results found for: WOUNDCX  No results found for: STOOLCX    I have personally looked at the labs and they are summarized above.  ----------------------------------------------------------------------------------------------------------------------  Imaging Results (Last 24 Hours)     ** No results found for the last 24 hours. **        ----------------------------------------------------------------------------------------------------------------------  Assessment and Plan:    1.  Right lower extremity cellulitis -right lower extremity cellulitis is nearly resolved, right foot currently wrapped in Ace bandage.  We will continue empiric antibiotic therapy with vancomycin for the next 4 weeks as cannot definitively rule out osteomyelitis.  Again, it is not possible to provide patient with any imaging to rule out osteomyelitis.  Have attempted to contact multiple hospitals and [facility] and beyond.    2.  Staph bacteremia -appreciate infectious disease recommendations, repeat blood cultures from 8/25/2020 demonstrated no growth to date.  Continue  vancomycin.    3.  Sepsis -present on admission, secondary to right lower extremity cellulitis and staph bacteremia, now resolved    4.  Super morbid obesity -complicates all manner of healthcare delivery    5.  Essential hypertension - well controlled        Currently pending LTAC evaluation.    Nader Noel,   09/04/20  15:17

## 2020-09-04 NOTE — PLAN OF CARE
Problem: Patient Care Overview  Goal: Plan of Care Review  Outcome: Ongoing (interventions implemented as appropriate)  Flowsheets  Taken 9/4/2020 0518 by Daisha Lazo RN  Progress: no change  Outcome Summary: Pt rested in bed throughout the shift. Pt new midline will not draw back blood. Pt stated that it was hurting when anitibiotics administered. Pt denies any other needs or concerns. Vital signs stable. No acute changes observed. Will continue to monitor.  Taken 9/3/2020 0901 by Sonia Felder, RN  Plan of Care Reviewed With: patient

## 2020-09-04 NOTE — PROGRESS NOTES
LOS: 11 days   No care team member to display    Surgery follow up for right foot blister    Subjective     Interval History:  Patient reports no complaints today to his feet.  He states he has had some ankle pain but none related to the blister on his feet    History taken from patient.      Review of Systems:   Review of systems negative except for history of present illness    Objective     Vital Signs  Temp:  [97.9 °F (36.6 °C)-98.4 °F (36.9 °C)] 98.4 °F (36.9 °C)  Heart Rate:  [69-90] 81  Resp:  [20-29] 20  BP: (132-177)/(65-83) 140/76    Physical Exam:  On examination this is a morbidly obese male in no acute distress  HEENT examination: Within normal limits.  Conjunctiva pink.  Nose and ears appear normal.  Neck: Supple, full range of motion.  No JVD.  Musculoskeletal: Full range of motion all extremities without focal weakness. No digital cyanosis.  Psych: Patient is alert, oriented x3. Mood and affect are appropriate.  Skin: Examination of the right foot demonstrates the tissue to be viable.  The foot is essentially unchanged from yesterday.  No open draining areas.  No obvious abscess.   Results Review:        Results from last 7 days   Lab Units 09/04/20  0502 09/03/20  0732 09/02/20  1142 09/01/20  0745   CRP mg/dL 1.35*  --  1.98* 2.37*   WBC 10*3/mm3 6.70 6.07 8.14  --    HEMOGLOBIN g/dL 13.2 14.0 14.2  --    HEMATOCRIT % 44.7 46.2 46.7  --    PLATELETS 10*3/mm3 281 306 363  --          Results from last 7 days   Lab Units 09/04/20  0502 09/03/20  0732 09/02/20  1142   SODIUM mmol/L 139 134* 134*   POTASSIUM mmol/L 4.0 4.0 4.1   MAGNESIUM mg/dL 2.2 2.3 2.1   CHLORIDE mmol/L 99 97* 93*   CO2 mmol/L 30.4* 28.4 32.4*   BUN mg/dL 17 15 17   CREATININE mg/dL 0.39* 0.53* 0.57*   EGFR IF NONAFRICN AM mL/min/1.73 >150 >150 >150   CALCIUM mg/dL 9.1 9.3 9.8   GLUCOSE mg/dL 111* 105* 121*   ALBUMIN g/dL 3.18* 3.10* 3.54   Estimated Creatinine Clearance: 553.8 mL/min (A) (by C-G formula based on SCr of 0.39  mg/dL (L)).  No results found for: AMMONIA      No results found for: BLOODCX  No results found for: URINECX  Wound Culture   Date Value Ref Range Status   09/02/2020 No growth  Preliminary     No results found for: STOOLCX    Imaging:  Imaging Results (Last 24 Hours)     ** No results found for the last 24 hours. **               Impression:  Large blister on the plantar surface of the right foot which was unroofed 2 days ago and is stable.  Etiology for this is unclear.  It would be helpful if a CT scan could be done of the foot to ensure that there was not a deeper infectious process but patient's weight precludes this.  Clinically there is no evidence of such at this time and therefore would continue present care    Plan:  Continue present care.   Plan to transfer patient to LTAC noted.    Gillian Patterson MD  09/04/20  13:35      Please note that portions of this note were completed with a voice recognition program.

## 2020-09-04 NOTE — PROGRESS NOTES
PROGRESS NOTE         Patient Identification:  Name:  Jimmy Recinos  Age:  44 y.o.  Sex:  male  :  1975  MRN:  [id number]  Visit Number:  [id number]  Primary Care Provider:  No primary care provider on file.         LOS: 11 days       ----------------------------------------------------------------------------------------------------------------------  Subjective       Chief Complaints:    No chief complaint on file.        Interval History:      Patient sitting up in bed.  No issues or complaints.  Afebrile, no diarrhea.  WBC normal.  CRP improving at 1.35.  Wound culture thus far shows no growth.    Review of Systems:    Constitutional: no fever, chills and night sweats. No appetite change or unexpected weight change. No fatigue.  Eyes: no eye drainage, itching or redness.  HEENT: no mouth sores, dysphagia or nose bleed.  Respiratory: no for shortness of breath, cough or production of sputum.  Cardiovascular: no chest pain, no palpitations, no orthopnea.  Gastrointestinal: no nausea, vomiting or diarrhea. No abdominal pain, hematemesis or rectal bleeding.  Genitourinary: no dysuria or polyuria.  Hematologic/lymphatic: no lymph node abnormalities, no easy bruising or easy bleeding.  Musculoskeletal: Right lower extremity pain.  Skin: No rash and no itching.  Neurological: no loss of consciousness, no seizure, no headache.  Behavioral/Psych: no depression or suicidal ideation.  Endocrine: no hot flashes.  Immunologic: negative.     ----------------------------------------------------------------------------------------------------------------------      Objective       Current Hospital Meds:    castor oil-balsam peru  Topical Q12H   heparin (porcine) 5,000 Units Subcutaneous Q12H   sodium chloride 10 mL Intravenous Q12H   vancomycin 1,500 mg Intravenous Q12H        ----------------------------------------------------------------------------------------------------------------------    Vital  Signs:  Temp:  [97.9 °F (36.6 °C)-98.4 °F (36.9 °C)] 98.4 °F (36.9 °C)  Heart Rate:  [69-90] 81  Resp:  [20-29] 20  BP: (132-177)/(65-83) 140/76  No data found.  SpO2 Percentage    09/04/20 0458 09/04/20 0649 09/04/20 0700   SpO2: 95% 94% 96%     SpO2:  [94 %-97 %] 96 %  on   ;   Device (Oxygen Therapy): room air    Body mass index is 93.27 kg/m².  Wt Readings from Last 3 Encounters:   08/23/20 (!) 295 kg (650 lb)   01/02/20 (!) 273 kg (601 lb)   12/21/19 (!) 273 kg (601 lb 3.2 oz)        Intake/Output Summary (Last 24 hours) at 9/4/2020 1203  Last data filed at 9/4/2020 0900  Gross per 24 hour   Intake 2778.09 ml   Output 1000 ml   Net 1778.09 ml     Diet Regular; Cardiac  ----------------------------------------------------------------------------------------------------------------------      Physical Exam:    Constitutional:  Well-developed and well-nourished.  No respiratory distress.  Morbidly obese.  HENT:  Head: Normocephalic and atraumatic.  Mouth:  Moist mucous membranes.    Eyes:  Conjunctivae and EOM are normal.  No scleral icterus.  Neck:  Neck supple.  No JVD present.    Cardiovascular:  Normal rate, regular rhythm and normal heart sounds with no murmur. No edema.  Pulmonary/Chest:  No respiratory distress, no wheezes, no crackles, with normal breath sounds and good air movement.  Abdominal:  Soft.  Bowel sounds are normal.  No distension and no tenderness.   Musculoskeletal: bilateral lower extremity lymphedema.    Right foot and ankle edema and erythema, improving.  Neurological:  Alert and oriented to person, place, and time.  No facial droop.  No slurred speech.   Skin:  Skin is warm and dry.  No rash noted.  No pallor. bilateral lower extremity lymphedema.  Right foot and ankle edema and erythema, improving.  Psychiatric:  Normal mood and affect.  Behavior is normal.    ----------------------------------------------------------------------------------------------------------------------               Results from last 7 days   Lab Units 09/04/20  0502 09/03/20  0732 09/02/20  1142 09/01/20  0745   CRP mg/dL 1.35*  --  1.98* 2.37*   WBC 10*3/mm3 6.70 6.07 8.14  --    HEMOGLOBIN g/dL 13.2 14.0 14.2  --    HEMATOCRIT % 44.7 46.2 46.7  --    MCV fL 91.8 89.9 88.6  --    MCHC g/dL 29.5* 30.3* 30.4*  --    PLATELETS 10*3/mm3 281 306 363  --      Results from last 7 days   Lab Units 09/04/20  0502 09/03/20  0732 09/02/20  1142   SODIUM mmol/L 139 134* 134*   POTASSIUM mmol/L 4.0 4.0 4.1   MAGNESIUM mg/dL 2.2 2.3 2.1   CHLORIDE mmol/L 99 97* 93*   CO2 mmol/L 30.4* 28.4 32.4*   BUN mg/dL 17 15 17   CREATININE mg/dL 0.39* 0.53* 0.57*   EGFR IF NONAFRICN AM mL/min/1.73 >150 >150 >150   CALCIUM mg/dL 9.1 9.3 9.8   GLUCOSE mg/dL 111* 105* 121*   ALBUMIN g/dL 3.18* 3.10* 3.54   Estimated Creatinine Clearance: 553.8 mL/min (A) (by C-G formula based on SCr of 0.39 mg/dL (L)).  No results found for: AMMONIA    No results found for: HGBA1C, POCGLU  Lab Results   Component Value Date    HGBA1C 5.00 08/23/2020     Lab Results   Component Value Date    TSH 0.793 12/21/2019       Blood Culture   Date Value Ref Range Status   08/23/2020 Staphylococcus, coagulase negative (C)  Preliminary   08/23/2020 Staphylococcus, coagulase negative (C)  Preliminary     Urine Culture   Date Value Ref Range Status   08/23/2020 >100,000 CFU/mL Mixed Robyn Isolated  Final     No results found for: WOUNDCX  No results found for: STOOLCX  No results found for: RESPCX  Pain Management Panel     Pain Management Panel Latest Ref Rng & Units 10/19/2014    AMPHETAMINES SCREEN, URINE NEGATIVE Negative    BARBITURATES SCREEN NEGATIVE Negative    BENZODIAZEPINE SCREEN, URINE NEGATIVE Negative    COCAINE SCREEN, URINE NEGATIVE Negative    METHADONE SCREEN, URINE NEGATIVE Negative            ----------------------------------------------------------------------------------------------------------------------  Imaging Results (Last 24 Hours)     ** No results  found for the last 24 hours. **          ----------------------------------------------------------------------------------------------------------------------    Assessment/Plan       Assessment/Plan     ASSESSMENT:    1.  Right lower extremity cellulitis  2.  Bacteremia  3.  UTI    PLAN:    Patient sitting up in bed.  No issues or complaints.  Afebrile, no diarrhea.  WBC normal.  CRP improving at 1.35.  Wound culture thus far shows no growth.    PSA normal.  Repeat urinalysis from 8/25/2020 unremarkable.    Blood cultures from 8/23/2020 2 out of 2 sets positive for staph species not aureus.  Blood cultures from 8/25/2020 finalized no growth.    Recommend to continue vancomycin pharmacy to dose monotherapy.  If surgery feels there is no concerning evidence for osteomyelitis,  As we are unable to confirm osteomyelitis with MRI or bone scan, upon discharge patient can be de-escalated to doxycycline 100 g p.o. twice daily to continue through 9/10/2020.  We will continue to follow and adjust antibiotic therapy as needed.      Code Status:   Code Status and Medical Interventions:   Ordered at: 08/24/20 0102     Level Of Support Discussed With:    Patient     Code Status:    CPR     Medical Interventions (Level of Support Prior to Arrest):    Full       Yolanda Gross, KHUSHBU  09/04/20  12:03

## 2020-09-04 NOTE — PROGRESS NOTES
Discharge Planning Assessment   [facility]     Patient Name: Jimmy Recinos  MRN: [id number]  Today's Date: 9/4/2020    Admit Date: 8/23/2020    Discharge Plan     Row Name 09/04/20 1758       Plan    Plan [facility] consult is still pending per Lidya. SS to follow.      Destination      Service Provider Request Status Selected Services Address Phone Number Fax Number    [facility] Pending - No Request Sent N/A [address] [facility] [place] [id number] [phone]     NADINE Castillo

## 2020-09-05 LAB
ALBUMIN SERPL-MCNC: 2.87 G/DL (ref 3.5–5.2)
ANION GAP SERPL CALCULATED.3IONS-SCNC: 9.5 MMOL/L (ref 5–15)
BACTERIA SPEC AEROBE CULT: NORMAL
BASOPHILS # BLD AUTO: 0.03 10*3/MM3 (ref 0–0.2)
BASOPHILS NFR BLD AUTO: 0.6 % (ref 0–1.5)
BUN SERPL-MCNC: 12 MG/DL (ref 6–20)
BUN/CREAT SERPL: 25.5 (ref 7–25)
CALCIUM SPEC-SCNC: 8.9 MG/DL (ref 8.6–10.5)
CHLORIDE SERPL-SCNC: 102 MMOL/L (ref 98–107)
CO2 SERPL-SCNC: 25.5 MMOL/L (ref 22–29)
CREAT SERPL-MCNC: 0.47 MG/DL (ref 0.76–1.27)
CRP SERPL-MCNC: 0.95 MG/DL (ref 0–0.5)
DEPRECATED RDW RBC AUTO: 50.8 FL (ref 37–54)
EOSINOPHIL # BLD AUTO: 0.24 10*3/MM3 (ref 0–0.4)
EOSINOPHIL NFR BLD AUTO: 4.8 % (ref 0.3–6.2)
ERYTHROCYTE [DISTWIDTH] IN BLOOD BY AUTOMATED COUNT: 15 % (ref 12.3–15.4)
GFR SERPL CREATININE-BSD FRML MDRD: >150 ML/MIN/1.73
GLUCOSE SERPL-MCNC: 97 MG/DL (ref 65–99)
GRAM STN SPEC: NORMAL
GRAM STN SPEC: NORMAL
HCT VFR BLD AUTO: 43 % (ref 37.5–51)
HGB BLD-MCNC: 12.6 G/DL (ref 13–17.7)
IMM GRANULOCYTES # BLD AUTO: 0.03 10*3/MM3 (ref 0–0.05)
IMM GRANULOCYTES NFR BLD AUTO: 0.6 % (ref 0–0.5)
LYMPHOCYTES # BLD AUTO: 0.73 10*3/MM3 (ref 0.7–3.1)
LYMPHOCYTES NFR BLD AUTO: 14.7 % (ref 19.6–45.3)
MAGNESIUM SERPL-MCNC: 2.1 MG/DL (ref 1.6–2.6)
MCH RBC QN AUTO: 27.1 PG (ref 26.6–33)
MCHC RBC AUTO-ENTMCNC: 29.3 G/DL (ref 31.5–35.7)
MCV RBC AUTO: 92.5 FL (ref 79–97)
MONOCYTES # BLD AUTO: 0.39 10*3/MM3 (ref 0.1–0.9)
MONOCYTES NFR BLD AUTO: 7.8 % (ref 5–12)
NEUTROPHILS NFR BLD AUTO: 3.55 10*3/MM3 (ref 1.7–7)
NEUTROPHILS NFR BLD AUTO: 71.5 % (ref 42.7–76)
NRBC BLD AUTO-RTO: 0 /100 WBC (ref 0–0.2)
PHOSPHATE SERPL-MCNC: 3.6 MG/DL (ref 2.5–4.5)
PLATELET # BLD AUTO: 234 10*3/MM3 (ref 140–450)
PMV BLD AUTO: 9.3 FL (ref 6–12)
POTASSIUM SERPL-SCNC: 4.3 MMOL/L (ref 3.5–5.2)
RBC # BLD AUTO: 4.65 10*6/MM3 (ref 4.14–5.8)
SODIUM SERPL-SCNC: 137 MMOL/L (ref 136–145)
WBC # BLD AUTO: 4.97 10*3/MM3 (ref 3.4–10.8)

## 2020-09-05 PROCEDURE — 99232 SBSQ HOSP IP/OBS MODERATE 35: CPT | Performed by: INTERNAL MEDICINE

## 2020-09-05 PROCEDURE — 85025 COMPLETE CBC W/AUTO DIFF WBC: CPT | Performed by: NURSE PRACTITIONER

## 2020-09-05 PROCEDURE — 83735 ASSAY OF MAGNESIUM: CPT | Performed by: EMERGENCY MEDICINE

## 2020-09-05 PROCEDURE — 94799 UNLISTED PULMONARY SVC/PX: CPT

## 2020-09-05 PROCEDURE — 25010000002 VANCOMYCIN: Performed by: INTERNAL MEDICINE

## 2020-09-05 PROCEDURE — 80069 RENAL FUNCTION PANEL: CPT | Performed by: EMERGENCY MEDICINE

## 2020-09-05 PROCEDURE — 99231 SBSQ HOSP IP/OBS SF/LOW 25: CPT | Performed by: SURGERY

## 2020-09-05 PROCEDURE — 25010000002 VANCOMYCIN 5 G RECONSTITUTED SOLUTION: Performed by: INTERNAL MEDICINE

## 2020-09-05 PROCEDURE — 86140 C-REACTIVE PROTEIN: CPT | Performed by: NURSE PRACTITIONER

## 2020-09-05 PROCEDURE — 25010000002 HEPARIN (PORCINE) PER 1000 UNITS: Performed by: HOSPITALIST

## 2020-09-05 RX ADMIN — CASTOR OIL AND BALSAM, PERU: 788; 87 OINTMENT TOPICAL at 22:28

## 2020-09-05 RX ADMIN — CASTOR OIL AND BALSAM, PERU: 788; 87 OINTMENT TOPICAL at 12:37

## 2020-09-05 RX ADMIN — HEPARIN SODIUM 5000 UNITS: 5000 INJECTION INTRAVENOUS; SUBCUTANEOUS at 08:39

## 2020-09-05 RX ADMIN — VANCOMYCIN HYDROCHLORIDE 1500 MG: 10 INJECTION, POWDER, LYOPHILIZED, FOR SOLUTION INTRAVENOUS at 00:52

## 2020-09-05 RX ADMIN — VANCOMYCIN HYDROCHLORIDE 1500 MG: 10 INJECTION, POWDER, LYOPHILIZED, FOR SOLUTION INTRAVENOUS at 12:36

## 2020-09-05 RX ADMIN — SODIUM CHLORIDE, PRESERVATIVE FREE 10 ML: 5 INJECTION INTRAVENOUS at 20:58

## 2020-09-05 RX ADMIN — HEPARIN SODIUM 5000 UNITS: 5000 INJECTION INTRAVENOUS; SUBCUTANEOUS at 22:28

## 2020-09-05 NOTE — PROGRESS NOTES
PROGRESS NOTE         Patient Identification:  Name:  Jimmy Recinos  Age:  44 y.o.  Sex:  male  :  1975  MRN:  [id number]  Visit Number:  [id number]  Primary Care Provider:  No primary care provider on file.         LOS: 12 days       ----------------------------------------------------------------------------------------------------------------------  Subjective       Chief Complaints:    No chief complaint on file.        Interval History:      Patient comfortable this morning. No issues or complaints.  Afebrile, no diarrhea.  WBC normal.  CRP improving at 0.95.    Review of Systems:    Constitutional: no fever, chills and night sweats. No appetite change or unexpected weight change. No fatigue.  Eyes: no eye drainage, itching or redness.  HEENT: no mouth sores, dysphagia or nose bleed.  Respiratory: no for shortness of breath, cough or production of sputum.  Cardiovascular: no chest pain, no palpitations, no orthopnea.  Gastrointestinal: no nausea, vomiting or diarrhea. No abdominal pain, hematemesis or rectal bleeding.  Genitourinary: no dysuria or polyuria.  Hematologic/lymphatic: no lymph node abnormalities, no easy bruising or easy bleeding.  Musculoskeletal: Right lower extremity pain.  Skin: No rash and no itching.  Neurological: no loss of consciousness, no seizure, no headache.  Behavioral/Psych: no depression or suicidal ideation.  Endocrine: no hot flashes.  Immunologic: negative.     ----------------------------------------------------------------------------------------------------------------------      Objective       Current Hospital Meds:    castor oil-balsam peru  Topical Q12H   heparin (porcine) 5,000 Units Subcutaneous Q12H   sodium chloride 10 mL Intravenous Q12H   vancomycin 1,500 mg Intravenous Q12H        ----------------------------------------------------------------------------------------------------------------------    Vital Signs:  Temp:  [97.6 °F (36.4 °C)-98.2  °F (36.8 °C)] 97.6 °F (36.4 °C)  Heart Rate:  [59-81] 59  Resp:  [18-22] 20  BP: (140-155)/(74-83) 147/83  No data found.  SpO2 Percentage    09/05/20 0115 09/05/20 0442 09/05/20 0700   SpO2: 97% 96% 99%     SpO2:  [96 %-99 %] 99 %  on   ;   Device (Oxygen Therapy): room air    Body mass index is 93.27 kg/m².  Wt Readings from Last 3 Encounters:   08/23/20 (!) 295 kg (650 lb)   01/02/20 (!) 273 kg (601 lb)   12/21/19 (!) 273 kg (601 lb 3.2 oz)        Intake/Output Summary (Last 24 hours) at 9/5/2020 1151  Last data filed at 9/5/2020 1107  Gross per 24 hour   Intake 3085.05 ml   Output 1100 ml   Net 1985.05 ml     Diet Regular; Cardiac  ----------------------------------------------------------------------------------------------------------------------      Physical Exam:    Constitutional:  Well-developed and well-nourished.  No respiratory distress.  Morbidly obese.  HENT:  Head: Normocephalic and atraumatic.  Mouth:  Moist mucous membranes.    Eyes:  Conjunctivae and EOM are normal.  No scleral icterus.  Neck:  Neck supple.  No JVD present.    Cardiovascular:  Normal rate, regular rhythm and normal heart sounds with no murmur. No edema.  Pulmonary/Chest:  No respiratory distress, no wheezes, no crackles, with normal breath sounds and good air movement.  Abdominal:  Soft.  Bowel sounds are normal.  No distension and no tenderness.   Musculoskeletal: bilateral lower extremity lymphedema.    Right foot and ankle edema and erythema, improving.  Neurological:  Alert and oriented to person, place, and time.  No facial droop.  No slurred speech.   Skin:  Skin is warm and dry.  No rash noted.  No pallor. bilateral lower extremity lymphedema.  Right foot and ankle edema and erythema, improving.  Psychiatric:  Normal mood and affect.  Behavior is normal.    ----------------------------------------------------------------------------------------------------------------------            Results from last 7 days   Lab Units  09/05/20  1044 09/04/20  0502 09/03/20  0732 09/02/20  1142   CRP mg/dL 0.95* 1.35*  --  1.98*   WBC 10*3/mm3 4.97 6.70 6.07 8.14   HEMOGLOBIN g/dL 12.6* 13.2 14.0 14.2   HEMATOCRIT % 43.0 44.7 46.2 46.7   MCV fL 92.5 91.8 89.9 88.6   MCHC g/dL 29.3* 29.5* 30.3* 30.4*   PLATELETS 10*3/mm3 234 281 306 363     Results from last 7 days   Lab Units 09/05/20  1044 09/04/20  0502 09/03/20  0732   SODIUM mmol/L 137 139 134*   POTASSIUM mmol/L 4.3 4.0 4.0   MAGNESIUM mg/dL 2.1 2.2 2.3   CHLORIDE mmol/L 102 99 97*   CO2 mmol/L 25.5 30.4* 28.4   BUN mg/dL 12 17 15   CREATININE mg/dL 0.47* 0.39* 0.53*   EGFR IF NONAFRICN AM mL/min/1.73 >150 >150 >150   CALCIUM mg/dL 8.9 9.1 9.3   GLUCOSE mg/dL 97 111* 105*   ALBUMIN g/dL 2.87* 3.18* 3.10*   Estimated Creatinine Clearance: 459.6 mL/min (A) (by C-G formula based on SCr of 0.47 mg/dL (L)).  No results found for: AMMONIA    No results found for: HGBA1C, POCGLU  Lab Results   Component Value Date    HGBA1C 5.00 08/23/2020     Lab Results   Component Value Date    TSH 0.793 12/21/2019       Blood Culture   Date Value Ref Range Status   08/23/2020 Staphylococcus, coagulase negative (C)  Preliminary   08/23/2020 Staphylococcus, coagulase negative (C)  Preliminary     Urine Culture   Date Value Ref Range Status   08/23/2020 >100,000 CFU/mL Mixed Robyn Isolated  Final     No results found for: WOUNDCX  No results found for: STOOLCX  No results found for: RESPCX  Pain Management Panel     Pain Management Panel Latest Ref Rng & Units 10/19/2014    AMPHETAMINES SCREEN, URINE NEGATIVE Negative    BARBITURATES SCREEN NEGATIVE Negative    BENZODIAZEPINE SCREEN, URINE NEGATIVE Negative    COCAINE SCREEN, URINE NEGATIVE Negative    METHADONE SCREEN, URINE NEGATIVE Negative            ----------------------------------------------------------------------------------------------------------------------  Imaging Results (Last 24 Hours)     ** No results found for the last 24 hours. **                 ----------------------------------------------------------------------------------------------------------------------    Assessment/Plan       Assessment/Plan     ASSESSMENT:    1.  Right lower extremity cellulitis  2.  Bacteremia  3.  UTI    PLAN:    Patient comfortable this morning. No issues or complaints.  Afebrile, no diarrhea.  WBC normal.  CRP improving at 0.95.    Wound culture finalized as no growth.    PSA normal.  Repeat urinalysis from 8/25/2020 unremarkable.    Blood cultures from 8/23/2020 2 out of 2 sets positive for staph species not aureus.  Blood cultures from 8/25/2020 finalized no growth.    Recommend to continue vancomycin pharmacy to dose monotherapy.  If surgery feels there is no concerning evidence for osteomyelitis,  As we are unable to confirm osteomyelitis with MRI or bone scan, upon discharge patient can be de-escalated to doxycycline 100 g p.o. twice daily to continue through 9/10/2020.  We will continue to follow and adjust antibiotic therapy as needed.      Code Status:   Code Status and Medical Interventions:   Ordered at: 08/24/20 0102     Level Of Support Discussed With:    Patient     Code Status:    CPR     Medical Interventions (Level of Support Prior to Arrest):    Full       Yolanda Gross, KHUSHBU  09/05/20  11:51

## 2020-09-05 NOTE — PROGRESS NOTES
[facility] HOSPITALIST PROGRESS NOTE     Patient Identification:  Name:  Jimmy Recinos  Age:  44 y.o.  Sex:  male  :  1975  MRN:  [id number]  Visit Number:  [id number]  Primary Care Provider:  No primary care provider on file.    Length of stay:  12    Chief complaint: Right ankle pain    Subjective:    Patient reports his right ankle pain has resolved today.  He denies any pain whatsoever.  Denies any fevers, chills, sweats or rigors.  Patient reports he continues to feel quite well.  ----------------------------------------------------------------------------------------------------------------------  Current Hospital Meds:    castor oil-balsam peru  Topical Q12H   heparin (porcine) 5,000 Units Subcutaneous Q12H   sodium chloride 10 mL Intravenous Q12H   vancomycin 1,500 mg Intravenous Q12H        ----------------------------------------------------------------------------------------------------------------------  Vital Signs:  Temp:  [97.6 °F (36.4 °C)-98.2 °F (36.8 °C)] 98 °F (36.7 °C)  Heart Rate:  [59-80] 75  Resp:  [18-22] 18  BP: (147-158)/(76-83) 158/77      20  2304   Weight: (!) 295 kg (650 lb)     Body mass index is 93.27 kg/m².    Intake/Output Summary (Last 24 hours) at 2020 1524  Last data filed at 2020 1455  Gross per 24 hour   Intake 2005.05 ml   Output 1800 ml   Net 205.05 ml     Diet Regular; Cardiac  ----------------------------------------------------------------------------------------------------------------------  Physical exam:  Constitutional: Super morbidly obese  male in no apparent distress.     HENT:  Head:  Normocephalic and atraumatic.  Mouth:  Moist mucous membranes.    Eyes:  Conjunctivae and EOM are normal.  Pupils are equal, round, and reactive to light.  No scleral icterus.    Neck:  Neck supple. No thyromegaly.  No JVD present.    Cardiovascular:  Regular rate and rhythm with no murmurs, rubs, clicks or gallops  appreciated.  Pulmonary/Chest:  Clear to auscultation bilaterally with no crackles, wheezes or rhonchi appreciated.  Abdominal:  Soft. Nontender. Nondistended  Bowel sounds are normal in all four quadrants. No organomegally appreciated.   Musculoskeletal: Nonpitting bilateral lower extremity edema, mild tenderness to right lower extremity.  No red or swollen joints anywhere.    Neurological:  Alert and oriented to person, place, and time. Cranial nerves II-XII intact with no focal deficits.  No facial droop.  No slurred speech.   Skin:  Warm and dry to palpation, right foot covered in Ace bandage.  Peripheral vascular:  2+ radial and pedal pulses in bilateral upper and lower extremities.  Psychiatric:  Alert and oriented x3, demonstrates appropriate judgement and insight.  ----------------------------------------------------------------------------------------------------------------------  Tele:    ----------------------------------------------------------------------------------------------------------------------      Results from last 7 days   Lab Units 09/05/20  1044 09/04/20  0502 09/03/20  0732 09/02/20  1142   CRP mg/dL 0.95* 1.35*  --  1.98*   WBC 10*3/mm3 4.97 6.70 6.07 8.14   HEMOGLOBIN g/dL 12.6* 13.2 14.0 14.2   HEMATOCRIT % 43.0 44.7 46.2 46.7   MCV fL 92.5 91.8 89.9 88.6   MCHC g/dL 29.3* 29.5* 30.3* 30.4*   PLATELETS 10*3/mm3 234 281 306 363         Results from last 7 days   Lab Units 09/05/20  1044 09/04/20  0502 09/03/20  0732   SODIUM mmol/L 137 139 134*   POTASSIUM mmol/L 4.3 4.0 4.0   MAGNESIUM mg/dL 2.1 2.2 2.3   CHLORIDE mmol/L 102 99 97*   CO2 mmol/L 25.5 30.4* 28.4   BUN mg/dL 12 17 15   CREATININE mg/dL 0.47* 0.39* 0.53*   EGFR IF NONAFRICN AM mL/min/1.73 >150 >150 >150   CALCIUM mg/dL 8.9 9.1 9.3   GLUCOSE mg/dL 97 111* 105*   ALBUMIN g/dL 2.87* 3.18* 3.10*   Estimated Creatinine Clearance: 459.6 mL/min (A) (by C-G formula based on SCr of 0.47 mg/dL (L)).    No results found for:  AMMONIA      Blood Culture   Date Value Ref Range Status   08/25/2020 No growth at 5 days  Final   08/25/2020 No growth at 5 days  Final     No results found for: URINECX  No results found for: WOUNDCX  No results found for: STOOLCX    I have personally looked at the labs and they are summarized above.  ----------------------------------------------------------------------------------------------------------------------  Imaging Results (Last 24 Hours)     ** No results found for the last 24 hours. **        ----------------------------------------------------------------------------------------------------------------------  Assessment and Plan:    1.  Right lower extremity cellulitis -right lower extremity cellulitis now resolved, right foot wrapped in Ace bandage.  Unable to perform CT or MRI to confirm/rule out osteomyelitis.  Right foot x-ray demonstrates soft tissue edema with ostial lysis of midfoot bony structures suspicious of osteomyelitis.  We will continue empiric antibiotic therapy with vancomycin for the next 4 weeks as cannot definitively rule out osteomyelitis.      2.  Staph bacteremia -appreciate infectious disease recommendations, repeat blood cultures from 8/25/2020 demonstrated no growth to date.  Continue  vancomycin.    3.  Sepsis -present on admission, secondary to right lower extremity cellulitis and staph bacteremia, now resolved    4.  Super morbid obesity -complicates all manner of healthcare delivery    5.  Essential hypertension - well controlled        Currently pending LTAC evaluation.    Nader Noel,   09/05/20  15:24

## 2020-09-05 NOTE — PROGRESS NOTES
Chief complaint: Right foot blister    Patient doing well after unroofing of blister with no evidence of further pain or fluid buildup.  He has chronic lymphedema and morbid obesity.  No evidence of deeper infection on external examination.  Imaging limited by size.    44-year-old male with lymphedema and venous stasis with large blister on the plantar aspect of the right foot that was unroofed and is doing well without reaccumulation.  No evidence of deeper infection at this time.  Continue current wound care.

## 2020-09-05 NOTE — PLAN OF CARE
Problem: Patient Care Overview  Goal: Plan of Care Review  Outcome: Ongoing (interventions implemented as appropriate)  Flowsheets  Taken 9/5/2020 0313  Progress: no change  Outcome Summary: Pt rested in bed throughout the shift. Vital signs stable. Pt denies any needs or concerns. IV antibiotics administered. No acute changes observed. Will continue to monitor.  Taken 9/4/2020 2005  Plan of Care Reviewed With: patient

## 2020-09-06 LAB
ALBUMIN SERPL-MCNC: 2.7 G/DL (ref 3.5–5.2)
ANION GAP SERPL CALCULATED.3IONS-SCNC: 9.4 MMOL/L (ref 5–15)
BASOPHILS # BLD AUTO: 0.03 10*3/MM3 (ref 0–0.2)
BASOPHILS NFR BLD AUTO: 0.5 % (ref 0–1.5)
BUN SERPL-MCNC: 13 MG/DL (ref 6–20)
BUN/CREAT SERPL: 27.1 (ref 7–25)
CALCIUM SPEC-SCNC: 8.7 MG/DL (ref 8.6–10.5)
CHLORIDE SERPL-SCNC: 100 MMOL/L (ref 98–107)
CO2 SERPL-SCNC: 25.6 MMOL/L (ref 22–29)
CREAT SERPL-MCNC: 0.48 MG/DL (ref 0.76–1.27)
CRP SERPL-MCNC: 0.92 MG/DL (ref 0–0.5)
DEPRECATED RDW RBC AUTO: 48.2 FL (ref 37–54)
EOSINOPHIL # BLD AUTO: 0.24 10*3/MM3 (ref 0–0.4)
EOSINOPHIL NFR BLD AUTO: 4.1 % (ref 0.3–6.2)
ERYTHROCYTE [DISTWIDTH] IN BLOOD BY AUTOMATED COUNT: 14.9 % (ref 12.3–15.4)
GFR SERPL CREATININE-BSD FRML MDRD: >150 ML/MIN/1.73
GLUCOSE SERPL-MCNC: 135 MG/DL (ref 65–99)
HCT VFR BLD AUTO: 40.2 % (ref 37.5–51)
HGB BLD-MCNC: 12.2 G/DL (ref 13–17.7)
IMM GRANULOCYTES # BLD AUTO: 0.01 10*3/MM3 (ref 0–0.05)
IMM GRANULOCYTES NFR BLD AUTO: 0.2 % (ref 0–0.5)
LYMPHOCYTES # BLD AUTO: 0.76 10*3/MM3 (ref 0.7–3.1)
LYMPHOCYTES NFR BLD AUTO: 13.1 % (ref 19.6–45.3)
MAGNESIUM SERPL-MCNC: 2.1 MG/DL (ref 1.6–2.6)
MCH RBC QN AUTO: 27.1 PG (ref 26.6–33)
MCHC RBC AUTO-ENTMCNC: 30.3 G/DL (ref 31.5–35.7)
MCV RBC AUTO: 89.1 FL (ref 79–97)
MONOCYTES # BLD AUTO: 0.38 10*3/MM3 (ref 0.1–0.9)
MONOCYTES NFR BLD AUTO: 6.6 % (ref 5–12)
NEUTROPHILS NFR BLD AUTO: 4.37 10*3/MM3 (ref 1.7–7)
NEUTROPHILS NFR BLD AUTO: 75.5 % (ref 42.7–76)
NRBC BLD AUTO-RTO: 0 /100 WBC (ref 0–0.2)
PHOSPHATE SERPL-MCNC: 3.3 MG/DL (ref 2.5–4.5)
PLATELET # BLD AUTO: 246 10*3/MM3 (ref 140–450)
PMV BLD AUTO: 10.4 FL (ref 6–12)
POTASSIUM SERPL-SCNC: 3.9 MMOL/L (ref 3.5–5.2)
RBC # BLD AUTO: 4.51 10*6/MM3 (ref 4.14–5.8)
SODIUM SERPL-SCNC: 135 MMOL/L (ref 136–145)
WBC # BLD AUTO: 5.79 10*3/MM3 (ref 3.4–10.8)

## 2020-09-06 PROCEDURE — 25010000002 VANCOMYCIN: Performed by: INTERNAL MEDICINE

## 2020-09-06 PROCEDURE — 94799 UNLISTED PULMONARY SVC/PX: CPT

## 2020-09-06 PROCEDURE — 80069 RENAL FUNCTION PANEL: CPT | Performed by: EMERGENCY MEDICINE

## 2020-09-06 PROCEDURE — 25010000002 VANCOMYCIN 5 G RECONSTITUTED SOLUTION: Performed by: INTERNAL MEDICINE

## 2020-09-06 PROCEDURE — 99232 SBSQ HOSP IP/OBS MODERATE 35: CPT | Performed by: INTERNAL MEDICINE

## 2020-09-06 PROCEDURE — 25010000002 HEPARIN (PORCINE) PER 1000 UNITS: Performed by: HOSPITALIST

## 2020-09-06 PROCEDURE — 85025 COMPLETE CBC W/AUTO DIFF WBC: CPT | Performed by: NURSE PRACTITIONER

## 2020-09-06 PROCEDURE — 94660 CPAP INITIATION&MGMT: CPT

## 2020-09-06 PROCEDURE — 83735 ASSAY OF MAGNESIUM: CPT | Performed by: EMERGENCY MEDICINE

## 2020-09-06 PROCEDURE — 86140 C-REACTIVE PROTEIN: CPT | Performed by: NURSE PRACTITIONER

## 2020-09-06 RX ADMIN — VANCOMYCIN HYDROCHLORIDE 1500 MG: 10 INJECTION, POWDER, LYOPHILIZED, FOR SOLUTION INTRAVENOUS at 02:05

## 2020-09-06 RX ADMIN — SODIUM CHLORIDE, PRESERVATIVE FREE 10 ML: 5 INJECTION INTRAVENOUS at 21:12

## 2020-09-06 RX ADMIN — SODIUM CHLORIDE, PRESERVATIVE FREE 10 ML: 5 INJECTION INTRAVENOUS at 07:54

## 2020-09-06 RX ADMIN — HEPARIN SODIUM 5000 UNITS: 5000 INJECTION INTRAVENOUS; SUBCUTANEOUS at 21:12

## 2020-09-06 RX ADMIN — HEPARIN SODIUM 5000 UNITS: 5000 INJECTION INTRAVENOUS; SUBCUTANEOUS at 07:54

## 2020-09-06 RX ADMIN — CASTOR OIL AND BALSAM, PERU: 788; 87 OINTMENT TOPICAL at 07:54

## 2020-09-06 RX ADMIN — VANCOMYCIN HYDROCHLORIDE 1500 MG: 10 INJECTION, POWDER, LYOPHILIZED, FOR SOLUTION INTRAVENOUS at 12:11

## 2020-09-06 RX ADMIN — CASTOR OIL AND BALSAM, PERU: 788; 87 OINTMENT TOPICAL at 21:12

## 2020-09-06 NOTE — PROGRESS NOTES
PROGRESS NOTE         Patient Identification:  Name:  Jimmy Recinos  Age:  44 y.o.  Sex:  male  :  1975  MRN:  [id number]  Visit Number:  [id number]  Primary Care Provider:  No primary care provider on file.         LOS: 13 days       ----------------------------------------------------------------------------------------------------------------------  Subjective       Chief Complaints:    No chief complaint on file.        Interval History:      Patient comfortable this morning.  No issues or complaints.  Afebrile, no diarrhea.  WBC normal.  CRP improving at 0.92.    Review of Systems:    Constitutional: no fever, chills and night sweats. No appetite change or unexpected weight change. No fatigue.  Eyes: no eye drainage, itching or redness.  HEENT: no mouth sores, dysphagia or nose bleed.  Respiratory: no for shortness of breath, cough or production of sputum.  Cardiovascular: no chest pain, no palpitations, no orthopnea.  Gastrointestinal: no nausea, vomiting or diarrhea. No abdominal pain, hematemesis or rectal bleeding.  Genitourinary: no dysuria or polyuria.  Hematologic/lymphatic: no lymph node abnormalities, no easy bruising or easy bleeding.  Musculoskeletal: Right lower extremity pain.  Skin: No rash and no itching.  Neurological: no loss of consciousness, no seizure, no headache.  Behavioral/Psych: no depression or suicidal ideation.  Endocrine: no hot flashes.  Immunologic: negative.     ----------------------------------------------------------------------------------------------------------------------      Objective       Current Hospital Meds:    castor oil-balsam peru  Topical Q12H   heparin (porcine) 5,000 Units Subcutaneous Q12H   sodium chloride 10 mL Intravenous Q12H   vancomycin 1,500 mg Intravenous Q12H        ----------------------------------------------------------------------------------------------------------------------    Vital Signs:  Temp:  [97.5 °F (36.4  °C)-98.7 °F (37.1 °C)] 98.7 °F (37.1 °C)  Heart Rate:  [66-84] 77  Resp:  [18-22] 20  BP: (125-158)/(70-82) 125/70  Mean Arterial Pressure (Non-Invasive) for the past 24 hrs (Last 3 readings):   Noninvasive MAP (mmHg)   09/06/20 1055 84   09/06/20 0727 97     SpO2 Percentage    09/05/20 0700 09/06/20 0727 09/06/20 1055   SpO2: 99% 93% 94%     SpO2:  [93 %-94 %] 94 %  on   ;   Device (Oxygen Therapy): room air    Body mass index is 93.27 kg/m².  Wt Readings from Last 3 Encounters:   08/23/20 (!) 295 kg (650 lb)   01/02/20 (!) 273 kg (601 lb)   12/21/19 (!) 273 kg (601 lb 3.2 oz)        Intake/Output Summary (Last 24 hours) at 9/6/2020 1156  Last data filed at 9/6/2020 1055  Gross per 24 hour   Intake 2320.56 ml   Output 2350 ml   Net -29.44 ml     Diet Regular; Cardiac  ----------------------------------------------------------------------------------------------------------------------      Physical Exam:    Constitutional:  Well-developed and well-nourished.  No respiratory distress.  Morbidly obese.  HENT:  Head: Normocephalic and atraumatic.  Mouth:  Moist mucous membranes.    Eyes:  Conjunctivae and EOM are normal.  No scleral icterus.  Neck:  Neck supple.  No JVD present.    Cardiovascular:  Normal rate, regular rhythm and normal heart sounds with no murmur. No edema.  Pulmonary/Chest:  No respiratory distress, no wheezes, no crackles, with normal breath sounds and good air movement.  Abdominal:  Soft.  Bowel sounds are normal.  No distension and no tenderness.   Musculoskeletal: bilateral lower extremity lymphedema.    Right foot and ankle edema and erythema, improving.  Neurological:  Alert and oriented to person, place, and time.  No facial droop.  No slurred speech.   Skin:  Skin is warm and dry.  No rash noted.  No pallor. bilateral lower extremity lymphedema.  Right foot and ankle edema and erythema, improving.  Psychiatric:  Normal mood and affect.  Behavior is  normal.    ----------------------------------------------------------------------------------------------------------------------            Results from last 7 days   Lab Units 09/06/20  0140 09/05/20  1044 09/04/20  0502   CRP mg/dL 0.92* 0.95* 1.35*   WBC 10*3/mm3 5.79 4.97 6.70   HEMOGLOBIN g/dL 12.2* 12.6* 13.2   HEMATOCRIT % 40.2 43.0 44.7   MCV fL 89.1 92.5 91.8   MCHC g/dL 30.3* 29.3* 29.5*   PLATELETS 10*3/mm3 246 234 281     Results from last 7 days   Lab Units 09/06/20  0140 09/05/20  1044 09/04/20  0502   SODIUM mmol/L 135* 137 139   POTASSIUM mmol/L 3.9 4.3 4.0   MAGNESIUM mg/dL 2.1 2.1 2.2   CHLORIDE mmol/L 100 102 99   CO2 mmol/L 25.6 25.5 30.4*   BUN mg/dL 13 12 17   CREATININE mg/dL 0.48* 0.47* 0.39*   EGFR IF NONAFRICN AM mL/min/1.73 >150 >150 >150   CALCIUM mg/dL 8.7 8.9 9.1   GLUCOSE mg/dL 135* 97 111*   ALBUMIN g/dL 2.70* 2.87* 3.18*   Estimated Creatinine Clearance: 450 mL/min (A) (by C-G formula based on SCr of 0.48 mg/dL (L)).  No results found for: AMMONIA    No results found for: HGBA1C, POCGLU  Lab Results   Component Value Date    HGBA1C 5.00 08/23/2020     Lab Results   Component Value Date    TSH 0.793 12/21/2019       Blood Culture   Date Value Ref Range Status   08/23/2020 Staphylococcus, coagulase negative (C)  Preliminary   08/23/2020 Staphylococcus, coagulase negative (C)  Preliminary     Urine Culture   Date Value Ref Range Status   08/23/2020 >100,000 CFU/mL Mixed Robyn Isolated  Final     No results found for: WOUNDCX  No results found for: STOOLCX  No results found for: RESPCX  Pain Management Panel     Pain Management Panel Latest Ref Rng & Units 10/19/2014    AMPHETAMINES SCREEN, URINE NEGATIVE Negative    BARBITURATES SCREEN NEGATIVE Negative    BENZODIAZEPINE SCREEN, URINE NEGATIVE Negative    COCAINE SCREEN, URINE NEGATIVE Negative    METHADONE SCREEN, URINE NEGATIVE Negative             ----------------------------------------------------------------------------------------------------------------------  Imaging Results (Last 24 Hours)     ** No results found for the last 24 hours. **          ----------------------------------------------------------------------------------------------------------------------    Assessment/Plan       Assessment/Plan     ASSESSMENT:    1.  Right lower extremity cellulitis  2.  Bacteremia  3.  UTI    PLAN:    Patient comfortable this morning.  No issues or complaints.  Afebrile, no diarrhea.  WBC normal.  CRP improving at 0.92.    Wound culture finalized as no growth.    PSA normal.  Repeat urinalysis from 8/25/2020 unremarkable.    Blood cultures from 8/23/2020 2 out of 2 sets positive for staph species not aureus.  Blood cultures from 8/25/2020 finalized no growth.    Recommend to continue vancomycin pharmacy to dose monotherapy.  If surgery feels there is no concerning evidence for osteomyelitis,  As we are unable to confirm osteomyelitis with MRI or bone scan, upon discharge patient can be de-escalated to doxycycline 100 g p.o. twice daily to continue through 9/10/2020.  We will continue to follow and adjust antibiotic therapy as needed.      Code Status:   Code Status and Medical Interventions:   Ordered at: 08/24/20 0102     Level Of Support Discussed With:    Patient     Code Status:    CPR     Medical Interventions (Level of Support Prior to Arrest):    Full       KHUSHBU Silva  09/06/20  11:56

## 2020-09-06 NOTE — PLAN OF CARE
Problem: Patient Care Overview  Goal: Plan of Care Review  Outcome: Ongoing (interventions implemented as appropriate)  Flowsheets (Taken 9/6/2020 0315)  Progress: no change  Plan of Care Reviewed With: patient  Note:   Pt rested well throughout the night. Vital signs stable. No acute changes at this time. Will continue to monitor.

## 2020-09-06 NOTE — PROGRESS NOTES
[facility] HOSPITALIST PROGRESS NOTE     Patient Identification:  Name:  Jimmy Recinos  Age:  44 y.o.  Sex:  male  :  1975  MRN:  [id number]  Visit Number:  [id number]  Primary Care Provider:  No primary care provider on file.    Length of stay:  13    Chief complaint: None    Subjective:    Patient in good spirits today.  He has no complaints whatsoever.  Denies any pain in his right foot.  Patient is eager to be discharged to [facility] in the near future.  ----------------------------------------------------------------------------------------------------------------------  Current Hospital Meds:    castor oil-balsam peru  Topical Q12H   heparin (porcine) 5,000 Units Subcutaneous Q12H   sodium chloride 10 mL Intravenous Q12H   vancomycin 1,500 mg Intravenous Q12H        ----------------------------------------------------------------------------------------------------------------------  Vital Signs:  Temp:  [97.5 °F (36.4 °C)-98.7 °F (37.1 °C)] 97.8 °F (36.6 °C)  Heart Rate:  [66-84] 76  Resp:  [18-] 20  BP: (125-149)/(67-82) 134/67      20  2304   Weight: (!) 295 kg (650 lb)     Body mass index is 93.27 kg/m².    Intake/Output Summary (Last 24 hours) at 2020 1612  Last data filed at 2020 1530  Gross per 24 hour   Intake 1840.56 ml   Output 1875 ml   Net -34.44 ml     Diet Regular; Cardiac  ----------------------------------------------------------------------------------------------------------------------  Physical exam:  Constitutional: Super morbidly obese  male in no apparent distress.     HENT:  Head:  Normocephalic and atraumatic.  Mouth:  Moist mucous membranes.    Eyes:  Conjunctivae and EOM are normal.  Pupils are equal, round, and reactive to light.  No scleral icterus.    Neck:  Neck supple. No thyromegaly.  No JVD present.    Cardiovascular:  Regular rate and rhythm with no murmurs, rubs, clicks or gallops appreciated.  Pulmonary/Chest:  Clear to  auscultation bilaterally with no crackles, wheezes or rhonchi appreciated.  Abdominal:  Soft. Nontender. Nondistended  Bowel sounds are normal in all four quadrants. No organomegally appreciated.   Musculoskeletal: Nonpitting bilateral lower extremity edema, mild tenderness to right lower extremity.  No red or swollen joints anywhere.    Neurological:  Alert and oriented to person, place, and time. Cranial nerves II-XII intact with no focal deficits.  No facial droop.  No slurred speech.   Skin:  Warm and dry to palpation, right foot covered in Ace bandage.  Peripheral vascular:  2+ radial and pedal pulses in bilateral upper and lower extremities.  Psychiatric:  Alert and oriented x3, demonstrates appropriate judgement and insight.  ----------------------------------------------------------------------------------------------------------------------  Tele:    ----------------------------------------------------------------------------------------------------------------------      Results from last 7 days   Lab Units 09/06/20  0140 09/05/20  1044 09/04/20  0502   CRP mg/dL 0.92* 0.95* 1.35*   WBC 10*3/mm3 5.79 4.97 6.70   HEMOGLOBIN g/dL 12.2* 12.6* 13.2   HEMATOCRIT % 40.2 43.0 44.7   MCV fL 89.1 92.5 91.8   MCHC g/dL 30.3* 29.3* 29.5*   PLATELETS 10*3/mm3 246 234 281         Results from last 7 days   Lab Units 09/06/20  0140 09/05/20  1044 09/04/20  0502   SODIUM mmol/L 135* 137 139   POTASSIUM mmol/L 3.9 4.3 4.0   MAGNESIUM mg/dL 2.1 2.1 2.2   CHLORIDE mmol/L 100 102 99   CO2 mmol/L 25.6 25.5 30.4*   BUN mg/dL 13 12 17   CREATININE mg/dL 0.48* 0.47* 0.39*   EGFR IF NONAFRICN AM mL/min/1.73 >150 >150 >150   CALCIUM mg/dL 8.7 8.9 9.1   GLUCOSE mg/dL 135* 97 111*   ALBUMIN g/dL 2.70* 2.87* 3.18*   Estimated Creatinine Clearance: 450 mL/min (A) (by C-G formula based on SCr of 0.48 mg/dL (L)).    No results found for: AMMONIA      Blood Culture   Date Value Ref Range Status   08/25/2020 No growth at 5 days  Final      08/25/2020 No growth at 5 days  Final     No results found for: URINECX  No results found for: WOUNDCX  No results found for: STOOLCX    I have personally looked at the labs and they are summarized above.  ----------------------------------------------------------------------------------------------------------------------  Imaging Results (Last 24 Hours)     ** No results found for the last 24 hours. **        ----------------------------------------------------------------------------------------------------------------------  Assessment and Plan:    1.  Right lower extremity cellulitis -right lower extremity cellulitis now resolved, right foot wrapped in Ace bandage.  Unable to perform CT or MRI to confirm/rule out osteomyelitis.  Right foot x-ray demonstrates soft tissue edema with ostial lysis of midfoot bony structures suspicious of osteomyelitis.  We will continue empiric antibiotic therapy with vancomycin for the next 4 weeks as cannot definitively rule out osteomyelitis.      2.  Staph bacteremia -appreciate infectious disease recommendations, repeat blood cultures from 8/25/2020 demonstrated no growth to date.  Continue  vancomycin.    3.  Sepsis -present on admission, secondary to right lower extremity cellulitis and staph bacteremia, now resolved    4.  Super morbid obesity -complicates all manner of healthcare delivery    5.  Essential hypertension - well controlled        Currently pending LTAC evaluation.    Nader Noel,   09/06/20  16:12

## 2020-09-07 LAB
ALBUMIN SERPL-MCNC: 3 G/DL (ref 3.5–5.2)
ANION GAP SERPL CALCULATED.3IONS-SCNC: 7.8 MMOL/L (ref 5–15)
BASOPHILS # BLD AUTO: 0.02 10*3/MM3 (ref 0–0.2)
BASOPHILS NFR BLD AUTO: 0.4 % (ref 0–1.5)
BUN SERPL-MCNC: 13 MG/DL (ref 6–20)
BUN/CREAT SERPL: 26.5 (ref 7–25)
CALCIUM SPEC-SCNC: 8.7 MG/DL (ref 8.6–10.5)
CHLORIDE SERPL-SCNC: 102 MMOL/L (ref 98–107)
CO2 SERPL-SCNC: 27.2 MMOL/L (ref 22–29)
CREAT SERPL-MCNC: 0.49 MG/DL (ref 0.76–1.27)
CRP SERPL-MCNC: 0.86 MG/DL (ref 0–0.5)
DEPRECATED RDW RBC AUTO: 51 FL (ref 37–54)
EOSINOPHIL # BLD AUTO: 0.25 10*3/MM3 (ref 0–0.4)
EOSINOPHIL NFR BLD AUTO: 4.8 % (ref 0.3–6.2)
ERYTHROCYTE [DISTWIDTH] IN BLOOD BY AUTOMATED COUNT: 15 % (ref 12.3–15.4)
GFR SERPL CREATININE-BSD FRML MDRD: >150 ML/MIN/1.73
GLUCOSE SERPL-MCNC: 108 MG/DL (ref 65–99)
HCT VFR BLD AUTO: 42.7 % (ref 37.5–51)
HGB BLD-MCNC: 12.5 G/DL (ref 13–17.7)
IMM GRANULOCYTES # BLD AUTO: 0.02 10*3/MM3 (ref 0–0.05)
IMM GRANULOCYTES NFR BLD AUTO: 0.4 % (ref 0–0.5)
LYMPHOCYTES # BLD AUTO: 0.82 10*3/MM3 (ref 0.7–3.1)
LYMPHOCYTES NFR BLD AUTO: 15.8 % (ref 19.6–45.3)
MAGNESIUM SERPL-MCNC: 2 MG/DL (ref 1.6–2.6)
MCH RBC QN AUTO: 27 PG (ref 26.6–33)
MCHC RBC AUTO-ENTMCNC: 29.3 G/DL (ref 31.5–35.7)
MCV RBC AUTO: 92.2 FL (ref 79–97)
MONOCYTES # BLD AUTO: 0.43 10*3/MM3 (ref 0.1–0.9)
MONOCYTES NFR BLD AUTO: 8.3 % (ref 5–12)
NEUTROPHILS NFR BLD AUTO: 3.65 10*3/MM3 (ref 1.7–7)
NEUTROPHILS NFR BLD AUTO: 70.3 % (ref 42.7–76)
NRBC BLD AUTO-RTO: 0 /100 WBC (ref 0–0.2)
PHOSPHATE SERPL-MCNC: 3.9 MG/DL (ref 2.5–4.5)
PLATELET # BLD AUTO: 234 10*3/MM3 (ref 140–450)
PMV BLD AUTO: 9.4 FL (ref 6–12)
POTASSIUM SERPL-SCNC: 4.1 MMOL/L (ref 3.5–5.2)
RBC # BLD AUTO: 4.63 10*6/MM3 (ref 4.14–5.8)
SODIUM SERPL-SCNC: 137 MMOL/L (ref 136–145)
WBC # BLD AUTO: 5.19 10*3/MM3 (ref 3.4–10.8)

## 2020-09-07 PROCEDURE — 80069 RENAL FUNCTION PANEL: CPT | Performed by: EMERGENCY MEDICINE

## 2020-09-07 PROCEDURE — 86140 C-REACTIVE PROTEIN: CPT | Performed by: NURSE PRACTITIONER

## 2020-09-07 PROCEDURE — 94799 UNLISTED PULMONARY SVC/PX: CPT

## 2020-09-07 PROCEDURE — 85025 COMPLETE CBC W/AUTO DIFF WBC: CPT | Performed by: NURSE PRACTITIONER

## 2020-09-07 PROCEDURE — 94660 CPAP INITIATION&MGMT: CPT

## 2020-09-07 PROCEDURE — 25010000002 VANCOMYCIN: Performed by: INTERNAL MEDICINE

## 2020-09-07 PROCEDURE — 99232 SBSQ HOSP IP/OBS MODERATE 35: CPT | Performed by: INTERNAL MEDICINE

## 2020-09-07 PROCEDURE — 25010000002 VANCOMYCIN 5 G RECONSTITUTED SOLUTION: Performed by: INTERNAL MEDICINE

## 2020-09-07 PROCEDURE — 25010000002 HEPARIN (PORCINE) PER 1000 UNITS: Performed by: HOSPITALIST

## 2020-09-07 PROCEDURE — 83735 ASSAY OF MAGNESIUM: CPT | Performed by: EMERGENCY MEDICINE

## 2020-09-07 RX ADMIN — HEPARIN SODIUM 5000 UNITS: 5000 INJECTION INTRAVENOUS; SUBCUTANEOUS at 08:05

## 2020-09-07 RX ADMIN — SODIUM CHLORIDE, PRESERVATIVE FREE 10 ML: 5 INJECTION INTRAVENOUS at 08:05

## 2020-09-07 RX ADMIN — VANCOMYCIN HYDROCHLORIDE 1500 MG: 10 INJECTION, POWDER, LYOPHILIZED, FOR SOLUTION INTRAVENOUS at 11:23

## 2020-09-07 RX ADMIN — VANCOMYCIN HYDROCHLORIDE 1500 MG: 10 INJECTION, POWDER, LYOPHILIZED, FOR SOLUTION INTRAVENOUS at 00:24

## 2020-09-07 RX ADMIN — CASTOR OIL AND BALSAM, PERU: 788; 87 OINTMENT TOPICAL at 08:05

## 2020-09-07 NOTE — PLAN OF CARE
Problem: Patient Care Overview  Goal: Plan of Care Review  Outcome: Ongoing (interventions implemented as appropriate)  Flowsheets (Taken 9/7/2020 9830)  Outcome Summary: pt rested well during the night. dressing changes per order. IV antibiotics administered. will continue to monitor.

## 2020-09-07 NOTE — PLAN OF CARE
Problem: Patient Care Overview  Goal: Plan of Care Review  Outcome: Ongoing (interventions implemented as appropriate)  Flowsheets (Taken 9/7/2020 1516)  Progress: improving  Outcome Summary: No complaints, no s/s of distress noted, v/s stable, con't IV ABT per order.

## 2020-09-07 NOTE — PROGRESS NOTES
PROGRESS NOTE         Patient Identification:  Name:  Jimmy Recinos  Age:  44 y.o.  Sex:  male  :  1975  MRN:  [id number]  Visit Number:  [id number]  Primary Care Provider:  No primary care provider on file.         LOS: 14 days       ----------------------------------------------------------------------------------------------------------------------  Subjective       Chief Complaints:    No chief complaint on file.        Interval History:      Patient comfortable this morning.  No issues or complaints.  Afebrile, no diarrhea.  WBC normal.  CRP improving at 0.86.    Review of Systems:    Constitutional: no fever, chills and night sweats. No appetite change or unexpected weight change. No fatigue.  Eyes: no eye drainage, itching or redness.  HEENT: no mouth sores, dysphagia or nose bleed.  Respiratory: no for shortness of breath, cough or production of sputum.  Cardiovascular: no chest pain, no palpitations, no orthopnea.  Gastrointestinal: no nausea, vomiting or diarrhea. No abdominal pain, hematemesis or rectal bleeding.  Genitourinary: no dysuria or polyuria.  Hematologic/lymphatic: no lymph node abnormalities, no easy bruising or easy bleeding.  Musculoskeletal: Right lower extremity pain.  Skin: No rash and no itching.  Neurological: no loss of consciousness, no seizure, no headache.  Behavioral/Psych: no depression or suicidal ideation.  Endocrine: no hot flashes.  Immunologic: negative.     ----------------------------------------------------------------------------------------------------------------------      Objective       Current Hospital Meds:    castor oil-balsam peru  Topical Q12H   heparin (porcine) 5,000 Units Subcutaneous Q12H   sodium chloride 10 mL Intravenous Q12H   vancomycin 1,500 mg Intravenous Q12H        ----------------------------------------------------------------------------------------------------------------------    Vital Signs:  Temp:  [97.8 °F (36.6  °C)-98.1 °F (36.7 °C)] 97.8 °F (36.6 °C)  Heart Rate:  [64-81] 69  Resp:  [18-20] 20  BP: (125-144)/(67-80) 125/68  Mean Arterial Pressure (Non-Invasive) for the past 24 hrs (Last 3 readings):   Noninvasive MAP (mmHg)   09/07/20 1041 87   09/07/20 0713 93   09/06/20 2003 97     SpO2 Percentage    09/07/20 0657 09/07/20 0713 09/07/20 1041   SpO2: 95% 94% 94%     SpO2:  [93 %-96 %] 94 %  on   ;   Device (Oxygen Therapy): room air    Body mass index is 93.27 kg/m².  Wt Readings from Last 3 Encounters:   08/23/20 (!) 295 kg (650 lb)   01/02/20 (!) 273 kg (601 lb)   12/21/19 (!) 273 kg (601 lb 3.2 oz)        Intake/Output Summary (Last 24 hours) at 9/7/2020 1119  Last data filed at 9/7/2020 1041  Gross per 24 hour   Intake 960 ml   Output 1100 ml   Net -140 ml     Diet Regular; Cardiac  ----------------------------------------------------------------------------------------------------------------------      Physical Exam:    Constitutional:  Well-developed and well-nourished.  No respiratory distress.  Morbidly obese.  HENT:  Head: Normocephalic and atraumatic.  Mouth:  Moist mucous membranes.    Eyes:  Conjunctivae and EOM are normal.  No scleral icterus.  Neck:  Neck supple.  No JVD present.    Cardiovascular:  Normal rate, regular rhythm and normal heart sounds with no murmur. No edema.  Pulmonary/Chest:  No respiratory distress, no wheezes, no crackles, with normal breath sounds and good air movement.  Abdominal:  Soft.  Bowel sounds are normal.  No distension and no tenderness.   Musculoskeletal: bilateral lower extremity lymphedema.    Right foot and ankle edema and erythema, improving.  Neurological:  Alert and oriented to person, place, and time.  No facial droop.  No slurred speech.   Skin:  Skin is warm and dry.  No rash noted.  No pallor. bilateral lower extremity lymphedema.  Right foot and ankle edema and erythema, improving.  Psychiatric:  Normal mood and affect.  Behavior is  normal.    ----------------------------------------------------------------------------------------------------------------------            Results from last 7 days   Lab Units 09/07/20  0511 09/06/20  0140 09/05/20  1044   CRP mg/dL 0.86* 0.92* 0.95*   WBC 10*3/mm3 5.19 5.79 4.97   HEMOGLOBIN g/dL 12.5* 12.2* 12.6*   HEMATOCRIT % 42.7 40.2 43.0   MCV fL 92.2 89.1 92.5   MCHC g/dL 29.3* 30.3* 29.3*   PLATELETS 10*3/mm3 234 246 234     Results from last 7 days   Lab Units 09/07/20  0511 09/06/20  0140 09/05/20  1044   SODIUM mmol/L 137 135* 137   POTASSIUM mmol/L 4.1 3.9 4.3   MAGNESIUM mg/dL 2.0 2.1 2.1   CHLORIDE mmol/L 102 100 102   CO2 mmol/L 27.2 25.6 25.5   BUN mg/dL 13 13 12   CREATININE mg/dL 0.49* 0.48* 0.47*   EGFR IF NONAFRICN AM mL/min/1.73 >150 >150 >150   CALCIUM mg/dL 8.7 8.7 8.9   GLUCOSE mg/dL 108* 135* 97   ALBUMIN g/dL 3.00* 2.70* 2.87*   Estimated Creatinine Clearance: 440.8 mL/min (A) (by C-G formula based on SCr of 0.49 mg/dL (L)).  No results found for: AMMONIA    No results found for: HGBA1C, POCGLU  Lab Results   Component Value Date    HGBA1C 5.00 08/23/2020     Lab Results   Component Value Date    TSH 0.793 12/21/2019       Blood Culture   Date Value Ref Range Status   08/23/2020 Staphylococcus, coagulase negative (C)  Preliminary   08/23/2020 Staphylococcus, coagulase negative (C)  Preliminary     Urine Culture   Date Value Ref Range Status   08/23/2020 >100,000 CFU/mL Mixed Robyn Isolated  Final     No results found for: WOUNDCX  No results found for: STOOLCX  No results found for: RESPCX  Pain Management Panel     Pain Management Panel Latest Ref Rng & Units 10/19/2014    AMPHETAMINES SCREEN, URINE NEGATIVE Negative    BARBITURATES SCREEN NEGATIVE Negative    BENZODIAZEPINE SCREEN, URINE NEGATIVE Negative    COCAINE SCREEN, URINE NEGATIVE Negative    METHADONE SCREEN, URINE NEGATIVE Negative             ----------------------------------------------------------------------------------------------------------------------  Imaging Results (Last 24 Hours)     ** No results found for the last 24 hours. **          ----------------------------------------------------------------------------------------------------------------------    Assessment/Plan       Assessment/Plan     ASSESSMENT:    1.  Right lower extremity cellulitis  2.  Bacteremia  3.  UTI    PLAN:    Patient comfortable this morning.  No issues or complaints.  Afebrile, no diarrhea.  WBC normal.  CRP improving at 0.86.    Wound culture finalized as no growth.    PSA normal.  Repeat urinalysis from 8/25/2020 unremarkable.    Blood cultures from 8/23/2020 2 out of 2 sets positive for staph species not aureus.  Blood cultures from 8/25/2020 finalized no growth.    Recommend to continue vancomycin pharmacy to dose monotherapy.  If surgery feels there is no concerning evidence for osteomyelitis,  As we are unable to confirm osteomyelitis with MRI or bone scan, upon discharge patient can be de-escalated to doxycycline 100 g p.o. twice daily to continue through 9/10/2020.  We will continue to follow and adjust antibiotic therapy as needed.      Code Status:   Code Status and Medical Interventions:   Ordered at: 08/24/20 0102     Level Of Support Discussed With:    Patient     Code Status:    CPR     Medical Interventions (Level of Support Prior to Arrest):    Full       KHUSHBU Silva  09/07/20  11:19

## 2020-09-08 LAB
ALBUMIN SERPL-MCNC: 2.8 G/DL (ref 3.5–5.2)
ANION GAP SERPL CALCULATED.3IONS-SCNC: 8.8 MMOL/L (ref 5–15)
ANISOCYTOSIS BLD QL: NORMAL
BASOPHILS # BLD AUTO: 0.03 10*3/MM3 (ref 0–0.2)
BASOPHILS NFR BLD AUTO: 0.5 % (ref 0–1.5)
BUN SERPL-MCNC: 15 MG/DL (ref 6–20)
BUN/CREAT SERPL: 29.4 (ref 7–25)
CALCIUM SPEC-SCNC: 8.7 MG/DL (ref 8.6–10.5)
CHLORIDE SERPL-SCNC: 102 MMOL/L (ref 98–107)
CO2 SERPL-SCNC: 27.2 MMOL/L (ref 22–29)
CREAT SERPL-MCNC: 0.51 MG/DL (ref 0.76–1.27)
CRP SERPL-MCNC: 0.84 MG/DL (ref 0–0.5)
DEPRECATED RDW RBC AUTO: 51.4 FL (ref 37–54)
EOSINOPHIL # BLD AUTO: 0.27 10*3/MM3 (ref 0–0.4)
EOSINOPHIL NFR BLD AUTO: 4.4 % (ref 0.3–6.2)
ERYTHROCYTE [DISTWIDTH] IN BLOOD BY AUTOMATED COUNT: 15.1 % (ref 12.3–15.4)
GFR SERPL CREATININE-BSD FRML MDRD: >150 ML/MIN/1.73
GLUCOSE SERPL-MCNC: 122 MG/DL (ref 65–99)
HCT VFR BLD AUTO: 42.7 % (ref 37.5–51)
HGB BLD-MCNC: 12.3 G/DL (ref 13–17.7)
HYPOCHROMIA BLD QL: NORMAL
IMM GRANULOCYTES # BLD AUTO: 0.01 10*3/MM3 (ref 0–0.05)
IMM GRANULOCYTES NFR BLD AUTO: 0.2 % (ref 0–0.5)
LYMPHOCYTES # BLD AUTO: 0.98 10*3/MM3 (ref 0.7–3.1)
LYMPHOCYTES NFR BLD AUTO: 16.1 % (ref 19.6–45.3)
MAGNESIUM SERPL-MCNC: 2 MG/DL (ref 1.6–2.6)
MCH RBC QN AUTO: 26.6 PG (ref 26.6–33)
MCHC RBC AUTO-ENTMCNC: 28.8 G/DL (ref 31.5–35.7)
MCV RBC AUTO: 92.4 FL (ref 79–97)
MONOCYTES # BLD AUTO: 0.41 10*3/MM3 (ref 0.1–0.9)
MONOCYTES NFR BLD AUTO: 6.7 % (ref 5–12)
NEUTROPHILS NFR BLD AUTO: 4.4 10*3/MM3 (ref 1.7–7)
NEUTROPHILS NFR BLD AUTO: 72.1 % (ref 42.7–76)
NRBC BLD AUTO-RTO: 0 /100 WBC (ref 0–0.2)
PHOSPHATE SERPL-MCNC: 3.8 MG/DL (ref 2.5–4.5)
PLAT MORPH BLD: NORMAL
PLATELET # BLD AUTO: 221 10*3/MM3 (ref 140–450)
PMV BLD AUTO: 11.1 FL (ref 6–12)
POTASSIUM SERPL-SCNC: 4.3 MMOL/L (ref 3.5–5.2)
RBC # BLD AUTO: 4.62 10*6/MM3 (ref 4.14–5.8)
SODIUM SERPL-SCNC: 138 MMOL/L (ref 136–145)
WBC # BLD AUTO: 6.1 10*3/MM3 (ref 3.4–10.8)

## 2020-09-08 PROCEDURE — 25010000002 HEPARIN (PORCINE) PER 1000 UNITS: Performed by: HOSPITALIST

## 2020-09-08 PROCEDURE — 80069 RENAL FUNCTION PANEL: CPT | Performed by: EMERGENCY MEDICINE

## 2020-09-08 PROCEDURE — 99232 SBSQ HOSP IP/OBS MODERATE 35: CPT | Performed by: INTERNAL MEDICINE

## 2020-09-08 PROCEDURE — 97530 THERAPEUTIC ACTIVITIES: CPT

## 2020-09-08 PROCEDURE — 86140 C-REACTIVE PROTEIN: CPT | Performed by: NURSE PRACTITIONER

## 2020-09-08 PROCEDURE — 85025 COMPLETE CBC W/AUTO DIFF WBC: CPT | Performed by: NURSE PRACTITIONER

## 2020-09-08 PROCEDURE — 99232 SBSQ HOSP IP/OBS MODERATE 35: CPT | Performed by: SURGERY

## 2020-09-08 PROCEDURE — 83735 ASSAY OF MAGNESIUM: CPT | Performed by: EMERGENCY MEDICINE

## 2020-09-08 PROCEDURE — 94799 UNLISTED PULMONARY SVC/PX: CPT

## 2020-09-08 PROCEDURE — 94660 CPAP INITIATION&MGMT: CPT

## 2020-09-08 PROCEDURE — 25010000002 VANCOMYCIN: Performed by: INTERNAL MEDICINE

## 2020-09-08 PROCEDURE — 85007 BL SMEAR W/DIFF WBC COUNT: CPT | Performed by: NURSE PRACTITIONER

## 2020-09-08 RX ORDER — DOXYCYCLINE 100 MG/1
100 CAPSULE ORAL EVERY 12 HOURS SCHEDULED
Status: DISCONTINUED | OUTPATIENT
Start: 2020-09-08 | End: 2020-09-09 | Stop reason: HOSPADM

## 2020-09-08 RX ADMIN — VANCOMYCIN HYDROCHLORIDE 1500 MG: 10 INJECTION, POWDER, LYOPHILIZED, FOR SOLUTION INTRAVENOUS at 00:05

## 2020-09-08 RX ADMIN — SODIUM CHLORIDE, PRESERVATIVE FREE 10 ML: 5 INJECTION INTRAVENOUS at 00:05

## 2020-09-08 RX ADMIN — HEPARIN SODIUM 5000 UNITS: 5000 INJECTION INTRAVENOUS; SUBCUTANEOUS at 21:55

## 2020-09-08 RX ADMIN — VANCOMYCIN HYDROCHLORIDE 1500 MG: 10 INJECTION, POWDER, LYOPHILIZED, FOR SOLUTION INTRAVENOUS at 11:25

## 2020-09-08 RX ADMIN — SODIUM CHLORIDE, PRESERVATIVE FREE 10 ML: 5 INJECTION INTRAVENOUS at 09:01

## 2020-09-08 RX ADMIN — CASTOR OIL AND BALSAM, PERU: 788; 87 OINTMENT TOPICAL at 00:04

## 2020-09-08 RX ADMIN — HEPARIN SODIUM 5000 UNITS: 5000 INJECTION INTRAVENOUS; SUBCUTANEOUS at 09:01

## 2020-09-08 RX ADMIN — CASTOR OIL AND BALSAM, PERU: 788; 87 OINTMENT TOPICAL at 21:55

## 2020-09-08 RX ADMIN — DOXYCYCLINE 100 MG: 100 CAPSULE ORAL at 22:16

## 2020-09-08 RX ADMIN — CASTOR OIL AND BALSAM, PERU: 788; 87 OINTMENT TOPICAL at 09:01

## 2020-09-08 RX ADMIN — HEPARIN SODIUM 5000 UNITS: 5000 INJECTION INTRAVENOUS; SUBCUTANEOUS at 00:04

## 2020-09-08 NOTE — PLAN OF CARE
Problem: Patient Care Overview  Goal: Plan of Care Review  Outcome: Ongoing (interventions implemented as appropriate)  Flowsheets  Taken 9/8/2020 0516  Progress: no change  Taken 9/7/2020 3850  Plan of Care Reviewed With: patient  Note:   Patient resting well with bipap on, no c/o of pain/discomfort, no changes, will continue to monitor

## 2020-09-08 NOTE — PROGRESS NOTES
HOSPITALIST PROGRESS NOTE    Patient Identification:  Name:  Jimmy Recinos  Age:  44 y.o.  Sex:  male  :  1975  MRN:  [id number]  Visit Number:  [id number]  Primary Care Provider:  No primary care provider on file.    Length of stay:  14     HPI: 43 yo male being seen in follow up for right foot cellulitis    Subjective:  Patient seen and examined earlier today. He is pleasant and denies any shortness of air and/or cough. No chest pains. No pain in his foot. No fevers, chills and/or nausea/vomiting.    Present during exam: N/A    Current Hospital Meds:    castor oil-balsam peru  Topical Q12H   heparin (porcine) 5,000 Units Subcutaneous Q12H   sodium chloride 10 mL Intravenous Q12H   vancomycin 1,500 mg Intravenous Q12H     Vital Signs  Temp:  [97.8 °F (36.6 °C)-98.3 °F (36.8 °C)] 98.3 °F (36.8 °C)  Heart Rate:  [64-80] 80  Resp:  [18-20] 18  BP: (124-155)/(64-80) 124/64      20  2304   Weight: (!) 295 kg (650 lb)     Body mass index is 93.27 kg/m².    Physical exam:  Physical Exam   Constitutional: He is oriented to person, place, and time. He appears well-developed and well-nourished. No distress.   Morbidly obese.   HENT:   Head: Normocephalic and atraumatic.   Mouth/Throat: Oropharynx is clear and moist.   Eyes: Pupils are equal, round, and reactive to light. Conjunctivae and EOM are normal.   Neck: Neck supple. No tracheal deviation present.   Cardiovascular: Normal rate and regular rhythm. Exam reveals no gallop and no friction rub.   No murmur heard.  Pulses:       Posterior tibial pulses are 1+ on the right side, and 1+ on the left side.   Pulmonary/Chest: Breath sounds normal. No respiratory distress. He has no wheezes. He has no rales.   Abdominal: Soft. Bowel sounds are normal. He exhibits no distension. There is no tenderness. There is no guarding.   Musculoskeletal: Normal range of motion. He exhibits no tenderness.   R foot dressed in ACE wrap (pictures on his cell phone reviewed with  most recent on revealing erythema on the sole that appears healthy; dark tissue around edges.   Neurological: He is alert and oriented to person, place, and time. No cranial nerve deficit.   Skin: Skin is warm and dry. No rash noted. No erythema.   Psychiatric: He has a normal mood and affect.     Results Review:  Results from last 7 days   Lab Units 09/07/20  0511 09/06/20  0140 09/05/20  1044 09/04/20  0502 09/03/20  0732 09/02/20  1142 09/01/20  0652   WBC 10*3/mm3 5.19 5.79 4.97 6.70 6.07 8.14 9.00   HEMOGLOBIN g/dL 12.5* 12.2* 12.6* 13.2 14.0 14.2 14.0   HEMATOCRIT % 42.7 40.2 43.0 44.7 46.2 46.7 46.2   PLATELETS 10*3/mm3 234 246 234 281 306 363 379     Results from last 7 days   Lab Units 09/07/20  0511 09/06/20 0140 09/05/20  1044 09/04/20  0502 09/03/20  0732 09/02/20  1142 09/01/20  2256 09/01/20  0745   SODIUM mmol/L 137 135* 137 139 134* 134*  --  132*   POTASSIUM mmol/L 4.1 3.9 4.3 4.0 4.0 4.1 4.0 3.6   CHLORIDE mmol/L 102 100 102 99 97* 93*  --  88*   CO2 mmol/L 27.2 25.6 25.5 30.4* 28.4 32.4*  --  32.6*   BUN mg/dL 13 13 12 17 15 17  --  21*   CREATININE mg/dL 0.49* 0.48* 0.47* 0.39* 0.53* 0.57*  --  0.58*   CALCIUM mg/dL 8.7 8.7 8.9 9.1 9.3 9.8  --  9.6   GLUCOSE mg/dL 108* 135* 97 111* 105* 121*  --  118*         Results from last 7 days   Lab Units 09/07/20  0511 09/06/20  0140 09/05/20  1044 09/04/20  0502 09/03/20  0732 09/02/20  1142 09/01/20  0745   MAGNESIUM mg/dL 2.0 2.1 2.1 2.2 2.3 2.1 2.1         Assessment/Plan     -Right lower extremity/right foot cellulitis presenting with sepsis status post bedside surgical debridement; two-view x-ray of the foot with diffuse soft tissue edema with ostial lysis of the midfoot bony structure suspicious for osteomyelitis; unable to obtain CT/MRI/NM due to weight limitations. According to ID notes, patient can be discharged home on Doxycycline to finish on 9/10/20. Will plan to discuss with them tomorrow regarding most appropriate course of action.      -Probable contaminant in blood stream as 1 blood culture revealing staphylococcus epidermidis and staphylococcus hominis; repeat blood cultures have been negative thus far.     -Super morbid obesity.    -Essential hypertension, controlled.     -Muscular dystrophy.    The patient is high risk due to the following diagnoses/reasons:  Super morbid obesity.    I discussed the patients findings and my recommendations with patient.    Disposition  Unclear; home v LTAC; will further discuss with ID      Brina Peterson DO  09/07/20  20:09

## 2020-09-08 NOTE — PROGRESS NOTES
LOS: 15 days   No care team member to display    Surgery follow up for right foot blister    Subjective     Interval History:  Patient reports no complaints today to his feet.  He states he has had some ankle pain but none related to the blister on his feet.  States he is doing well and possible discharge is pending for tomorrow    History taken from patient.      Review of Systems:   Review of systems negative except for history of present illness    Objective     Vital Signs  Temp:  [97.8 °F (36.6 °C)-98.7 °F (37.1 °C)] 98.7 °F (37.1 °C)  Heart Rate:  [68-89] 89  Resp:  [18-22] 20  BP: (123-147)/(64-77) 141/68    Physical Exam:  On examination this is a morbidly obese male in no acute distress  HEENT examination: Within normal limits.  Conjunctiva pink.  Nose and ears appear normal.  Neck: Supple, full range of motion.  No JVD.  Musculoskeletal: Full range of motion all extremities without focal weakness. No digital cyanosis.  Psych: Patient is alert, oriented x3. Mood and affect are appropriate.  Skin: Examination of the right foot demonstrates the tissue to be viable.  The foot is essentially unchanged from 9/4/2020 no open draining areas.  No obvious abscess.   Results Review:        Results from last 7 days   Lab Units 09/08/20  0345 09/07/20  0511 09/06/20  0140   CRP mg/dL 0.84* 0.86* 0.92*   WBC 10*3/mm3 6.10 5.19 5.79   HEMOGLOBIN g/dL 12.3* 12.5* 12.2*   HEMATOCRIT % 42.7 42.7 40.2   PLATELETS 10*3/mm3 221 234 246         Results from last 7 days   Lab Units 09/08/20  0345 09/07/20  0511 09/06/20  0140   SODIUM mmol/L 138 137 135*   POTASSIUM mmol/L 4.3 4.1 3.9   MAGNESIUM mg/dL 2.0 2.0 2.1   CHLORIDE mmol/L 102 102 100   CO2 mmol/L 27.2 27.2 25.6   BUN mg/dL 15 13 13   CREATININE mg/dL 0.51* 0.49* 0.48*   EGFR IF NONAFRICN AM mL/min/1.73 >150 >150 >150   CALCIUM mg/dL 8.7 8.7 8.7   GLUCOSE mg/dL 122* 108* 135*   ALBUMIN g/dL 2.80* 3.00* 2.70*   Estimated Creatinine Clearance: 423.5 mL/min (A) (by  C-G formula based on SCr of 0.51 mg/dL (L)).  No results found for: AMMONIA      No results found for: BLOODCX  No results found for: URINECX  Wound Culture   Date Value Ref Range Status   09/02/2020 No growth  Preliminary     No results found for: STOOLCX    Imaging:  Imaging Results (Last 24 Hours)     ** No results found for the last 24 hours. **               Impression:  Large blister on the plantar surface of the right foot which was unroofed  and is stable.  Etiology for this is unclear.  After multiple days the wound has not progressed.  It is dry and intact.    Plan:  Keep area covered with Adaptic no longer necessary.  Patient surgically stable for discharge.  Gillian Patterson MD  09/08/20  14:14      Please note that portions of this note were completed with a voice recognition program.

## 2020-09-08 NOTE — PROGRESS NOTES
PROGRESS NOTE         Patient Identification:  Name:  Jimmy Recinos  Age:  44 y.o.  Sex:  male  :  1975  MRN:  [id number]  Visit Number:  [id number]  Primary Care Provider:  No primary care provider on file.         LOS: 15 days       ----------------------------------------------------------------------------------------------------------------------  Subjective       Chief Complaints:    No chief complaint on file.        Interval History:      Patient resting comfortably in bed.  No issues or complaints.  Afebrile, no diarrhea.  WBC normal.  CRP improving at 0.84.    Review of Systems:    Constitutional: no fever, chills and night sweats. No appetite change or unexpected weight change. No fatigue.  Eyes: no eye drainage, itching or redness.  HEENT: no mouth sores, dysphagia or nose bleed.  Respiratory: no for shortness of breath, cough or production of sputum.  Cardiovascular: no chest pain, no palpitations, no orthopnea.  Gastrointestinal: no nausea, vomiting or diarrhea. No abdominal pain, hematemesis or rectal bleeding.  Genitourinary: no dysuria or polyuria.  Hematologic/lymphatic: no lymph node abnormalities, no easy bruising or easy bleeding.  Musculoskeletal: Right lower extremity pain.  Skin: No rash and no itching.  Neurological: no loss of consciousness, no seizure, no headache.  Behavioral/Psych: no depression or suicidal ideation.  Endocrine: no hot flashes.  Immunologic: negative.     ----------------------------------------------------------------------------------------------------------------------      Objective       Current Hospital Meds:    castor oil-balsam peru  Topical Q12H   heparin (porcine) 5,000 Units Subcutaneous Q12H   sodium chloride 10 mL Intravenous Q12H   vancomycin 1,500 mg Intravenous Q12H        ----------------------------------------------------------------------------------------------------------------------    Vital Signs:  Temp:  [97.8 °F (36.6  °C)-98.4 °F (36.9 °C)] 97.8 °F (36.6 °C)  Heart Rate:  [68-80] 71  Resp:  [18-22] 20  BP: (123-155)/(64-77) 147/77  Mean Arterial Pressure (Non-Invasive) for the past 24 hrs (Last 3 readings):   Noninvasive MAP (mmHg)   09/08/20 0748 96   09/07/20 1409 96   09/07/20 1041 87     SpO2 Percentage    09/08/20 0345 09/08/20 0748 09/08/20 0820   SpO2: 96% 97% 97%     SpO2:  [94 %-97 %] 97 %  on   ;   Device (Oxygen Therapy): room air    Body mass index is 93.27 kg/m².  Wt Readings from Last 3 Encounters:   08/23/20 (!) 295 kg (650 lb)   01/02/20 (!) 273 kg (601 lb)   12/21/19 (!) 273 kg (601 lb 3.2 oz)        Intake/Output Summary (Last 24 hours) at 9/8/2020 0911  Last data filed at 9/8/2020 0602  Gross per 24 hour   Intake 3307.31 ml   Output 1950 ml   Net 1357.31 ml     Diet Regular; Cardiac  ----------------------------------------------------------------------------------------------------------------------      Physical Exam:    Constitutional:  Well-developed and well-nourished.  No respiratory distress.  Morbidly obese.  HENT:  Head: Normocephalic and atraumatic.  Mouth:  Moist mucous membranes.    Eyes:  Conjunctivae and EOM are normal.  No scleral icterus.  Neck:  Neck supple.  No JVD present.    Cardiovascular:  Normal rate, regular rhythm and normal heart sounds with no murmur. No edema.  Pulmonary/Chest:  No respiratory distress, no wheezes, no crackles, with normal breath sounds and good air movement.  Abdominal:  Soft.  Bowel sounds are normal.  No distension and no tenderness.   Musculoskeletal: bilateral lower extremity lymphedema.    Right foot and ankle edema and erythema, improving.  Neurological:  Alert and oriented to person, place, and time.  No facial droop.  No slurred speech.   Skin:  Skin is warm and dry.  No rash noted.  No pallor. bilateral lower extremity lymphedema.  Right foot and ankle edema and erythema, improving.  Psychiatric:  Normal mood and affect.  Behavior is  normal.    ----------------------------------------------------------------------------------------------------------------------            Results from last 7 days   Lab Units 09/08/20  0345 09/07/20  0511 09/06/20  0140   CRP mg/dL 0.84* 0.86* 0.92*   WBC 10*3/mm3 6.10 5.19 5.79   HEMOGLOBIN g/dL 12.3* 12.5* 12.2*   HEMATOCRIT % 42.7 42.7 40.2   MCV fL 92.4 92.2 89.1   MCHC g/dL 28.8* 29.3* 30.3*   PLATELETS 10*3/mm3 221 234 246     Results from last 7 days   Lab Units 09/08/20 0345 09/07/20  0511 09/06/20  0140   SODIUM mmol/L 138 137 135*   POTASSIUM mmol/L 4.3 4.1 3.9   MAGNESIUM mg/dL 2.0 2.0 2.1   CHLORIDE mmol/L 102 102 100   CO2 mmol/L 27.2 27.2 25.6   BUN mg/dL 15 13 13   CREATININE mg/dL 0.51* 0.49* 0.48*   EGFR IF NONAFRICN AM mL/min/1.73 >150 >150 >150   CALCIUM mg/dL 8.7 8.7 8.7   GLUCOSE mg/dL 122* 108* 135*   ALBUMIN g/dL 2.80* 3.00* 2.70*   Estimated Creatinine Clearance: 423.5 mL/min (A) (by C-G formula based on SCr of 0.51 mg/dL (L)).  No results found for: AMMONIA    No results found for: HGBA1C, POCGLU  Lab Results   Component Value Date    HGBA1C 5.00 08/23/2020     Lab Results   Component Value Date    TSH 0.793 12/21/2019       Blood Culture   Date Value Ref Range Status   08/23/2020 Staphylococcus, coagulase negative (C)  Preliminary   08/23/2020 Staphylococcus, coagulase negative (C)  Preliminary     Urine Culture   Date Value Ref Range Status   08/23/2020 >100,000 CFU/mL Mixed Robyn Isolated  Final     No results found for: WOUNDCX  No results found for: STOOLCX  No results found for: RESPCX  Pain Management Panel     Pain Management Panel Latest Ref Rng & Units 10/19/2014    AMPHETAMINES SCREEN, URINE NEGATIVE Negative    BARBITURATES SCREEN NEGATIVE Negative    BENZODIAZEPINE SCREEN, URINE NEGATIVE Negative    COCAINE SCREEN, URINE NEGATIVE Negative    METHADONE SCREEN, URINE NEGATIVE Negative             ----------------------------------------------------------------------------------------------------------------------  Imaging Results (Last 24 Hours)     ** No results found for the last 24 hours. **          ----------------------------------------------------------------------------------------------------------------------    Assessment/Plan       Assessment/Plan     ASSESSMENT:    1.  Right lower extremity cellulitis  2.  Bacteremia  3.  UTI    PLAN:    Patient resting comfortably in bed.  No issues or complaints.  Afebrile, no diarrhea.  WBC normal.  CRP improving at 0.84.    Wound culture finalized as no growth.    PSA normal.  Repeat urinalysis from 8/25/2020 unremarkable.    Blood cultures from 8/23/2020 2 out of 2 sets positive for staph species not aureus.  Blood cultures from 8/25/2020 finalized no growth.    Recommend to continue vancomycin pharmacy to dose monotherapy.  If surgery feels there is no concerning evidence for osteomyelitis,  As we are unable to confirm osteomyelitis with MRI or bone scan, upon discharge patient can be de-escalated to doxycycline 100 g p.o. twice daily to continue through 9/10/2020.  We will continue to follow and adjust antibiotic therapy as needed.      Code Status:   Code Status and Medical Interventions:   Ordered at: 08/24/20 0102     Level Of Support Discussed With:    Patient     Code Status:    CPR     Medical Interventions (Level of Support Prior to Arrest):    Full       Yolanda Gross, KHUSHBU  09/08/20  09:11

## 2020-09-08 NOTE — THERAPY TREATMENT NOTE
Acute Care - Occupational Therapy Treatment Note  [facility]     Patient Name: Jimmy Recinos  : 1975  MRN: [id number]  Today's Date: 2020  Onset of Illness/Injury or Date of Surgery: 20     Referring Physician: Dr. Marquez    Admit Date: 2020       ICD-10-CM ICD-9-CM   1. Cellulitis of right lower extremity L03.115 682.6     Patient Active Problem List   Diagnosis   • Essential hypertension   • Obesity, morbid, BMI 50 or higher (CMS/HCC)   • Severe sepsis (CMS/HCC)   • Acute respiratory failure with hypoxia (CMS/HCC)   • Hyponatremia   • Influenza A   • Cellulitis of right lower extremity     Past Medical History:   Diagnosis Date   • Bilateral cellulitis of lower leg 2018   • Charcot-Ramya disease    • Chronic low back pain    • Debility    • Elevated liver enzymes    • Essential hypertension 2018   • Lymphedema of both lower extremities    • MRSA nasal colonization    • Muscular dystrophy (CMS/HCC)     Since age 15 yo   • Obesity, morbid, BMI 50 or higher (CMS/HCC) 2018   • VRE (vancomycin-resistant Enterococci)      Past Surgical History:   Procedure Laterality Date   • HERNIA REPAIR  2004    x 2, ventral   • KNEE SURGERY Right    • TONSILLECTOMY     • WRIST SURGERY Left     ORIF       Therapy Treatment    Rehabilitation Treatment Summary     Row Name 20 1614             Treatment Time/Intention    Discipline  occupational therapist  -KR      Document Type  therapy note (daily note)  -KR      Patient Effort  adequate  -KR      Comment  BUE ther ex HEP training completed for theraband activity as tolerated and desired to increase ability to assist with mobility/self care in home environment. Pt. acknowledged need for HEPs and was able to display independence with use of theraband. Adaptive  demonstrated for manipulation of bands.   -KR      Recorded by [KR] David Daniels OT 20 1615      Row Name                Wound 20 0318 Bilateral lower thoracic  spine Abrasion    Wound - Properties Group Date first assessed: 08/24/20 [KE] Time first assessed: 0318 [KE] Present on Hospital Admission: Y [KE] Side: Bilateral [KE] Orientation: lower [KE] Location: thoracic spine [KE] Primary Wound Type: Abrasion [KE] Recorded by:  [KE] Marsha Carrion RN 08/24/20 0320    Row Name                Wound 09/02/20 1251 Right foot Incision    Wound - Properties Group Date first assessed: 09/02/20 [SC] Time first assessed: 1251 [SC] Present on Hospital Admission: Y [SC] Side: Right [SC] Location: foot [SC2], bottom of foot  Primary Wound Type: Incision [SC] Additional Comments: bottom  [SC] Recorded by:  [SC] Sonia Felder RN 09/02/20 1252 [SC2] Sonia Felder RN 09/02/20 1254      User Key  (r) = Recorded By, (t) = Taken By, (c) = Cosigned By    Initials Name Effective Dates Discipline    David Thomas OT 04/03/18 -  OT    Marsha Dubose RN 02/04/20 -  Nurse    SC Sonia Felder RN 06/29/20 -  Nurse        Wound 08/24/20 0318 Bilateral lower thoracic spine Abrasion (Active)   Dressing Appearance open to air 9/8/2020  7:20 AM   Closure Open to air 9/8/2020  7:20 AM   Base red 9/8/2020  7:20 AM   Drainage Characteristics/Odor bleeding controlled 9/8/2020  7:20 AM   Drainage Amount none 9/8/2020  7:20 AM   Dressing Care, Wound open to air 9/8/2020  7:20 AM   Periwound Care, Wound barrier ointment applied 9/8/2020  7:20 AM       Wound 09/02/20 1251 Right foot Incision (Active)   Dressing Appearance dry;intact 9/8/2020  7:20 AM   Closure None 9/8/2020 11:30 AM   Base red 9/8/2020 11:30 AM   Periwound redness 9/8/2020 11:30 AM   Periwound Temperature warm 9/8/2020 11:30 AM   Periwound Skin Turgor soft 9/8/2020 11:30 AM   Drainage Amount none 9/8/2020 11:30 AM   Care, Wound other (see comments) 9/8/2020  2:10 PM   Dressing Care, Wound dressing changed;abdominal pad 9/8/2020  2:10 PM   Periwound Care, Wound dry periwound area maintained 9/8/2020 11:30 AM           OT Recommendation  and Plan  Outcome Summary/Treatment Plan (OT)  Anticipated Discharge Disposition (OT): home with assist  Planned Therapy Interventions (OT Eval): strengthening exercise          Time Calculation:     Therapy Charges for Today     Code Description Service Date Service Provider Modifiers Qty    [id number]  OT THERAPEUTIC ACT EA 15 MIN 9/8/2020 David Daniels, OT GO 2               David Daniels OT  9/8/2020

## 2020-09-08 NOTE — PLAN OF CARE
Problem: Patient Care Overview  Goal: Plan of Care Review  Outcome: Ongoing (interventions implemented as appropriate)  Flowsheets (Taken 9/8/2020 1539)  Progress: improving  Plan of Care Reviewed With: patient  Outcome Summary: pt feels good, has no complaints, wound looks much better, IV ABT changed to PO per MD, con't to monitor.

## 2020-09-08 NOTE — PROGRESS NOTES
HOSPITALIST PROGRESS NOTE    Patient Identification:  Name:  Jimmy Recinos  Age:  44 y.o.  Sex:  male  :  1975  MRN:  [id number]  Visit Number:  [id number]  Primary Care Provider:  No primary care provider on file.    Length of stay:  15     HPI: 45 yo male being seen in follow up for right foot cellulitis    Subjective:  Patient seen and examined today.  Patient remains in good spirits and denies any new complaints.  He denies any chest pain or shortness of air.  No nausea or vomiting reported.    Present during exam: HERNANDEZ Harmon    Current Hospital Meds:    castor oil-balsam peru  Topical Q12H   doxycycline 100 mg Oral Q12H   heparin (porcine) 5,000 Units Subcutaneous Q12H   sodium chloride 10 mL Intravenous Q12H     Vital Signs  Temp:  [97.6 °F (36.4 °C)-98.7 °F (37.1 °C)] 97.6 °F (36.4 °C)  Heart Rate:  [68-89] 81  Resp:  [18-22] 20  BP: (116-147)/(64-80) 116/80      20  2304   Weight: (!) 295 kg (650 lb)     Body mass index is 93.27 kg/m².    Physical exam:  Physical Exam   Constitutional: He is oriented to person, place, and time. He appears well-developed and well-nourished. No distress.   Morbidly obese.   HENT:   Head: Normocephalic and atraumatic.   Mouth/Throat: Oropharynx is clear and moist.   Eyes: Pupils are equal, round, and reactive to light. Conjunctivae and EOM are normal.   Neck: Neck supple. No tracheal deviation present.   Cardiovascular: Normal rate and regular rhythm. Exam reveals no gallop and no friction rub.   No murmur heard.  Pulses:       Posterior tibial pulses are 1+ on the right side, and 1+ on the left side.   Pulmonary/Chest: Breath sounds normal. No respiratory distress. He has no wheezes. He has no rales.   Abdominal: Soft. Bowel sounds are normal. He exhibits no distension. There is no tenderness. There is no guarding.   Musculoskeletal: He exhibits no tenderness.   R foot examined; plantar surface with pink/healthy appearing tissue with some thickened edges.  No  slough or areas of necrosis.   Neurological: He is alert and oriented to person, place, and time. No cranial nerve deficit.   Skin: Skin is warm and dry. No rash noted. No erythema.   Psychiatric: He has a normal mood and affect.     Results Review:  Results from last 7 days   Lab Units 09/08/20  0345 09/07/20  0511 09/06/20  0140 09/05/20  1044 09/04/20  0502 09/03/20  0732 09/02/20  1142   WBC 10*3/mm3 6.10 5.19 5.79 4.97 6.70 6.07 8.14   HEMOGLOBIN g/dL 12.3* 12.5* 12.2* 12.6* 13.2 14.0 14.2   HEMATOCRIT % 42.7 42.7 40.2 43.0 44.7 46.2 46.7   PLATELETS 10*3/mm3 221 234 246 234 281 306 363     Results from last 7 days   Lab Units 09/08/20  0345 09/07/20  0511 09/06/20  0140 09/05/20  1044 09/04/20  0502 09/03/20  0732 09/02/20  1142   SODIUM mmol/L 138 137 135* 137 139 134* 134*   POTASSIUM mmol/L 4.3 4.1 3.9 4.3 4.0 4.0 4.1   CHLORIDE mmol/L 102 102 100 102 99 97* 93*   CO2 mmol/L 27.2 27.2 25.6 25.5 30.4* 28.4 32.4*   BUN mg/dL 15 13 13 12 17 15 17   CREATININE mg/dL 0.51* 0.49* 0.48* 0.47* 0.39* 0.53* 0.57*   CALCIUM mg/dL 8.7 8.7 8.7 8.9 9.1 9.3 9.8   GLUCOSE mg/dL 122* 108* 135* 97 111* 105* 121*         Results from last 7 days   Lab Units 09/08/20  0345 09/07/20  0511 09/06/20  0140 09/05/20  1044 09/04/20  0502 09/03/20  0732 09/02/20  1142   MAGNESIUM mg/dL 2.0 2.0 2.1 2.1 2.2 2.3 2.1         Assessment/Plan     -Right lower extremity/right foot cellulitis presenting with sepsis status post bedside surgical debridement; two-view x-ray of the foot with diffuse soft tissue edema with ostial lysis of the midfoot bony structure suspicious for osteomyelitis; unable to obtain CT/MRI/NM due to weight limitations.     Discussed patient's case today with Dr. Kendrick via telephone. Will change to PO doxycycline this evening to see how patient initially responds. Repeat CBC and C-RP in am and consider discharge home tomorrow.    -Probable contaminant in blood stream as 1 blood culture revealing staphylococcus  epidermidis and staphylococcus hominis; repeat blood cultures have been negative thus far.     -Super morbid obesity.    -Essential hypertension, controlled.     -Muscular dystrophy.    The patient is high risk due to the following diagnoses/reasons:  Super morbid obesity.    I discussed the patients findings and my recommendations with patient, nursing staff and consulting provider/service    Disposition  Home tomorrow      Brina Peterson DO  09/08/20  17:12

## 2020-09-08 NOTE — PROGRESS NOTES
Discharge Planning Assessment   [place]     Patient Name: Jimmy Recinos  MRN: [id number]  Today's Date: 9/8/2020    Admit Date: 8/23/2020      Discharge Plan     Row Name 09/08/20 1627       Plan    Plan Pt was discussed in [facility] today and Dr. Peterson plans to discharge pt home in 1-2 days. SS notified [facility] per Lidya. SS to follow.         Destination      Service Provider Request Status Selected Services Address Phone Number Fax Number    [facility] Pending - No Request Sent N[facility]A [address] [facility] [address] [facility] [place] [place] [address] [phone]     NADINE Castillo

## 2020-09-09 VITALS
HEIGHT: 70 IN | TEMPERATURE: 98.1 F | OXYGEN SATURATION: 96 % | WEIGHT: 315 LBS | BODY MASS INDEX: 45.1 KG/M2 | DIASTOLIC BLOOD PRESSURE: 61 MMHG | SYSTOLIC BLOOD PRESSURE: 146 MMHG | RESPIRATION RATE: 20 BRPM | HEART RATE: 78 BPM

## 2020-09-09 PROBLEM — L03.115 CELLULITIS OF RIGHT LOWER EXTREMITY: Status: RESOLVED | Noted: 2020-08-24 | Resolved: 2020-09-09

## 2020-09-09 PROCEDURE — 94799 UNLISTED PULMONARY SVC/PX: CPT

## 2020-09-09 PROCEDURE — 94660 CPAP INITIATION&MGMT: CPT

## 2020-09-09 PROCEDURE — 99239 HOSP IP/OBS DSCHRG MGMT >30: CPT | Performed by: INTERNAL MEDICINE

## 2020-09-09 PROCEDURE — 25010000002 HEPARIN (PORCINE) PER 1000 UNITS: Performed by: HOSPITALIST

## 2020-09-09 RX ORDER — UREA 10 %
1 LOTION (ML) TOPICAL DAILY
Qty: 3 TABLET | Refills: 0 | Status: SHIPPED | OUTPATIENT
Start: 2020-09-09 | End: 2020-09-12

## 2020-09-09 RX ORDER — DOXYCYCLINE 100 MG/1
100 CAPSULE ORAL EVERY 12 HOURS SCHEDULED
Qty: 3 CAPSULE | Refills: 0 | Status: SHIPPED | OUTPATIENT
Start: 2020-09-09 | End: 2020-09-11

## 2020-09-09 RX ORDER — BACLOFEN 5 MG/1
5 TABLET ORAL EVERY 12 HOURS PRN
Qty: 60 TABLET | Refills: 0 | Status: ON HOLD | OUTPATIENT
Start: 2020-09-09 | End: 2020-10-03

## 2020-09-09 RX ADMIN — HEPARIN SODIUM 5000 UNITS: 5000 INJECTION INTRAVENOUS; SUBCUTANEOUS at 09:07

## 2020-09-09 RX ADMIN — CASTOR OIL AND BALSAM, PERU: 788; 87 OINTMENT TOPICAL at 09:07

## 2020-09-09 RX ADMIN — DOXYCYCLINE 100 MG: 100 CAPSULE ORAL at 09:07

## 2020-09-09 NOTE — PROGRESS NOTES
PROGRESS NOTE         Patient Identification:  Name:  Jimmy Recinos  Age:  44 y.o.  Sex:  male  :  1975  MRN:  [id number]  Visit Number:  [id number]  Primary Care Provider:  No primary care provider on file.         LOS: 16 days       ----------------------------------------------------------------------------------------------------------------------  Subjective       Chief Complaints:    No chief complaint on file.        Interval History:      Patient doing well this morning.  Hopeful to be discharged today.  Afebrile, no diarrhea.     Review of Systems:    Constitutional: no fever, chills and night sweats. No appetite change or unexpected weight change. No fatigue.  Eyes: no eye drainage, itching or redness.  HEENT: no mouth sores, dysphagia or nose bleed.  Respiratory: no for shortness of breath, cough or production of sputum.  Cardiovascular: no chest pain, no palpitations, no orthopnea.  Gastrointestinal: no nausea, vomiting or diarrhea. No abdominal pain, hematemesis or rectal bleeding.  Genitourinary: no dysuria or polyuria.  Hematologic/lymphatic: no lymph node abnormalities, no easy bruising or easy bleeding.  Musculoskeletal:  No muscle or joint pain.  Skin: No rash and no itching.  Neurological: no loss of consciousness, no seizure, no headache.  Behavioral/Psych: no depression or suicidal ideation.  Endocrine: no hot flashes.  Immunologic: negative.     ----------------------------------------------------------------------------------------------------------------------      Objective       Current Hospital Meds:    castor oil-balsam peru  Topical Q12H   doxycycline 100 mg Oral Q12H   heparin (porcine) 5,000 Units Subcutaneous Q12H   sodium chloride 10 mL Intravenous Q12H        ----------------------------------------------------------------------------------------------------------------------    Vital Signs:  Temp:  [97.6 °F (36.4 °C)-98 °F (36.7 °C)] 97.8 °F (36.6 °C)  Heart  Rate:  [60-81] 73  Resp:  [18-23] 20  BP: (116-143)/(69-80) 143/69  Mean Arterial Pressure (Non-Invasive) for the past 24 hrs (Last 3 readings):   Noninvasive MAP (mmHg)   09/08/20 1513 97     SpO2 Percentage    09/09/20 0005 09/09/20 0442 09/09/20 0720   SpO2: 97% 95% 95%     SpO2:  [94 %-100 %] 95 %  on   ;   Device (Oxygen Therapy): room air    Body mass index is 93.27 kg/m².  Wt Readings from Last 3 Encounters:   08/23/20 (!) 295 kg (650 lb)   01/02/20 (!) 273 kg (601 lb)   12/21/19 (!) 273 kg (601 lb 3.2 oz)        Intake/Output Summary (Last 24 hours) at 9/9/2020 1052  Last data filed at 9/9/2020 0700  Gross per 24 hour   Intake 1220 ml   Output 1675 ml   Net -455 ml     Diet Regular; Cardiac  ----------------------------------------------------------------------------------------------------------------------      Physical Exam:    Constitutional:  Well-developed and well-nourished.  No respiratory distress.  Morbidly obese.  HENT:  Head: Normocephalic and atraumatic.  Mouth:  Moist mucous membranes.    Eyes:  Conjunctivae and EOM are normal.  No scleral icterus.  Neck:  Neck supple.  No JVD present.    Cardiovascular:  Normal rate, regular rhythm and normal heart sounds with no murmur. No edema.  Pulmonary/Chest:  No respiratory distress, no wheezes, no crackles, with normal breath sounds and good air movement.  Abdominal:  Soft.  Bowel sounds are normal.  No distension and no tenderness.   Musculoskeletal: bilateral lower extremity lymphedema.    Right foot and ankle edema and erythema, improving.  Neurological:  Alert and oriented to person, place, and time.  No facial droop.  No slurred speech.   Skin:  Skin is warm and dry.  No rash noted.  No pallor. bilateral lower extremity lymphedema.  Right foot and ankle edema and erythema, improving.  Psychiatric:  Normal mood and affect.  Behavior is  normal.    ----------------------------------------------------------------------------------------------------------------------            Results from last 7 days   Lab Units 09/08/20  0345 09/07/20  0511 09/06/20  0140   CRP mg/dL 0.84* 0.86* 0.92*   WBC 10*3/mm3 6.10 5.19 5.79   HEMOGLOBIN g/dL 12.3* 12.5* 12.2*   HEMATOCRIT % 42.7 42.7 40.2   MCV fL 92.4 92.2 89.1   MCHC g/dL 28.8* 29.3* 30.3*   PLATELETS 10*3/mm3 221 234 246     Results from last 7 days   Lab Units 09/08/20 0345 09/07/20  0511 09/06/20  0140   SODIUM mmol/L 138 137 135*   POTASSIUM mmol/L 4.3 4.1 3.9   MAGNESIUM mg/dL 2.0 2.0 2.1   CHLORIDE mmol/L 102 102 100   CO2 mmol/L 27.2 27.2 25.6   BUN mg/dL 15 13 13   CREATININE mg/dL 0.51* 0.49* 0.48*   EGFR IF NONAFRICN AM mL/min/1.73 >150 >150 >150   CALCIUM mg/dL 8.7 8.7 8.7   GLUCOSE mg/dL 122* 108* 135*   ALBUMIN g/dL 2.80* 3.00* 2.70*   Estimated Creatinine Clearance: 423.5 mL/min (A) (by C-G formula based on SCr of 0.51 mg/dL (L)).  No results found for: AMMONIA    No results found for: HGBA1C, POCGLU  Lab Results   Component Value Date    HGBA1C 5.00 08/23/2020     Lab Results   Component Value Date    TSH 0.793 12/21/2019       Blood Culture   Date Value Ref Range Status   08/23/2020 Staphylococcus, coagulase negative (C)  Preliminary   08/23/2020 Staphylococcus, coagulase negative (C)  Preliminary     Urine Culture   Date Value Ref Range Status   08/23/2020 >100,000 CFU/mL Mixed Robyn Isolated  Final     No results found for: WOUNDCX  No results found for: STOOLCX  No results found for: RESPCX  Pain Management Panel     Pain Management Panel Latest Ref Rng & Units 10/19/2014    AMPHETAMINES SCREEN, URINE NEGATIVE Negative    BARBITURATES SCREEN NEGATIVE Negative    BENZODIAZEPINE SCREEN, URINE NEGATIVE Negative    COCAINE SCREEN, URINE NEGATIVE Negative    METHADONE SCREEN, URINE NEGATIVE Negative             ----------------------------------------------------------------------------------------------------------------------  Imaging Results (Last 24 Hours)     ** No results found for the last 24 hours. **          ----------------------------------------------------------------------------------------------------------------------    Assessment/Plan       Assessment/Plan     ASSESSMENT:    1.  Right lower extremity cellulitis  2.  Bacteremia  3.  UTI    PLAN:    Patient doing well this morning.  Hopeful to be discharged today.  Afebrile, no diarrhea.     Wound culture finalized as no growth.    PSA normal.  Repeat urinalysis from 8/25/2020 unremarkable.    Blood cultures from 8/23/2020 2 out of 2 sets positive for staph species not aureus.  Blood cultures from 8/25/2020 finalized no growth.     If surgery feels there is no concerning evidence for osteomyelitis,  As we are unable to confirm osteomyelitis with MRI or bone scan, antibiotic therapy to continue through 9/10/2020.     Patient with de-escalated to doxycycline 100 g p.o. twice daily to continue through 9/10/2020.  Patient stable from ID standpoint.  Okay to discharge from ID standpoint.      Code Status:   Code Status and Medical Interventions:   Ordered at: 08/24/20 0102     Level Of Support Discussed With:    Patient     Code Status:    CPR     Medical Interventions (Level of Support Prior to Arrest):    Full       Yolanda Gross, APRN  09/09/20  10:52

## 2020-09-09 NOTE — DISCHARGE SUMMARY
Discharge Summary:    Date of Admission: 8/23/2020  Date of Discharge:  9/9/2020    PCP: No primary care provider on file.    DISCHARGE DIAGNOSIS  -Right lower extremity/foot cellulitis presenting with sepsis  -Probable contaminant and bloodstream as 1 blood culture revealing Staphylococcus epidermidis and Staphylococcus hominis  -Super morbid obesity  -Muscular dystrophy    SECONDARY DIAGNOSES  Past Medical History:   Diagnosis Date   • Bilateral cellulitis of lower leg 11/26/2018   • Charcot-Ramya disease    • Chronic low back pain    • Debility    • Elevated liver enzymes    • Essential hypertension 12/5/2018   • Lymphedema of both lower extremities    • MRSA nasal colonization    • Muscular dystrophy (CMS/[facility])     Since age 15 yo   • Obesity, morbid, BMI 50 or higher (CMS/[facility]) 12/5/2018   • VRE (vancomycin-resistant Enterococci)        CONSULTS   Dr. Patterson-general surgery  Dr. Kendrick-infectious disease    PROCEDURES PERFORMED  Bedside surgical debridement right foot    HOSPITAL COURSE  Patient is a 44 y.o. male presented to [facility] complaining of right foot and right lower extremity pain, swelling and redness.  Please see the admitting history and physical for further details.     Patient was started on intravenous antibiotics.  Patient was seen by infectious disease for antibiotic recommendations.  Patient was seen by general surgery who performed a bedside debridement of the right foot.  Patient received wound care and dressing changes to the right foot.  We are unable to obtain a CT or MRI due to patient's body mass index of 98.  Patient had an x-ray of the right foot that was suspicious for osteomyelitis, however, per general surgery there was no clinical evidence of a deeper infection.  The patient's right foot wound progressed nicely during his hospitalization and revealed a clean wound bed on the plantar surface of his right foot.  Initially considered treatment with intravenous vancomycin  "for 4 weeks, however, there was concern that the risks of long-term intravenous antibiotics outweighed the current clinical condition/date.  I discussed with Dr. Kendrick and reviewed surgical notes and it was felt that the patient could be discharged home on oral antibiotics at this time.    The patient was advised to return to the emergency department should he experience any redness of the foot and/or leg, a new nonhealing wound, fever, etc.  Patient reported that he has experienced cellulitis many times in the past and is aware of the signs and symptoms.  He stated that he would return if needed.  The patient was discharged home in a hemodynamically stable condition with home health for ongoing evaluation and treatment.    CONDITION ON DISCHARGE  Stable.      VITAL SIGNS  /61 (BP Location: Right arm, Patient Position: Lying)   Pulse 78   Temp 98.1 °F (36.7 °C) (Oral)   Resp 20   Ht 177.8 cm (70\")   Wt (!) 295 kg (650 lb)   SpO2 96%   BMI 93.27 kg/m²   Objective:  General Appearance:  Comfortable, well-appearing and in no acute distress.    Vital signs: (most recent): Blood pressure 146/61, pulse 78, temperature 98.1 °F (36.7 °C), temperature source Oral, resp. rate 20, height 177.8 cm (70\"), weight (!) 295 kg (650 lb), SpO2 96 %.  Vital signs are normal.    HEENT: Normal HEENT exam.    Lungs:  Normal effort.  Breath sounds clear to auscultation.  No rales, wheezes or rhonchi.    Heart: Normal rate.  S1 normal and S2 normal.  No murmur, gallop or friction rub.   Chest: Symmetric chest wall expansion.   Abdomen: Abdomen is soft and non-distended.  Bowel sounds are normal.   There is no abdominal tenderness.     Extremities: Normal range of motion.  There is no deformity or dependent edema.    Pulses: Distal pulses are intact.    Neurological: Patient is alert and oriented to person, place and time.  Normal strength.    Skin:  Warm and dry.  No rash. (Right foot is dressed in an ACE " wrap.)            DISCHARGE DISPOSITION   Home or Self Care    DISCHARGE MEDICATIONS     Discharge Medications      New Medications      Instructions Start Date   Baclofen 5 MG tablet   5 mg, Oral, Every 12 Hours PRN      doxycycline 100 MG capsule  Commonly known as:  MONODOX   100 mg, Oral, Every 12 Hours Scheduled      Lactobacillus tablet   1 tablet, Oral, Daily             DISCHARGE DIET  cardiac diet  Dietary Orders (From admission, onward)     Start     Ordered    08/24/20 0216  Diet Regular; Cardiac  Diet Effective Now     Question Answer Comment   Diet Texture / Consistency Regular    Common Modifiers Cardiac        08/24/20 0215                ACTIVITY AT DISCHARGE   activity as tolerated      Additional Instructions for the Follow-ups that You Need to Schedule     Discharge Follow-up with PCP   As directed       Currently Documented PCP:    No primary care provider on file.    PCP Phone Number:    None     Follow Up Details:  any local [facility] provider; 1-2 weeks; hospital follow up for right foot cellulitis             Patient to see KHUSHBU Champion    TEST  RESULTS PENDING AT DISCHARGE         Brina Peterson DO  09/09/20  11:18      Time: greater than 30 minutes.

## 2020-09-09 NOTE — DISCHARGE PLACEMENT REQUEST
"Jimmy Marley (44 y.o. Male)     Date of Birth Social Security Number Address Home Phone MRN    1975  [address] [facility]  [place] [address] [phone] [id number]    Orthodoxy Marital Status          None        Admission Date Admission Type Admitting Provider Attending Provider Department, Room/Bed    20 Emergency Jimi Turner MD Grace, Aimee Russell, DO [address], 3350/1S    Discharge Date Discharge Disposition Discharge Destination         Home or Self Care              Attending Provider:  Starr Haley DO    Allergies:  No Known Allergies    Isolation:  None   Infection:  None   Code Status:  CPR    Ht:  177.8 cm (70\")   Wt:  295 kg (650 lb)    Admission Cmt:  None   Principal Problem:  None                Active Insurance as of 2020     Primary Coverage     Payor Plan Insurance Group Employer/Plan Group    MEDICARE MEDICARE A & B      Payor Plan Address Payor Plan Phone Number Payor Plan Fax Number Effective Dates    [address] [phone]  2003 - None Entered    [place] [address]       Subscriber Name Subscriber Birth Date Member ID       JIMMY MARLEY 1975 [id number]                 Emergency Contacts      (Rel.) Home Phone Work Phone Mobile Phone    Alondra Marley (Father) [phone] -- --    Philly Marley (Spouse) -- -- [phone]        [address] [place] [address]  Phone:  [phone]  Fax:   Date: Sep 9, 2020      Referral to Home Health     Patient:  Jimmy Marley MRN:  [id number]   [address] [facility] [place] [address] :  1975  SSN:    Phone: [phone] Sex:  M      INSURANCE PAYOR PLAN GROUP # SUBSCRIBER ID   Primary:    MEDICARE [id number]   [id number]      Referring Provider Information:  STARR HALEY Phone: [phone] Fax:       Referral Information:   # Visits:  1 Referral Type: Home Health [42]   Urgency:  Routine Referral " Reason: Specialty Services Required   Start Date: Sep 9, 2020 End Date:  To be determined by Insurer   Diagnosis: Cellulitis of right lower extremity (L03.115 [ICD-10-CM] 682.6 [ICD-9-CM])      Refer to Dept:   Refer to Provider:   Refer to Facility:       Face to Face Visit Date: 2020  Follow-up provider for Plan of Care? I treated the patient in an acute care facility and will not continue treatment after discharge.  Follow-up provider: LIGIA MIGUEL [[id number]]  Reason/Clinical Findings: nursing care, wound care, pt  Describe mobility limitations that make leaving home difficult: bedbound, morbidly obese (650 pounds)  Nursing/Therapeutic Services Requested: Skilled Nursing  Nursing/Therapeutic Services Requested: Physical Therapy  Skilled nursing orders: Wound care dressing/changes  Instructions: Paint bottom of right foot with betadine, cover with adaptic, abd, kerlex and ace daily  PT orders: Gait Training  PT orders: Transfer training  Weight Bearing Status: As Tolerated  Frequency: 1 Week 1     This document serves as a request of services and does not constitute Insurance authorization or approval of services.  To determine eligibility, please contact the members Insurance carrier to verify and review coverage.     If you have medical questions regarding this request for services. Please contact [address] at [phone] during normal business hours.       Authorizing Provider:Brina Peterson DO  Authorizing Provider's NPI: [id number]  Order Entered By: Brina Peterson DO 2020 11:59 AM     Electronically signed by: Brina Peterson DO 2020 11:59 AM               History & Physical      Jimi Turner MD at 20 0208          Hospitalist History and Physical        Patient Identification  Name: Jimmy Recinos  Age/Sex: 44 y.o. male  :  1975        MRN: [id number]  Visit Number: [id number]  PCP: Ligia Miguel APRN          Chief  complaint right leg pain, swelling, redness    History of Present Illness:  Patient is a 44 y.o. male with charcot-eyad disease, muscular dystrophy, HTN, lymphedema of both lower extremities, and morbid obesity with BMI >50 who presents with complaints of worsening right lower extremity redness, swelling and pain extending from his foot up to mid thigh over the last 2 days. He reports associated fever and chills but denies nausea or vomiting. He does report poor appetite with many food intake but has been trying to drink fluids to stay hydrated. He denies any injury to the affected leg. He denies any ulceration or purulent drainage. He reports he now wears a trilogy bipap at night for DANIA (and presumed obesity hypoventilation syndrome).    Review of Systems  Review of Systems   Constitutional: Positive for activity change, appetite change, chills and fever. Negative for fatigue.   HENT: Negative for congestion, postnasal drip, rhinorrhea, sinus pressure, sinus pain and sore throat.    Eyes: Negative for photophobia, pain, discharge, redness and itching.   Respiratory: Positive for shortness of breath (on occasion since onset of pain and swelling in right leg). Negative for cough and wheezing.    Cardiovascular: Positive for leg swelling (right leg). Negative for chest pain and palpitations.   Gastrointestinal: Negative for abdominal distention, abdominal pain, constipation, diarrhea, nausea and vomiting.   Endocrine: Negative for cold intolerance, heat intolerance, polydipsia and polyphagia.   Genitourinary: Negative for difficulty urinating, dysuria, flank pain, frequency and hematuria.   Musculoskeletal: Negative for arthralgias, back pain, joint swelling, myalgias, neck pain and neck stiffness.   Skin: Positive for color change. Negative for pallor, rash and wound.   Neurological: Negative for dizziness, tremors, seizures, syncope, weakness, light-headedness, numbness and headaches.   Hematological: Negative for  adenopathy. Does not bruise/bleed easily.   Psychiatric/Behavioral: Negative for agitation, behavioral problems and confusion.       History  Past Medical History:   Diagnosis Date   • Bilateral cellulitis of lower leg 11/26/2018   • Charcot-Ramya disease    • Chronic low back pain    • Debility    • Elevated liver enzymes    • Essential hypertension 12/5/2018   • Lymphedema of both lower extremities    • MRSA nasal colonization    • Muscular dystrophy (CMS/HCC)     Since age 15 yo   • Obesity, morbid, BMI 50 or higher (CMS/HCC) 12/5/2018   • VRE (vancomycin-resistant Enterococci)      Past Surgical History:   Procedure Laterality Date   • HERNIA REPAIR  2004    x 2, ventral   • KNEE SURGERY Right    • TONSILLECTOMY     • WRIST SURGERY Left     ORIF     Family History   Problem Relation Age of Onset   • Heart disease Mother    • Diabetes Mother    • Cancer Mother    • Hypertension Father      Social History     Tobacco Use   • Smoking status: Never Smoker   • Smokeless tobacco: Never Used   Substance Use Topics   • Alcohol use: Yes     Alcohol/week: 1.0 standard drinks     Types: 1 Shots of liquor per week     Frequency: Never     Comment: on occasion once a month   • Drug use: No     Medications Prior to Admission   Medication Sig Dispense Refill Last Dose   • cefdinir (OMNICEF) 300 MG capsule Take 1 capsule by mouth 2 (Two) Times a Day. 6 capsule 0    • cetirizine (zyrTEC) 10 MG tablet Take 1 tablet by mouth Daily. 30 tablet 0    • fluticasone (FLONASE) 50 MCG/ACT nasal spray Use 2 sprays in each nostril daily for nasal allergies. 16 g 0    • furosemide (LASIX) 40 MG tablet Take 1 tablet by mouth Daily. 30 tablet 0    • dextromethorphan-guaifenesin (ROBITUSSIN-DM)  MG/5ML liquid Take 10 mL by mouth Every 4 (Four) Hours As Needed for Cough. 118 mL 0    • lactobacillus acidophilus (RISAQUAD) capsule capsule Take 1 capsule by mouth Daily. 3 each 0    • lisinopril (PRINIVIL,ZESTRIL) 10 MG tablet Take 1 tablet by  mouth Daily. 30 tablet 0    • metoprolol tartrate (LOPRESSOR) 100 MG tablet Take 1 tablet by mouth Every 12 (Twelve) Hours. 60 tablet 0    • metoprolol tartrate (LOPRESSOR) 100 MG tablet Take 1 tablet by mouth Every 12 (Twelve) Hours. 60 tablet 0    • multivitamin (DAILY SOPHIE) tablet tablet Take 1 tablet by mouth Daily. 30 tablet 0      Allergies:  Patient has no known allergies.    Objective     Vital Signs  Temp:  [99 °F (37.2 °C)] 99 °F (37.2 °C)  Heart Rate:  [93-97] 95  Resp:  [18] 18  BP: (128-130)/(62-73) 130/73  Body mass index is 93.27 kg/m².    Physical Exam:  Physical Exam   Constitutional: He is oriented to person, place, and time.   Morbidly obese middle aged gentleman, no acute distress but appears uncomfortable and acutely ill   HENT:   Head: Normocephalic and atraumatic.   Mouth/Throat: Oropharynx is clear and moist.   Eyes: Pupils are equal, round, and reactive to light. Conjunctivae and EOM are normal.   Neck: Normal range of motion. Neck supple. No JVD present.   Cardiovascular: Normal heart sounds and intact distal pulses.   No murmur heard.  Borderline tachycardic, regular rhythm   Pulmonary/Chest: Effort normal and breath sounds normal. No respiratory distress. He has no wheezes. He has no rales. He exhibits no tenderness.   Abdominal: Soft. Bowel sounds are normal. He exhibits no distension. There is no tenderness.   Musculoskeletal: He exhibits edema, tenderness (right lower extremity) and deformity.   Limited range of motion of either leg due to severity of lymphedema superimposed on muscular dystrophy   Lymphadenopathy:     He has no cervical adenopathy.   Neurological: He is alert and oriented to person, place, and time.   No gross focal deficits but again limited strength in both legs due to muscular dystrophy   Skin: Skin is warm and dry. Capillary refill takes 2 to 3 seconds.   Erythema involving right lower extremity from foot to mid thigh. No ulceration identified, no drainage.  Excoriations with minor skin break down noted in lower back.    Psychiatric: He has a normal mood and affect. His behavior is normal. Judgment and thought content normal.         Results Review:       Lab Results:  Results from last 7 days   Lab Units 08/23/20  2329   WBC 10*3/mm3 13.81*   HEMOGLOBIN g/dL 12.2*   PLATELETS 10*3/mm3 192     Results from last 7 days   Lab Units 08/23/20  2329   CRP mg/dL 24.77*     Results from last 7 days   Lab Units 08/23/20  2329   SODIUM mmol/L 136   POTASSIUM mmol/L 3.4*   CHLORIDE mmol/L 99   CO2 mmol/L 27.5   BUN mg/dL 13   CREATININE mg/dL 0.58*   CALCIUM mg/dL 8.6   GLUCOSE mg/dL 124*     Results from last 7 days   Lab Units 08/23/20  2329   MAGNESIUM mg/dL 1.7     No results found for: HGBA1C  Results from last 7 days   Lab Units 08/23/20  2329   BILIRUBIN mg/dL 0.8   ALK PHOS U/L 79   AST (SGOT) U/L 16   ALT (SGPT) U/L 16     Results from last 7 days   Lab Units 08/23/20  2329   CK TOTAL U/L 58                   I have reviewed the patient's laboratory results.        Imaging:  None performed        EKG: Borderline sinus tachycardia with 1st degree AV block, HR 93, QTc 442. No overt ST changes appreciated.     I have personally reviewed the patient's EKG.        Assessment/Plan     - Sepsis present on admission with tachycardia, leukocytosis and CRP elevation, secondary to cellulitis of right lower extremity: this is superimposed on severe bilateral lower extremity lymphedema. Treat with IV vancomycin and zosyn for now. Monitor WBC, CRP. Follow up on blood cultures collected in ED.   - Mildly abnormal UA: presence of squamous epithelial cells suggests possible contamination. Urine culture collected, will follow up on results. If truly has UTI, should be appropriately covered with IV vancomycin and zosyn initiated for cellulitis above.  - History DANIA and likely obesity hypoventilation syndrome now on nightly trilogy bipap: respiratory therapy consulted to set up nightly  bipap since patient did not bring trilogy machine. Monitor closely on pulse oximetry. If shows any sign of somnolence will obtain ABG.   - Mild hyperglycemia: likely reactive from above sepsis and cellulitis. Will check A1c.   - Hypertension: BP stable per last recording in chart. Monitor closely. Review home meds once reconciled by pharmacy.  - Muscular dystrophy: supportive care.  - Morbid obesity BMI>50    DVT Prophylaxis: SQ heparin    Estimated Length of Stay >2 midnights    I discussed the patient's findings, assessment and plan with the patient and his RN Marsha who was present during my interview and exam on [facility].    * patient is high risk due to sepsis secondary to right lower extremity cellulitis, morbid obesity, history DANIA and likely obesity hypoventilation syndrome    Jimi Turner MD  08/24/20  02:08      Electronically signed by Jimi Turner MD at 08/24/20 0231       Vital Signs (last day)     Date/Time   Temp   Temp src   Pulse   Resp   BP   Patient Position   SpO2    09/09/20 1102   98.1 (36.7)   Oral   78   20   146/61   Lying   96    09/09/20 0720   97.8 (36.6)   Oral   73   20   143/69   Lying   95    09/09/20 0442   --   --   77   20   --   --   95    09/09/20 0300   --   --   70   20   123/75   Lying   --    09/09/20 0005   --   --   65   23   --   --   97    09/08/20 2100   --   --   --   --   --   --   99    09/08/20 1900   98 (36.7)   Oral   60   18   124/69   Lying   100    09/08/20 1513   97.6 (36.4)   Oral   81   20   116/80   Lying   94    09/08/20 1011   98.7 (37.1)   Oral   89   20   141/68   Lying   97    09/08/20 0820   --   --   --   --   --   --   97    09/08/20 0748   97.8 (36.6)   Oral   71   20   147/77   Lying   97    09/08/20 0345   --   --   71   20   --   --   96    09/08/20 0300   98.4 (36.9)   Oral   68   20   123/69   Lying   --    09/08/20 0050   --   --   74   22   --   --   96              Intake & Output (last day)       09/08 0701 - 09/09 0700  09/09 0701 - 09/10 0700    P.O. 1200     I.V. (mL/kg) 0 (0)     IV Piggyback 500     Total Intake(mL/kg) 1700 (5.8)     Urine (mL/kg/hr) 2600 (0.4)     Stool 0     Total Output 2600     Net -900           Stool Unmeasured Occurrence 1 x           Current Facility-Administered Medications   Medication Dose Route Frequency Provider Last Rate Last Dose   • baclofen (LIORESAL) tablet 5 mg  5 mg Oral Q12H PRN Nader Noel DO       • castor oil-balsam peru (VENELEX) ointment   Topical Q12H Nader Noel DO       • castor oil-balsam peru (VENELEX) ointment   Topical PRN Nader Noel DO       • doxycycline (MONODOX) capsule 100 mg  100 mg Oral Q12H Brina Peterson DO   100 mg at 09/09/20 0907   • heparin (porcine) 5000 UNIT/ML injection 5,000 Units  5,000 Units Subcutaneous Q12H Jimi Turner MD   5,000 Units at 09/09/20 0907   • ibuprofen (ADVIL,MOTRIN) tablet 600 mg  600 mg Oral Q6H PRN Nader Noel DO   600 mg at 08/31/20 1141   • magnesium sulfate 4 gram infusion - Mg less than or equal to 1mg/dL  4 g Intravenous PRN Jimi Turner MD        Or   • magnesium sulfate 3 gram infusion (1gm x 3) - Mg 1.1 - 1.5 mg/dL  1 g Intravenous PRN Jimi Turner MD        Or   • Magnesium Sulfate 2 gram infusion- Mg 1.6 - 1.9 mg/dL  2 g Intravenous PRN Jimi Turner MD       • potassium chloride (K-DUR,KLOR-CON) CR tablet 40 mEq  40 mEq Oral PRN Jimi Turner MD        Or   • potassium chloride (KLOR-CON) packet 40 mEq  40 mEq Oral PRN Jimi Turner MD       • sodium chloride 0.9 % flush 10 mL  10 mL Intravenous PRN Jimi Turner MD       • sodium chloride 0.9 % flush 10 mL  10 mL Intravenous Q12H Jimi Turner MD   10 mL at 09/08/20 0901   • sodium chloride 0.9 % flush 10 mL  10 mL Intravenous PRN Jimi Turner MD         Lab Results (most recent)     Procedure Component Value Units Date/Time    Renal  Function Panel [[id number]]  (Abnormal) Collected:  09/08/20 0345    Specimen:  Blood Updated:  09/08/20 0442     Glucose 122 mg/dL      BUN 15 mg/dL      Creatinine 0.51 mg/dL      Sodium 138 mmol/L      Potassium 4.3 mmol/L      Comment: Slight hemolysis detected by analyzer. Results may be affected.        Chloride 102 mmol/L      CO2 27.2 mmol/L      Calcium 8.7 mg/dL      Albumin 2.80 g/dL      Phosphorus 3.8 mg/dL      Anion Gap 8.8 mmol/L      BUN/Creatinine Ratio 29.4     eGFR Non African Amer >150 mL/min/1.73     Narrative:       GFR Normal >60  Chronic Kidney Disease <60  Kidney Failure <15      Magnesium [[id number]]  (Normal) Collected:  09/08/20 0345    Specimen:  Blood Updated:  09/08/20 0442     Magnesium 2.0 mg/dL     C-reactive Protein [[id number]]  (Abnormal) Collected:  09/08/20 0345    Specimen:  Blood Updated:  09/08/20 0442     C-Reactive Protein 0.84 mg/dL     CBC & Differential [[id number]] Collected:  09/08/20 0345    Specimen:  Blood Updated:  09/08/20 0441    Narrative:       The following orders were created for panel order CBC & Differential.  Procedure                               Abnormality         Status                     ---------                               -----------         ------                     CBC Auto Differential[[id number]]        Abnormal            Final result                 Please view results for these tests on the individual orders.    CBC Auto Differential [[id number]]  (Abnormal) Collected:  09/08/20 0345    Specimen:  Blood Updated:  09/08/20 0441     WBC 6.10 10*3/mm3      RBC 4.62 10*6/mm3      Hemoglobin 12.3 g/dL      Hematocrit 42.7 %      MCV 92.4 fL      MCH 26.6 pg      MCHC 28.8 g/dL      RDW 15.1 %      RDW-SD 51.4 fl      MPV 11.1 fL      Platelets 221 10*3/mm3      Neutrophil % 72.1 %      Lymphocyte % 16.1 %      Monocyte % 6.7 %      Eosinophil % 4.4 %      Basophil % 0.5 %      Immature Grans % 0.2 %      Neutrophils, Absolute 4.40 10*3/mm3       Lymphocytes, Absolute 0.98 10*3/mm3      Monocytes, Absolute 0.41 10*3/mm3      Eosinophils, Absolute 0.27 10*3/mm3      Basophils, Absolute 0.03 10*3/mm3      Immature Grans, Absolute 0.01 10*3/mm3      nRBC 0.0 /100 WBC     Scan Slide [[id number]] Collected:  09/08/20 0345    Specimen:  Blood Updated:  09/08/20 0441     Anisocytosis Slight/1+     Hypochromia Mod/2+     Platelet Morphology Normal    Renal Function Panel [[id number]]  (Abnormal) Collected:  09/07/20 0511    Specimen:  Blood Updated:  09/07/20 0547     Glucose 108 mg/dL      BUN 13 mg/dL      Creatinine 0.49 mg/dL      Sodium 137 mmol/L      Potassium 4.1 mmol/L      Chloride 102 mmol/L      CO2 27.2 mmol/L      Calcium 8.7 mg/dL      Albumin 3.00 g/dL      Phosphorus 3.9 mg/dL      Anion Gap 7.8 mmol/L      BUN/Creatinine Ratio 26.5     eGFR Non African Amer >150 mL/min/1.73     Narrative:       GFR Normal >60  Chronic Kidney Disease <60  Kidney Failure <15      Magnesium [[id number]]  (Normal) Collected:  09/07/20 0511    Specimen:  Blood Updated:  09/07/20 0547     Magnesium 2.0 mg/dL     C-reactive Protein [[id number]]  (Abnormal) Collected:  09/07/20 0511    Specimen:  Blood Updated:  09/07/20 0547     C-Reactive Protein 0.86 mg/dL     CBC & Differential [[id number]] Collected:  09/07/20 0511    Specimen:  Blood Updated:  09/07/20 0531    Narrative:       The following orders were created for panel order CBC & Differential.  Procedure                               Abnormality         Status                     ---------                               -----------         ------                     CBC Auto Differential[[id number]]        Abnormal            Final result                 Please view results for these tests on the individual orders.    CBC Auto Differential [[id number]]  (Abnormal) Collected:  09/07/20 0511    Specimen:  Blood Updated:  09/07/20 0531     WBC 5.19 10*3/mm3      RBC 4.63 10*6/mm3      Hemoglobin 12.5 g/dL      Hematocrit 42.7  %      MCV 92.2 fL      MCH 27.0 pg      MCHC 29.3 g/dL      RDW 15.0 %      RDW-SD 51.0 fl      MPV 9.4 fL      Platelets 234 10*3/mm3      Neutrophil % 70.3 %      Lymphocyte % 15.8 %      Monocyte % 8.3 %      Eosinophil % 4.8 %      Basophil % 0.4 %      Immature Grans % 0.4 %      Neutrophils, Absolute 3.65 10*3/mm3      Lymphocytes, Absolute 0.82 10*3/mm3      Monocytes, Absolute 0.43 10*3/mm3      Eosinophils, Absolute 0.25 10*3/mm3      Basophils, Absolute 0.02 10*3/mm3      Immature Grans, Absolute 0.02 10*3/mm3      nRBC 0.0 /100 WBC     Wound Culture - Wound, Foot, Right [[id number]] Collected:  09/02/20 1304    Specimen:  Wound from Foot, Right Updated:  09/05/20 1036     Wound Culture No growth at 3 days     Gram Stain Few (2+) WBCs seen      No organisms seen    Vancomycin, Trough [[id number]]  (Normal) Collected:  09/04/20 1340    Specimen:  Blood Updated:  09/04/20 1415     Vancomycin Trough 12.50 mcg/mL     Vancomycin, Trough [[id number]]  (Abnormal) Collected:  09/02/20 1506    Specimen:  Blood Updated:  09/02/20 1608     Vancomycin Trough 25.40 mcg/mL     Potassium [[id number]]  (Normal) Collected:  09/01/20 2256    Specimen:  Blood Updated:  09/01/20 2349     Potassium 4.0 mmol/L     Blood Culture - Blood, Arm, Right [[id number]] Collected:  08/25/20 1702    Specimen:  Blood from Arm, Right Updated:  08/30/20 1715     Blood Culture No growth at 5 days    Blood Culture - Blood, Arm, Right [[id number]] Collected:  08/25/20 1703    Specimen:  Blood from Arm, Right Updated:  08/30/20 1715     Blood Culture No growth at 5 days    Vancomycin, Random [[id number]]  (Normal) Collected:  08/28/20 0250    Specimen:  Blood Updated:  08/28/20 0345     Vancomycin Random 13.00 mcg/mL     Vancomycin, Random [[id number]]  (Normal) Collected:  08/27/20 2055    Specimen:  Blood Updated:  08/27/20 2208     Vancomycin Random 21.90 mcg/mL     Basic Metabolic Panel [[id number]]  (Abnormal) Collected:  08/26/20 0423    Specimen:   Blood Updated:  08/26/20 0501     Glucose 99 mg/dL      BUN 9 mg/dL      Creatinine 0.42 mg/dL      Sodium 133 mmol/L      Potassium 3.8 mmol/L      Comment: Specimen hemolyzed.  Results may be affected.        Chloride 100 mmol/L      CO2 22.5 mmol/L      Calcium 7.8 mg/dL      eGFR Non African Amer >150 mL/min/1.73      BUN/Creatinine Ratio 21.4     Anion Gap 10.5 mmol/L     Narrative:       GFR Normal >60  Chronic Kidney Disease <60  Kidney Failure <15      CBC (No Diff) [[id number]]  (Abnormal) Collected:  08/26/20 0423    Specimen:  Blood Updated:  08/26/20 0442     WBC 9.28 10*3/mm3      RBC 4.12 10*6/mm3      Hemoglobin 11.1 g/dL      Hematocrit 37.8 %      MCV 91.7 fL      MCH 26.9 pg      MCHC 29.4 g/dL      RDW 14.7 %      RDW-SD 50.0 fl      MPV 9.9 fL      Platelets 180 10*3/mm3     PSA DIAGNOSTIC [[id number]]  (Normal) Collected:  08/25/20 1710    Specimen:  Blood Updated:  08/26/20 0306     PSA 0.135 ng/mL     Narrative:       Results may be falsely decreased if patient taking Biotin.      Urinalysis, Microscopic Only - Urine, Clean Catch [[id number]]  (Abnormal) Collected:  08/25/20 1044    Specimen:  Urine, Clean Catch Updated:  08/25/20 1113     RBC, UA None Seen /HPF      WBC, UA 0-2 /HPF      Bacteria, UA 2+ /HPF      Squamous Epithelial Cells, UA 0-2 /HPF      Hyaline Casts, UA None Seen /LPF      Methodology Manual Light Microscopy    Urinalysis With Culture If Indicated - Urine, Clean Catch [[id number]]  (Abnormal) Collected:  08/25/20 1044    Specimen:  Urine, Clean Catch Updated:  08/25/20 1057     Color, UA Dark Yellow     Appearance, UA Clear     pH, UA 5.5     Specific Gravity, UA >=1.030     Glucose, UA Negative     Ketones, UA Negative     Bilirubin, UA Small (1+)     Blood, UA Negative     Protein, UA 30 mg/dL (1+)     Leuk Esterase, UA Negative     Nitrite, UA Negative     Urobilinogen, UA 2.0 E.U./dL    Urine Culture - Urine, Urine, Clean Catch [[id number]] Collected:  08/23/20 1708     Specimen:  Urine, Clean Catch Updated:  08/25/20 0956     Urine Culture >100,000 CFU/mL Mixed Rich Isolated    Narrative:       Specimen contains mixed organisms of questionable pathogenicity which indicates contamination with commensal rich.  Further identification is unlikely to provide clinically useful information.  Suggest recollection.    Blood Culture ID, PCR - Blood, Arm, Left [[id number]]  (Abnormal) Collected:  08/23/20 2329    Specimen:  Blood from Arm, Left Updated:  08/24/20 2023     BCID, PCR Staphylococcus spp, not aureus. Identification by BCID PCR.    Potassium [[id number]]  (Normal) Collected:  08/24/20 1520    Specimen:  Blood Updated:  08/24/20 1612     Potassium 4.3 mmol/L      Comment: Specimen hemolyzed.  Results may be affected.       Comprehensive Metabolic Panel [[id number]]  (Abnormal) Collected:  08/24/20 0731    Specimen:  Blood Updated:  08/24/20 0802     Glucose 139 mg/dL      BUN 10 mg/dL      Creatinine 0.50 mg/dL      Sodium 134 mmol/L      Potassium 3.8 mmol/L      Chloride 100 mmol/L      CO2 25.6 mmol/L      Calcium 8.5 mg/dL      Total Protein 7.3 g/dL      Albumin 2.67 g/dL      ALT (SGPT) 15 U/L      AST (SGOT) 18 U/L      Alkaline Phosphatase 80 U/L      Total Bilirubin 1.0 mg/dL      eGFR Non African Amer >150 mL/min/1.73      Globulin 4.6 gm/dL      A/G Ratio 0.6 g/dL      BUN/Creatinine Ratio 20.0     Anion Gap 8.4 mmol/L     Narrative:       GFR Normal >60  Chronic Kidney Disease <60  Kidney Failure <15      Hemoglobin A1c [[id number]]  (Normal) Collected:  08/23/20 2329    Specimen:  Blood Updated:  08/24/20 0429     Hemoglobin A1C 5.00 %     Narrative:       Hemoglobin A1C Ranges:    Increased Risk for Diabetes  5.7% to 6.4%  Diabetes                     >= 6.5%  Diabetic Goal                < 7.0%    Wilson Draw [[id number]] Collected:  08/23/20 2329    Specimen:  Blood Updated:  08/24/20 0031    Narrative:       The following orders were created for panel order Wilson  Draw.  Procedure                               Abnormality         Status                     ---------                               -----------         ------                     Light Blue Top[[id number]]                                   Final result               Green Top (Gel)[[id number]]                                  Final result               Lavender Top[[id number]]                                     Final result               Gold Top - SST[[id number]]                                   Final result                 Please view results for these tests on the individual orders.    Light Blue Top [[id number]] Collected:  08/23/20 2329    Specimen:  Blood Updated:  08/24/20 0031     Extra Tube hold for add-on     Comment: Auto resulted       Green Top (Gel) [[id number]] Collected:  08/23/20 2329    Specimen:  Blood Updated:  08/24/20 0031     Extra Tube Hold for add-ons.     Comment: Auto resulted.       Lavender Top [[id number]] Collected:  08/23/20 2329    Specimen:  Blood Updated:  08/24/20 0031     Extra Tube hold for add-on     Comment: Auto resulted       Gold Top - SST [[id number]] Collected:  08/23/20 2329    Specimen:  Blood Updated:  08/24/20 0031     Extra Tube Hold for add-ons.     Comment: Auto resulted.       Urinalysis With Microscopic If Indicated (No Culture) - Urine, Clean Catch [[id number]]  (Abnormal) Collected:  08/23/20 2354    Specimen:  Urine, Clean Catch Updated:  08/24/20 0020     Color, UA Dark Yellow     Appearance, UA Cloudy     pH, UA 5.5     Specific Gravity, UA >=1.030     Glucose, UA Negative     Ketones, UA Trace     Bilirubin, UA Small (1+)     Blood, UA Negative     Protein,  mg/dL (2+)     Leuk Esterase, UA Small (1+)     Nitrite, UA Negative     Urobilinogen, UA 2.0 E.U./dL    Urinalysis, Microscopic Only - Urine, Clean Catch [[id number]]  (Abnormal) Collected:  08/23/20 2354    Specimen:  Urine, Clean Catch Updated:  08/24/20 0020     RBC, UA 0-2 /HPF      WBC, UA 13-20  /HPF      Bacteria, UA 1+ /HPF      Squamous Epithelial Cells, UA 3-6 /HPF      Hyaline Casts, UA 3-6 /LPF      Granular Casts, UA 0-2 /LPF      Methodology Manual Light Microscopy    Comprehensive Metabolic Panel [[id number]]  (Abnormal) Collected:  08/23/20 2329    Specimen:  Blood Updated:  08/24/20 0007     Glucose 124 mg/dL      BUN 13 mg/dL      Creatinine 0.58 mg/dL      Sodium 136 mmol/L      Potassium 3.4 mmol/L      Chloride 99 mmol/L      CO2 27.5 mmol/L      Calcium 8.6 mg/dL      Total Protein 7.7 g/dL      Albumin 3.00 g/dL      ALT (SGPT) 16 U/L      AST (SGOT) 16 U/L      Alkaline Phosphatase 79 U/L      Total Bilirubin 0.8 mg/dL      eGFR Non African Amer >150 mL/min/1.73      Globulin 4.7 gm/dL      A/G Ratio 0.6 g/dL      BUN/Creatinine Ratio 22.4     Anion Gap 9.5 mmol/L     Narrative:       GFR Normal >60  Chronic Kidney Disease <60  Kidney Failure <15      CK [[id number]]  (Normal) Collected:  08/23/20 2329    Specimen:  Blood Updated:  08/24/20 0007     Creatine Kinase 58 U/L     Lactic Acid, Plasma [[id number]]  (Normal) Collected:  08/23/20 2329    Specimen:  Blood Updated:  08/24/20 0005     Lactate 1.0 mmol/L           Imaging Results (Most Recent)     Procedure Component Value Units Date/Time    XR Foot 2 View Right [[id number]] Collected:  09/01/20 2040     Updated:  09/01/20 2043    Narrative:       EXAMINATION: XR FOOT 2 VW RIGHT-      CLINICAL INDICATION:possible gangrene; L03.115-Cellulitis of right lower  limb     COMPARISON: None      TECHNIQUE: 2 views right foot     FINDINGS:   Osteolysis noted of the midfoot bony structures which radiographically  are most consistent with osteomyelitis with destructive changes. There  is diffuse soft tissue edema noted but no soft tissue emphysema  identified on radiograph. No acute fracture identified. No dislocation.       Impression:       Diffuse soft tissue edema with osteolysis of the midfoot bony structures  suspicious for osteomyelitis. No  acute fracture.     This report was finalized on 2020 8:41 PM by Dr. David Dixon MD.       XR Ankle 3+ View Right [[id number]] Collected:  20     Updated:  20    Narrative:       EXAMINATION: XR ANKLE 3+ VW RIGHT-      CLINICAL INDICATION: pain, fall, cellulitis, r/o osteo;  L03.115-Cellulitis of right lower limb        COMPARISON: None available     FINDINGS: 3 views of the right ankle show soft tissue swelling.     There is demineralization.     I cannot exclude osteomyelitis     No fracture is seen on this exam      This report was finalized on 2020 7:36 PM by Dr. Broderick Grace MD.           Operative/Procedure Notes (most recent note)    No notes of this type exist for this encounter.            Physician Progress Notes (most recent note)      Yolanda Gross APRN at 20 1052                     PROGRESS NOTE         Patient Identification:  Name:  Jimmy Recinos  Age:  44 y.o.  Sex:  male  :  1975  MRN:  [id number]  Visit Number:  [id number]  Primary Care Provider:  No primary care provider on file.         LOS: 16 days       ----------------------------------------------------------------------------------------------------------------------  Subjective       Chief Complaints:    No chief complaint on file.        Interval History:      Patient doing well this morning.  Hopeful to be discharged today.  Afebrile, no diarrhea.     Review of Systems:    Constitutional: no fever, chills and night sweats. No appetite change or unexpected weight change. No fatigue.  Eyes: no eye drainage, itching or redness.  HEENT: no mouth sores, dysphagia or nose bleed.  Respiratory: no for shortness of breath, cough or production of sputum.  Cardiovascular: no chest pain, no palpitations, no orthopnea.  Gastrointestinal: no nausea, vomiting or diarrhea. No abdominal pain, hematemesis or rectal bleeding.  Genitourinary: no dysuria or polyuria.  Hematologic/lymphatic: no lymph node  abnormalities, no easy bruising or easy bleeding.  Musculoskeletal:  No muscle or joint pain.  Skin: No rash and no itching.  Neurological: no loss of consciousness, no seizure, no headache.  Behavioral/Psych: no depression or suicidal ideation.  Endocrine: no hot flashes.  Immunologic: negative.     ----------------------------------------------------------------------------------------------------------------------      Objective       Current Hospital Meds:    castor oil-balsam peru  Topical Q12H   doxycycline 100 mg Oral Q12H   heparin (porcine) 5,000 Units Subcutaneous Q12H   sodium chloride 10 mL Intravenous Q12H        ----------------------------------------------------------------------------------------------------------------------    Vital Signs:  Temp:  [97.6 °F (36.4 °C)-98 °F (36.7 °C)] 97.8 °F (36.6 °C)  Heart Rate:  [60-81] 73  Resp:  [18-23] 20  BP: (116-143)/(69-80) 143/69  Mean Arterial Pressure (Non-Invasive) for the past 24 hrs (Last 3 readings):   Noninvasive MAP (mmHg)   09/08/20 1513 97     SpO2 Percentage    09/09/20 0005 09/09/20 0442 09/09/20 0720   SpO2: 97% 95% 95%     SpO2:  [94 %-100 %] 95 %  on   ;   Device (Oxygen Therapy): room air    Body mass index is 93.27 kg/m².  Wt Readings from Last 3 Encounters:   08/23/20 (!) 295 kg (650 lb)   01/02/20 (!) 273 kg (601 lb)   12/21/19 (!) 273 kg (601 lb 3.2 oz)        Intake/Output Summary (Last 24 hours) at 9/9/2020 1052  Last data filed at 9/9/2020 0700  Gross per 24 hour   Intake 1220 ml   Output 1675 ml   Net -455 ml     Diet Regular; Cardiac  ----------------------------------------------------------------------------------------------------------------------      Physical Exam:    Constitutional:  Well-developed and well-nourished.  No respiratory distress.  Morbidly obese.  HENT:  Head: Normocephalic and atraumatic.  Mouth:  Moist mucous membranes.    Eyes:  Conjunctivae and EOM are normal.  No scleral icterus.  Neck:  Neck supple.  No  JVD present.    Cardiovascular:  Normal rate, regular rhythm and normal heart sounds with no murmur. No edema.  Pulmonary/Chest:  No respiratory distress, no wheezes, no crackles, with normal breath sounds and good air movement.  Abdominal:  Soft.  Bowel sounds are normal.  No distension and no tenderness.   Musculoskeletal: bilateral lower extremity lymphedema.    Right foot and ankle edema and erythema, improving.  Neurological:  Alert and oriented to person, place, and time.  No facial droop.  No slurred speech.   Skin:  Skin is warm and dry.  No rash noted.  No pallor. bilateral lower extremity lymphedema.  Right foot and ankle edema and erythema, improving.  Psychiatric:  Normal mood and affect.  Behavior is normal.    ----------------------------------------------------------------------------------------------------------------------            Results from last 7 days   Lab Units 09/08/20  0345 09/07/20  0511 09/06/20  0140   CRP mg/dL 0.84* 0.86* 0.92*   WBC 10*3/mm3 6.10 5.19 5.79   HEMOGLOBIN g/dL 12.3* 12.5* 12.2*   HEMATOCRIT % 42.7 42.7 40.2   MCV fL 92.4 92.2 89.1   MCHC g/dL 28.8* 29.3* 30.3*   PLATELETS 10*3/mm3 221 234 246     Results from last 7 days   Lab Units 09/08/20  0345 09/07/20  0511 09/06/20  0140   SODIUM mmol/L 138 137 135*   POTASSIUM mmol/L 4.3 4.1 3.9   MAGNESIUM mg/dL 2.0 2.0 2.1   CHLORIDE mmol/L 102 102 100   CO2 mmol/L 27.2 27.2 25.6   BUN mg/dL 15 13 13   CREATININE mg/dL 0.51* 0.49* 0.48*   EGFR IF NONAFRICN AM mL/min/1.73 >150 >150 >150   CALCIUM mg/dL 8.7 8.7 8.7   GLUCOSE mg/dL 122* 108* 135*   ALBUMIN g/dL 2.80* 3.00* 2.70*   Estimated Creatinine Clearance: 423.5 mL/min (A) (by C-G formula based on SCr of 0.51 mg/dL (L)).  No results found for: AMMONIA    No results found for: HGBA1C, POCGLU  Lab Results   Component Value Date    HGBA1C 5.00 08/23/2020     Lab Results   Component Value Date    TSH 0.793 12/21/2019       Blood Culture   Date Value Ref Range Status    08/23/2020 Staphylococcus, coagulase negative (C)  Preliminary   08/23/2020 Staphylococcus, coagulase negative (C)  Preliminary     Urine Culture   Date Value Ref Range Status   08/23/2020 >100,000 CFU/mL Mixed Robyn Isolated  Final     No results found for: WOUNDCX  No results found for: STOOLCX  No results found for: RESPCX  Pain Management Panel     Pain Management Panel Latest Ref Rng & Units 10/19/2014    AMPHETAMINES SCREEN, URINE NEGATIVE Negative    BARBITURATES SCREEN NEGATIVE Negative    BENZODIAZEPINE SCREEN, URINE NEGATIVE Negative    COCAINE SCREEN, URINE NEGATIVE Negative    METHADONE SCREEN, URINE NEGATIVE Negative            ----------------------------------------------------------------------------------------------------------------------  Imaging Results (Last 24 Hours)     ** No results found for the last 24 hours. **          ----------------------------------------------------------------------------------------------------------------------    Assessment/Plan       Assessment/Plan     ASSESSMENT:    1.  Right lower extremity cellulitis  2.  Bacteremia  3.  UTI    PLAN:    Patient doing well this morning.  Hopeful to be discharged today.  Afebrile, no diarrhea.     Wound culture finalized as no growth.    PSA normal.  Repeat urinalysis from 8/25/2020 unremarkable.    Blood cultures from 8/23/2020 2 out of 2 sets positive for staph species not aureus.  Blood cultures from 8/25/2020 finalized no growth.     If surgery feels there is no concerning evidence for osteomyelitis,  As we are unable to confirm osteomyelitis with MRI or bone scan, antibiotic therapy to continue through 9/10/2020.     Patient with de-escalated to doxycycline 100 g p.o. twice daily to continue through 9/10/2020.  Patient stable from ID standpoint.  Okay to discharge from ID standpoint.      Code Status:   Code Status and Medical Interventions:   Ordered at: 08/24/20 0102     Level Of Support Discussed With:    Patient      Code Status:    CPR     Medical Interventions (Level of Support Prior to Arrest):    Full       KHUSHBU Silva  09/09/20  10:52    Electronically signed by Yolanda Gross APRN at 09/09/20 1054          Consult Notes (most recent note)      Gillian Patterson MD at 09/02/20 1211      Consult Orders    1. Inpatient General Surgery Consult [[id number]] ordered by Nader Noel DO at 09/01/20 8984                    Consulting physician:  Dr. Patterson    Referring physician: Dr. Noel    Date of consultation: 09/02/20     Chief complaint blisters on plantar aspect of right foot    Subjective     Patient is a 44 y.o. morbidly obese male male who was admitted on 8/24 with swelling and edema of the right foot.  Surgical consult was requested for evaluation of the blistering on the bottom of the right foot.  Patient states he has not been able to walk on this foot.  He states the blisters just came up.  He did state that he had some on the anterior surface of his left foot at what point in time and they dried up on their own with no significant sequelae.      Review of Systems  Review of Systems - General ROS: negative for - weight loss  Psychological ROS: negative for - behavioral disorder  Ophthalmic ROS: negative for - dry eyes  ENT ROS: negative for - vertigo or vocal changes  Hematological and Lymphatic ROS: negative for - swollen lymph nodes, DVT, PE.   Respiratory ROS: negative for - sputum changes or stridor.  Cardiovascular ROS: negative for - irregular heartbeat or murmur  Gastrointestinal ROS: negative for - blood in stools or change in stools  Genito-Urinary ROS: negative for - hematuria or incontinence  Musculoskeletal ROS: Positive for - gait disturbance      History  Past Medical History:   Diagnosis Date   • Bilateral cellulitis of lower leg 11/26/2018   • Charcot-Ramya disease    • Chronic low back pain    • Debility    • Elevated liver enzymes    • Essential hypertension 12/5/2018   •  Lymphedema of both lower extremities    • MRSA nasal colonization    • Muscular dystrophy (CMS/HCC)     Since age 15 yo   • Obesity, morbid, BMI 50 or higher (CMS/HCC) 12/5/2018   • VRE (vancomycin-resistant Enterococci)      Past Surgical History:   Procedure Laterality Date   • HERNIA REPAIR  2004    x 2, ventral   • KNEE SURGERY Right    • TONSILLECTOMY     • WRIST SURGERY Left     ORIF     Family History   Problem Relation Age of Onset   • Heart disease Mother    • Diabetes Mother    • Cancer Mother    • Hypertension Father      Social History     Tobacco Use   • Smoking status: Never Smoker   • Smokeless tobacco: Never Used   Substance Use Topics   • Alcohol use: Yes     Alcohol/week: 1.0 standard drinks     Types: 1 Shots of liquor per week     Frequency: Never     Comment: on occasion once a month   • Drug use: No     No medications prior to admission.     Allergies:  Patient has no known allergies.    Objective     Vital Signs  Temp:  [97.8 °F (36.6 °C)-98.5 °F (36.9 °C)] 98.1 °F (36.7 °C)  Heart Rate:  [81-92] 83  Resp:  [18-24] 22  BP: (110-148)/(68-85) 138/74    Physical Exam:  On examination this is a morbidly obese male in no acute distress  HEENT examination: Within normal limits.  Conjunctiva pink.  Nose and ears appear normal.  Neck: Supple, full range of motion.  No JVD.  Musculoskeletal: Unable to assess gait.  Patient moves all extremities  Psych: Patient is alert, oriented x3. Mood and affect are appropriate.  Skin: Patient has chronic venous insufficiency changes of the lower extremities.  On the plantar surface of the right foot there is a large blister which essentially encompasses the entire foot.  This was unroofed and debrided.  Fluid was sent for culture.  Underlying tissue appears viable.  There is one area which is more tender overlying the metatarsal head which may extend deeper into the soft tissue.  No definite clinical signs of osteomyelitis  Results Review:       Results from last 7  days   Lab Units 09/02/20  1142 09/01/20  0745 09/01/20  0652 08/31/20  0719 08/30/20  0543  08/28/20  0250   CRP mg/dL  --  2.37*  --  3.76* 5.78*   < > 9.84*   WBC 10*3/mm3 8.14  --  9.00  --   --   --  6.93   HEMOGLOBIN g/dL 14.2  --  14.0  --   --   --  11.2*   HEMATOCRIT % 46.7  --  46.2  --   --   --  37.5   PLATELETS 10*3/mm3 363  --  379  --   --   --  251    < > = values in this interval not displayed.         Results from last 7 days   Lab Units 09/01/20 2256 09/01/20 0745 08/31/20 0719 08/30/20  0543   SODIUM mmol/L  --  132* 134* 136   POTASSIUM mmol/L 4.0 3.6 3.9 3.7   MAGNESIUM mg/dL  --  2.1 2.1 2.1   CHLORIDE mmol/L  --  88* 88* 92*   CO2 mmol/L  --  32.6* 34.2* 35.2*   BUN mg/dL  --  21* 15 10   CREATININE mg/dL  --  0.58* 0.54* 0.53*   EGFR IF NONAFRICN AM mL/min/1.73  --  >150 >150 >150   CALCIUM mg/dL  --  9.6 9.5 9.2   GLUCOSE mg/dL  --  118* 114* 122*   ALBUMIN g/dL  --  3.14* 2.96* 2.90*   Estimated Creatinine Clearance: 372.4 mL/min (A) (by C-G formula based on SCr of 0.58 mg/dL (L)).  No results found for: AMMONIA      No results found for: BLOODCX  No results found for: URINECX  No results found for: WOUNDCX  No results found for: STOOLCX    Imaging:  Imaging Results (Last 24 Hours)     Procedure Component Value Units Date/Time    XR Foot 2 View Right [[id number]] Collected:  09/01/20 2040     Updated:  09/01/20 2043    Narrative:       EXAMINATION: XR FOOT 2 VW RIGHT-      CLINICAL INDICATION:possible gangrene; L03.115-Cellulitis of right lower  limb     COMPARISON: None      TECHNIQUE: 2 views right foot     FINDINGS:   Osteolysis noted of the midfoot bony structures which radiographically  are most consistent with osteomyelitis with destructive changes. There  is diffuse soft tissue edema noted but no soft tissue emphysema  identified on radiograph. No acute fracture identified. No dislocation.       Impression:       Diffuse soft tissue edema with osteolysis of the midfoot bony  structures  suspicious for osteomyelitis. No acute fracture.     This report was finalized on 2020 8:41 PM by Dr. David Dixon MD.               Impression:  Patient Active Problem List   Diagnosis Code   • Essential hypertension I10   • Obesity, morbid, BMI 50 or higher (CMS/HCC) E66.01   • Severe sepsis (CMS/HCC) A41.9, R65.20   • Acute respiratory failure with hypoxia (CMS/[facility]) J96.01   • Hyponatremia E87.1   • Influenza A J10.1   • Cellulitis of right lower extremity L03.115       Plan:  Await culture result.  Continue to monitor wound    Discussion:    Gillian Patterson MD  20  12:13    Time: Time spent:    Please note that portions of this note were completed with a voice recognition program.    Electronically signed by Gillian Patterson MD at 20 5721       Nutrition Notes (most recent note)    No notes exist for this encounter.            Physical Therapy Notes (most recent note)      Bianka Hoyos, PT at 20 2543  Version 1 of 1         Acute Care - Physical Therapy Initial Evaluation  [facility]     Patient Name: Jimmy Recinos  : 1975  MRN: [id number]  Today's Date: 2020   Onset of Illness/Injury or Date of Surgery: 20  Date of Referral to PT: 20  Referring Physician: Dr. Marquez      Admit Date: 2020    Visit Dx:     ICD-10-CM ICD-9-CM   1. Cellulitis of right lower extremity L03.115 682.6     Patient Active Problem List   Diagnosis   • Essential hypertension   • Obesity, morbid, BMI 50 or higher (CMS/[facility])   • Severe sepsis (CMS/[facility])   • Acute respiratory failure with hypoxia (CMS/[facility])   • Hyponatremia   • Influenza A   • Cellulitis of right lower extremity     Past Medical History:   Diagnosis Date   • Bilateral cellulitis of lower leg 2018   • Charcot-Ramya disease    • Chronic low back pain    • Debility    • Elevated liver enzymes    • Essential hypertension 2018   • Lymphedema of both lower extremities    • MRSA nasal colonization    •  Muscular dystrophy (CMS/[facility])     Since age 15 yo   • Obesity, morbid, BMI 50 or higher (CMS/[facility]) 12/5/2018   • VRE (vancomycin-resistant Enterococci)      Past Surgical History:   Procedure Laterality Date   • HERNIA REPAIR  2004    x 2, ventral   • KNEE SURGERY Right    • TONSILLECTOMY     • WRIST SURGERY Left     ORIF        PT ASSESSMENT (last 12 hours)      Physical Therapy Evaluation     Row Name 08/27/20 1330          PT Evaluation Time/Intention    Subjective Information  complains of;pain;swelling  -AG     Document Type  evaluation  -AG     Mode of Treatment  individual therapy;physical therapy  -AG     Patient Effort  fair  -AG     Symptoms Noted During/After Treatment  none  -AG     Row Name 08/27/20 1332          General Information    Patient Profile Reviewed?  yes  -AG     Onset of Illness/Injury or Date of Surgery  08/24/20  -AG     Referring Physician  Dr. Marquez  -     Patient Observations  alert;cooperative;agree to therapy  -AG     Patient/Family Observations  patient supine; morbidly obese; B LE very edematous with R plantar foot dark discoloration.    -AG     Prior Level of Function  dependent:  -AG     Equipment Currently Used at Home  cane, straight;walker, rolling;wheelchair  -AG     Pertinent History of Current Functional Problem  patient bedbound since March after falling while attempting to ambulate on walker.  Reports R ankle injury for which he states Xray negative for fx.   -AG     Limitations/Impairments  safety/cognitive  -AG     Barriers to Rehab  previous functional deficit  -AG     Row Name 08/27/20 6433          Relationship/Environment    Primary Source of Support/Comfort  spouse  -AG     Lives With  spouse  -AG     Row Name 08/27/20 1336          Resource/Environmental Concerns    Current Living Arrangements  home/apartment/condo  -AG     Row Name 08/27/20 1338          Living Environment    Home Accessibility  wheelchair accessible  -AG     Row Name 08/27/20 2016           Cognitive Assessment/Intervention- PT/OT    Orientation Status (Cognition)  oriented x 4  -AG     Follows Commands (Cognition)  WFL  -[facility] Name 08/27/20 1339          Safety Issues, Functional Mobility    Impairments Affecting Function (Mobility)  balance;pain;range of motion (ROM);strength  -[facility] Name 08/27/20 1339          Mobility Assessment/Treatment    Additional Documentation  -- no WB restrictions  -[facility] Name 08/27/20 1339          Bed Mobility Assessment/Treatment    Bed Mobility Assessment/Treatment  rolling left;rolling right  -AG     Rolling Left [place] (Bed Mobility)  dependent (less than 25% patient effort)  -AG     Rolling Right [place] (Bed Mobility)  dependent (less than 25% patient effort)  -AG     Comment (Bed Mobility)  patient reports he is more mobile in his bed at home because he is able to hold to headboard.  PT recommended placement of overhead trapeze bar while inpatient to assist with rolling and repositioning.   -[facility] Name 08/27/20 1338          Transfer Assessment/Treatment    Comment (Transfers)  not appropriate to assess.  Pt. will require bariatric mechanical lift for out of bed transfers.  -[facility] Name 08/27/20 1339          Gait/Stairs Assessment/Training    [place] Level (Gait)  unable to assess not appropriate to assess  -[facility] Name 08/27/20 1339          General ROM    GENERAL ROM COMMENTS  B shoulder and elbow WNL; weak B .   B ankle-no functional DF; minimal hip and knee flexion d/t body habitus and morbid obesity.   -[facility] Name 08/27/20 1338          MMT (Manual Muscle Testing)    General MMT Comments  unable to formally assess d/t morbid obesity  -[facility] Name 08/27/20 1339          Sensory Assessment/Intervention    Sensory General Assessment  no sensation deficits identified  -AG     Row Name 08/27/20 1339          Pain Assessment    Additional Documentation  Pain Scale: FACES Pre/Post-Treatment (Group)  -      Row Name 08/27/20 1338          Pain Scale: Numbers Pre/Post-Treatment    Pain Location - Side  Right  -AG     Pain Location  ankle ankle/ foot  -AG     Row Name 08/27/20 0375          Pain Scale: FACES Pre/Post-Treatment    Pain: FACES Scale, Pretreatment  4-->hurts little more  -AG     Pain: FACES Scale, Post-Treatment  4-->hurts little more  -AG     Row Name             Wound 08/24/20 0318 Bilateral lower thoracic spine Abrasion    Wound - Properties Group Date first assessed: 08/24/20  -KE Time first assessed: 0318  -KE Present on Hospital Admission: Y  -KE Side: Bilateral  -KE Orientation: lower  -KE Location: thoracic spine  -KE Primary Wound Type: Abrasion  -KE    Row Name 08/27/20 2932          Coping    Observed Emotional State  accepting;calm;cooperative  -AG     Verbalized Emotional State  acceptance  -AG     Row Name 08/27/20 2582          Plan of Care Review    Plan of Care Reviewed With  patient  -AG     Outcome Summary  patient demonstrates dependent mobility and has been bedbound for many months.  He will require mechanical lift for home (discussed with SS) and could benefit from overhead trapeze bar while is inpatient.    -AG     Row Name 08/27/20 1332          Physical Therapy Clinical Impression    Date of Referral to PT  08/26/20  -AG     Functional Level at Time of Evaluation (PT Clinical Impression)  dependent  -AG     Patient/Family Goals Statement (PT Clinical Impression)  return home  -AG     Criteria for Skilled Interventions Met (PT Clinical Impression)  no;current level of function same as previous level of function PLOF includes bedbound status  -AG     Referral Needed to Another Service (PT)  -- will benefit from mechanical lift, trapeze bar  -AG     Row Name 08/27/20 6753          Positioning and Restraints    Pre-Treatment Position  in bed  -AG     Post Treatment Position  bed  -AG     In Bed  supine;call light within reach;encouraged to call for assist;side rails up x3  -AG      "  User Key  (r) = Recorded By, (t) = Taken By, (c) = Cosigned By    Initials Name Provider Type    Bianka Dubose, PT Physical Therapist    Marsha Dubose RN Registered Nurse        Physical Therapy Education                 Title: PT OT SLP Therapies (Done)     Topic: Physical Therapy (Done)     Point: Mobility training (Done)     Description:   Instruct learner(s) on safety and technique for assisting patient out of bed, chair or wheelchair.  Instruct in the proper use of assistive devices, such as walker, crutches, cane or brace.              Patient Friendly Description:   It's important to get you on your feet again, but we need to do so in a way that is safe for you. Falling has serious consequences, and your personal safety is the most important thing of all.        When it's time to get out of bed, one of us or a family member will sit next to you on the bed to give you support.     If your doctor or nurse tells you to use a walker, crutches, a cane, or a brace, be sure you use it every time you get out of bed, even if you think you don't need it.    Learning Progress Summary           Patient Acceptance, E, VU by  at 8/27/2020 1342    Comment:  discussed guido lift options as patient reports he has been told he \"does not qualify\" for one.  Discussed w/ SS, Anastasiya.  Patient may also benefit from overhead trapeze bar for bed mobility while inpatient.    Acceptance, E,D, VU by  at 8/27/2020 1335                   Point: Home exercise program (Done)     Description:   Instruct learner(s) on appropriate technique for monitoring, assisting and/or progressing patient with therapeutic exercises and activities.              Learning Progress Summary           Patient Acceptance, E, VU by  at 8/27/2020 1342    Comment:  discussed guido lift options as patient reports he has been told he \"does not qualify\" for one.  Discussed w/ SS, Anastasiya.  Patient may also benefit from overhead trapeze bar for bed " "mobility while inpatient.    Acceptance, E,D, VU by  at 8/27/2020 1335                   Point: Body mechanics (Done)     Description:   Instruct learner(s) on proper positioning and spine alignment for patient and/or caregiver during mobility tasks and/or exercises.              Learning Progress Summary           Patient Acceptance, E, VU by  at 8/27/2020 1342    Comment:  discussed guido lift options as patient reports he has been told he \"does not qualify\" for one.  Discussed w/ Anastasiya HERNÁNDEZ.  Patient may also benefit from overhead trapeze bar for bed mobility while inpatient.    Acceptance, E,D, VU by  at 8/27/2020 1335                   Point: Precautions (Done)     Description:   Instruct learner(s) on prescribed precautions during mobility and gait tasks              Learning Progress Summary           Patient Acceptance, E, VU by  at 8/27/2020 1342    Comment:  discussed guido lift options as patient reports he has been told he \"does not qualify\" for one.  Discussed w/ Anastasiya HERNÁNDEZ.  Patient may also benefit from overhead trapeze bar for bed mobility while inpatient.    Acceptance, E,D, VU by  at 8/27/2020 1335                               User Key     Initials Effective Dates Name Provider Type Discipline     04/03/18 -  Bianka Hoyos, PT Physical Therapist PT              PT Recommendation and Plan  Therapy Frequency (PT Clinical Impression): evaluation only  Outcome Summary/Treatment Plan (PT)  Referral Needed to Another Service (PT): (will benefit from mechanical lift, trapeze bar)  Plan of Care Reviewed With: patient  Outcome Summary: patient demonstrates dependent mobility and has been bedbound for many months.  He will require mechanical lift for home (discussed with SS) and could benefit from overhead trapeze bar while is inpatient.       Time Calculation:   PT Charges     Row Name 08/27/20 2137             Time Calculation    PT Received On  08/27/20  -        User Key  (r) = Recorded By, " (t) = Taken By, (c) = Cosigned By    Initials Name Provider Type     Bianka Hoyos, PT Physical Therapist        Therapy Charges for Today     Code Description Service Date Service Provider Modifiers Qty    [id number]  PT EVAL MOD COMPLEXITY 4 2020 Bianka Hoyos, PT GP 1                 Bianka Hoyos, PT  2020          Electronically signed by Bianka Hoyos, PT at 20 1400          Occupational Therapy Notes (most recent note)      David Daniels, OT at 20 1616          Acute Care - Occupational Therapy Treatment Note  JOEL Ornelas     Patient Name: Jimmy Recinos  : 1975  MRN: [id number]  Today's Date: 2020  Onset of Illness/Injury or Date of Surgery: 20     Referring Physician: Dr. Marquez    Admit Date: 2020       ICD-10-CM ICD-9-CM   1. Cellulitis of right lower extremity L03.115 682.6     Patient Active Problem List   Diagnosis   • Essential hypertension   • Obesity, morbid, BMI 50 or higher (CMS/HCC)   • Severe sepsis (CMS/HCC)   • Acute respiratory failure with hypoxia (CMS/HCC)   • Hyponatremia   • Influenza A   • Cellulitis of right lower extremity     Past Medical History:   Diagnosis Date   • Bilateral cellulitis of lower leg 2018   • Charcot-Ramya disease    • Chronic low back pain    • Debility    • Elevated liver enzymes    • Essential hypertension 2018   • Lymphedema of both lower extremities    • MRSA nasal colonization    • Muscular dystrophy (CMS/HCC)     Since age 15 yo   • Obesity, morbid, BMI 50 or higher (CMS/HCC) 2018   • VRE (vancomycin-resistant Enterococci)      Past Surgical History:   Procedure Laterality Date   • HERNIA REPAIR  2004    x 2, ventral   • KNEE SURGERY Right    • TONSILLECTOMY     • WRIST SURGERY Left     ORIF       Therapy Treatment    Rehabilitation Treatment Summary     Row Name 20 1614             Treatment Time/Intention    Discipline  occupational therapist  -KR      Document Type  therapy note  (daily note)  -KR      Patient Effort  adequate  -KR      Comment  BUE ther ex HEP training completed for theraband activity as tolerated and desired to increase ability to assist with mobility/self care in home environment. Pt. acknowledged need for HEPs and was able to display independence with use of theraband. Adaptive  demonstrated for manipulation of bands.   -KR      Recorded by [KR] David Daniels OT 09/08/20 1615      Row Name                Wound 08/24/20 0318 Bilateral lower thoracic spine Abrasion    Wound - Properties Group Date first assessed: 08/24/20 [KE] Time first assessed: 0318 [KE] Present on Hospital Admission: Y [KE] Side: Bilateral [KE] Orientation: lower [KE] Location: thoracic spine [KE] Primary Wound Type: Abrasion [KE] Recorded by:  [KE] Marsha Carrion RN 08/24/20 0320    Row Name                Wound 09/02/20 1251 Right foot Incision    Wound - Properties Group Date first assessed: 09/02/20 [SC] Time first assessed: 1251 [SC] Present on Hospital Admission: Y [SC] Side: Right [SC] Location: foot [SC2], bottom of foot  Primary Wound Type: Incision [SC] Additional Comments: bottom  [SC] Recorded by:  [SC] Sonia Felder RN 09/02/20 1252 [SC2] Sonia Felder RN 09/02/20 1254      User Key  (r) = Recorded By, (t) = Taken By, (c) = Cosigned By    Initials Name Effective Dates Discipline    KR David Daniels OT 04/03/18 -  OT    Marsha Dubose RN 02/04/20 -  Nurse    SC Sonia Felder RN 06/29/20 -  Nurse        Wound 08/24/20 0318 Bilateral lower thoracic spine Abrasion (Active)   Dressing Appearance open to air 9/8/2020  7:20 AM   Closure Open to air 9/8/2020  7:20 AM   Base red 9/8/2020  7:20 AM   Drainage Characteristics/Odor bleeding controlled 9/8/2020  7:20 AM   Drainage Amount none 9/8/2020  7:20 AM   Dressing Care, Wound open to air 9/8/2020  7:20 AM   Periwound Care, Wound barrier ointment applied 9/8/2020  7:20 AM       Wound 09/02/20 1251 Right foot Incision (Active)    Dressing Appearance dry;intact 9/8/2020  7:20 AM   Closure None 9/8/2020 11:30 AM   Base red 9/8/2020 11:30 AM   Periwound redness 9/8/2020 11:30 AM   Periwound Temperature warm 9/8/2020 11:30 AM   Periwound Skin Turgor soft 9/8/2020 11:30 AM   Drainage Amount none 9/8/2020 11:30 AM   Care, Wound other (see comments) 9/8/2020  2:10 PM   Dressing Care, Wound dressing changed;abdominal pad 9/8/2020  2:10 PM   Periwound Care, Wound dry periwound area maintained 9/8/2020 11:30 AM           OT Recommendation and Plan  Outcome Summary/Treatment Plan (OT)  Anticipated Discharge Disposition (OT): home with assist  Planned Therapy Interventions (OT Eval): strengthening exercise          Time Calculation:     Therapy Charges for Today     Code Description Service Date Service Provider Modifiers Qty    [id number]  OT THERAPEUTIC ACT EA 15 MIN 9/8/2020 David Daniels OT GO 2               David Daniels OT  9/8/2020    Electronically signed by David Daniels OT at 09/08/20 1616       Speech Language Pathology Notes (most recent note)    No notes exist for this encounter.         Respiratory Therapy Notes (most recent note)    No notes exist for this encounter.         ADL Documentation (most recent)      Most Recent Value   Presence of Pain  denies pain/discomfort   Transferring  4 - completely dependent   Toileting  3 - assistive equipment and person   Bathing  2 - assistive person   Dressing  2 - assistive person   Eating  0 - independent   Communication  0 - understands/communicates without difficulty   Swallowing  0 - swallows foods/liquids without difficulty   Equipment Currently Used at Home  walker, rolling, cane, straight, other (see comments) [Pt has a Trilogy machine, rolling walker and straight cane from [facility] [employer]. ]          Discharge Summary    No notes of this type exist for this encounter.         Discharge Order (From admission, onward)     Start     Ordered    09/09/20 1113  Discharge  patient  Once     Expected Discharge Date:  09/09/20    Discharge Disposition:  Home or Self Care    Physician of Record for Attribution - Please select from Treatment Team:  NEAL BRITTON [[id number]]    Review needed by CMO to determine Physician of Record:  No       Question Answer Comment   Physician of Record for Attribution - Please select from Treatment Team NEAL BRITTON    Review needed by CMO to determine Physician of Record No        09/09/20 1117

## 2020-09-09 NOTE — PLAN OF CARE
"  Problem: Patient Care Overview  Goal: Plan of Care Review  Outcome: Ongoing (interventions implemented as appropriate)  Flowsheets  Taken 9/9/2020 0315  Progress: improving  Taken 9/8/2020 2100  Plan of Care Reviewed With: patient  Note:   Pt rested well tonight. Pt ready for discharge. Pt states \" I feel much better\".     "

## 2020-09-09 NOTE — PROGRESS NOTES
Discharge Planning Assessment   [place]     Patient Name: Jimmy Recinos  MRN: [id number]  Today's Date: 9/9/2020    Admit Date: 8/23/2020      Discharge Plan     Row Name 09/09/20 1448       Plan    Final Discharge Disposition Code  06 - home with home health care    Final Note Pt is being discharged home today. SS contacted Nabeel RÍOS's office [phone] per Cheri and verified that KHUSHBU Nabeeldilan Hanna will see pt and follow home health. Cheri states KHUSHBU Nabeel Cyndi will see pt via telehealth visit on 9/10/20 at 16:00 and he will agree to follow pt with home health. SS notified Dr. Peterson. SS discussed this with pt and he is agreeable for a telehealth visit with PCP to be arranged. SS contacted [facility] per [place] with referral. SS faxed referral including order to [facility]. RN to call report to [facility]. SS completed EMS PCS form and contacted [facility] per Deirdre. No other needs identified.          Home Medical Care - Selection Complete      Service Provider Request Status Selected Services Address Phone Number Fax Number    [facility] [address] [place] [address] [facility] [place] [address] [phone]     NADINE Castillo

## 2020-09-10 ENCOUNTER — READMISSION MANAGEMENT (OUTPATIENT)
Dept: CALL CENTER | Facility: HOSPITAL | Age: 45
End: 2020-09-10

## 2020-09-10 NOTE — OUTREACH NOTE
Prep Survey      Responses   [facility] facility patient discharged from?  [facility]   Is LACE score < 7 ?  No   Eligibility  Readm Mgmt   Discharge diagnosis  cellulitis of right lower extremity   COVID-19 Test Status  Not tested   Does the patient have one of the following disease processes/diagnoses(primary or secondary)?  Other   Does the patient have Home health ordered?  Yes   What is the Home health agency?   [facility]    Is there a DME ordered?  No   Comments regarding appointments  see AVS   Medication alerts for this patient  see AVS   Prep survey completed?  Yes          Beverly Lazo RN

## 2020-09-15 ENCOUNTER — READMISSION MANAGEMENT (OUTPATIENT)
Dept: CALL CENTER | Facility: HOSPITAL | Age: 45
End: 2020-09-15

## 2020-09-15 NOTE — OUTREACH NOTE
Medical Week 1 Survey      Responses   [facility] [place] patient discharged from?  [facility]   COVID-19 Test Status  Not tested   Does the patient have one of the following disease processes/diagnoses(primary or secondary)?  Other   Is there a successful TCM telephone encounter documented?  No   Week 1 attempt successful?  No   Unsuccessful attempts  Attempt 1          Bianka Prasad RN

## 2020-09-16 ENCOUNTER — READMISSION MANAGEMENT (OUTPATIENT)
Dept: CALL CENTER | Facility: HOSPITAL | Age: 45
End: 2020-09-16

## 2020-09-16 NOTE — OUTREACH NOTE
Medical Week 1 Survey      Responses   [facility] patient discharged from?  [facility]   COVID-19 Test Status  Not tested   Does the patient have one of the following disease processes/diagnoses(primary or secondary)?  Other   Is there a successful TCM telephone encounter documented?  No   Week 1 attempt successful?  No   Unsuccessful attempts  Attempt 2          Loyda Payton RN

## 2020-09-18 ENCOUNTER — READMISSION MANAGEMENT (OUTPATIENT)
Dept: CALL CENTER | Facility: HOSPITAL | Age: 45
End: 2020-09-18

## 2020-09-18 NOTE — OUTREACH NOTE
Medical Week 1 Survey      Responses   [facility] patient discharged from?  [facility]   COVID-19 Test Status  Not tested   Does the patient have one of the following disease processes/diagnoses(primary or secondary)?  Other   Is there a successful TCM telephone encounter documented?  No   Week 1 attempt successful?  No   Unsuccessful attempts  Attempt 3   Rescheduled  Revoked   Revoke  Decline to participate          Betzy Duarte RN

## 2020-10-02 ENCOUNTER — APPOINTMENT (OUTPATIENT)
Dept: ULTRASOUND IMAGING | Facility: HOSPITAL | Age: 45
End: 2020-10-02

## 2020-10-02 ENCOUNTER — HOSPITAL ENCOUNTER (INPATIENT)
Facility: HOSPITAL | Age: 45
LOS: 8 days | Discharge: HOME | End: 2020-10-11
Attending: FAMILY MEDICINE | Admitting: INTERNAL MEDICINE

## 2020-10-02 DIAGNOSIS — U07.1 COVID-19: ICD-10-CM

## 2020-10-02 DIAGNOSIS — L03.116 CELLULITIS OF LEFT LOWER EXTREMITY: Primary | ICD-10-CM

## 2020-10-02 LAB
ALBUMIN SERPL-MCNC: 2.97 G/DL (ref 3.5–5.2)
ALBUMIN/GLOB SERPL: 0.7 G/DL
ALP SERPL-CCNC: 71 U/L (ref 39–117)
ALT SERPL W P-5'-P-CCNC: 24 U/L (ref 1–41)
ANION GAP SERPL CALCULATED.3IONS-SCNC: 10.4 MMOL/L (ref 5–15)
AST SERPL-CCNC: 23 U/L (ref 1–40)
BASOPHILS # BLD AUTO: 0.01 10*3/MM3 (ref 0–0.2)
BASOPHILS NFR BLD AUTO: 0.1 % (ref 0–1.5)
BILIRUB SERPL-MCNC: 0.5 MG/DL (ref 0–1.2)
BUN SERPL-MCNC: 16 MG/DL (ref 6–20)
BUN/CREAT SERPL: 23.5 (ref 7–25)
CALCIUM SPEC-SCNC: 8.4 MG/DL (ref 8.6–10.5)
CHLORIDE SERPL-SCNC: 102 MMOL/L (ref 98–107)
CO2 SERPL-SCNC: 26.6 MMOL/L (ref 22–29)
CREAT SERPL-MCNC: 0.68 MG/DL (ref 0.76–1.27)
CRP SERPL-MCNC: 14.02 MG/DL (ref 0–0.5)
D-LACTATE SERPL-SCNC: 1.1 MMOL/L (ref 0.5–2)
DEPRECATED RDW RBC AUTO: 50.7 FL (ref 37–54)
EOSINOPHIL # BLD AUTO: 0.2 10*3/MM3 (ref 0–0.4)
EOSINOPHIL NFR BLD AUTO: 1.4 % (ref 0.3–6.2)
ERYTHROCYTE [DISTWIDTH] IN BLOOD BY AUTOMATED COUNT: 15.5 % (ref 12.3–15.4)
GFR SERPL CREATININE-BSD FRML MDRD: 127 ML/MIN/1.73
GLOBULIN UR ELPH-MCNC: 4.5 GM/DL
GLUCOSE SERPL-MCNC: 141 MG/DL (ref 65–99)
HCT VFR BLD AUTO: 40.6 % (ref 37.5–51)
HGB BLD-MCNC: 12 G/DL (ref 13–17.7)
IMM GRANULOCYTES # BLD AUTO: 0.07 10*3/MM3 (ref 0–0.05)
IMM GRANULOCYTES NFR BLD AUTO: 0.5 % (ref 0–0.5)
LYMPHOCYTES # BLD AUTO: 0.66 10*3/MM3 (ref 0.7–3.1)
LYMPHOCYTES NFR BLD AUTO: 4.7 % (ref 19.6–45.3)
MCH RBC QN AUTO: 26.3 PG (ref 26.6–33)
MCHC RBC AUTO-ENTMCNC: 29.6 G/DL (ref 31.5–35.7)
MCV RBC AUTO: 88.8 FL (ref 79–97)
MONOCYTES # BLD AUTO: 0.54 10*3/MM3 (ref 0.1–0.9)
MONOCYTES NFR BLD AUTO: 3.8 % (ref 5–12)
NEUTROPHILS NFR BLD AUTO: 12.61 10*3/MM3 (ref 1.7–7)
NEUTROPHILS NFR BLD AUTO: 89.5 % (ref 42.7–76)
NRBC BLD AUTO-RTO: 0 /100 WBC (ref 0–0.2)
PLATELET # BLD AUTO: 266 10*3/MM3 (ref 140–450)
PMV BLD AUTO: 10 FL (ref 6–12)
POTASSIUM SERPL-SCNC: 4 MMOL/L (ref 3.5–5.2)
PROT SERPL-MCNC: 7.5 G/DL (ref 6–8.5)
RBC # BLD AUTO: 4.57 10*6/MM3 (ref 4.14–5.8)
SODIUM SERPL-SCNC: 139 MMOL/L (ref 136–145)
WBC # BLD AUTO: 14.09 10*3/MM3 (ref 3.4–10.8)

## 2020-10-02 PROCEDURE — 93923 UPR/LXTR ART STDY 3+ LVLS: CPT

## 2020-10-02 PROCEDURE — 87077 CULTURE AEROBIC IDENTIFY: CPT | Performed by: PHYSICIAN ASSISTANT

## 2020-10-02 PROCEDURE — 87186 SC STD MICRODIL/AGAR DIL: CPT | Performed by: PHYSICIAN ASSISTANT

## 2020-10-02 PROCEDURE — 80053 COMPREHEN METABOLIC PANEL: CPT | Performed by: PHYSICIAN ASSISTANT

## 2020-10-02 PROCEDURE — 87040 BLOOD CULTURE FOR BACTERIA: CPT | Performed by: PHYSICIAN ASSISTANT

## 2020-10-02 PROCEDURE — 85025 COMPLETE CBC W/AUTO DIFF WBC: CPT | Performed by: PHYSICIAN ASSISTANT

## 2020-10-02 PROCEDURE — 83605 ASSAY OF LACTIC ACID: CPT | Performed by: PHYSICIAN ASSISTANT

## 2020-10-02 PROCEDURE — 87150 DNA/RNA AMPLIFIED PROBE: CPT | Performed by: PHYSICIAN ASSISTANT

## 2020-10-02 PROCEDURE — 82728 ASSAY OF FERRITIN: CPT | Performed by: HOSPITALIST

## 2020-10-02 PROCEDURE — 86140 C-REACTIVE PROTEIN: CPT | Performed by: PHYSICIAN ASSISTANT

## 2020-10-02 PROCEDURE — 25010000002 PIPERACILLIN SOD-TAZOBACTAM PER 1 G: Performed by: PHYSICIAN ASSISTANT

## 2020-10-02 PROCEDURE — 85379 FIBRIN DEGRADATION QUANT: CPT | Performed by: HOSPITALIST

## 2020-10-02 PROCEDURE — 83615 LACTATE (LD) (LDH) ENZYME: CPT | Performed by: HOSPITALIST

## 2020-10-02 PROCEDURE — 99285 EMERGENCY DEPT VISIT HI MDM: CPT

## 2020-10-02 RX ORDER — SODIUM CHLORIDE 0.9 % (FLUSH) 0.9 %
10 SYRINGE (ML) INJECTION AS NEEDED
Status: DISCONTINUED | OUTPATIENT
Start: 2020-10-02 | End: 2020-10-11 | Stop reason: HOSPADM

## 2020-10-02 RX ORDER — ACETAMINOPHEN 325 MG/1
650 TABLET ORAL ONCE
Status: COMPLETED | OUTPATIENT
Start: 2020-10-02 | End: 2020-10-02

## 2020-10-02 RX ADMIN — PIPERACILLIN SODIUM AND TAZOBACTAM SODIUM 3.38 G: 3; .375 INJECTION, POWDER, LYOPHILIZED, FOR SOLUTION INTRAVENOUS at 23:32

## 2020-10-02 RX ADMIN — ACETAMINOPHEN 650 MG: 325 TABLET ORAL at 23:41

## 2020-10-02 RX ADMIN — SODIUM CHLORIDE 1000 ML: 9 INJECTION, SOLUTION INTRAVENOUS at 22:35

## 2020-10-03 ENCOUNTER — APPOINTMENT (OUTPATIENT)
Dept: GENERAL RADIOLOGY | Facility: HOSPITAL | Age: 45
End: 2020-10-03

## 2020-10-03 ENCOUNTER — APPOINTMENT (OUTPATIENT)
Dept: ULTRASOUND IMAGING | Facility: HOSPITAL | Age: 45
End: 2020-10-03

## 2020-10-03 PROBLEM — L03.116 CELLULITIS OF LEFT LOWER EXTREMITY: Status: ACTIVE | Noted: 2020-10-03

## 2020-10-03 LAB
ALBUMIN SERPL-MCNC: 2.53 G/DL (ref 3.5–5.2)
ALBUMIN/GLOB SERPL: 0.6 G/DL
ALP SERPL-CCNC: 65 U/L (ref 39–117)
ALT SERPL W P-5'-P-CCNC: 21 U/L (ref 1–41)
ANION GAP SERPL CALCULATED.3IONS-SCNC: 9.6 MMOL/L (ref 5–15)
AST SERPL-CCNC: 22 U/L (ref 1–40)
BASOPHILS # BLD AUTO: 0.02 10*3/MM3 (ref 0–0.2)
BASOPHILS NFR BLD AUTO: 0.1 % (ref 0–1.5)
BILIRUB SERPL-MCNC: 0.8 MG/DL (ref 0–1.2)
BUN SERPL-MCNC: 14 MG/DL (ref 6–20)
BUN/CREAT SERPL: 29.8 (ref 7–25)
CALCIUM SPEC-SCNC: 8 MG/DL (ref 8.6–10.5)
CHLORIDE SERPL-SCNC: 102 MMOL/L (ref 98–107)
CO2 SERPL-SCNC: 23.4 MMOL/L (ref 22–29)
CREAT SERPL-MCNC: 0.47 MG/DL (ref 0.76–1.27)
D DIMER PPP FEU-MCNC: 2.9 MCGFEU/ML (ref 0–0.5)
DEPRECATED RDW RBC AUTO: 51.6 FL (ref 37–54)
EOSINOPHIL # BLD AUTO: 0.13 10*3/MM3 (ref 0–0.4)
EOSINOPHIL NFR BLD AUTO: 1 % (ref 0.3–6.2)
ERYTHROCYTE [DISTWIDTH] IN BLOOD BY AUTOMATED COUNT: 15.7 % (ref 12.3–15.4)
FERRITIN SERPL-MCNC: 141.6 NG/ML (ref 30–400)
GFR SERPL CREATININE-BSD FRML MDRD: >150 ML/MIN/1.73
GLOBULIN UR ELPH-MCNC: 4.4 GM/DL
GLUCOSE SERPL-MCNC: 118 MG/DL (ref 65–99)
HCT VFR BLD AUTO: 37.6 % (ref 37.5–51)
HGB BLD-MCNC: 11.2 G/DL (ref 13–17.7)
HOLD SPECIMEN: NORMAL
HOLD SPECIMEN: NORMAL
IMM GRANULOCYTES # BLD AUTO: 0.09 10*3/MM3 (ref 0–0.05)
IMM GRANULOCYTES NFR BLD AUTO: 0.7 % (ref 0–0.5)
LDH SERPL-CCNC: 258 U/L (ref 135–225)
LYMPHOCYTES # BLD AUTO: 0.73 10*3/MM3 (ref 0.7–3.1)
LYMPHOCYTES NFR BLD AUTO: 5.4 % (ref 19.6–45.3)
MCH RBC QN AUTO: 26.7 PG (ref 26.6–33)
MCHC RBC AUTO-ENTMCNC: 29.8 G/DL (ref 31.5–35.7)
MCV RBC AUTO: 89.5 FL (ref 79–97)
MONOCYTES # BLD AUTO: 0.5 10*3/MM3 (ref 0.1–0.9)
MONOCYTES NFR BLD AUTO: 3.7 % (ref 5–12)
NEUTROPHILS NFR BLD AUTO: 12.01 10*3/MM3 (ref 1.7–7)
NEUTROPHILS NFR BLD AUTO: 89.1 % (ref 42.7–76)
NRBC BLD AUTO-RTO: 0 /100 WBC (ref 0–0.2)
PLATELET # BLD AUTO: 246 10*3/MM3 (ref 140–450)
PMV BLD AUTO: 10.1 FL (ref 6–12)
POTASSIUM SERPL-SCNC: 4 MMOL/L (ref 3.5–5.2)
PROT SERPL-MCNC: 6.9 G/DL (ref 6–8.5)
RBC # BLD AUTO: 4.2 10*6/MM3 (ref 4.14–5.8)
SARS-COV-2 RDRP RESP QL NAA+PROBE: DETECTED
SODIUM SERPL-SCNC: 135 MMOL/L (ref 136–145)
WBC # BLD AUTO: 13.48 10*3/MM3 (ref 3.4–10.8)
WHOLE BLOOD HOLD SPECIMEN: NORMAL
WHOLE BLOOD HOLD SPECIMEN: NORMAL

## 2020-10-03 PROCEDURE — 71045 X-RAY EXAM CHEST 1 VIEW: CPT

## 2020-10-03 PROCEDURE — 99223 1ST HOSP IP/OBS HIGH 75: CPT | Performed by: HOSPITALIST

## 2020-10-03 PROCEDURE — 25010000002 CEFTRIAXONE PER 250 MG: Performed by: INTERNAL MEDICINE

## 2020-10-03 PROCEDURE — 85025 COMPLETE CBC W/AUTO DIFF WBC: CPT | Performed by: HOSPITALIST

## 2020-10-03 PROCEDURE — 25010000002 VANCOMYCIN 5 G RECONSTITUTED SOLUTION: Performed by: INTERNAL MEDICINE

## 2020-10-03 PROCEDURE — 25010000002 VANCOMYCIN: Performed by: PHYSICIAN ASSISTANT

## 2020-10-03 PROCEDURE — 80053 COMPREHEN METABOLIC PANEL: CPT | Performed by: HOSPITALIST

## 2020-10-03 PROCEDURE — 93970 EXTREMITY STUDY: CPT

## 2020-10-03 PROCEDURE — 25010000002 HEPARIN (PORCINE) PER 1000 UNITS: Performed by: HOSPITALIST

## 2020-10-03 PROCEDURE — 87635 SARS-COV-2 COVID-19 AMP PRB: CPT | Performed by: PHYSICIAN ASSISTANT

## 2020-10-03 PROCEDURE — 93005 ELECTROCARDIOGRAM TRACING: CPT | Performed by: HOSPITALIST

## 2020-10-03 PROCEDURE — 93010 ELECTROCARDIOGRAM REPORT: CPT | Performed by: INTERNAL MEDICINE

## 2020-10-03 PROCEDURE — 93970 EXTREMITY STUDY: CPT | Performed by: RADIOLOGY

## 2020-10-03 PROCEDURE — 25010000002 PIPERACILLIN SOD-TAZOBACTAM PER 1 G

## 2020-10-03 PROCEDURE — 94799 UNLISTED PULMONARY SVC/PX: CPT

## 2020-10-03 RX ORDER — SODIUM CHLORIDE 0.9 % (FLUSH) 0.9 %
10 SYRINGE (ML) INJECTION AS NEEDED
Status: DISCONTINUED | OUTPATIENT
Start: 2020-10-03 | End: 2020-10-11 | Stop reason: HOSPADM

## 2020-10-03 RX ORDER — POTASSIUM CHLORIDE 750 MG/1
40 CAPSULE, EXTENDED RELEASE ORAL AS NEEDED
Status: DISCONTINUED | OUTPATIENT
Start: 2020-10-03 | End: 2020-10-11 | Stop reason: HOSPADM

## 2020-10-03 RX ORDER — HEPARIN SODIUM 5000 [USP'U]/ML
5000 INJECTION, SOLUTION INTRAVENOUS; SUBCUTANEOUS EVERY 12 HOURS SCHEDULED
Status: DISCONTINUED | OUTPATIENT
Start: 2020-10-03 | End: 2020-10-11 | Stop reason: HOSPADM

## 2020-10-03 RX ORDER — ACETAMINOPHEN 325 MG/1
650 TABLET ORAL EVERY 6 HOURS PRN
Status: DISCONTINUED | OUTPATIENT
Start: 2020-10-03 | End: 2020-10-11 | Stop reason: HOSPADM

## 2020-10-03 RX ORDER — MAGNESIUM SULFATE HEPTAHYDRATE 40 MG/ML
2 INJECTION, SOLUTION INTRAVENOUS AS NEEDED
Status: DISCONTINUED | OUTPATIENT
Start: 2020-10-03 | End: 2020-10-11 | Stop reason: HOSPADM

## 2020-10-03 RX ORDER — IBUPROFEN 200 MG
200 TABLET ORAL EVERY 8 HOURS PRN
COMMUNITY
End: 2020-10-11 | Stop reason: HOSPADM

## 2020-10-03 RX ORDER — SODIUM CHLORIDE 0.9 % (FLUSH) 0.9 %
10 SYRINGE (ML) INJECTION EVERY 12 HOURS SCHEDULED
Status: DISCONTINUED | OUTPATIENT
Start: 2020-10-03 | End: 2020-10-11 | Stop reason: HOSPADM

## 2020-10-03 RX ORDER — SODIUM CHLORIDE 9 MG/ML
75 INJECTION, SOLUTION INTRAVENOUS CONTINUOUS
Status: DISCONTINUED | OUTPATIENT
Start: 2020-10-03 | End: 2020-10-07

## 2020-10-03 RX ORDER — POTASSIUM CHLORIDE 1.5 G/1.77G
40 POWDER, FOR SOLUTION ORAL AS NEEDED
Status: DISCONTINUED | OUTPATIENT
Start: 2020-10-03 | End: 2020-10-11 | Stop reason: HOSPADM

## 2020-10-03 RX ORDER — MAGNESIUM SULFATE HEPTAHYDRATE 40 MG/ML
4 INJECTION, SOLUTION INTRAVENOUS AS NEEDED
Status: DISCONTINUED | OUTPATIENT
Start: 2020-10-03 | End: 2020-10-11 | Stop reason: HOSPADM

## 2020-10-03 RX ORDER — POTASSIUM CHLORIDE 7.45 MG/ML
10 INJECTION INTRAVENOUS
Status: DISCONTINUED | OUTPATIENT
Start: 2020-10-03 | End: 2020-10-11 | Stop reason: HOSPADM

## 2020-10-03 RX ORDER — HYDROCODONE BITARTRATE AND ACETAMINOPHEN 5; 325 MG/1; MG/1
1 TABLET ORAL EVERY 6 HOURS PRN
Status: DISPENSED | OUTPATIENT
Start: 2020-10-03 | End: 2020-10-10

## 2020-10-03 RX ORDER — MAGNESIUM SULFATE 1 G/100ML
1 INJECTION INTRAVENOUS AS NEEDED
Status: DISCONTINUED | OUTPATIENT
Start: 2020-10-03 | End: 2020-10-11 | Stop reason: HOSPADM

## 2020-10-03 RX ORDER — IBUPROFEN 200 MG
200 TABLET ORAL EVERY 8 HOURS PRN
Status: CANCELLED | OUTPATIENT
Start: 2020-10-03

## 2020-10-03 RX ORDER — ACETAMINOPHEN 500 MG
1000 TABLET ORAL ONCE
Status: COMPLETED | OUTPATIENT
Start: 2020-10-03 | End: 2020-10-03

## 2020-10-03 RX ORDER — L.ACID,PARA/B.BIFIDUM/S.THERM 8B CELL
1 CAPSULE ORAL DAILY
Status: DISCONTINUED | OUTPATIENT
Start: 2020-10-03 | End: 2020-10-04

## 2020-10-03 RX ADMIN — HEPARIN SODIUM 5000 UNITS: 5000 INJECTION INTRAVENOUS; SUBCUTANEOUS at 09:53

## 2020-10-03 RX ADMIN — VANCOMYCIN HYDROCHLORIDE 2000 MG: 1 INJECTION, POWDER, LYOPHILIZED, FOR SOLUTION INTRAVENOUS at 00:10

## 2020-10-03 RX ADMIN — SODIUM CHLORIDE, PRESERVATIVE FREE 10 ML: 5 INJECTION INTRAVENOUS at 09:53

## 2020-10-03 RX ADMIN — VANCOMYCIN HYDROCHLORIDE 2500 MG: 1 INJECTION, POWDER, LYOPHILIZED, FOR SOLUTION INTRAVENOUS at 23:37

## 2020-10-03 RX ADMIN — HEPARIN SODIUM 5000 UNITS: 5000 INJECTION INTRAVENOUS; SUBCUTANEOUS at 20:18

## 2020-10-03 RX ADMIN — VANCOMYCIN HYDROCHLORIDE 2500 MG: 1 INJECTION, POWDER, LYOPHILIZED, FOR SOLUTION INTRAVENOUS at 12:01

## 2020-10-03 RX ADMIN — CEFTRIAXONE 2 G: 2 INJECTION, POWDER, FOR SOLUTION INTRAMUSCULAR; INTRAVENOUS at 12:01

## 2020-10-03 RX ADMIN — SODIUM CHLORIDE 75 ML/HR: 9 INJECTION, SOLUTION INTRAVENOUS at 23:38

## 2020-10-03 RX ADMIN — ACETAMINOPHEN 650 MG: 325 TABLET ORAL at 13:49

## 2020-10-03 RX ADMIN — ACETAMINOPHEN 650 MG: 325 TABLET ORAL at 20:19

## 2020-10-03 RX ADMIN — HYDROCODONE BITARTRATE AND ACETAMINOPHEN 1 TABLET: 5; 325 TABLET ORAL at 14:51

## 2020-10-03 RX ADMIN — SODIUM CHLORIDE, PRESERVATIVE FREE 10 ML: 5 INJECTION INTRAVENOUS at 20:19

## 2020-10-03 RX ADMIN — SODIUM CHLORIDE 75 ML/HR: 9 INJECTION, SOLUTION INTRAVENOUS at 09:54

## 2020-10-03 RX ADMIN — ACETAMINOPHEN 1000 MG: 500 TABLET ORAL at 02:20

## 2020-10-03 RX ADMIN — PIPERACILLIN AND TAZOBACTAM 4.5 G: 4; .5 INJECTION, POWDER, LYOPHILIZED, FOR SOLUTION INTRAVENOUS; PARENTERAL at 09:53

## 2020-10-03 NOTE — ED NOTES
Attempted IV x 3. Unable to obtain access. Charge nurse HERNANDEZ alvarado made aware.      Stephie Coker RN  10/02/20 9210

## 2020-10-03 NOTE — PLAN OF CARE
Problem: Adult Inpatient Plan of Care  Goal: Plan of Care Review  Outcome: Ongoing, Progressing  Flowsheets (Taken 10/3/2020 1551)  Outcome Summary: Patient resting in bed. Placed 2LNC due to desating to 88% while asleep. Vital signs stable. Low grade temp earlier in the shift. No needs at this time. [place] continue to monitor the patient.  Goal: Patient-Specific Goal (Individualized)  Outcome: Ongoing, Progressing  Goal: Absence of Hospital-Acquired Illness or Injury  Outcome: Ongoing, Progressing  Intervention: Identify and Manage Fall Risk  Recent Flowsheet Documentation  Taken 10/3/2020 1354 by Bianka Banerjee RN  Safety Promotion/Fall Prevention: safety round/check completed  Taken 10/3/2020 1300 by Bianka Banerjee RN  Safety Promotion/Fall Prevention: safety round/check completed  Taken 10/3/2020 1100 by Bianka Banerjee RN  Safety Promotion/Fall Prevention: safety round/check completed  Taken 10/3/2020 0805 by Bianka Banerjee RN  Safety Promotion/Fall Prevention: safety round/check completed  Taken 10/3/2020 0700 by Bianka Banerjee RN  Safety Promotion/Fall Prevention: safety round/check completed  Intervention: Prevent and Manage VTE (venous thromboembolism) Risk  Recent Flowsheet Documentation  Taken 10/3/2020 1354 by Bianka Banerjee RN  VTE Prevention/Management: (SEE MAR) other (see comments)  Taken 10/3/2020 0805 by Bianka Banerjee RN  VTE Prevention/Management: (SEE MAR) other (see comments)  Intervention: Prevent Infection  Recent Flowsheet Documentation  Taken 10/3/2020 1354 by Bianka Banerjee RN  Infection Prevention:   single patient room provided   rest/sleep promoted  Taken 10/3/2020 1300 by Bianka Banerjee RN  Infection Prevention: rest/sleep promoted  Taken 10/3/2020 1100 by Bianka Banerjee RN  Infection Prevention: rest/sleep promoted  Taken 10/3/2020 0805 by Bianka Banerjee RN  Infection Prevention:   single patient room provided   rest/sleep promoted  Taken 10/3/2020 0700 by Bianka Banerjee RN  Infection  Prevention: rest/sleep promoted  Goal: Optimal Comfort and Wellbeing  Outcome: Ongoing, Progressing  Intervention: Provide Person-Centered Care  Recent Flowsheet Documentation  Taken 10/3/2020 1354 by Bianka Banerjee RN  Trust Relationship/Rapport:   care explained   choices provided   questions answered   empathic listening provided   emotional support provided   questions encouraged   reassurance provided   thoughts/feelings acknowledged  Taken 10/3/2020 0805 by Bianka Banerjee RN  Trust Relationship/Rapport:   care explained   questions answered   empathic listening provided   emotional support provided   choices provided   questions encouraged   reassurance provided   thoughts/feelings acknowledged  Goal: Readiness for Transition of Care  Outcome: Ongoing, Progressing  Intervention: Mutually Develop Transition Plan  Recent Flowsheet Documentation  Taken 10/3/2020 0733 by Bianka Banerjee RN  Transportation Anticipated: public transportation  Patient/Family Anticipated Services at Transition:   Patient/Family Anticipates Transition to: home     Problem: Fall Injury Risk  Goal: Absence of Fall and Fall-Related Injury  Outcome: Ongoing, Progressing  Intervention: Identify and Manage Contributors to Fall Injury Risk  Recent Flowsheet Documentation  Taken 10/3/2020 1354 by Bianka Banerjee RN  Medication Review/Management: medications reviewed  Self-Care Promotion:   independence encouraged   BADL personal objects within reach   BADL personal routines maintained   safe use of adaptive equipment encouraged  Taken 10/3/2020 1300 by Bianka Banerjee RN  Medication Review/Management: medications reviewed  Taken 10/3/2020 1100 by Bianka Banerjee RN  Medication Review/Management: medications reviewed  Taken 10/3/2020 0805 by Bianka Banerjee RN  Medication Review/Management: medications reviewed  Self-Care Promotion:   independence encouraged   BADL personal objects within reach   BADL personal routines maintained   safe  use of adaptive equipment encouraged  Taken 10/3/2020 0700 by Bianka Banerjee RN  Medication Review/Management: medications reviewed  Intervention: Promote Injury-Free Environment  Recent Flowsheet Documentation  Taken 10/3/2020 1354 by Bianka Banerjee RN  Safety Promotion/Fall Prevention: safety round/check completed  Taken 10/3/2020 1300 by Bianka Banerjee RN  Safety Promotion/Fall Prevention: safety round/check completed  Taken 10/3/2020 1100 by Bianka Banerjee RN  Safety Promotion/Fall Prevention: safety round/check completed  Taken 10/3/2020 0805 by Bianka Banerjee RN  Safety Promotion/Fall Prevention: safety round/check completed  Taken 10/3/2020 0700 by Bianka Banerjee RN  Safety Promotion/Fall Prevention: safety round/check completed     Problem: Skin Injury Risk Increased  Goal: Skin Health and Integrity  Outcome: Ongoing, Progressing  Intervention: Optimize Skin Protection  Recent Flowsheet Documentation  Taken 10/3/2020 1354 by Bianka Banerjee RN  Pressure Reduction Techniques: frequent weight shift encouraged  Pressure Reduction Devices: pressure-redistributing mattress utilized  Skin Protection: adhesive use limited  Taken 10/3/2020 0805 by Bianka Banerjee RN  Pressure Reduction Techniques:   frequent weight shift encouraged   weight shift assistance provided  Pressure Reduction Devices: pressure-redistributing mattress utilized  Skin Protection: adhesive use limited     Problem: Asthma Comorbidity  Goal: Maintenance of Asthma Control  Outcome: Ongoing, Progressing  Intervention: Maintain Asthma Symptom Control  Recent Flowsheet Documentation  Taken 10/3/2020 1354 by Bianka Banerjee RN  Medication Review/Management: medications reviewed  Taken 10/3/2020 1300 by Bianka Banerjee RN  Medication Review/Management: medications reviewed  Taken 10/3/2020 1100 by Bianka Banerjee RN  Medication Review/Management: medications reviewed  Taken 10/3/2020 0805 by Bianka Banerjee RN  Medication Review/Management: medications  reviewed  Taken 10/3/2020 0700 by Bianka Banerjee RN  Medication Review/Management: medications reviewed     Problem: COPD Comorbidity  Goal: Maintenance of COPD Symptom Control  Outcome: Ongoing, Progressing  Intervention: Maintain COPD-Symptom Control  Recent Flowsheet Documentation  Taken 10/3/2020 1354 by Bianka Banerjee RN  Medication Review/Management: medications reviewed  Taken 10/3/2020 1300 by Bianka Banerjee RN  Medication Review/Management: medications reviewed  Taken 10/3/2020 1100 by Bianka Banerjee RN  Medication Review/Management: medications reviewed  Taken 10/3/2020 0805 by Bianka Banerjee RN  Medication Review/Management: medications reviewed  Taken 10/3/2020 0700 by Bianka Banerjee RN  Medication Review/Management: medications reviewed     Problem: Diabetes Comorbidity  Goal: Blood Glucose Level Within Desired Range  Outcome: Ongoing, Progressing     Problem: Hypertension Comorbidity  Goal: Blood Pressure in Desired Range  Outcome: Ongoing, Progressing  Intervention: Maintain Hypertension-Management Strategies  Recent Flowsheet Documentation  Taken 10/3/2020 1354 by Bianka Banerjee RN  Medication Review/Management: medications reviewed  Taken 10/3/2020 1300 by Bianka Banerjee RN  Medication Review/Management: medications reviewed  Taken 10/3/2020 1100 by Bianka Banerjee RN  Medication Review/Management: medications reviewed  Taken 10/3/2020 0805 by Bianka Banerjee RN  Medication Review/Management: medications reviewed  Taken 10/3/2020 0700 by Bianka Banerjee RN  Medication Review/Management: medications reviewed     Problem: Obstructive Sleep Apnea Risk or Actual (Comorbidity Management)  Goal: Unobstructed Breathing During Sleep  Outcome: Ongoing, Progressing     Problem: Pain Chronic (Persistent) (Comorbidity Management)  Goal: Acceptable Pain Control and Functional Ability  Outcome: Ongoing, Progressing  Intervention: Manage Persistent Pain  Recent Flowsheet Documentation  Taken 10/3/2020 1354 by Chriss  Bianka LONG RN  Bowel Elimination Promotion: adequate fluid intake promoted  Medication Review/Management: medications reviewed  Taken 10/3/2020 1300 by Bianka Banerjee RN  Medication Review/Management: medications reviewed  Taken 10/3/2020 1100 by Bianka Banerjee RN  Medication Review/Management: medications reviewed  Taken 10/3/2020 0805 by Bianka Banerjee RN  Bowel Elimination Promotion: adequate fluid intake promoted  Sleep/Rest Enhancement:   awakenings minimized   consistent schedule promoted   relaxation techniques promoted   regular sleep/rest pattern promoted  Medication Review/Management: medications reviewed  Taken 10/3/2020 0700 by Bianka Banerjee RN  Medication Review/Management: medications reviewed  Intervention: Optimize Psychosocial Wellbeing  Recent Flowsheet Documentation  Taken 10/3/2020 1354 by Bianka Banerjee RN  Supportive Measures: self-care encouraged  Diversional Activities:   television   smartphone  Family/Support System Care:   support provided   self-care encouraged  Taken 10/3/2020 0805 by Bianka Banerjee RN  Supportive Measures:   self-care encouraged   self-responsibility promoted  Diversional Activities:   smartphone   television  Family/Support System Care: support provided   Goal Outcome Evaluation:  Plan of Care Reviewed With: patient  Progress: no change  Outcome Summary: Patient resting in bed. Placed 2LNC due to desating to 88% while asleep. Vital signs stable. Low grade temp earlier in the shift. No needs at this time. [place] continue to monitor the patient.

## 2020-10-03 NOTE — ED PROVIDER NOTES
Subjective   44-year-old male with past medical history of cellulitis, hypertension, lymphedema, muscular dystrophy, Charcot Ramya disease, and VRE presents to the emergency room with left lower extremity cellulitis.  Patient states for the last day he has noticed the swelling and redness and states it is progressively gotten worse.  He reports chills and pain to the left leg.  He denies fever or known injury.  Denies aggravating or alleviating factors.  Patient is nonambulatory.    Patient is COVID positive and tested positive at [facility] 8 to 9 days ago.      History provided by:  Patient   used: No        Review of Systems   Constitutional: Negative.  Negative for fever.   HENT: Negative.    Respiratory: Negative.    Cardiovascular: Negative.  Negative for chest pain.   Gastrointestinal: Negative.  Negative for abdominal pain.   Endocrine: Negative.    Genitourinary: Negative.  Negative for dysuria.   Skin: Positive for rash.   Neurological: Negative.    Psychiatric/Behavioral: Negative.    All other systems reviewed and are negative.      Past Medical History:   Diagnosis Date   • Bilateral cellulitis of lower leg 11/26/2018   • Charcot-Ramya disease    • Chronic low back pain    • Debility    • Elevated liver enzymes    • Essential hypertension 12/5/2018   • Lymphedema of both lower extremities    • MRSA nasal colonization    • Muscular dystrophy (CMS/HCC)     Since age 15 yo   • Obesity, morbid, BMI 50 or higher (CMS/HCC) 12/5/2018   • VRE (vancomycin-resistant Enterococci)        No Known Allergies    Past Surgical History:   Procedure Laterality Date   • HERNIA REPAIR  2004    x 2, ventral   • KNEE SURGERY Right    • TONSILLECTOMY     • WRIST SURGERY Left     ORIF       Family History   Problem Relation Age of Onset   • Heart disease Mother    • Diabetes Mother    • Cancer Mother    • Hypertension Father        Social History     Socioeconomic History   • Marital  status:      Spouse name: Not on file   • Number of children: Not on file   • Years of education: Not on file   • Highest education level: Not on file   Tobacco Use   • Smoking status: Never Smoker   • Smokeless tobacco: Never Used   Substance and Sexual Activity   • Alcohol use: Yes     Alcohol/week: 1.0 standard drinks     Types: 1 Shots of liquor per week     Frequency: Never     Comment: on occasion once a month   • Drug use: No   • Sexual activity: Defer           Objective   Physical Exam  Vitals signs and nursing note reviewed.   Constitutional:       General: He is not in acute distress.     Appearance: He is well-developed. He is not diaphoretic.   HENT:      Head: Normocephalic and atraumatic.      Right Ear: External ear normal.      Left Ear: External ear normal.      Nose: Nose normal.   Eyes:      Conjunctiva/sclera: Conjunctivae normal.      Pupils: Pupils are equal, round, and reactive to light.   Neck:      Musculoskeletal: Normal range of motion and neck supple.      Vascular: No JVD.      Trachea: No tracheal deviation.   Cardiovascular:      Rate and Rhythm: Normal rate and regular rhythm.      Heart sounds: Normal heart sounds. No murmur.      Comments: Right foot appears purple in nature and cool to the touch  Left foot cool to the touch  Pulmonary:      Effort: Pulmonary effort is normal. No respiratory distress.      Breath sounds: Normal breath sounds. No wheezing.   Abdominal:      General: Bowel sounds are normal.      Palpations: Abdomen is soft.      Tenderness: There is no abdominal tenderness.   Musculoskeletal: Normal range of motion.         General: No deformity.   Skin:     General: Skin is warm and dry.      Coloration: Skin is not pale.      Findings: Erythema present. No rash.          Neurological:      Mental Status: He is alert and oriented to person, place, and time.      Cranial Nerves: No cranial nerve deficit.   Psychiatric:         Behavior: Behavior normal.          Thought Content: Thought content normal.         Procedures           ED Course  ED Course as of Oct 03 0410   Fri Oct 02, 2020   2245 CT lower extremity cancelled secondary to pt body habitus - weight limit 500lb.    [TK]   2259 Labs still haven't been obtained. Unsuccessful attempts by multiple nurses. RNStephie will call respiratory for another attempt.    [TK]   2259 IMPRESSION:   Noninvasive testing suggests multilevel stenoses likely most severe within the mid left SFA with estimated 50-79% stenosis. Probable multiple segmental stenoses right lower extremity. Questionable partial occlusion left posterior tibial artery. Dorsalis pedis velocities or flow was not reported bilaterally.    Signer Name: ETHAN Lazo MD  Signed: 10/2/2020 10:53 PM  Workstation Name: [facility]   [facility] Arterial Doppler Lower Extremity Bilateral [TK]   3766 Pt endorsed to Rancho Buck PA-C    [TK]   Sat Oct 03, 2020   0233 Discussed w/ Dr. Turner, pt accepted for admission.     [RB]      ED Course User Index  [RB] Rancho Buck II, PA  [TK] Lolita Ramos PA-C                                           MDM  Number of Diagnoses or Management Options  Cellulitis of left lower extremity: established and worsening  COVID-19: established and improving     Amount and/or Complexity of Data Reviewed  Clinical lab tests: reviewed  Tests in the radiology section of CPT®: reviewed  Decide to obtain previous medical records or to obtain history from someone other than the patient: yes  Discuss the patient with other providers: yes    Risk of Complications, Morbidity, and/or Mortality  Presenting problems: moderate  Diagnostic procedures: moderate  Management options: moderate    Patient Progress  Patient progress: stable      Final diagnoses:   Cellulitis of left lower extremity   COVID-19            Rancho Buck II, PA  10/03/20 3515

## 2020-10-03 NOTE — PROGRESS NOTES
Kinetics :  Vancomycin  Day 1    The patient has been evaluated for vancomycin therapy for sepsis / skin - soft tissue infection.  Historically the patient has tolerated a  Regimen of vancomycin 2500mg q 12 hrs to maximize  The AUC and trough levels.  Will monitor with you.

## 2020-10-03 NOTE — PROGRESS NOTES
[facility]     Patient Identification:  Name:  Jimmy Recinos  Age:  44 y.o.  Sex:  male  :  1975  MRN:  [id number]  Visit Number:  [id number]  Primary Care Provider:  Provider, No Known    Length of stay:  0    Chief complaint: Left knee pain    Subjective:    Patient reports increasing left knee pain that he rates at a 6 to a 7 on a 1-10 pain scale.  He reports pain with active and passive flexion and extension.  He denies any shortness of breath.  Denies any cough or any other respiratory symptoms at this time.  ----------------------------------------------------------------------------------------------------------------------  Current Hospital Meds:  cefTRIAXone, 2 g, Intravenous, Q24H  heparin (porcine), 5,000 Units, Subcutaneous, Q12H  lactobacillus acidophilus, 1 capsule, Oral, Daily  sodium chloride, 10 mL, Intravenous, Q12H  vancomycin, 2,500 mg, Intravenous, Q12H      Pharmacy to dose vancomycin,   sodium chloride, 75 mL/hr, Last Rate: 75 mL/hr (10/03/20 0954)      ----------------------------------------------------------------------------------------------------------------------  Vital Signs:  Temp:  [98.6 °F (37 °C)-103.5 °F (39.7 °C)] 98.6 °F (37 °C)  Heart Rate:  [] 94  Resp:  [] 12  BP: (119-168)/(54-99) 132/78      10/02/20  1957 10/03/20  0400   Weight: (!) 295 kg (650 lb) (!) 268 kg (590 lb 6.3 oz)     Body mass index is 84.71 kg/m².    Intake/Output Summary (Last 24 hours) at 10/3/2020 1738  Last data filed at 10/3/2020 1600  Gross per 24 hour   Intake 2822.61 ml   Output 1200 ml   Net 1622.61 ml     Diet Regular  ----------------------------------------------------------------------------------------------------------------------  Physical exam:  This physical exam has been personally performed remotely in the unit aided by real-time audio/visual communication tools.The use of a video visit has been reviewed with the patient and  verbal informed consent has been obtained.     Physical Exam:  General: Patient appears awake, alert, and in no acute distress.  Head: Normocephalic, atraumatic  Eyes: EOMI. Conjunctivae and sclerae normal.  Ears: Ears appear intact with no abnormalities noted.   Neck: Trachea midline. No obvious JVD.  Lungs: Respirations appear to be regular, even and unlabored with no signs of respiratory distress. No audible wheezing.  Abdomen: No obvious abdominal distension.  MS: Muscle tone appears normal. No gross deformities.  Extremities: No clubbing, cyanosis or edema noted.  Skin: Erythematous circumferential rash around the left knee  Neurologic: Alert and oriented x3. No gross focal deficits.     ----------------------------------------------------------------------------------------------------------------------  Tele:    ----------------------------------------------------------------------------------------------------------------------      Results from last 7 days   Lab Units 10/03/20  0730 10/02/20  2326   CRP mg/dL  --  14.02*   LACTATE mmol/L  --  1.1   WBC 10*3/mm3 13.48* 14.09*   HEMOGLOBIN g/dL 11.2* 12.0*   HEMATOCRIT % 37.6 40.6   MCV fL 89.5 88.8   MCHC g/dL 29.8* 29.6*   PLATELETS 10*3/mm3 246 266         Results from last 7 days   Lab Units 10/03/20  0730 10/02/20  2326   SODIUM mmol/L 135* 139   POTASSIUM mmol/L 4.0 4.0   CHLORIDE mmol/L 102 102   CO2 mmol/L 23.4 26.6   BUN mg/dL 14 16   CREATININE mg/dL 0.47* 0.68*   EGFR IF NONAFRICN AM mL/min/1.73 >150 127   CALCIUM mg/dL 8.0* 8.4*   GLUCOSE mg/dL 118* 141*   ALBUMIN g/dL 2.53* 2.97*   BILIRUBIN mg/dL 0.8 0.5   ALK PHOS U/L 65 71   AST (SGOT) U/L 22 23   ALT (SGPT) U/L 21 24   Estimated Creatinine Clearance: 428.4 mL/min (A) (by C-G formula based on SCr of 0.47 mg/dL (L)).    No results found for: AMMONIA      No results found for: BLOODCX  No results found for: URINECX  No results found for: WOUNDCX  No results found for: STOOLCX    I have  personally looked at the labs and they are summarized above.  ----------------------------------------------------------------------------------------------------------------------  Imaging Results (Last 24 Hours)     Procedure Component Value Units Date/Time    US Venous Doppler Lower Extremity Bilateral (duplex) [[id number]] Collected: 10/03/20 1113     Updated: 10/03/20 1115    Narrative:      EXAMINATION: US VENOUS DOPPLER LOWER EXTREMITY BILATERAL (DUPLEX)-      CLINICAL INDICATION:     Extremity Pain and Swelling  positive d-dimer, rule out DVT; L03.116-Cellulitis of left lower limb;  U07.1-COVID-19  TECHNIQUE: Multiplanar gray scale and Doppler vascular sonographic  imaging of the deep veins  without and with compression.      COMPARISON: NONE      FINDINGS: The visualized deep veins demonstrate flow and are  compressible. No evidence of deep venous thrombosis.           Impression:      No DVT.     This report was finalized on 10/3/2020 11:13 AM by Dr. Alvarez Eaton MD.       XR Chest 1 View [[id number]] Collected: 10/03/20 0329     Updated: 10/03/20 0331    Narrative:      CR Chest 1 Vw    INDICATION:   Shortness of air. COVID positive. Fever.     COMPARISON:    1/2/2020    FINDINGS:  2 portable AP view(s) of the chest.    Heart is mildly enlarged. Pulmonary vascularity is normal. The lungs are clear. No pneumothorax is identified.       Impression:      No acute cardiopulmonary findings.    Signer Name: Earl Siddiqui MD   Signed: 10/3/2020 3:29 AM   [facility]    [facility]    US Arterial Doppler Lower Extremity Bilateral [[id number]] Collected: 10/02/20 2253     Updated: 10/02/20 2256    Narrative:      US Arterial Low Ext Bilat     INDICATION:    Diminished pulses bilaterally. History of peripheral vascular disease.    TECHNIQUE:   Grayscale and color Doppler imaging of the lower extremity arterial system bilaterally.    COMPARISON:    None  available.    FINDINGS:  Right lower extremity: Triphasic waveform within the right external iliac. Mildly elevated velocities at 186 cm/s suggesting 30-49% stenosis. Mildly elevated velocities within the common femoral and superficial femoral vessels also suggesting mild  stenosis of less than 50%. Monophasic and biphasic flow within the right posterior tibial artery. Dorsalis pedis velocities and flow not reported.    Left lower extremity: External iliac and common femoral arteries could not be imaged due to body habitus. Markedly elevated velocities noted mid left SFA at 300 cm/s suggesting 50-79% stenosis. Monophasic flow noted within the left popliteal artery and  left posterior tibial artery. No flow identified within the mid posterior tibial artery and may be secondary to occlusion with distal reconstitution. Dorsalis pedis velocities and flow not reported. Markedly enlarged hyperemic left groin mass most likely  representing an enlarged lymph node measuring 7.6 x 4.8 x 2.3 cm.      Impression:      Noninvasive testing suggests multilevel stenoses likely most severe within the mid left SFA with estimated 50-79% stenosis. Probable multiple segmental stenoses right lower extremity. Questionable partial occlusion left posterior tibial artery. Dorsalis  pedis velocities or flow was not reported bilaterally.    Signer Name: ETHAN Lazo MD   Signed: 10/2/2020 10:53 PM   Workstation Name: [email or web address]    [facility] [place]        ----------------------------------------------------------------------------------------------------------------------  Assessment and Plan:    1.  Sepsis -present on admission, continue to treat underlying etiology which is likely left knee cellulitis.  Continue vancomycin and Rocephin per infectious disease recommendations.    2.  Left knee cellulitis -see #1    3.  COVID-19 infection -patient is currently asymptomatic.  No indication for Decadron, convalescent  plasma or Remdesivir at this time.  Appreciate infectious disease recommendations.    4.  Super morbid obesity -complicates all aspects of care    5.  Muscular dystrophy    6.  Essential hypertension -continue home antihypertensive medications, continue to monitor closely and make medication adjustments as necessary            Nader Neol,   10/03/20  17:38 EDT

## 2020-10-03 NOTE — ED NOTES
x2 attempt to draw labs, rn and provider aware, resp contacted for stick per provider.     Broderick Ellsworth  10/02/20 3519

## 2020-10-03 NOTE — CONSULTS
INFECTIOUS DISEASE CONSULTATION REPORT        Patient Identification:  Name:  Jimmy Recinos  Age:  44 y.o.  Sex:  male  :  1975  MRN:  [id number]   Visit Number:  [id number]  Primary Care Physician:  Provider, No Known       LOS: 0 days        Subjective       Subjective     History of present illness:      Thank you Dr. Noel for allowing us to participate in the care of your patient.  As you well know, Mr. Jimmy Recinos is a 44 y.o. male with past medical history significant for morbid obesity, lymphedema involving both lower extremities with recurrence cellulitis, muscular dystrophy, Charcot Ramya disease, asthma, chronic low back pain, debility, who presented to [facility] Emergency Department on 10/2/2020 for left leg pain redness, swelling, fever, chills, lethargy.  Patient reports he tested positive for COVID-19 8 days ago.  Repeat COVID-19 PCR 10/3/2020 positive.  Fever with T-max of 103.5 on admission.  Patient currently on room air with no apparent distress.  WBC 14.09.  CRP 14.02.  D-dimer 2.90.  Chest x-ray unremarkable.  Patient unable to complete CT scan due to size.      Infectious Disease consultation was requested for antimicrobial management.      ---------------------------------------------------------------------------------------------------------------------     Review Of Systems:    Constitutional: Positive fever, chills.  No night sweats. No appetite change or unexpected weight change.  Positive fatigue.  Eyes: no eye drainage, itching or redness.  HEENT: no mouth sores, dysphagia or nose bleed.  Respiratory: no for shortness of breath, cough or production of sputum.  Cardiovascular: no chest pain, no palpitations, no orthopnea.  Gastrointestinal: no nausea, vomiting or diarrhea. No abdominal pain, hematemesis or rectal bleeding.  Genitourinary: no dysuria or polyuria.  Hematologic/lymphatic: no lymph node abnormalities, no easy bruising or easy  bleeding.  Musculoskeletal: Left lower extremity pain.  Skin: No rash and no itching.  Neurological: no loss of consciousness, no seizure, no headache.  Behavioral/Psych: no depression or suicidal ideation.  Endocrine: no hot flashes.  Immunologic: negative.    ---------------------------------------------------------------------------------------------------------------------     Past Medical History    Past Medical History:   Diagnosis Date   • Bilateral cellulitis of lower leg 11/26/2018   • Charcot-Ramya disease    • Chronic low back pain    • Debility    • Elevated liver enzymes    • Essential hypertension 12/5/2018   • Lymphedema of both lower extremities    • MRSA nasal colonization    • Muscular dystrophy (CMS/[facility])     Since age 15 yo   • Obesity, morbid, BMI 50 or higher (CMS/[facility]) 12/5/2018   • VRE (vancomycin-resistant Enterococci)        Past Surgical History    Past Surgical History:   Procedure Laterality Date   • HERNIA REPAIR  2004    x 2, ventral   • KNEE SURGERY Right    • TONSILLECTOMY     • WRIST SURGERY Left     ORIF       Family History    Family History   Problem Relation Age of Onset   • Heart disease Mother    • Diabetes Mother    • Cancer Mother    • Hypertension Father        Social History    Social History     Tobacco Use   • Smoking status: Never Smoker   • Smokeless tobacco: Never Used   Substance Use Topics   • Alcohol use: Yes     Alcohol/week: 1.0 standard drinks     Types: 1 Shots of liquor per week     Frequency: Never     Comment: on occasion once a month   • Drug use: No       Allergies    Patient has no known allergies.  ---------------------------------------------------------------------------------------------------------------------     Home Medications:    Prior to Admission Medications     Prescriptions Last Dose Informant Patient Reported? Taking?    ibuprofen (ADVIL,MOTRIN) 200 MG tablet Unknown Self Yes No    Take 200 mg by mouth Every 8 (Eight) Hours As Needed for Mild  Pain .        ---------------------------------------------------------------------------------------------------------------------    Objective       Objective     Hospital Scheduled Meds:  cefTRIAXone, 2 g, Intravenous, Q24H  heparin (porcine), 5,000 Units, Subcutaneous, Q12H  lactobacillus acidophilus, 1 capsule, Oral, Daily  sodium chloride, 10 mL, Intravenous, Q12H  vancomycin, 2,500 mg, Intravenous, Q12H      Pharmacy to dose vancomycin,   sodium chloride, 75 mL/hr, Last Rate: 75 mL/hr (10/03/20 0954)      ---------------------------------------------------------------------------------------------------------------------   Vital Signs:  Temp:  [98.9 °F (37.2 °C)-103.5 °F (39.7 °C)] 98.9 °F (37.2 °C)  Heart Rate:  [101-124] 106  Resp:  [14-24] 14  BP: (123-168)/(54-99) 144/81  Mean Arterial Pressure (Non-Invasive) for the past 24 hrs (Last 3 readings):   Noninvasive MAP (mmHg)   10/03/20 1403 104   10/03/20 1303 110   10/03/20 1205 110     SpO2 Percentage    10/03/20 1205 10/03/20 1303 10/03/20 1403   SpO2: 98% 90% 93%     SpO2:  [89 %-98 %] 93 %  on  Flow (L/min):  [2] 2;   Device (Oxygen Therapy): nasal cannula    Body mass index is 84.71 kg/m².  Wt Readings from Last 3 Encounters:   10/03/20 (!) 268 kg (590 lb 6.3 oz)   08/23/20 (!) 295 kg (650 lb)   01/02/20 (!) 273 kg (601 lb)     ---------------------------------------------------------------------------------------------------------------------     Physical Exam:    Deferred due to COVID-19 isolation.    ---------------------------------------------------------------------------------------------------------------------              Results from last 7 days   Lab Units 10/03/20  0730 10/02/20  2326   CRP mg/dL  --  14.02*   LACTATE mmol/L  --  1.1   WBC 10*3/mm3 13.48* 14.09*   HEMOGLOBIN g/dL 11.2* 12.0*   HEMATOCRIT % 37.6 40.6   MCV fL 89.5 88.8   MCHC g/dL 29.8* 29.6*   PLATELETS 10*3/mm3 375 206     Results from last 7 days   Lab Units  10/03/20  0730 10/02/20  2326   SODIUM mmol/L 135* 139   POTASSIUM mmol/L 4.0 4.0   CHLORIDE mmol/L 102 102   CO2 mmol/L 23.4 26.6   BUN mg/dL 14 16   CREATININE mg/dL 0.47* 0.68*   EGFR IF NONAFRICN AM mL/min/1.73 >150 127   CALCIUM mg/dL 8.0* 8.4*   GLUCOSE mg/dL 118* 141*   ALBUMIN g/dL 2.53* 2.97*   BILIRUBIN mg/dL 0.8 0.5   ALK PHOS U/L 65 71   AST (SGOT) U/L 22 23   ALT (SGPT) U/L 21 24   Estimated Creatinine Clearance: 428.4 mL/min (A) (by C-G formula based on SCr of 0.47 mg/dL (L)).  No results found for: AMMONIA    No results found for: HGBA1C, POCGLU  Lab Results   Component Value Date    HGBA1C 5.00 08/23/2020     Lab Results   Component Value Date    TSH 0.793 12/21/2019       No results found for: BLOODCX  No results found for: URINECX  No results found for: WOUNDCX  No results found for: STOOLCX  No results found for: RESPCX  Pain Management Panel     Pain Management Panel Latest Ref Rng & Units 10/19/2014    AMPHETAMINES SCREEN, URINE NEGATIVE Negative    BARBITURATES SCREEN NEGATIVE Negative    BENZODIAZEPINE SCREEN, URINE NEGATIVE Negative    COCAINE SCREEN, URINE NEGATIVE Negative    METHADONE SCREEN, URINE NEGATIVE Negative        I have personally reviewed the above laboratory results.   ---------------------------------------------------------------------------------------------------------------------  Imaging Results (Last 7 Days)     Procedure Component Value Units Date/Time    US Venous Doppler Lower Extremity Bilateral (duplex) [[id number]] Collected: 10/03/20 1113     Updated: 10/03/20 1115    Narrative:      EXAMINATION: US VENOUS DOPPLER LOWER EXTREMITY BILATERAL (DUPLEX)-      CLINICAL INDICATION:     Extremity Pain and Swelling  positive d-dimer, rule out DVT; L03.116-Cellulitis of left lower limb;  U07.1-COVID-19  TECHNIQUE: Multiplanar gray scale and Doppler vascular sonographic  imaging of the deep veins  without and with compression.      COMPARISON: NONE      FINDINGS: The  visualized deep veins demonstrate flow and are  compressible. No evidence of deep venous thrombosis.           Impression:      No DVT.     This report was finalized on 10/3/2020 11:13 AM by Dr. Alvarez Eaton MD.       XR Chest 1 View [[id number]] Collected: 10/03/20 0329     Updated: 10/03/20 0331    Narrative:      CR Chest 1 Vw    INDICATION:   Shortness of air. COVID positive. Fever.     COMPARISON:    1/2/2020    FINDINGS:  2 portable AP view(s) of the chest.    Heart is mildly enlarged. Pulmonary vascularity is normal. The lungs are clear. No pneumothorax is identified.       Impression:      No acute cardiopulmonary findings.    Signer Name: Earl Siddiqui MD   Signed: 10/3/2020 3:29 AM   Workstation Name: [facility]    [facility]    US Arterial Doppler Lower Extremity Bilateral [[id number]] Collected: 10/02/20 2253     Updated: 10/02/20 2256    Narrative:      US Arterial Low Ext Bilat     INDICATION:    Diminished pulses bilaterally. History of peripheral vascular disease.    TECHNIQUE:   Grayscale and color Doppler imaging of the lower extremity arterial system bilaterally.    COMPARISON:    None available.    FINDINGS:  Right lower extremity: Triphasic waveform within the right external iliac. Mildly elevated velocities at 186 cm/s suggesting 30-49% stenosis. Mildly elevated velocities within the common femoral and superficial femoral vessels also suggesting mild  stenosis of less than 50%. Monophasic and biphasic flow within the right posterior tibial artery. Dorsalis pedis velocities and flow not reported.    Left lower extremity: External iliac and common femoral arteries could not be imaged due to body habitus. Markedly elevated velocities noted mid left SFA at 300 cm/s suggesting 50-79% stenosis. Monophasic flow noted within the left popliteal artery and  left posterior tibial artery. No flow identified within the mid posterior tibial artery and may be secondary to occlusion  with distal reconstitution. Dorsalis pedis velocities and flow not reported. Markedly enlarged hyperemic left groin mass most likely  representing an enlarged lymph node measuring 7.6 x 4.8 x 2.3 cm.      Impression:      Noninvasive testing suggests multilevel stenoses likely most severe within the mid left SFA with estimated 50-79% stenosis. Probable multiple segmental stenoses right lower extremity. Questionable partial occlusion left posterior tibial artery. Dorsalis  pedis velocities or flow was not reported bilaterally.    Signer Name: ETHAN Lazo MD   Signed: 10/2/2020 10:53 PM   Workstation Name: [email or web address]    [facility]        I have personally reviewed the above radiology results.   ---------------------------------------------------------------------------------------------------------------------      Assessment & Plan        Assessment/Plan       ASSESSMENT:    1.  COVID-19  2.  Lower extremity cellulitis    PLAN:    Patient presents with left leg pain redness, swelling, fever, chills, lethargy.  Patient reports he tested positive for COVID-19 8 days ago.  Repeat COVID-19 PCR 10/3/2020 positive.  Fever with T-max of 103.5 on admission.  Patient currently on room air with no apparent distress.  WBC 14.09.  CRP 14.02.  D-dimer 2.90.  Chest x-ray unremarkable.  Patient unable to complete CT scan due to size.    At this time we do not feel patient is a candidate for Remdesivir or convalescent plasma therapy.  If any respiratory worsening would consider initiating this treatment.    For now antibiotic therapy was changed to Rocephin 2 g IV every 24 hours and vancomycin pharmacy to dose.  We will continue to follow closely and adjust antibiotic therapy as needed.    Again, thank you Dr. Noel for allowing us to participate in the care of your patient and please feel free to call for any questions you may have.        Code Status:   Code Status and Medical Interventions:    Ordered at: 10/03/20 0236     Level Of Support Discussed With:    Patient     Code Status:    CPR     Medical Interventions (Level of Support Prior to Arrest):    Full           KHUSHBU Silva  10/03/20  14:58 EDT

## 2020-10-03 NOTE — H&P
Hospitalist History and Physical    Name: Jimmy Recinos  Age/Sex: 44 y.o. male  :  1975        MRN: [id number]  Visit Number: [id number]  Admit Date: 10/2/2020   PCP: Provider, No Known      Patient Identification          Chief complaint left leg pain, redness, swelling; fever, chills, feeling run down    History of Present Illness:  Patient is a 44 y.o. male with history of morbid obesity, lymphedema involving both lower extremities with recurrent superimposed cellulitis, muscular dystrophy since age 15, charcot-eyad disease, asthma, chronic low back pain and debility who presents with complaints of left leg pain, redness and swelling over the last 2-3 days. He reports associated fever, chills, and lethargy. Of note, about 8 days ago he tested positive for COVID-19. He explains that his wife and child tested positive around that time, so he was tested by the health department because of his high exposure risk. He has not had any cough or dyspnea on exertion worse than his baseline, which he attributes to his asthma. He says he often has fevers with his recurrent bouts of cellulitis and he feels like this is the case currently as well.     Review of Systems  Review of Systems   Constitutional: Positive for activity change, chills, fatigue and fever. Negative for appetite change, diaphoresis and unexpected weight change.   HENT: Negative for congestion, postnasal drip, rhinorrhea, sinus pressure, sinus pain and sore throat.    Eyes: Negative for photophobia, pain, discharge, redness, itching and visual disturbance.   Respiratory: Positive for cough (chronic, unchanged from baseline) and shortness of breath (chronic, associated with exertion). Negative for wheezing.    Cardiovascular: Positive for leg swelling (acute on chronic left upper leg). Negative for chest pain and palpitations.   Gastrointestinal: Negative for abdominal distention, abdominal pain, constipation, diarrhea, nausea and vomiting.    Endocrine: Negative for cold intolerance, heat intolerance, polydipsia, polyphagia and polyuria.   Genitourinary: Negative for dysuria, flank pain, frequency and hematuria.   Musculoskeletal: Positive for back pain. Negative for arthralgias, joint swelling, myalgias, neck pain and neck stiffness.   Skin: Positive for color change (left thigh and proximal lower leg; right foot). Negative for pallor and rash.   Neurological: Positive for headaches. Negative for dizziness, tremors, seizures, syncope, weakness, light-headedness and numbness.   Hematological: Negative for adenopathy. Does not bruise/bleed easily.   Psychiatric/Behavioral: Negative for agitation, behavioral problems and confusion.       History  Past Medical History:   Diagnosis Date   • Bilateral cellulitis of lower leg 11/26/2018   • Charcot-Ramya disease    • Chronic low back pain    • Debility    • Elevated liver enzymes    • Essential hypertension 12/5/2018   • Lymphedema of both lower extremities    • MRSA nasal colonization    • Muscular dystrophy (CMS/HCC)     Since age 15 yo   • Obesity, morbid, BMI 50 or higher (CMS/HCC) 12/5/2018   • VRE (vancomycin-resistant Enterococci)      Past Surgical History:   Procedure Laterality Date   • HERNIA REPAIR  2004    x 2, ventral   • KNEE SURGERY Right    • TONSILLECTOMY     • WRIST SURGERY Left     ORIF     Family History   Problem Relation Age of Onset   • Heart disease Mother    • Diabetes Mother    • Cancer Mother    • Hypertension Father      Social History     Tobacco Use   • Smoking status: Never Smoker   • Smokeless tobacco: Never Used   Substance Use Topics   • Alcohol use: Yes     Alcohol/week: 1.0 standard drinks     Types: 1 Shots of liquor per week     Frequency: Never     Comment: on occasion once a month   • Drug use: No     Medications Prior to Admission   Medication Sig Dispense Refill Last Dose   • Baclofen 5 MG tablet Take 1 tablet by mouth Every 12 (Twelve) Hours As Needed for Muscle  Spasms. 60 tablet 0      Allergies:  Patient has no known allergies.    Objective     Vital Signs  Temp:  [100.4 °F (38 °C)-103.5 °F (39.7 °C)] 101.8 °F (38.8 °C)  Heart Rate:  [110-124] 110  Resp:  [24] 24  BP: (125-168)/(54-94) 141/67  Body mass index is 93.27 kg/m².    Physical Exam:  Physical Exam  This physical exam has been personally performed remotely in the unit aided by real-time audio/visual communication tools. HERNANDEZ Pruett was present at bedside during this exam and assisted during exam. The use of a video visit has been reviewed with the patient and verbal informed consent has been obtained.     Physical Exam:  General: Patient appears awake, alert, and in no acute distress but appears fatigued. Morbidly obese.   Head: Normocephalic, atraumatic  Eyes: EOMI. Conjunctivae and sclerae normal.  Ears: Ears appear intact with no abnormalities noted.   Neck: Trachea midline. No obvious JVD.  Heart: Tele reveals sinus tachycardia, -110s  Lungs: Respirations appear to be regular, even and unlabored with no signs of respiratory distress. No audible wheezing.  Abdomen: No obvious abdominal distension.  MS: Muscle tone appears normal. No gross deformities.  Extremities: Severe lymphedema   Skin: Chronic venous dermatitis skin changes noted bilateral lower extremities. Plantar surface right foot with desquamation (patient states he had a blister that was the size of the entire plantar surface of the foot that recently ruptured). Left leg with patchy erythema on anterior thigh and proximal lower leg.  Neurologic: Alert and oriented x3. No gross focal deficits.     Results Review:       Lab Results:  Results from last 7 days   Lab Units 10/02/20  2326   WBC 10*3/mm3 14.09*   HEMOGLOBIN g/dL 12.0*   PLATELETS 10*3/mm3 266     Results from last 7 days   Lab Units 10/02/20  2326   CRP mg/dL 14.02*     Results from last 7 days   Lab Units 10/02/20  2326   SODIUM mmol/L 139   POTASSIUM mmol/L 4.0   CHLORIDE mmol/L 102    CO2 mmol/L 26.6   BUN mg/dL 16   CREATININE mg/dL 0.68*   CALCIUM mg/dL 8.4*   GLUCOSE mg/dL 141*         No results found for: HGBA1C  Results from last 7 days   Lab Units 10/02/20  2326   BILIRUBIN mg/dL 0.5   ALK PHOS U/L 71   AST (SGOT) U/L 23   ALT (SGPT) U/L 24                       I have reviewed the patient's laboratory results.    Imaging:  Imaging Results (Last 72 Hours)     Procedure Component Value Units Date/Time    XR Chest 1 View [[id number]] Collected: 10/03/20 0329     Updated: 10/03/20 0331    Narrative:      CR Chest 1 Vw    INDICATION:   Shortness of air. COVID positive. Fever.     COMPARISON:    1/2/2020    FINDINGS:  2 portable AP view(s) of the chest.    Heart is mildly enlarged. Pulmonary vascularity is normal. The lungs are clear. No pneumothorax is identified.       Impression:      No acute cardiopulmonary findings.    Signer Name: Earl Siddiqui MD   Signed: 10/3/2020 3:29 AM   Workstation Name: [email or web address]    [facility]    US Arterial Doppler Lower Extremity Bilateral [[id number]] Collected: 10/02/20 2253     Updated: 10/02/20 2256    Narrative:      US Arterial Low Ext Bilat     INDICATION:    Diminished pulses bilaterally. History of peripheral vascular disease.    TECHNIQUE:   Grayscale and color Doppler imaging of the lower extremity arterial system bilaterally.    COMPARISON:    None available.    FINDINGS:  Right lower extremity: Triphasic waveform within the right external iliac. Mildly elevated velocities at 186 cm/s suggesting 30-49% stenosis. Mildly elevated velocities within the common femoral and superficial femoral vessels also suggesting mild  stenosis of less than 50%. Monophasic and biphasic flow within the right posterior tibial artery. Dorsalis pedis velocities and flow not reported.    Left lower extremity: External iliac and common femoral arteries could not be imaged due to body habitus. Markedly elevated velocities noted mid left SFA at 300  cm/s suggesting 50-79% stenosis. Monophasic flow noted within the left popliteal artery and  left posterior tibial artery. No flow identified within the mid posterior tibial artery and may be secondary to occlusion with distal reconstitution. Dorsalis pedis velocities and flow not reported. Markedly enlarged hyperemic left groin mass most likely  representing an enlarged lymph node measuring 7.6 x 4.8 x 2.3 cm.      Impression:      Noninvasive testing suggests multilevel stenoses likely most severe within the mid left SFA with estimated 50-79% stenosis. Probable multiple segmental stenoses right lower extremity. Questionable partial occlusion left posterior tibial artery. Dorsalis  pedis velocities or flow was not reported bilaterally.    Signer Name: ETHAN Lazo MD   Signed: 10/2/2020 10:53 PM   Workstation Name: [facility]    [facility] [place]          I have personally reviewed the patient's radiologic imaging.        EKG: ordered, not resulted    I have personally reviewed the patient's EKG.        Assessment/Plan     - Sepsis, present on admission with fever, tachycardia, leukocytosis and CRP elevation, felt to be primarily due to cellulitis of left lower extremity, superimposed on severe lymphedema with chronic venous stasis changes: treat with IV vancomycin and zosyn. Monitor response to IV antibiotics. Hydrate with IV fluids. Repeat CBC, CRP later this morning. Alternatively, COVID-19 infection could be contributing some to fever and overall sense of not feeling well. However, O2 saturation is good on room air and chest XR read as clear by radiology so at this time would agree that cellulitis is the primary  behind his sepsis.     - COVID-19 positive test result within the last week: LDH and CRP up but ferritin and lactate normal. D-dimer elevated as well but has been so in the past also. Will order venous doppler to rule out DVT, but will not be able to rule out PE with CT  PE protocol or VQ scan due to body habitus. O2 sat has been in mid 90s on room air at this time. Chest XR unremarkable as noted above. Since not having any active respiratory symptoms and no typical COVID-19 features seen on chest XR, will not initiate treatment with steroids, remdesivir or convalescent plasma. Observe for now, keep in enhanced droplet/contact precautions for the time being. Consult ID for further recommendations.     - Questionable partial occlusion of left posterior tibial artery, with multilevel stenosis bilateral legs: bedside doppler picked up pedal pulses in both feet per ED provider, Maxx. Suspect body habitus could be contributing to appearance of multilevel stenosis rather than true atherosclerotic disease. However, will consult Dr Spencer, cardiology, for vascular assessment and guidance of what, if anything, can be done for further evaluation or intervention in light of extreme morbid obesity.    - Morbid obestiy, BMI 93, complicating all aspects of care    DVT Prophylaxis: SQ heparin    Estimated Length of Stay >2 midnights    I discussed the patient's findings, assessment and plan with the patient and his RN Seble who was at bedside during my video interview and exam in the PCU.    * patient is high risk due to sepsis, left lower extremity cellulitis, possible PVD bilateral lower extremities, extreme morbid obesity, COVID-19 positive    Jimi Turner MD  10/03/20  04:17 EDT

## 2020-10-03 NOTE — PLAN OF CARE
Goal Outcome Evaluation:  Plan of Care Reviewed With: patient  Progress: no change  Outcome Summary: Patient arrived to floor at 0400. Has since been resting in bed and had a pleasant attitude. VSS and fever has been declining since arriving from ER. Call light within reach. Will continue to monitor.      Problem: Adult Inpatient Plan of Care  Goal: Plan of Care Review  Outcome: Ongoing, Progressing  Flowsheets (Taken 10/3/2020 0637)  Progress: no change  Plan of Care Reviewed With: patient  Outcome Summary: Patient arrived to floor at 0400. Has since been resting in bed and had a pleasant attitude. VSS and fever has been declining since arriving from ER. Call light within reach. Will continue to monitor.  Goal: Patient-Specific Goal (Individualized)  Outcome: Ongoing, Progressing  Goal: Absence of Hospital-Acquired Illness or Injury  Outcome: Ongoing, Progressing  Goal: Optimal Comfort and Wellbeing  Outcome: Ongoing, Progressing  Intervention: Provide Person-Centered Care  Recent Flowsheet Documentation  Taken 10/3/2020 0400 by Martha Platt, RN  Trust Relationship/Rapport:   care explained   choices provided   questions answered   questions encouraged   reassurance provided   thoughts/feelings acknowledged  Goal: Readiness for Transition of Care  Outcome: Ongoing, Progressing  Intervention: Mutually Develop Transition Plan  Recent Flowsheet Documentation  Taken 10/3/2020 0411 by Martha Platt, RN  Equipment Currently Used at Home:   cpap   wheelchair     Problem: Fall Injury Risk  Goal: Absence of Fall and Fall-Related Injury  Outcome: Ongoing, Progressing     Problem: Skin Injury Risk Increased  Goal: Skin Health and Integrity  Outcome: Ongoing, Progressing  Intervention: Optimize Skin Protection  Recent Flowsheet Documentation  Taken 10/3/2020 0400 by Martha Platt, RN  Pressure Reduction Techniques:   frequent weight shift encouraged   positioned off wounds   weight shift assistance provided  Pressure Reduction  Devices:   pressure-redistributing mattress utilized   positioning supports utilized  Skin Protection:   adhesive use limited   tubing/devices free from skin contact     Problem: Asthma Comorbidity  Goal: Maintenance of Asthma Control  Outcome: Ongoing, Progressing     Problem: COPD Comorbidity  Goal: Maintenance of COPD Symptom Control  Outcome: Ongoing, Progressing     Problem: Diabetes Comorbidity  Goal: Blood Glucose Level Within Desired Range  Outcome: Ongoing, Progressing     Problem: Hypertension Comorbidity  Goal: Blood Pressure in Desired Range  Outcome: Ongoing, Progressing     Problem: Obstructive Sleep Apnea Risk or Actual (Comorbidity Management)  Goal: Unobstructed Breathing During Sleep  Outcome: Ongoing, Progressing     Problem: Pain Chronic (Persistent) (Comorbidity Management)  Goal: Acceptable Pain Control and Functional Ability  Outcome: Ongoing, Progressing  Intervention: Manage Persistent Pain  Recent Flowsheet Documentation  Taken 10/3/2020 0400 by Martha Platt RN  Sleep/Rest Enhancement: awakenings minimized  Intervention: Optimize Psychosocial Wellbeing  Recent Flowsheet Documentation  Taken 10/3/2020 0400 by Martha Platt RN  Supportive Measures: active listening utilized  Diversional Activities: smartphone  Family/Support System Care:   self-care encouraged   support provided

## 2020-10-04 LAB
ANION GAP SERPL CALCULATED.3IONS-SCNC: 8.9 MMOL/L (ref 5–15)
BACTERIA BLD CULT: NORMAL
BOTTLE TYPE: NORMAL
BUN SERPL-MCNC: 9 MG/DL (ref 6–20)
BUN/CREAT SERPL: 18.8 (ref 7–25)
CALCIUM SPEC-SCNC: 7.8 MG/DL (ref 8.6–10.5)
CHLORIDE SERPL-SCNC: 102 MMOL/L (ref 98–107)
CO2 SERPL-SCNC: 20.1 MMOL/L (ref 22–29)
CREAT SERPL-MCNC: 0.48 MG/DL (ref 0.76–1.27)
CRP SERPL-MCNC: 19.51 MG/DL (ref 0–0.5)
DEPRECATED RDW RBC AUTO: 52 FL (ref 37–54)
ERYTHROCYTE [DISTWIDTH] IN BLOOD BY AUTOMATED COUNT: 15.5 % (ref 12.3–15.4)
GFR SERPL CREATININE-BSD FRML MDRD: >150 ML/MIN/1.73
GLUCOSE SERPL-MCNC: 101 MG/DL (ref 65–99)
HCT VFR BLD AUTO: 38.1 % (ref 37.5–51)
HGB BLD-MCNC: 11.1 G/DL (ref 13–17.7)
MCH RBC QN AUTO: 26.6 PG (ref 26.6–33)
MCHC RBC AUTO-ENTMCNC: 29.1 G/DL (ref 31.5–35.7)
MCV RBC AUTO: 91.1 FL (ref 79–97)
PLATELET # BLD AUTO: 241 10*3/MM3 (ref 140–450)
PMV BLD AUTO: 10.2 FL (ref 6–12)
POTASSIUM SERPL-SCNC: 4.1 MMOL/L (ref 3.5–5.2)
RBC # BLD AUTO: 4.18 10*6/MM3 (ref 4.14–5.8)
SODIUM SERPL-SCNC: 131 MMOL/L (ref 136–145)
WBC # BLD AUTO: 10.09 10*3/MM3 (ref 3.4–10.8)

## 2020-10-04 PROCEDURE — 86140 C-REACTIVE PROTEIN: CPT | Performed by: NURSE PRACTITIONER

## 2020-10-04 PROCEDURE — 25010000002 CEFTRIAXONE PER 250 MG: Performed by: INTERNAL MEDICINE

## 2020-10-04 PROCEDURE — 85027 COMPLETE CBC AUTOMATED: CPT | Performed by: INTERNAL MEDICINE

## 2020-10-04 PROCEDURE — 25010000002 HEPARIN (PORCINE) PER 1000 UNITS: Performed by: HOSPITALIST

## 2020-10-04 PROCEDURE — 99232 SBSQ HOSP IP/OBS MODERATE 35: CPT | Performed by: INTERNAL MEDICINE

## 2020-10-04 PROCEDURE — 25010000002 VANCOMYCIN 5 G RECONSTITUTED SOLUTION: Performed by: INTERNAL MEDICINE

## 2020-10-04 PROCEDURE — 94799 UNLISTED PULMONARY SVC/PX: CPT

## 2020-10-04 PROCEDURE — 80048 BASIC METABOLIC PNL TOTAL CA: CPT | Performed by: INTERNAL MEDICINE

## 2020-10-04 RX ADMIN — HEPARIN SODIUM 5000 UNITS: 5000 INJECTION INTRAVENOUS; SUBCUTANEOUS at 20:28

## 2020-10-04 RX ADMIN — SODIUM CHLORIDE, PRESERVATIVE FREE 10 ML: 5 INJECTION INTRAVENOUS at 08:58

## 2020-10-04 RX ADMIN — HYDROCODONE BITARTRATE AND ACETAMINOPHEN 1 TABLET: 5; 325 TABLET ORAL at 11:05

## 2020-10-04 RX ADMIN — SODIUM CHLORIDE 75 ML/HR: 9 INJECTION, SOLUTION INTRAVENOUS at 16:28

## 2020-10-04 RX ADMIN — VANCOMYCIN HYDROCHLORIDE 2500 MG: 1 INJECTION, POWDER, LYOPHILIZED, FOR SOLUTION INTRAVENOUS at 23:18

## 2020-10-04 RX ADMIN — HEPARIN SODIUM 5000 UNITS: 5000 INJECTION INTRAVENOUS; SUBCUTANEOUS at 08:58

## 2020-10-04 RX ADMIN — HYDROCODONE BITARTRATE AND ACETAMINOPHEN 1 TABLET: 5; 325 TABLET ORAL at 23:17

## 2020-10-04 RX ADMIN — CEFTRIAXONE 2 G: 2 INJECTION, POWDER, FOR SOLUTION INTRAMUSCULAR; INTRAVENOUS at 10:55

## 2020-10-04 RX ADMIN — ACETAMINOPHEN 650 MG: 325 TABLET ORAL at 20:27

## 2020-10-04 RX ADMIN — VANCOMYCIN HYDROCHLORIDE 2500 MG: 1 INJECTION, POWDER, LYOPHILIZED, FOR SOLUTION INTRAVENOUS at 11:05

## 2020-10-04 NOTE — PROGRESS NOTES
PROGRESS NOTE         Patient Identification:  Name:  Jimmy Recinos  Age:  44 y.o.  Sex:  male  :  1975  MRN:  [id number]  Visit Number:  [id number]  Primary Care Provider:  Provider, No Known         LOS: 1 day       ----------------------------------------------------------------------------------------------------------------------  Subjective       Chief Complaints:    Cellulitis        Interval History:      Case discussed with primary RN.  Patient overall doing well today.  Occasional use of 2 L per nasal cannula with no apparent distress.  WBC improving at 13.48.  CRP elevated at 19.51.    Review of Systems:    Constitutional:  No fever, chills.  No night sweats. No appetite change or unexpected weight change.  Positive fatigue.  Eyes: no eye drainage, itching or redness.  HEENT: no mouth sores, dysphagia or nose bleed.  Respiratory: no for shortness of breath, cough or production of sputum.  Cardiovascular: no chest pain, no palpitations, no orthopnea.  Gastrointestinal: no nausea, vomiting or diarrhea. No abdominal pain, hematemesis or rectal bleeding.  Genitourinary: no dysuria or polyuria.  Hematologic/lymphatic: no lymph node abnormalities, no easy bruising or easy bleeding.  Musculoskeletal: Left lower extremity pain.  Skin: No rash and no itching.  Neurological: no loss of consciousness, no seizure, no headache.  Behavioral/Psych: no depression or suicidal ideation.  Endocrine: no hot flashes.  Immunologic: negative.       ----------------------------------------------------------------------------------------------------------------------      Objective       Current [facility] Meds:  cefTRIAXone, 2 g, Intravenous, Q24H  heparin (porcine), 5,000 Units, Subcutaneous, Q12H  sodium chloride, 10 mL, Intravenous, Q12H  vancomycin, 2,500 mg, Intravenous, Q12H      Pharmacy to dose vancomycin,   sodium chloride, 75 mL/hr, Last Rate: 75 mL/hr (10/03/20  2338)      ----------------------------------------------------------------------------------------------------------------------    Vital Signs:  Temp:  [98.4 °F (36.9 °C)-99.5 °F (37.5 °C)] 99.1 °F (37.3 °C)  Heart Rate:  [] 83  Resp:  [12-31] 27  BP: (114-178)/() 136/72  Mean Arterial Pressure (Non-Invasive) for the past 24 hrs (Last 3 readings):   Noninvasive MAP (mmHg)   10/04/20 1302 91   10/04/20 1202 97   10/04/20 1102 90     SpO2 Percentage    10/04/20 1102 10/04/20 1202 10/04/20 1302   SpO2: 94% 97% 92%     SpO2:  [91 %-100 %] 92 %  on  Flow (L/min):  [2] 2;   Device (Oxygen Therapy): nasal cannula    Body mass index is 84.78 kg/m².  Wt Readings from Last 3 Encounters:   10/04/20 (!) 268 kg (590 lb 13.3 oz)   08/23/20 (!) 295 kg (650 lb)   01/02/20 (!) 273 kg (601 lb)        Intake/Output Summary (Last 24 hours) at 10/4/2020 1352  Last data filed at 10/4/2020 1200  Gross per 24 hour   Intake 3385.94 ml   Output 1700 ml   Net 1685.94 ml     Diet Regular  ----------------------------------------------------------------------------------------------------------------------      Physical Exam:    Deferred due to COVID-19 isolation.  ----------------------------------------------------------------------------------------------------------------------            Results from last 7 days   Lab Units 10/04/20  0708 10/04/20  0507 10/03/20  0730 10/02/20  2326   CRP mg/dL  --  19.51*  --  14.02*   LACTATE mmol/L  --   --   --  1.1   WBC 10*3/mm3 10.09  --  13.48* 14.09*   HEMOGLOBIN g/dL 11.1*  --  11.2* 12.0*   HEMATOCRIT % 38.1  --  37.6 40.6   MCV fL 91.1  --  89.5 88.8   MCHC g/dL 29.1*  --  29.8* 29.6*   PLATELETS 10*3/mm3 241  --  246 266     Results from last 7 days   Lab Units 10/04/20  0507 10/03/20  0730 10/02/20  2326   SODIUM mmol/L 131* 135* 139   POTASSIUM mmol/L 4.1 4.0 4.0   CHLORIDE mmol/L 102 102 102   CO2 mmol/L 20.1* 23.4 26.6   BUN mg/dL 9 14 16   CREATININE mg/dL 0.48* 0.47* 0.68*    EGFR IF NONAFRICN AM mL/min/1.73 >150 >150 127   CALCIUM mg/dL 7.8* 8.0* 8.4*   GLUCOSE mg/dL 101* 118* 141*   ALBUMIN g/dL  --  2.53* 2.97*   BILIRUBIN mg/dL  --  0.8 0.5   ALK PHOS U/L  --  65 71   AST (SGOT) U/L  --  22 23   ALT (SGPT) U/L  --  21 24   Estimated Creatinine Clearance: 419.4 mL/min (A) (by C-G formula based on SCr of 0.48 mg/dL (L)).  No results found for: AMMONIA    No results found for: HGBA1C, POCGLU  Lab Results   Component Value Date    HGBA1C 5.00 08/23/2020     Lab Results   Component Value Date    TSH 0.793 12/21/2019       Blood Culture   Date Value Ref Range Status   10/02/2020 Abnormal Stain (C)  Preliminary   10/02/2020 No growth at 24 hours  Preliminary     No results found for: URINECX  No results found for: WOUNDCX  No results found for: STOOLCX  No results found for: RESPCX  Pain Management Panel     Pain Management Panel Latest Ref Rng & Units 10/19/2014    AMPHETAMINES SCREEN, URINE NEGATIVE Negative    BARBITURATES SCREEN NEGATIVE Negative    BENZODIAZEPINE SCREEN, URINE NEGATIVE Negative    COCAINE SCREEN, URINE NEGATIVE Negative    METHADONE SCREEN, URINE NEGATIVE Negative            ----------------------------------------------------------------------------------------------------------------------  Imaging Results (Last 24 Hours)     ** No results found for the last 24 hours. **          ----------------------------------------------------------------------------------------------------------------------    Assessment/Plan       Assessment/Plan     ASSESSMENT:    1.  COVID-19  2.  Lower extremity cellulitis    PLAN:    Case discussed with primary RN.  Patient overall doing well today.  Occasional use of 2 L per nasal cannula with no apparent distress.  WBC improving at 13.48.  CRP elevated at 19.51.    Chest x-ray unremarkable.  Patient unable to complete CT scan due to size.     At this time we do not feel patient is a candidate for Remdesivir or convalescent plasma  therapy.  If any respiratory worsening would consider initiating this treatment.     We recommend to continue Rocephin 2 g IV every 24 hours and vancomycin pharmacy to dose.  We will continue to follow closely and adjust antibiotic therapy as needed.    Code Status:   Code Status and Medical Interventions:   Ordered at: 10/03/20 0236     Level Of Support Discussed With:    Patient     Code Status:    CPR     Medical Interventions (Level of Support Prior to Arrest):    Full       KHUSHBU Silva  10/04/20  13:52 EDT

## 2020-10-04 NOTE — PROGRESS NOTES
Pharmacy note :    Received a communication from microbiology for a positive culture in 1 / 3 bottles for gram + cocci in chains - no species detected.  The patient continues on coverage with rocephin / vancomycin.  Will continue to follow with you.

## 2020-10-04 NOTE — PLAN OF CARE
Goal Outcome Evaluation:  Plan of Care Reviewed With: patient  Progress: no change  Outcome Summary: VSS with a low grade temperature towards the end of shift. Pt contiues on 2L NC and tolerating well. He has been resting throughout the shift. Call light within reach. Will continue to monitor.      Problem: Adult Inpatient Plan of Care  Goal: Plan of Care Review  Outcome: Ongoing, Progressing  Flowsheets (Taken 10/4/2020 0443)  Progress: no change  Plan of Care Reviewed With: patient  Outcome Summary: VSS with a low grade temperature towards the end of shift. Pt contiues on 2L NC and tolerating well. He has been resting throughout the shift. Call light within reach. Will continue to monitor.  Goal: Patient-Specific Goal (Individualized)  Outcome: Ongoing, Progressing  Goal: Absence of Hospital-Acquired Illness or Injury  Outcome: Ongoing, Progressing  Intervention: Identify and Manage Fall Risk  Recent Flowsheet Documentation  Taken 10/4/2020 0300 by Martha Platt, RN  Safety Promotion/Fall Prevention:   activity supervised   assistive device/personal items within reach   clutter free environment maintained   fall prevention program maintained   nonskid shoes/slippers when out of bed   room organization consistent   safety round/check completed  Taken 10/4/2020 0100 by Martha Platt, RN  Safety Promotion/Fall Prevention:   activity supervised   assistive device/personal items within reach   clutter free environment maintained   fall prevention program maintained   nonskid shoes/slippers when out of bed   room organization consistent   safety round/check completed  Taken 10/3/2020 2300 by Martha Platt, RN  Safety Promotion/Fall Prevention:   activity supervised   assistive device/personal items within reach   clutter free environment maintained   fall prevention program maintained   nonskid shoes/slippers when out of bed   room organization consistent   safety round/check completed  Taken 10/3/2020 2100 by Giulia  HERNANDEZ Thomas  Safety Promotion/Fall Prevention:   activity supervised   assistive device/personal items within reach   clutter free environment maintained   fall prevention program maintained   nonskid shoes/slippers when out of bed   room organization consistent   safety round/check completed  Taken 10/3/2020 1900 by Martha Platt RN  Safety Promotion/Fall Prevention:   activity supervised   assistive device/personal items within reach   clutter free environment maintained   fall prevention program maintained   nonskid shoes/slippers when out of bed   room organization consistent   safety round/check completed  Intervention: Prevent and Manage VTE (venous thromboembolism) Risk  Recent Flowsheet Documentation  Taken 10/4/2020 0220 by Martha Platt RN  VTE Prevention/Management: (see MAR) other (see comments)  Taken 10/3/2020 2030 by Martha Platt RN  VTE Prevention/Management: (see MAR) other (see comments)  Intervention: Prevent Infection  Recent Flowsheet Documentation  Taken 10/4/2020 0300 by Martha Platt RN  Infection Prevention:   rest/sleep promoted   single patient room provided  Taken 10/4/2020 0100 by Martha Platt RN  Infection Prevention:   single patient room provided   rest/sleep promoted  Taken 10/3/2020 2300 by Martha Platt RN  Infection Prevention:   single patient room provided   rest/sleep promoted  Taken 10/3/2020 2100 by Martha Platt RN  Infection Prevention:   single patient room provided   rest/sleep promoted  Taken 10/3/2020 1900 by Martha Platt RN  Infection Prevention:   single patient room provided   rest/sleep promoted  Goal: Optimal Comfort and Wellbeing  Outcome: Ongoing, Progressing  Intervention: Provide Person-Centered Care  Recent Flowsheet Documentation  Taken 10/4/2020 0220 by Martha Platt RN  Trust Relationship/Rapport:   care explained   choices provided   questions answered   thoughts/feelings acknowledged  Taken 10/3/2020 2030 by Martha Platt RN  Trust Relationship/Rapport:    care explained   choices provided   emotional support provided   questions answered   questions encouraged   thoughts/feelings acknowledged   reassurance provided  Goal: Readiness for Transition of Care  Outcome: Ongoing, Progressing     Problem: Fall Injury Risk  Goal: Absence of Fall and Fall-Related Injury  Outcome: Ongoing, Progressing  Intervention: Identify and Manage Contributors to Fall Injury Risk  Recent Flowsheet Documentation  Taken 10/4/2020 0300 by Martha Platt RN  Medication Review/Management: medications reviewed  Taken 10/4/2020 0100 by Martha Platt RN  Medication Review/Management: medications reviewed  Taken 10/3/2020 2300 by Martha Platt RN  Medication Review/Management: medications reviewed  Taken 10/3/2020 2100 by Martha Platt RN  Medication Review/Management: medications reviewed  Taken 10/3/2020 1900 by Martha Platt RN  Medication Review/Management: medications reviewed  Intervention: Promote Injury-Free Environment  Recent Flowsheet Documentation  Taken 10/4/2020 0300 by Martha Platt RN  Safety Promotion/Fall Prevention:   activity supervised   assistive device/personal items within reach   clutter free environment maintained   fall prevention program maintained   nonskid shoes/slippers when out of bed   room organization consistent   safety round/check completed  Taken 10/4/2020 0100 by Martha Platt RN  Safety Promotion/Fall Prevention:   activity supervised   assistive device/personal items within reach   clutter free environment maintained   fall prevention program maintained   nonskid shoes/slippers when out of bed   room organization consistent   safety round/check completed  Taken 10/3/2020 2300 by Martha Platt RN  Safety Promotion/Fall Prevention:   activity supervised   assistive device/personal items within reach   clutter free environment maintained   fall prevention program maintained   nonskid shoes/slippers when out of bed   room organization consistent   safety  round/check completed  Taken 10/3/2020 2100 by Martha Platt RN  Safety Promotion/Fall Prevention:   activity supervised   assistive device/personal items within reach   clutter free environment maintained   fall prevention program maintained   nonskid shoes/slippers when out of bed   room organization consistent   safety round/check completed  Taken 10/3/2020 1900 by Martha Platt RN  Safety Promotion/Fall Prevention:   activity supervised   assistive device/personal items within reach   clutter free environment maintained   fall prevention program maintained   nonskid shoes/slippers when out of bed   room organization consistent   safety round/check completed     Problem: Skin Injury Risk Increased  Goal: Skin Health and Integrity  Outcome: Ongoing, Progressing  Intervention: Optimize Skin Protection  Recent Flowsheet Documentation  Taken 10/4/2020 0220 by Martha Platt RN  Pressure Reduction Techniques:   frequent weight shift encouraged   weight shift assistance provided  Pressure Reduction Devices:   pressure-redistributing mattress utilized   positioning supports utilized  Skin Protection: adhesive use limited  Taken 10/3/2020 2030 by Martha Platt RN  Pressure Reduction Techniques:   frequent weight shift encouraged   weight shift assistance provided  Pressure Reduction Devices:   pressure-redistributing mattress utilized   positioning supports utilized  Skin Protection: adhesive use limited     Problem: Asthma Comorbidity  Goal: Maintenance of Asthma Control  Outcome: Ongoing, Progressing  Intervention: Maintain Asthma Symptom Control  Recent Flowsheet Documentation  Taken 10/4/2020 0300 by Martha Platt RN  Medication Review/Management: medications reviewed  Taken 10/4/2020 0100 by Martha Platt RN  Medication Review/Management: medications reviewed  Taken 10/3/2020 2300 by Martha Platt, RN  Medication Review/Management: medications reviewed  Taken 10/3/2020 2100 by Martha Platt, HERNANDEZ  Medication  Review/Management: medications reviewed  Taken 10/3/2020 1900 by Martha Platt RN  Medication Review/Management: medications reviewed     Problem: COPD Comorbidity  Goal: Maintenance of COPD Symptom Control  Outcome: Ongoing, Progressing  Intervention: Maintain COPD-Symptom Control  Recent Flowsheet Documentation  Taken 10/4/2020 0300 by Martha Platt RN  Medication Review/Management: medications reviewed  Taken 10/4/2020 0100 by Martha Platt RN  Medication Review/Management: medications reviewed  Taken 10/3/2020 2300 by Martha Platt RN  Medication Review/Management: medications reviewed  Taken 10/3/2020 2100 by Martha Platt RN  Medication Review/Management: medications reviewed  Taken 10/3/2020 1900 by Martha Platt RN  Medication Review/Management: medications reviewed     Problem: Diabetes Comorbidity  Goal: Blood Glucose Level Within Desired Range  Outcome: Ongoing, Progressing     Problem: Hypertension Comorbidity  Goal: Blood Pressure in Desired Range  Outcome: Ongoing, Progressing  Intervention: Maintain Hypertension-Management Strategies  Recent Flowsheet Documentation  Taken 10/4/2020 0300 by Martha Platt RN  Medication Review/Management: medications reviewed  Taken 10/4/2020 0100 by Martha Platt RN  Medication Review/Management: medications reviewed  Taken 10/3/2020 2300 by Martha Platt RN  Medication Review/Management: medications reviewed  Taken 10/3/2020 2100 by Martha Platt RN  Medication Review/Management: medications reviewed  Taken 10/3/2020 1900 by Martha Platt RN  Medication Review/Management: medications reviewed     Problem: Obstructive Sleep Apnea Risk or Actual (Comorbidity Management)  Goal: Unobstructed Breathing During Sleep  Outcome: Ongoing, Progressing     Problem: Pain Chronic (Persistent) (Comorbidity Management)  Goal: Acceptable Pain Control and Functional Ability  Outcome: Ongoing, Progressing  Intervention: Develop Pain Management Plan  Recent Flowsheet Documentation  Taken  10/3/2020 2030 by Martha Platt RN  Pain Management Interventions: see MAR  Intervention: Manage Persistent Pain  Recent Flowsheet Documentation  Taken 10/4/2020 0300 by Martha Platt RN  Medication Review/Management: medications reviewed  Taken 10/4/2020 0220 by Matrha Platt RN  Bowel Elimination Promotion: adequate fluid intake promoted  Sleep/Rest Enhancement:   awakenings minimized   regular sleep/rest pattern promoted   consistent schedule promoted  Taken 10/4/2020 0100 by Martha Platt RN  Medication Review/Management: medications reviewed  Taken 10/3/2020 2300 by Martha Platt RN  Medication Review/Management: medications reviewed  Taken 10/3/2020 2100 by Martha Platt RN  Medication Review/Management: medications reviewed  Taken 10/3/2020 2030 by Martha Platt RN  Sleep/Rest Enhancement:   awakenings minimized   consistent schedule promoted   regular sleep/rest pattern promoted   room darkened  Taken 10/3/2020 1900 by Martha Platt RN  Medication Review/Management: medications reviewed  Intervention: Optimize Psychosocial Wellbeing  Recent Flowsheet Documentation  Taken 10/4/2020 0220 by Martha Platt RN  Supportive Measures:   active listening utilized   self-care encouraged  Diversional Activities:   television   smartphone  Family/Support System Care:   support provided   self-care encouraged  Taken 10/3/2020 2030 by Martha Platt RN  Supportive Measures:   active listening utilized   self-care encouraged  Diversional Activities:   television   smartphone  Family/Support System Care:   support provided   self-care encouraged

## 2020-10-04 NOTE — PROGRESS NOTES
[facility]     Patient Identification:  Name:  Jimmy Recinos  Age:  44 y.o.  Sex:  male  :  1975  MRN:  [id number]  Visit Number:  [id number]  Primary Care Provider:  Provider, No Known    Length of stay:  1    Chief complaint: Left knee pain    Subjective:    Patient reports his left knee pain is about the same compared to yesterday.  He denies any fevers or chills.  Denies any other new events overnight.  Patient does report feeling somewhat more sleepy today and that he would like to take a nap.  He denies any shortness of breath, cough or any respiratory symptoms at this time.  ----------------------------------------------------------------------------------------------------------------------  Current Hospital Meds:  cefTRIAXone, 2 g, Intravenous, Q24H  heparin (porcine), 5,000 Units, Subcutaneous, Q12H  sodium chloride, 10 mL, Intravenous, Q12H  vancomycin, 2,500 mg, Intravenous, Q12H      Pharmacy to dose vancomycin,   sodium chloride, 75 mL/hr, Last Rate: 75 mL/hr (10/03/20 2338)      ----------------------------------------------------------------------------------------------------------------------  Vital Signs:  Temp:  [98.4 °F (36.9 °C)-99.5 °F (37.5 °C)] 99.1 °F (37.3 °C)  Heart Rate:  [] 88  Resp:  [12-31] 15  BP: (114-178)/() 134/65      10/03/20  0400 10/04/20  0400 10/04/20  0600   Weight: (!) 268 kg (590 lb 6.3 oz) (!) 268 kg (591 lb 8 oz) (!) 268 kg (590 lb 13.3 oz)     Body mass index is 84.78 kg/m².    Intake/Output Summary (Last 24 hours) at 10/4/2020 1301  Last data filed at 10/4/2020 1200  Gross per 24 hour   Intake 3385.94 ml   Output 1700 ml   Net 1685.94 ml     Diet Regular  ----------------------------------------------------------------------------------------------------------------------  Physical exam:  This physical exam has been personally performed remotely in the unit aided by real-time audio/visual communication  tools.The use of a video visit has been reviewed with the patient and verbal informed consent has been obtained.     Physical Exam:  General: Patient appears awake, alert, and in no acute distress.  Head: Normocephalic, atraumatic  Eyes: EOMI. Conjunctivae and sclerae normal.  Ears: Ears appear intact with no abnormalities noted.   Neck: Trachea midline. No obvious JVD.  Lungs: Respirations appear to be regular, even and unlabored with no signs of respiratory distress. No audible wheezing.  Abdomen: No obvious abdominal distension.  MS: Muscle tone appears normal. No gross deformities.  Extremities: No clubbing, cyanosis or edema noted.  Skin: Erythematous circumferential rash around the left knee  Neurologic: Alert and oriented x3. No gross focal deficits.     ----------------------------------------------------------------------------------------------------------------------  Tele:    ----------------------------------------------------------------------------------------------------------------------      Results from last 7 days   Lab Units 10/04/20  0708 10/04/20  0507 10/03/20  0730 10/02/20  2326   CRP mg/dL  --  19.51*  --  14.02*   LACTATE mmol/L  --   --   --  1.1   WBC 10*3/mm3 10.09  --  13.48* 14.09*   HEMOGLOBIN g/dL 11.1*  --  11.2* 12.0*   HEMATOCRIT % 38.1  --  37.6 40.6   MCV fL 91.1  --  89.5 88.8   MCHC g/dL 29.1*  --  29.8* 29.6*   PLATELETS 10*3/mm3 241  --  246 266         Results from last 7 days   Lab Units 10/04/20  0507 10/03/20  0730 10/02/20  2326   SODIUM mmol/L 131* 135* 139   POTASSIUM mmol/L 4.1 4.0 4.0   CHLORIDE mmol/L 102 102 102   CO2 mmol/L 20.1* 23.4 26.6   BUN mg/dL 9 14 16   CREATININE mg/dL 0.48* 0.47* 0.68*   EGFR IF NONAFRICN AM mL/min/1.73 >150 >150 127   CALCIUM mg/dL 7.8* 8.0* 8.4*   GLUCOSE mg/dL 101* 118* 141*   ALBUMIN g/dL  --  2.53* 2.97*   BILIRUBIN mg/dL  --  0.8 0.5   ALK PHOS U/L  --  65 71   AST (SGOT) U/L  --  22 23   ALT (SGPT) U/L  --  21 24   Estimated  Creatinine Clearance: 419.4 mL/min (A) (by C-G formula based on SCr of 0.48 mg/dL (L)).    No results found for: AMMONIA      No results found for: BLOODCX  No results found for: URINECX  No results found for: WOUNDCX  No results found for: STOOLCX    I have personally looked at the labs and they are summarized above.  ----------------------------------------------------------------------------------------------------------------------  Imaging Results (Last 24 Hours)     ** No results found for the last 24 hours. **        ----------------------------------------------------------------------------------------------------------------------  Assessment and Plan:    1.  Sepsis -present on admission, continue to treat underlying etiology which is likely left knee cellulitis.  Continue vancomycin and Rocephin per infectious disease recommendations.    2.  Left knee cellulitis -see #1    3.  COVID-19 infection -patient is currently asymptomatic.  No indication for Decadron, convalescent plasma or Remdesivir at this time.  Appreciate infectious disease recommendations.    4.  Super morbid obesity -complicates all aspects of care    5.  Muscular dystrophy    6.  Essential hypertension -continue home antihypertensive medications, continue to monitor closely and make medication adjustments as necessary            Nader Noel,   10/04/20  13:01 EDT

## 2020-10-04 NOTE — PLAN OF CARE
Goal Outcome Evaluation:  Plan of Care Reviewed With: patient  Progress: no change  Outcome Summary: VSS, has had low grade temp of 99 this shift. Patient on 2L NC but can tolerate room air, only on NC because he has been napping throughout the day. Skin assessment has been very difficult to complete this shift as patient refuses turns and large size makes it difficult to visualize all skin areas. Cellulitus area on left leg marked and will continue to monitor.

## 2020-10-05 LAB
ALBUMIN SERPL-MCNC: 2.63 G/DL (ref 3.5–5.2)
ALBUMIN/GLOB SERPL: 0.6 G/DL
ALP SERPL-CCNC: 79 U/L (ref 39–117)
ALT SERPL W P-5'-P-CCNC: 21 U/L (ref 1–41)
ANION GAP SERPL CALCULATED.3IONS-SCNC: 8 MMOL/L (ref 5–15)
AST SERPL-CCNC: 20 U/L (ref 1–40)
BASOPHILS # BLD AUTO: 0.01 10*3/MM3 (ref 0–0.2)
BASOPHILS NFR BLD AUTO: 0.1 % (ref 0–1.5)
BILIRUB SERPL-MCNC: 0.2 MG/DL (ref 0–1.2)
BUN SERPL-MCNC: 8 MG/DL (ref 6–20)
BUN/CREAT SERPL: 19 (ref 7–25)
CALCIUM SPEC-SCNC: 8.3 MG/DL (ref 8.6–10.5)
CHLORIDE SERPL-SCNC: 101 MMOL/L (ref 98–107)
CK SERPL-CCNC: 23 U/L (ref 20–200)
CO2 SERPL-SCNC: 28 MMOL/L (ref 22–29)
CREAT SERPL-MCNC: 0.42 MG/DL (ref 0.76–1.27)
CRP SERPL-MCNC: 15.56 MG/DL (ref 0–0.5)
D DIMER PPP FEU-MCNC: 1.17 MCGFEU/ML (ref 0–0.5)
DEPRECATED RDW RBC AUTO: 50.2 FL (ref 37–54)
EOSINOPHIL # BLD AUTO: 0.48 10*3/MM3 (ref 0–0.4)
EOSINOPHIL NFR BLD AUTO: 4.8 % (ref 0.3–6.2)
ERYTHROCYTE [DISTWIDTH] IN BLOOD BY AUTOMATED COUNT: 15.3 % (ref 12.3–15.4)
FERRITIN SERPL-MCNC: 189.3 NG/ML (ref 30–400)
FIBRINOGEN PPP-MCNC: 740 MG/DL (ref 173–524)
GFR SERPL CREATININE-BSD FRML MDRD: >150 ML/MIN/1.73
GLOBULIN UR ELPH-MCNC: 4.4 GM/DL
GLUCOSE SERPL-MCNC: 111 MG/DL (ref 65–99)
HCT VFR BLD AUTO: 37.2 % (ref 37.5–51)
HGB BLD-MCNC: 11 G/DL (ref 13–17.7)
IMM GRANULOCYTES # BLD AUTO: 0.07 10*3/MM3 (ref 0–0.05)
IMM GRANULOCYTES NFR BLD AUTO: 0.7 % (ref 0–0.5)
LDH SERPL-CCNC: 238 U/L (ref 135–225)
LYMPHOCYTES # BLD AUTO: 0.75 10*3/MM3 (ref 0.7–3.1)
LYMPHOCYTES NFR BLD AUTO: 7.6 % (ref 19.6–45.3)
MCH RBC QN AUTO: 26.6 PG (ref 26.6–33)
MCHC RBC AUTO-ENTMCNC: 29.6 G/DL (ref 31.5–35.7)
MCV RBC AUTO: 90.1 FL (ref 79–97)
MONOCYTES # BLD AUTO: 0.43 10*3/MM3 (ref 0.1–0.9)
MONOCYTES NFR BLD AUTO: 4.3 % (ref 5–12)
NEUTROPHILS NFR BLD AUTO: 8.19 10*3/MM3 (ref 1.7–7)
NEUTROPHILS NFR BLD AUTO: 82.5 % (ref 42.7–76)
NRBC BLD AUTO-RTO: 0 /100 WBC (ref 0–0.2)
PLATELET # BLD AUTO: 238 10*3/MM3 (ref 140–450)
PMV BLD AUTO: 10.5 FL (ref 6–12)
POTASSIUM SERPL-SCNC: 3.8 MMOL/L (ref 3.5–5.2)
PROT SERPL-MCNC: 7 G/DL (ref 6–8.5)
RBC # BLD AUTO: 4.13 10*6/MM3 (ref 4.14–5.8)
SODIUM SERPL-SCNC: 137 MMOL/L (ref 136–145)
VANCOMYCIN TROUGH SERPL-MCNC: 11.4 MCG/ML (ref 5–20)
WBC # BLD AUTO: 9.93 10*3/MM3 (ref 3.4–10.8)

## 2020-10-05 PROCEDURE — 80202 ASSAY OF VANCOMYCIN: CPT | Performed by: INTERNAL MEDICINE

## 2020-10-05 PROCEDURE — C1751 CATH, INF, PER/CENT/MIDLINE: HCPCS

## 2020-10-05 PROCEDURE — 85384 FIBRINOGEN ACTIVITY: CPT | Performed by: INTERNAL MEDICINE

## 2020-10-05 PROCEDURE — 80053 COMPREHEN METABOLIC PANEL: CPT | Performed by: INTERNAL MEDICINE

## 2020-10-05 PROCEDURE — 86140 C-REACTIVE PROTEIN: CPT | Performed by: INTERNAL MEDICINE

## 2020-10-05 PROCEDURE — 83615 LACTATE (LD) (LDH) ENZYME: CPT | Performed by: INTERNAL MEDICINE

## 2020-10-05 PROCEDURE — 94799 UNLISTED PULMONARY SVC/PX: CPT

## 2020-10-05 PROCEDURE — 85025 COMPLETE CBC W/AUTO DIFF WBC: CPT | Performed by: INTERNAL MEDICINE

## 2020-10-05 PROCEDURE — 25010000002 CEFTRIAXONE PER 250 MG: Performed by: INTERNAL MEDICINE

## 2020-10-05 PROCEDURE — 82728 ASSAY OF FERRITIN: CPT | Performed by: INTERNAL MEDICINE

## 2020-10-05 PROCEDURE — 97116 GAIT TRAINING THERAPY: CPT

## 2020-10-05 PROCEDURE — 99232 SBSQ HOSP IP/OBS MODERATE 35: CPT | Performed by: INTERNAL MEDICINE

## 2020-10-05 PROCEDURE — 82550 ASSAY OF CK (CPK): CPT | Performed by: INTERNAL MEDICINE

## 2020-10-05 PROCEDURE — 85379 FIBRIN DEGRADATION QUANT: CPT | Performed by: INTERNAL MEDICINE

## 2020-10-05 PROCEDURE — 25010000002 HEPARIN (PORCINE) PER 1000 UNITS: Performed by: HOSPITALIST

## 2020-10-05 PROCEDURE — 36410 VNPNXR 3YR/> PHY/QHP DX/THER: CPT

## 2020-10-05 PROCEDURE — 97162 PT EVAL MOD COMPLEX 30 MIN: CPT

## 2020-10-05 RX ORDER — SODIUM CHLORIDE 0.9 % (FLUSH) 0.9 %
10 SYRINGE (ML) INJECTION EVERY 12 HOURS SCHEDULED
Status: DISCONTINUED | OUTPATIENT
Start: 2020-10-05 | End: 2020-10-11 | Stop reason: HOSPADM

## 2020-10-05 RX ORDER — SODIUM CHLORIDE 0.9 % (FLUSH) 0.9 %
10 SYRINGE (ML) INJECTION AS NEEDED
Status: DISCONTINUED | OUTPATIENT
Start: 2020-10-05 | End: 2020-10-11 | Stop reason: HOSPADM

## 2020-10-05 RX ADMIN — HEPARIN SODIUM 5000 UNITS: 5000 INJECTION INTRAVENOUS; SUBCUTANEOUS at 20:01

## 2020-10-05 RX ADMIN — HEPARIN SODIUM 5000 UNITS: 5000 INJECTION INTRAVENOUS; SUBCUTANEOUS at 09:16

## 2020-10-05 RX ADMIN — SODIUM CHLORIDE 75 ML/HR: 9 INJECTION, SOLUTION INTRAVENOUS at 09:12

## 2020-10-05 RX ADMIN — SODIUM CHLORIDE, PRESERVATIVE FREE 10 ML: 5 INJECTION INTRAVENOUS at 09:16

## 2020-10-05 RX ADMIN — HYDROCODONE BITARTRATE AND ACETAMINOPHEN 1 TABLET: 5; 325 TABLET ORAL at 20:01

## 2020-10-05 RX ADMIN — VANCOMYCIN HYDROCHLORIDE 2500 MG: 1 INJECTION, POWDER, LYOPHILIZED, FOR SOLUTION INTRAVENOUS at 13:49

## 2020-10-05 RX ADMIN — SODIUM CHLORIDE, PRESERVATIVE FREE 10 ML: 5 INJECTION INTRAVENOUS at 20:02

## 2020-10-05 RX ADMIN — HYDROCODONE BITARTRATE AND ACETAMINOPHEN 1 TABLET: 5; 325 TABLET ORAL at 09:15

## 2020-10-05 RX ADMIN — CEFTRIAXONE 2 G: 2 INJECTION, POWDER, FOR SOLUTION INTRAMUSCULAR; INTRAVENOUS at 12:22

## 2020-10-05 NOTE — PROGRESS NOTES
PROGRESS NOTE         Patient Identification:  Name:  Jimmy Recinos  Age:  44 y.o.  Sex:  male  :  1975  MRN:  [id number]  Visit Number:  [id number]  Primary Care Provider:  Provider, No Known         LOS: 2 days       ----------------------------------------------------------------------------------------------------------------------  Subjective       Chief Complaints:    Cellulitis        Interval History:      Case discussed with primary RN.  Patient doing well today.  Nurse reports mild expiratory wheezes and decreased lung sounds in the bases on lung exam.  Afebrile, no diarrhea.  WBC normal.  CRP improving at 15.56.  Blood cultures from 10/2/2020 1 out of 2 sets positive for Streptococcus alphahemolytic.    Review of Systems:    Constitutional:  No fever, chills.  No night sweats. No appetite change or unexpected weight change.  Positive fatigue.  Eyes: no eye drainage, itching or redness.  HEENT: no mouth sores, dysphagia or nose bleed.  Respiratory: no for shortness of breath, cough or production of sputum.  Cardiovascular: no chest pain, no palpitations, no orthopnea.  Gastrointestinal: no nausea, vomiting or diarrhea. No abdominal pain, hematemesis or rectal bleeding.  Genitourinary: no dysuria or polyuria.  Hematologic/lymphatic: no lymph node abnormalities, no easy bruising or easy bleeding.  Musculoskeletal: Left lower extremity pain.  Skin: No rash and no itching.  Neurological: no loss of consciousness, no seizure, no headache.  Behavioral/Psych: no depression or suicidal ideation.  Endocrine: no hot flashes.  Immunologic: negative.       ----------------------------------------------------------------------------------------------------------------------      Objective       \[facility]\"" Meds:  cefTRIAXone, 2 g, Intravenous, Q24H  heparin (porcine), 5,000 Units, Subcutaneous, Q12H  sodium chloride, 10 mL, Intravenous, Q12H  vancomycin, 2,500 mg, Intravenous,  Q12H      Pharmacy to dose vancomycin,   sodium chloride, 75 mL/hr, Last Rate: 75 mL/hr (10/05/20 0912)      ----------------------------------------------------------------------------------------------------------------------    Vital Signs:  Temp:  [98.2 °F (36.8 °C)-99.2 °F (37.3 °C)] 99.2 °F (37.3 °C)  Heart Rate:  [83-96] 90  Resp:  [15-27] 27  BP: (132-162)/(58-99) 155/58  Mean Arterial Pressure (Non-Invasive) for the past 24 hrs (Last 3 readings):   Noninvasive MAP (mmHg)   10/05/20 1005 106   10/05/20 0902 109   10/05/20 0802 107     SpO2 Percentage    10/05/20 0802 10/05/20 0902 10/05/20 1005   SpO2: 92% 93% 93%     SpO2:  [90 %-97 %] 93 %  on  Flow (L/min):  [2] 2;   Device (Oxygen Therapy): nasal cannula    Body mass index is 84.87 kg/m².  Wt Readings from Last 3 Encounters:   10/05/20 (!) 268 kg (591 lb 8 oz)   08/23/20 (!) 295 kg (650 lb)   01/02/20 (!) 273 kg (601 lb)        Intake/Output Summary (Last 24 hours) at 10/5/2020 1148  Last data filed at 10/5/2020 0915  Gross per 24 hour   Intake 3893.6 ml   Output 2650 ml   Net 1243.6 ml     Diet Regular  ----------------------------------------------------------------------------------------------------------------------      Physical Exam:    Deferred due to COVID-19 isolation.  ----------------------------------------------------------------------------------------------------------------------  Results from last 7 days   Lab Units 10/05/20  0243   CK TOTAL U/L 23           Results from last 7 days   Lab Units 10/05/20  0243 10/04/20  0708 10/04/20  0507 10/03/20  0730 10/02/20  2326   CRP mg/dL 15.56*  --  19.51*  --  14.02*   LACTATE mmol/L  --   --   --   --  1.1   WBC 10*3/mm3 9.93 10.09  --  13.48* 14.09*   HEMOGLOBIN g/dL 11.0* 11.1*  --  11.2* 12.0*   HEMATOCRIT % 37.2* 38.1  --  37.6 40.6   MCV fL 90.1 91.1  --  89.5 88.8   MCHC g/dL 29.6* 29.1*  --  29.8* 29.6*   PLATELETS 10*3/mm3 238 241  --  246 266     Results from last 7 days   Lab Units  10/05/20  0243 10/04/20  0507 10/03/20  0730 10/02/20  2326   SODIUM mmol/L 137 131* 135* 139   POTASSIUM mmol/L 3.8 4.1 4.0 4.0   CHLORIDE mmol/L 101 102 102 102   CO2 mmol/L 28.0 20.1* 23.4 26.6   BUN mg/dL 8 9 14 16   CREATININE mg/dL 0.42* 0.48* 0.47* 0.68*   EGFR IF NONAFRICN AM mL/min/1.73 >150 >150 >150 127   CALCIUM mg/dL 8.3* 7.8* 8.0* 8.4*   GLUCOSE mg/dL 111* 101* 118* 141*   ALBUMIN g/dL 2.63*  --  2.53* 2.97*   BILIRUBIN mg/dL 0.2  --  0.8 0.5   ALK PHOS U/L 79  --  65 71   AST (SGOT) U/L 20  --  22 23   ALT (SGPT) U/L 21  --  21 24   Estimated Creatinine Clearance: 479.4 mL/min (A) (by C-G formula based on SCr of 0.42 mg/dL (L)).  No results found for: AMMONIA    No results found for: HGBA1C, POCGLU  Lab Results   Component Value Date    HGBA1C 5.00 08/23/2020     Lab Results   Component Value Date    TSH 0.793 12/21/2019       Blood Culture   Date Value Ref Range Status   10/02/2020 Abnormal Stain (C)  Preliminary   10/02/2020 No growth at 24 hours  Preliminary     No results found for: URINECX  No results found for: WOUNDCX  No results found for: STOOLCX  No results found for: RESPCX  Pain Management Panel     Pain Management Panel Latest Ref Rng & Units 10/19/2014    AMPHETAMINES SCREEN, URINE NEGATIVE Negative    BARBITURATES SCREEN NEGATIVE Negative    BENZODIAZEPINE SCREEN, URINE NEGATIVE Negative    COCAINE SCREEN, URINE NEGATIVE Negative    METHADONE SCREEN, URINE NEGATIVE Negative            ----------------------------------------------------------------------------------------------------------------------  Imaging Results (Last 24 Hours)     ** No results found for the last 24 hours. **          ----------------------------------------------------------------------------------------------------------------------    Assessment/Plan       Assessment/Plan     ASSESSMENT:    1.  COVID-19  2.  Lower extremity cellulitis      PLAN:    Case discussed with primary RN.  Patient doing well today.   Nurse reports mild expiratory wheezes and decreased lung sounds in the bases on lung exam.  Afebrile, no diarrhea.  WBC normal.  CRP improving at 15.56.  Blood cultures from 10/2/2020 1 out of 2 sets positive for Streptococcus alphahemolytic.    Chest x-ray unremarkable.  Patient unable to complete CT scan due to size.     At this time we do not feel patient is a candidate for Remdesivir or convalescent plasma therapy.  If any respiratory worsening would consider initiating this treatment.     We recommend to continue Rocephin 2 g IV every 24 hours and vancomycin pharmacy to dose.  We will continue to follow closely and adjust antibiotic therapy as needed.    Code Status:   Code Status and Medical Interventions:   Ordered at: 10/03/20 0236     Level Of Support Discussed With:    Patient     Code Status:    CPR     Medical Interventions (Level of Support Prior to Arrest):    Full       Scribed for Rosita Kendrick MD.      Yolanda Gross, KHUSHBU  10/05/20  11:48 EDT

## 2020-10-05 NOTE — PLAN OF CARE
Goal Outcome Evaluation:  Plan of Care Reviewed With: patient  Progress: no change  Outcome Summary: VSS and remained afebrile through shift. Patient continued using 2L NC d/t sleep apnea and he is tolerating well. Has complained of a headache and received medication to help subside it. Call light within reach. Will continue to monitor.      Problem: Adult Inpatient Plan of Care  Goal: Plan of Care Review  Outcome: Ongoing, Progressing  Flowsheets (Taken 10/5/2020 0318)  Progress: no change  Plan of Care Reviewed With: patient  Outcome Summary: VSS and remained afebrile through shift. Patient continued using 2L NC d/t sleep apnea and he is tolerating well. Has complained of a headache and received medication to help subside it. Call light within reach. Will continue to monitor.  Goal: Patient-Specific Goal (Individualized)  Outcome: Ongoing, Progressing  Goal: Absence of Hospital-Acquired Illness or Injury  Outcome: Ongoing, Progressing  Intervention: Identify and Manage Fall Risk  Recent Flowsheet Documentation  Taken 10/5/2020 0300 by Martha Platt, RN  Safety Promotion/Fall Prevention:   activity supervised   assistive device/personal items within reach   clutter free environment maintained   fall prevention program maintained   nonskid shoes/slippers when out of bed   room organization consistent   safety round/check completed  Taken 10/5/2020 0100 by Martha Platt, RN  Safety Promotion/Fall Prevention:   activity supervised   assistive device/personal items within reach   clutter free environment maintained   fall prevention program maintained   nonskid shoes/slippers when out of bed   room organization consistent   safety round/check completed  Taken 10/4/2020 2300 by Martha Platt, RN  Safety Promotion/Fall Prevention:   activity supervised   assistive device/personal items within reach   clutter free environment maintained   fall prevention program maintained   nonskid shoes/slippers when out of bed   room  organization consistent   safety round/check completed  Taken 10/4/2020 2100 by Martha Platt RN  Safety Promotion/Fall Prevention:   activity supervised   assistive device/personal items within reach   clutter free environment maintained   fall prevention program maintained   nonskid shoes/slippers when out of bed   room organization consistent   safety round/check completed  Taken 10/4/2020 1900 by Martha Platt RN  Safety Promotion/Fall Prevention:   activity supervised   assistive device/personal items within reach   clutter free environment maintained   fall prevention program maintained   nonskid shoes/slippers when out of bed   room organization consistent   safety round/check completed  Intervention: Prevent and Manage VTE (venous thromboembolism) Risk  Recent Flowsheet Documentation  Taken 10/4/2020 2027 by Martha Platt RN  VTE Prevention/Management: (see MAR) other (see comments)  Intervention: Prevent Infection  Recent Flowsheet Documentation  Taken 10/5/2020 0300 by Martha Platt RN  Infection Prevention:   rest/sleep promoted   single patient room provided  Taken 10/5/2020 0100 by Martha Platt RN  Infection Prevention:   rest/sleep promoted   single patient room provided  Taken 10/4/2020 2300 by Martha Platt RN  Infection Prevention:   rest/sleep promoted   single patient room provided  Taken 10/4/2020 2100 by Martha Platt RN  Infection Prevention:   single patient room provided   rest/sleep promoted  Taken 10/4/2020 1900 by Martha Platt RN  Infection Prevention:   single patient room provided   rest/sleep promoted  Goal: Optimal Comfort and Wellbeing  Outcome: Ongoing, Progressing  Intervention: Provide Person-Centered Care  Recent Flowsheet Documentation  Taken 10/5/2020 0215 by Martha Platt RN  Trust Relationship/Rapport:   care explained   choices provided   questions answered   thoughts/feelings acknowledged  Taken 10/4/2020 2027 by Martha Platt RN  Trust Relationship/Rapport:   care  explained   choices provided   questions answered   thoughts/feelings acknowledged  Goal: Readiness for Transition of Care  Outcome: Ongoing, Progressing     Problem: Fall Injury Risk  Goal: Absence of Fall and Fall-Related Injury  Outcome: Ongoing, Progressing  Intervention: Identify and Manage Contributors to Fall Injury Risk  Recent Flowsheet Documentation  Taken 10/5/2020 0300 by Martha Platt RN  Medication Review/Management: medications reviewed  Taken 10/5/2020 0100 by Martha Platt RN  Medication Review/Management: medications reviewed  Taken 10/4/2020 2300 by Martha Platt RN  Medication Review/Management: medications reviewed  Taken 10/4/2020 2100 by Martha Platt RN  Medication Review/Management: medications reviewed  Taken 10/4/2020 1900 by Martha Platt RN  Medication Review/Management: medications reviewed  Intervention: Promote Injury-Free Environment  Recent Flowsheet Documentation  Taken 10/5/2020 0300 by Martha Platt RN  Safety Promotion/Fall Prevention:   activity supervised   assistive device/personal items within reach   clutter free environment maintained   fall prevention program maintained   nonskid shoes/slippers when out of bed   room organization consistent   safety round/check completed  Taken 10/5/2020 0100 by Martha Platt RN  Safety Promotion/Fall Prevention:   activity supervised   assistive device/personal items within reach   clutter free environment maintained   fall prevention program maintained   nonskid shoes/slippers when out of bed   room organization consistent   safety round/check completed  Taken 10/4/2020 2300 by Martha Platt RN  Safety Promotion/Fall Prevention:   activity supervised   assistive device/personal items within reach   clutter free environment maintained   fall prevention program maintained   nonskid shoes/slippers when out of bed   room organization consistent   safety round/check completed  Taken 10/4/2020 2100 by Martha Platt RN  Safety Promotion/Fall  Prevention:   activity supervised   assistive device/personal items within reach   clutter free environment maintained   fall prevention program maintained   nonskid shoes/slippers when out of bed   room organization consistent   safety round/check completed  Taken 10/4/2020 1900 by Martha Platt RN  Safety Promotion/Fall Prevention:   activity supervised   assistive device/personal items within reach   clutter free environment maintained   fall prevention program maintained   nonskid shoes/slippers when out of bed   room organization consistent   safety round/check completed     Problem: Skin Injury Risk Increased  Goal: Skin Health and Integrity  Outcome: Ongoing, Progressing  Intervention: Optimize Skin Protection  Recent Flowsheet Documentation  Taken 10/5/2020 0215 by Martha Platt RN  Pressure Reduction Techniques: frequent weight shift encouraged  Pressure Reduction Devices: positioning supports utilized  Skin Protection: adhesive use limited  Taken 10/4/2020 2027 by Martha Platt RN  Pressure Reduction Techniques: frequent weight shift encouraged  Pressure Reduction Devices: positioning supports utilized  Skin Protection: adhesive use limited     Problem: Asthma Comorbidity  Goal: Maintenance of Asthma Control  Outcome: Ongoing, Progressing  Intervention: Maintain Asthma Symptom Control  Recent Flowsheet Documentation  Taken 10/5/2020 0300 by Martha Platt RN  Medication Review/Management: medications reviewed  Taken 10/5/2020 0100 by Martha Platt RN  Medication Review/Management: medications reviewed  Taken 10/4/2020 2300 by Martha Platt RN  Medication Review/Management: medications reviewed  Taken 10/4/2020 2100 by Martha Platt RN  Medication Review/Management: medications reviewed  Taken 10/4/2020 1900 by Martha Platt RN  Medication Review/Management: medications reviewed     Problem: COPD Comorbidity  Goal: Maintenance of COPD Symptom Control  Outcome: Ongoing, Progressing  Intervention: Maintain  COPD-Symptom Control  Recent Flowsheet Documentation  Taken 10/5/2020 0300 by Martha Platt RN  Medication Review/Management: medications reviewed  Taken 10/5/2020 0100 by Martha Platt RN  Medication Review/Management: medications reviewed  Taken 10/4/2020 2300 by Martha Platt RN  Medication Review/Management: medications reviewed  Taken 10/4/2020 2100 by Martha Platt RN  Medication Review/Management: medications reviewed  Taken 10/4/2020 1900 by Martha Platt RN  Medication Review/Management: medications reviewed     Problem: Diabetes Comorbidity  Goal: Blood Glucose Level Within Desired Range  Outcome: Ongoing, Progressing     Problem: Hypertension Comorbidity  Goal: Blood Pressure in Desired Range  Outcome: Ongoing, Progressing  Intervention: Maintain Hypertension-Management Strategies  Recent Flowsheet Documentation  Taken 10/5/2020 0300 by Martha Platt RN  Medication Review/Management: medications reviewed  Taken 10/5/2020 0100 by Martha Platt RN  Medication Review/Management: medications reviewed  Taken 10/4/2020 2300 by Martha Paltt RN  Medication Review/Management: medications reviewed  Taken 10/4/2020 2100 by Martha Platt RN  Medication Review/Management: medications reviewed  Taken 10/4/2020 1900 by Martha Platt RN  Medication Review/Management: medications reviewed     Problem: Obstructive Sleep Apnea Risk or Actual (Comorbidity Management)  Goal: Unobstructed Breathing During Sleep  Outcome: Ongoing, Progressing     Problem: Pain Chronic (Persistent) (Comorbidity Management)  Goal: Acceptable Pain Control and Functional Ability  Outcome: Ongoing, Progressing  Intervention: Develop Pain Management Plan  Recent Flowsheet Documentation  Taken 10/4/2020 2317 by Martha Platt RN  Pain Management Interventions: see MAR  Taken 10/4/2020 2027 by Martha Platt RN  Pain Management Interventions: see MAR  Intervention: Manage Persistent Pain  Recent Flowsheet Documentation  Taken 10/5/2020 0300 by Giulia  HERNANDEZ Thomas  Medication Review/Management: medications reviewed  Taken 10/5/2020 0215 by Martha Platt RN  Bowel Elimination Promotion: adequate fluid intake promoted  Sleep/Rest Enhancement:   awakenings minimized   room darkened  Taken 10/5/2020 0100 by Martha Platt RN  Medication Review/Management: medications reviewed  Taken 10/4/2020 2300 by Martha Platt RN  Medication Review/Management: medications reviewed  Taken 10/4/2020 2100 by Martha Platt RN  Medication Review/Management: medications reviewed  Taken 10/4/2020 2027 by Martha Platt RN  Bowel Elimination Promotion: adequate fluid intake promoted  Sleep/Rest Enhancement:   awakenings minimized   regular sleep/rest pattern promoted  Taken 10/4/2020 1900 by Martha Platt RN  Medication Review/Management: medications reviewed  Intervention: Optimize Psychosocial Wellbeing  Recent Flowsheet Documentation  Taken 10/5/2020 0215 by Martha Platt RN  Supportive Measures:   active listening utilized   self-care encouraged  Diversional Activities:   television   smartphone  Family/Support System Care:   support provided   self-care encouraged  Taken 10/4/2020 2027 by Martha Platt RN  Supportive Measures:   active listening utilized   self-care encouraged  Diversional Activities:   television   smartphone  Family/Support System Care:   support provided   self-care encouraged

## 2020-10-05 NOTE — PROGRESS NOTES
Pharmacokinetics Service Note:     continues on day 3 of vancomycin 2.5 gm q12h for his sepsis and lower extremity cellulitis.  A level was reported as 11.4 mg/L at 1252 today. The AUC is calculated at 728 mg/L.hr on this regimen. This level is higher than desired. Will decrease the dosage to 2 gm q12h to target an AUC of 400-600 mg/L.hr.  Will obtain a repeat level in a few days to guide further dosing.       Thank you,    Marina Madera, PharmD  Pharmacy Resident  10/5/2020  14:06 EDT

## 2020-10-05 NOTE — PROGRESS NOTES
[facility]     Patient Identification:  Name:  Jimmy Recinos  Age:  44 y.o.  Sex:  male  :  1975  MRN:  [id number]  Visit Number:  [id number]  Primary Care Provider:  Provider, No Known    Length of stay:  2    Chief complaint: Left knee pain    Subjective:    Patient reports left knee pain is mildly improved compared to yesterday.  Erythema and redness has decreased compared to yesterday.  Denies any fevers or chills.  Does report feeling more tired than usual.  Otherwise, no new events overnight.  ----------------------------------------------------------------------------------------------------------------------  Current [facility] Meds:  cefTRIAXone, 2 g, Intravenous, Q24H  heparin (porcine), 5,000 Units, Subcutaneous, Q12H  sodium chloride, 10 mL, Intravenous, Q12H  [START ON 10/6/2020] vancomycin, 2,000 mg, Intravenous, Q12H      sodium chloride, 75 mL/hr, Last Rate: 75 mL/hr (10/05/20 0912)      ----------------------------------------------------------------------------------------------------------------------  Vital Signs:  Temp:  [98.2 °F (36.8 °C)-99.2 °F (37.3 °C)] 99.2 °F (37.3 °C)  Heart Rate:  [83-96] 84  Resp:  [15-31] 23  BP: (129-162)/() 141/91      10/04/20  0400 10/04/20  0600 10/05/20  0400   Weight: (!) 268 kg (591 lb 8 oz) (!) 268 kg (590 lb 13.3 oz) (!) 268 kg (591 lb 8 oz)     Body mass index is 84.87 kg/m².    Intake/Output Summary (Last 24 hours) at 10/5/2020 1545  Last data filed at 10/5/2020 1222  Gross per 24 hour   Intake 3893.6 ml   Output 2800 ml   Net 1093.6 ml     Diet Regular  ----------------------------------------------------------------------------------------------------------------------  Physical exam:  This physical exam has been personally performed remotely in the unit aided by real-time audio/visual communication tools.The use of a video visit has been reviewed with the patient and verbal informed consent has  been obtained.     Physical Exam:  General: Patient appears awake, alert, and in no acute distress.  Head: Normocephalic, atraumatic  Eyes: EOMI. Conjunctivae and sclerae normal.  Ears: Ears appear intact with no abnormalities noted.   Neck: Trachea midline. No obvious JVD.  Lungs: Respirations appear to be regular, even and unlabored with no signs of respiratory distress. No audible wheezing.  Abdomen: No obvious abdominal distension.  MS: Muscle tone appears normal. No gross deformities.  Extremities: No clubbing, cyanosis or edema noted.  Skin: Erythematous circumferential rash around the left knee  Neurologic: Alert and oriented x3. No gross focal deficits.     ----------------------------------------------------------------------------------------------------------------------  Tele:    ----------------------------------------------------------------------------------------------------------------------  Results from last 7 days   Lab Units 10/05/20  0243   CK TOTAL U/L 23     Results from last 7 days   Lab Units 10/05/20  0243 10/04/20  0708 10/04/20  0507 10/03/20  0730 10/02/20  2326   CRP mg/dL 15.56*  --  19.51*  --  14.02*   LACTATE mmol/L  --   --   --   --  1.1   WBC 10*3/mm3 9.93 10.09  --  13.48* 14.09*   HEMOGLOBIN g/dL 11.0* 11.1*  --  11.2* 12.0*   HEMATOCRIT % 37.2* 38.1  --  37.6 40.6   MCV fL 90.1 91.1  --  89.5 88.8   MCHC g/dL 29.6* 29.1*  --  29.8* 29.6*   PLATELETS 10*3/mm3 238 241  --  246 266         Results from last 7 days   Lab Units 10/05/20  0243 10/04/20  0507 10/03/20  0730 10/02/20  2326   SODIUM mmol/L 137 131* 135* 139   POTASSIUM mmol/L 3.8 4.1 4.0 4.0   CHLORIDE mmol/L 101 102 102 102   CO2 mmol/L 28.0 20.1* 23.4 26.6   BUN mg/dL 8 9 14 16   CREATININE mg/dL 0.42* 0.48* 0.47* 0.68*   EGFR IF NONAFRICN AM mL/min/1.73 >150 >150 >150 127   CALCIUM mg/dL 8.3* 7.8* 8.0* 8.4*   GLUCOSE mg/dL 111* 101* 118* 141*   ALBUMIN g/dL 2.63*  --  2.53* 2.97*   BILIRUBIN mg/dL 0.2  --  0.8 0.5     ALK PHOS U/L 79  --  65 71   AST (SGOT) U/L 20  --  22 23   ALT (SGPT) U/L 21  --  21 24   Estimated Creatinine Clearance: 479.4 mL/min (A) (by C-G formula based on SCr of 0.42 mg/dL (L)).    No results found for: AMMONIA      No results found for: BLOODCX  No results found for: URINECX  No results found for: WOUNDCX  No results found for: STOOLCX    I have personally looked at the labs and they are summarized above.  ----------------------------------------------------------------------------------------------------------------------  Imaging Results (Last 24 Hours)     ** No results found for the last 24 hours. **        ----------------------------------------------------------------------------------------------------------------------  Assessment and Plan:    1.  Sepsis -present on admission, continue to treat underlying etiology which is likely left knee cellulitis.  Continue vancomycin and Rocephin per infectious disease recommendations.    2.  Left knee cellulitis -see #1    3.  COVID-19 infection -patient is currently asymptomatic.  No indication for Decadron, convalescent plasma or Remdesivir at this time.  Appreciate infectious disease recommendations.    4.  Super morbid obesity -complicates all aspects of care    5.  Muscular dystrophy    6.  Essential hypertension -continue home antihypertensive medications, continue to monitor closely and make medication adjustments as necessary            Nader Noel, DO  10/05/20  15:45 EDT

## 2020-10-05 NOTE — PLAN OF CARE
Goal Outcome Evaluation:  Plan of Care Reviewed With: patient  Progress: no change  Outcome Summary: VSS, afebrile. patient using 2L NC while resting throughout the day. Cellulitis seems to be improving, it is less red and not as warm as compared to yesterday. Has lost IV access at this time. Awaiting PICC line nurse consult. Will continue to monitor.     Update 1800 : patient has double lumen midline. NS infusing at 75 ml/hr.

## 2020-10-05 NOTE — THERAPY EVALUATION
Acute Care - Physical Therapy Initial Evaluation   [facility]     Patient Name: Jimmy Recinos  : 1975  MRN: [id number]  Today's Date: 10/5/2020   Onset of Illness/Injury or Date of Surgery: 10/02/20       PT Assessment (last 12 hours)      PT Evaluation and Treatment     Row Name 10/05/20 1100          Physical Therapy Time and Intention    Subjective Information  no complaints  -BC     Document Type  therapy note (daily note)  -BC     Mode of Treatment  physical therapy  -BC     Patient Effort  fair  -BC     Row Name 10/05/20 1100          General Information    Patient Profile Reviewed  yes  -BC     Onset of Illness/Injury or Date of Surgery  10/02/20  -BC     Referring Physician  Dr. Noel  -BC     Patient Observations  alert;cooperative;agree to therapy  -BC     Prior Level of Function  max assist:  -BC     Equipment Currently Used at Home  bipap/ cpap;walker, rolling  -BC     Risks Reviewed  patient:;nausea/vomiting;dizziness;LOB;increased discomfort;change in vital signs;increased drainage;lines disloged  -BC     Benefits Reviewed  patient:;improve function;increase independence;increase strength;increase balance;decrease pain;decrease risk of DVT;improve skin integrity;increase knowledge  -BC     Row Name 10/05/20 1100          Living Environment    Current Living Arrangements  home/apartment/condo  -BC     Row Name 10/05/20 1100          Cognition    Affect/Mental Status (Cognitive)  WNL  -BC     Orientation Status (Cognition)  oriented x 4  -BC     Follows Commands (Cognition)  WFL  -BC     Row Name 10/05/20 1100          Bed Mobility    Bed Mobility  bed mobility (all) activities  -BC     All Activities, [place] (Bed Mobility)  dependent (less than 25% patient effort)  -BC     Bed Mobility, Safety Issues  decreased use of arms for pushing/pulling;decreased use of legs for bridging/pushing;impaired trunk control for bed mobility  -BC     Assistive Device (Bed Mobility)  bed rails  -BC     Row  Name 10/05/20 1100          Transfers    Comment (Transfers)  unable to transer  -BC     Row Name 10/05/20 1100          Gait/Stairs (Locomotion)    Gait/Stairs Locomotion  gait/ambulation independence  -BC     [place] Level (Gait)  dependent (less than 25% patient effort)  -BC     Row Name             Wound 08/24/20 0318 Bilateral lower thoracic spine Abrasion    Wound - Properties Group Placement Date: 08/24/20  -KE Placement Time: 0318  -KE Present on Hospital Admission: Y  -KE Side: Bilateral  -KE Location: thoracic spine  -KE Primary Wound Type: Abrasion  -KE    Retired Wound - Properties Group Date first assessed: 08/24/20  -KE Time first assessed: 0318  -KE Present on Hospital Admission: Y  -KE Side: Bilateral  -KE Retired Orientation: lower  -KE Location: thoracic spine  -KE Primary Wound Type: Abrasion  -KE    Row Name             Wound 12/21/19 2330 Right gluteal Abrasion    Wound - Properties Group Placement Date: 12/21/19  -KS Placement Time: 2330  -KS Side: Right  -KS Location: gluteal  -KS Primary Wound Type: Abrasion  -KS Stage, Pressure Injury : Stage 1  -KM    Retired Wound - Properties Group Date first assessed: 12/21/19  -KS Time first assessed: 2330  -KS Side: Right  -KS Retired Orientation: medial  -KS Location: gluteal  -KS Primary Wound Type: Abrasion  -KS Retired Stage, Pressure Injury : other (see comments)  -KS    Row Name 10/05/20 1100          Plan of Care Review    Plan of Care Reviewed With  patient  -BC     Row Name 10/05/20 1100          Physical Therapy Goals    Bed Mobility Goal Selection (PT)  bed mobility, PT goal 1  -BC     Transfer Goal Selection (PT)  --  -BC     Row Name 10/05/20 1100          Bed Mobility Goal 1 (PT)    Activity/Assistive Device (Bed Mobility Goal 1, PT)  bed mobility activities, all  -BC     [place] Level/Cues Needed (Bed Mobility Goal 1, PT)  moderate assist (50-74% patient effort)  -BC     Time Frame (Bed Mobility Goal 1, PT)  by discharge  -BC      Row Name 10/05/20 1100          Positioning and Restraints    Pre-Treatment Position  in bed  -BC     Post Treatment Position  bed  -BC     In Bed  notified nsg;supine;call light within reach;encouraged to call for assist;side rails up x3  -BC       User Key  (r) = Recorded By, (t) = Taken By, (c) = Cosigned By    Initials Name Provider Type    BC Janell Rosales PT Physical Therapist    Kyung Flores RN Registered Nurse    Mary Farfan RN Registered Nurse    Marsha Dubose RN Registered Nurse        Physical Therapy Education                 Title: PT OT SLP Therapies (Done)     Topic: Physical Therapy (Done)     Point: Mobility training (Done)     Learning Progress Summary           Patient Acceptance, E, VU,NR by SC at 10/4/2020 2243    Acceptance, E, VU by SC at 10/4/2020 0027    Acceptance, E,TB, VU by AM at 10/3/2020 1045    Acceptance, E, VU by SC at 10/3/2020 0634                   Point: Home exercise program (Done)     Learning Progress Summary           Patient Acceptance, E, VU,NR by SC at 10/4/2020 2243    Acceptance, E, VU by SC at 10/4/2020 0027    Acceptance, E,TB, VU by AM at 10/3/2020 1045    Acceptance, E, VU by SC at 10/3/2020 0634                   Point: Body mechanics (Done)     Learning Progress Summary           Patient Acceptance, E, VU,NR by SC at 10/4/2020 2243    Acceptance, E, VU by SC at 10/4/2020 0027    Acceptance, E,TB, VU by AM at 10/3/2020 1045    Acceptance, E, VU by SC at 10/3/2020 0634                   Point: Precautions (Done)     Learning Progress Summary           Patient Acceptance, E, VU,NR by SC at 10/4/2020 2243    Acceptance, E, VU by SC at 10/4/2020 0027    Acceptance, E,TB, VU by AM at 10/3/2020 1045    Acceptance, E, VU by SC at 10/3/2020 0634                               User Key     Initials Effective Dates Name Provider Type Discipline    AM 06/11/18 -  Bianka Banerjee RN Registered Nurse Nurse    SC 07/07/20 -  Martha Platt RN  Registered Nurse Nurse              PT Recommendation and Plan     Plan of Care Reviewed With: patient       Time Calculation:   PT Charges     Row Name 10/05/20 1125             Time Calculation    PT Received On  10/05/20  -BC      PT Goal Re-Cert Due Date  10/14/20  -BC         Time Calculation- PT    Total Timed Code Minutes- PT  60 minute(s)  -BC         Timed Charges    58682 - PT Therapeutic Activity Minutes  15  -BC        User Key  (r) = Recorded By, (t) = Taken By, (c) = Cosigned By    Initials Name Provider Type    BC Janell Rosales, PT Physical Therapist        Therapy Charges for Today     Code Description Service Date Service Provider Modifiers Qty    78275400983 HC PT EVAL MOD COMPLEXITY 3 10/5/2020 Janell Rosales, PT GP 1    07883915198 HC GAIT TRAINING EA 15 MIN 10/5/2020 Janell Rosales, PT GP 1               Janell Rosales PT  10/5/2020

## 2020-10-06 LAB
ALBUMIN SERPL-MCNC: 2.72 G/DL (ref 3.5–5.2)
ALBUMIN/GLOB SERPL: 0.6 G/DL
ALP SERPL-CCNC: 69 U/L (ref 39–117)
ALT SERPL W P-5'-P-CCNC: 23 U/L (ref 1–41)
ANION GAP SERPL CALCULATED.3IONS-SCNC: 5.7 MMOL/L (ref 5–15)
AST SERPL-CCNC: 21 U/L (ref 1–40)
BACTERIA SPEC AEROBE CULT: ABNORMAL
BASOPHILS # BLD AUTO: 0.03 10*3/MM3 (ref 0–0.2)
BASOPHILS NFR BLD AUTO: 0.4 % (ref 0–1.5)
BILIRUB SERPL-MCNC: 0.2 MG/DL (ref 0–1.2)
BUN SERPL-MCNC: 7 MG/DL (ref 6–20)
BUN/CREAT SERPL: 15.9 (ref 7–25)
CALCIUM SPEC-SCNC: 8.4 MG/DL (ref 8.6–10.5)
CHLORIDE SERPL-SCNC: 102 MMOL/L (ref 98–107)
CK SERPL-CCNC: 20 U/L (ref 20–200)
CO2 SERPL-SCNC: 31.3 MMOL/L (ref 22–29)
CREAT SERPL-MCNC: 0.44 MG/DL (ref 0.76–1.27)
CRP SERPL-MCNC: 11.39 MG/DL (ref 0–0.5)
DEPRECATED RDW RBC AUTO: 49.9 FL (ref 37–54)
EOSINOPHIL # BLD AUTO: 0.5 10*3/MM3 (ref 0–0.4)
EOSINOPHIL NFR BLD AUTO: 5.8 % (ref 0.3–6.2)
ERYTHROCYTE [DISTWIDTH] IN BLOOD BY AUTOMATED COUNT: 15.1 % (ref 12.3–15.4)
FERRITIN SERPL-MCNC: 163.5 NG/ML (ref 30–400)
GFR SERPL CREATININE-BSD FRML MDRD: >150 ML/MIN/1.73
GLOBULIN UR ELPH-MCNC: 4.4 GM/DL
GLUCOSE SERPL-MCNC: 130 MG/DL (ref 65–99)
GRAM STN SPEC: ABNORMAL
HCT VFR BLD AUTO: 37.7 % (ref 37.5–51)
HGB BLD-MCNC: 11.1 G/DL (ref 13–17.7)
IMM GRANULOCYTES # BLD AUTO: 0.12 10*3/MM3 (ref 0–0.05)
IMM GRANULOCYTES NFR BLD AUTO: 1.4 % (ref 0–0.5)
ISOLATED FROM: ABNORMAL
LYMPHOCYTES # BLD AUTO: 0.78 10*3/MM3 (ref 0.7–3.1)
LYMPHOCYTES NFR BLD AUTO: 9.1 % (ref 19.6–45.3)
MCH RBC QN AUTO: 26.6 PG (ref 26.6–33)
MCHC RBC AUTO-ENTMCNC: 29.4 G/DL (ref 31.5–35.7)
MCV RBC AUTO: 90.2 FL (ref 79–97)
MONOCYTES # BLD AUTO: 0.33 10*3/MM3 (ref 0.1–0.9)
MONOCYTES NFR BLD AUTO: 3.9 % (ref 5–12)
NEUTROPHILS NFR BLD AUTO: 6.79 10*3/MM3 (ref 1.7–7)
NEUTROPHILS NFR BLD AUTO: 79.4 % (ref 42.7–76)
NRBC BLD AUTO-RTO: 0 /100 WBC (ref 0–0.2)
PLATELET # BLD AUTO: 245 10*3/MM3 (ref 140–450)
PMV BLD AUTO: 9.7 FL (ref 6–12)
POTASSIUM SERPL-SCNC: 3.8 MMOL/L (ref 3.5–5.2)
PROT SERPL-MCNC: 7.1 G/DL (ref 6–8.5)
RBC # BLD AUTO: 4.18 10*6/MM3 (ref 4.14–5.8)
SODIUM SERPL-SCNC: 139 MMOL/L (ref 136–145)
WBC # BLD AUTO: 8.55 10*3/MM3 (ref 3.4–10.8)

## 2020-10-06 PROCEDURE — 94799 UNLISTED PULMONARY SVC/PX: CPT

## 2020-10-06 PROCEDURE — 99233 SBSQ HOSP IP/OBS HIGH 50: CPT | Performed by: INTERNAL MEDICINE

## 2020-10-06 PROCEDURE — 82550 ASSAY OF CK (CPK): CPT | Performed by: INTERNAL MEDICINE

## 2020-10-06 PROCEDURE — 25010000002 CEFTRIAXONE PER 250 MG: Performed by: INTERNAL MEDICINE

## 2020-10-06 PROCEDURE — 82728 ASSAY OF FERRITIN: CPT | Performed by: INTERNAL MEDICINE

## 2020-10-06 PROCEDURE — 25010000002 VANCOMYCIN: Performed by: INTERNAL MEDICINE

## 2020-10-06 PROCEDURE — 85025 COMPLETE CBC W/AUTO DIFF WBC: CPT | Performed by: INTERNAL MEDICINE

## 2020-10-06 PROCEDURE — 86140 C-REACTIVE PROTEIN: CPT | Performed by: INTERNAL MEDICINE

## 2020-10-06 PROCEDURE — 80053 COMPREHEN METABOLIC PANEL: CPT | Performed by: INTERNAL MEDICINE

## 2020-10-06 PROCEDURE — 25010000002 HEPARIN (PORCINE) PER 1000 UNITS: Performed by: HOSPITALIST

## 2020-10-06 RX ADMIN — VANCOMYCIN HYDROCHLORIDE 2000 MG: 1 INJECTION, POWDER, LYOPHILIZED, FOR SOLUTION INTRAVENOUS at 02:05

## 2020-10-06 RX ADMIN — SODIUM CHLORIDE, PRESERVATIVE FREE 10 ML: 5 INJECTION INTRAVENOUS at 20:19

## 2020-10-06 RX ADMIN — CEFTRIAXONE 2 G: 2 INJECTION, POWDER, FOR SOLUTION INTRAMUSCULAR; INTRAVENOUS at 11:39

## 2020-10-06 RX ADMIN — SODIUM CHLORIDE, PRESERVATIVE FREE 10 ML: 5 INJECTION INTRAVENOUS at 08:04

## 2020-10-06 RX ADMIN — HEPARIN SODIUM 5000 UNITS: 5000 INJECTION INTRAVENOUS; SUBCUTANEOUS at 08:03

## 2020-10-06 RX ADMIN — SODIUM CHLORIDE, PRESERVATIVE FREE 10 ML: 5 INJECTION INTRAVENOUS at 08:03

## 2020-10-06 RX ADMIN — HYDROCODONE BITARTRATE AND ACETAMINOPHEN 1 TABLET: 5; 325 TABLET ORAL at 08:03

## 2020-10-06 RX ADMIN — VANCOMYCIN HYDROCHLORIDE 2000 MG: 1 INJECTION, POWDER, LYOPHILIZED, FOR SOLUTION INTRAVENOUS at 15:00

## 2020-10-06 RX ADMIN — ACETAMINOPHEN 650 MG: 325 TABLET ORAL at 11:42

## 2020-10-06 RX ADMIN — SODIUM CHLORIDE 75 ML/HR: 9 INJECTION, SOLUTION INTRAVENOUS at 00:40

## 2020-10-06 RX ADMIN — HYDROCODONE BITARTRATE AND ACETAMINOPHEN 1 TABLET: 5; 325 TABLET ORAL at 15:00

## 2020-10-06 RX ADMIN — HYDROCODONE BITARTRATE AND ACETAMINOPHEN 1 TABLET: 5; 325 TABLET ORAL at 20:18

## 2020-10-06 RX ADMIN — HEPARIN SODIUM 5000 UNITS: 5000 INJECTION INTRAVENOUS; SUBCUTANEOUS at 20:18

## 2020-10-06 NOTE — PLAN OF CARE
Goal Outcome Evaluation:  Plan of Care Reviewed With: patient  Progress: no change  VSS, pt w/ increased wheezing & O2 Sats drop on RA while awake, reapplied NC at 2L NC throughout the day, tolerating well. IVF & IV antibiotics infusing, call light within reach, pt states no further needs at this time, will continue to monitor.  Christine Lawson RN

## 2020-10-06 NOTE — PROGRESS NOTES
I have discussed remdesivir therapy and the different possible side effects knowingly that it is not currently FDA approved but based on reported case studies and small observational studies that might carry some benefit. I went over the frequently asked questions with the patient and Mr. Recinos is in agreement to initiate therapy understanding the risk.    The following is a link for the FAQ sheet that was reviewed with the patient. The patient was also offered a copy.     [email or web address]

## 2020-10-06 NOTE — PROGRESS NOTES
PROGRESS NOTE         Patient Identification:  Name:  Jimmy Recinos  Age:  44 y.o.  Sex:  male  :  1975  MRN:  [id number]  Visit Number:  [id number]  Primary Care Provider:  Provider, No Known         LOS: 3 days       ----------------------------------------------------------------------------------------------------------------------  Subjective       Chief Complaints:    Cellulitis        Interval History:      Case discussed with primary RN this morning.  Patient noted to have expiratory wheezing.  He is currently on 2 L via nasal cannula.  WBC normal.  CRP trending down 11.39.  Afebrile, no diarrhea.  Cellulitis is noted to be clearing.  Blood cultures reveal 1 out of 2 Streptococcus mitis/oralis.    Review of Systems:    Constitutional:  No fever, chills.  No night sweats. No appetite change or unexpected weight change.  Positive fatigue.  Eyes: no eye drainage, itching or redness.  HEENT: no mouth sores, dysphagia or nose bleed.  Respiratory: no for shortness of breath, cough or production of sputum.  Cardiovascular: no chest pain, no palpitations, no orthopnea.  Gastrointestinal: no nausea, vomiting or diarrhea. No abdominal pain, hematemesis or rectal bleeding.  Genitourinary: no dysuria or polyuria.  Hematologic/lymphatic: no lymph node abnormalities, no easy bruising or easy bleeding.  Musculoskeletal: Left lower extremity pain.  Skin: No rash and no itching.  Neurological: no loss of consciousness, no seizure, no headache.  Behavioral/Psych: no depression or suicidal ideation.  Endocrine: no hot flashes.  Immunologic: negative.       ----------------------------------------------------------------------------------------------------------------------      Objective       [facility] Meds:  cefTRIAXone, 2 g, Intravenous, Q24H  heparin (porcine), 5,000 Units, Subcutaneous, Q12H  sodium chloride, 10 mL, Intravenous, Q12H  sodium chloride, 10 mL, Intravenous, Q12H  sodium chloride,  10 mL, Intravenous, Q12H  vancomycin, 2,000 mg, Intravenous, Q12H      sodium chloride, 75 mL/hr, Last Rate: 75 mL/hr (10/06/20 0040)      ----------------------------------------------------------------------------------------------------------------------    Vital Signs:  Temp:  [98.1 °F (36.7 °C)-99.2 °F (37.3 °C)] 98.5 °F (36.9 °C)  Heart Rate:  [] 94  Resp:  [20-31] 28  BP: (125-160)/() 160/75  Mean Arterial Pressure (Non-Invasive) for the past 24 hrs (Last 3 readings):   Noninvasive MAP (mmHg)   10/06/20 0817 107   10/06/20 0803 111   10/06/20 0703 109     SpO2 Percentage    10/06/20 0803 10/06/20 0825 10/06/20 0827   SpO2: 95% (!) 88% 92%     SpO2:  [88 %-98 %] 92 %  on  Flow (L/min):  [2] 2;   Device (Oxygen Therapy): nasal cannula    Body mass index is 85.09 kg/m².  Wt Readings from Last 3 Encounters:   10/06/20 (!) 269 kg (593 lb)   08/23/20 (!) 295 kg (650 lb)   01/02/20 (!) 273 kg (601 lb)        Intake/Output Summary (Last 24 hours) at 10/6/2020 1221  Last data filed at 10/6/2020 1143  Gross per 24 hour   Intake 3943.11 ml   Output 2625 ml   Net 1318.11 ml     Diet Regular  ----------------------------------------------------------------------------------------------------------------------      Physical Exam:    Deferred due to COVID-19 isolation.  ----------------------------------------------------------------------------------------------------------------------  Results from last 7 days   Lab Units 10/06/20  0115 10/05/20  0243   CK TOTAL U/L 20 23           Results from last 7 days   Lab Units 10/06/20  0115 10/05/20  0243 10/04/20  0708 10/04/20  0507  10/02/20  2326   CRP mg/dL 11.39* 15.56*  --  19.51*  --  14.02*   LACTATE mmol/L  --   --   --   --   --  1.1   WBC 10*3/mm3 8.55 9.93 10.09  --    < > 14.09*   HEMOGLOBIN g/dL 11.1* 11.0* 11.1*  --    < > 12.0*   HEMATOCRIT % 37.7 37.2* 38.1  --    < > 40.6   MCV fL 90.2 90.1 91.1  --    < > 88.8   MCHC g/dL 29.4* 29.6* 29.1*  --    <  > 29.6*   PLATELETS 10*3/mm3 245 238 241  --    < > 266    < > = values in this interval not displayed.     Results from last 7 days   Lab Units 10/06/20  0115 10/05/20  0243 10/04/20  0507 10/03/20  0730   SODIUM mmol/L 139 137 131* 135*   POTASSIUM mmol/L 3.8 3.8 4.1 4.0   CHLORIDE mmol/L 102 101 102 102   CO2 mmol/L 31.3* 28.0 20.1* 23.4   BUN mg/dL 7 8 9 14   CREATININE mg/dL 0.44* 0.42* 0.48* 0.47*   EGFR IF NONAFRICN AM mL/min/1.73 >150 >150 >150 >150   CALCIUM mg/dL 8.4* 8.3* 7.8* 8.0*   GLUCOSE mg/dL 130* 111* 101* 118*   ALBUMIN g/dL 2.72* 2.63*  --  2.53*   BILIRUBIN mg/dL 0.2 0.2  --  0.8   ALK PHOS U/L 69 79  --  65   AST (SGOT) U/L 21 20  --  22   ALT (SGPT) U/L 23 21  --  21   Estimated Creatinine Clearance: 457.6 mL/min (A) (by C-G formula based on SCr of 0.44 mg/dL (L)).  No results found for: AMMONIA    No results found for: HGBA1C, POCGLU  Lab Results   Component Value Date    HGBA1C 5.00 08/23/2020     Lab Results   Component Value Date    TSH 0.793 12/21/2019       Blood Culture   Date Value Ref Range Status   10/02/2020 Abnormal Stain (C)  Preliminary   10/02/2020 No growth at 24 hours  Preliminary     No results found for: URINECX  No results found for: WOUNDCX  No results found for: STOOLCX  No results found for: RESPCX  Pain Management Panel     Pain Management Panel Latest Ref Rng & Units 10/19/2014    AMPHETAMINES SCREEN, URINE NEGATIVE Negative    BARBITURATES SCREEN NEGATIVE Negative    BENZODIAZEPINE SCREEN, URINE NEGATIVE Negative    COCAINE SCREEN, URINE NEGATIVE Negative    METHADONE SCREEN, URINE NEGATIVE Negative            ----------------------------------------------------------------------------------------------------------------------  Imaging Results (Last 24 Hours)     ** No results found for the last 24 hours. **          ----------------------------------------------------------------------------------------------------------------------    Assessment/Plan              Assessment/Plan     ASSESSMENT:    1.  COVID-19  2.  Lower extremity cellulitis      PLAN:    Case discussed with primary RN this morning.  Patient noted to have expiratory wheezing.  He is currently on 2 L via nasal cannula.  WBC normal.  CRP trending down 11.39.  Afebrile, no diarrhea.  Cellulitis is noted to be clearing.  Blood cultures reveal 1 out of 2 Streptococcus mitis/oralis.    Chest x-ray unremarkable.  Patient unable to complete CT scan due to size.     Primary team to initiate convalescent plasma therapy and Remdesivir.  Consents were obtained per primary team.     We recommend to continue Rocephin 2 g IV every 24 hours and vancomycin pharmacy to dose.  We will continue to follow closely and adjust antibiotic therapy as needed.    Code Status:   Code Status and Medical Interventions:   Ordered at: 10/03/20 0236     Level Of Support Discussed With:    Patient     Code Status:    CPR     Medical Interventions (Level of Support Prior to Arrest):    Full       Scribed for Rosita Kendrick MD.      Hortencia Keith, APRN  10/06/20  12:21 EDT     Physician Attestation:    The documentation recorded by the scribe accurately reflects the service I personally performed and the decisions made by me.    Rosita Kendrick MD  Infectious Diseases  10/07/20  05:41 EDT

## 2020-10-06 NOTE — PLAN OF CARE
Goal Outcome Evaluation:  Plan of Care Reviewed With: patient  Progress: no change  Outcome Summary: VSS and remians afebrile. Patient continues on 2L NC when sleeping throughout the night, tolerating well. Continues to recieve fluids through PICC line. Call light within reach. Will continue to monitor.      Problem: Adult Inpatient Plan of Care  Goal: Plan of Care Review  Outcome: Ongoing, Progressing  Flowsheets (Taken 10/6/2020 0421)  Progress: no change  Plan of Care Reviewed With: patient  Outcome Summary: VSS and remians afebrile. Patient continues on 2L NC when sleeping throughout the night, tolerating well. Continues to recieve fluids through PICC line. Call light within reach. Will continue to monitor.  Goal: Patient-Specific Goal (Individualized)  Outcome: Ongoing, Progressing  Goal: Absence of Hospital-Acquired Illness or Injury  Outcome: Ongoing, Progressing  Intervention: Identify and Manage Fall Risk  Recent Flowsheet Documentation  Taken 10/6/2020 0300 by Martha Platt, RN  Safety Promotion/Fall Prevention:   activity supervised   assistive device/personal items within reach   clutter free environment maintained   nonskid shoes/slippers when out of bed   fall prevention program maintained   room organization consistent   safety round/check completed  Taken 10/6/2020 0100 by Martha Platt, RN  Safety Promotion/Fall Prevention:   activity supervised   assistive device/personal items within reach   clutter free environment maintained   fall prevention program maintained   nonskid shoes/slippers when out of bed   room organization consistent   safety round/check completed  Taken 10/5/2020 2300 by Martha Platt, RN  Safety Promotion/Fall Prevention:   activity supervised   assistive device/personal items within reach   clutter free environment maintained   fall prevention program maintained   nonskid shoes/slippers when out of bed   room organization consistent   safety round/check completed  Taken 10/5/2020  2100 by Corman, Martha, RN  Safety Promotion/Fall Prevention:   activity supervised   assistive device/personal items within reach   clutter free environment maintained   fall prevention program maintained   nonskid shoes/slippers when out of bed   room organization consistent   safety round/check completed  Taken 10/5/2020 1900 by Martha Platt RN  Safety Promotion/Fall Prevention:   activity supervised   assistive device/personal items within reach   clutter free environment maintained   fall prevention program maintained   nonskid shoes/slippers when out of bed   room organization consistent   safety round/check completed  Intervention: Prevent and Manage VTE (venous thromboembolism) Risk  Recent Flowsheet Documentation  Taken 10/6/2020 0215 by Martha Platt RN  VTE Prevention/Management: (see MAR) other (see comments)  Taken 10/5/2020 2001 by Martha Platt RN  VTE Prevention/Management: (see MAR) other (see comments)  Intervention: Prevent Infection  Recent Flowsheet Documentation  Taken 10/6/2020 0300 by Martha Platt RN  Infection Prevention:   rest/sleep promoted   single patient room provided  Taken 10/6/2020 0100 by Martha Platt RN  Infection Prevention:   rest/sleep promoted   single patient room provided  Taken 10/5/2020 2300 by Martha Platt RN  Infection Prevention:   rest/sleep promoted   single patient room provided  Taken 10/5/2020 2100 by Martha Platt RN  Infection Prevention:   rest/sleep promoted   single patient room provided  Taken 10/5/2020 1900 by Martha Platt RN  Infection Prevention:   rest/sleep promoted   single patient room provided  Goal: Optimal Comfort and Wellbeing  Outcome: Ongoing, Progressing  Intervention: Provide Person-Centered Care  Recent Flowsheet Documentation  Taken 10/6/2020 0215 by Martha Platt RN  Trust Relationship/Rapport:   care explained   choices provided   questions answered   thoughts/feelings acknowledged  Taken 10/5/2020 2001 by Martha Platt RN Trust  Relationship/Rapport:   care explained   choices provided   questions answered   thoughts/feelings acknowledged  Goal: Readiness for Transition of Care  Outcome: Ongoing, Progressing     Problem: Fall Injury Risk  Goal: Absence of Fall and Fall-Related Injury  Outcome: Ongoing, Progressing  Intervention: Identify and Manage Contributors to Fall Injury Risk  Recent Flowsheet Documentation  Taken 10/6/2020 0300 by Martha Platt RN  Medication Review/Management: medications reviewed  Taken 10/6/2020 0100 by Martha Platt RN  Medication Review/Management: medications reviewed  Taken 10/5/2020 2300 by Martha Platt RN  Medication Review/Management: medications reviewed  Taken 10/5/2020 2100 by Martha Platt RN  Medication Review/Management: medications reviewed  Taken 10/5/2020 1900 by Martha Platt RN  Medication Review/Management: medications reviewed  Intervention: Promote Injury-Free Environment  Recent Flowsheet Documentation  Taken 10/6/2020 0300 by Martha Platt RN  Safety Promotion/Fall Prevention:   activity supervised   assistive device/personal items within reach   clutter free environment maintained   nonskid shoes/slippers when out of bed   fall prevention program maintained   room organization consistent   safety round/check completed  Taken 10/6/2020 0100 by Martha Platt RN  Safety Promotion/Fall Prevention:   activity supervised   assistive device/personal items within reach   clutter free environment maintained   fall prevention program maintained   nonskid shoes/slippers when out of bed   room organization consistent   safety round/check completed  Taken 10/5/2020 2300 by Martha Platt RN  Safety Promotion/Fall Prevention:   activity supervised   assistive device/personal items within reach   clutter free environment maintained   fall prevention program maintained   nonskid shoes/slippers when out of bed   room organization consistent   safety round/check completed  Taken 10/5/2020 2100 by Giulia  HERNANDEZ Thomas  Safety Promotion/Fall Prevention:   activity supervised   assistive device/personal items within reach   clutter free environment maintained   fall prevention program maintained   nonskid shoes/slippers when out of bed   room organization consistent   safety round/check completed  Taken 10/5/2020 1900 by Martha Platt RN  Safety Promotion/Fall Prevention:   activity supervised   assistive device/personal items within reach   clutter free environment maintained   fall prevention program maintained   nonskid shoes/slippers when out of bed   room organization consistent   safety round/check completed     Problem: Skin Injury Risk Increased  Goal: Skin Health and Integrity  Outcome: Ongoing, Progressing  Intervention: Optimize Skin Protection  Recent Flowsheet Documentation  Taken 10/6/2020 0215 by Martha Platt RN  Pressure Reduction Techniques: frequent weight shift encouraged  Pressure Reduction Devices: positioning supports utilized  Skin Protection: adhesive use limited  Taken 10/5/2020 2001 by Martha Platt RN  Pressure Reduction Techniques: frequent weight shift encouraged  Pressure Reduction Devices: positioning supports utilized  Skin Protection: adhesive use limited     Problem: Asthma Comorbidity  Goal: Maintenance of Asthma Control  Outcome: Ongoing, Progressing  Intervention: Maintain Asthma Symptom Control  Recent Flowsheet Documentation  Taken 10/6/2020 0300 by Martha Platt RN  Medication Review/Management: medications reviewed  Taken 10/6/2020 0100 by Martha Platt RN  Medication Review/Management: medications reviewed  Taken 10/5/2020 2300 by Martha Platt RN  Medication Review/Management: medications reviewed  Taken 10/5/2020 2100 by Martha Platt RN  Medication Review/Management: medications reviewed  Taken 10/5/2020 1900 by Martha Platt RN  Medication Review/Management: medications reviewed     Problem: COPD Comorbidity  Goal: Maintenance of COPD Symptom Control  Outcome: Ongoing,  Progressing  Intervention: Maintain COPD-Symptom Control  Recent Flowsheet Documentation  Taken 10/6/2020 0300 by Martha Platt RN  Medication Review/Management: medications reviewed  Taken 10/6/2020 0100 by Martha Platt RN  Medication Review/Management: medications reviewed  Taken 10/5/2020 2300 by Martha Platt RN  Medication Review/Management: medications reviewed  Taken 10/5/2020 2100 by Martha Platt RN  Medication Review/Management: medications reviewed  Taken 10/5/2020 1900 by Martha Platt RN  Medication Review/Management: medications reviewed     Problem: Diabetes Comorbidity  Goal: Blood Glucose Level Within Desired Range  Outcome: Ongoing, Progressing     Problem: Hypertension Comorbidity  Goal: Blood Pressure in Desired Range  Outcome: Ongoing, Progressing  Intervention: Maintain Hypertension-Management Strategies  Recent Flowsheet Documentation  Taken 10/6/2020 0300 by Martha Platt RN  Medication Review/Management: medications reviewed  Taken 10/6/2020 0100 by Martha Platt RN  Medication Review/Management: medications reviewed  Taken 10/5/2020 2300 by Martha Platt RN  Medication Review/Management: medications reviewed  Taken 10/5/2020 2100 by Martha Platt RN  Medication Review/Management: medications reviewed  Taken 10/5/2020 1900 by Martha Platt RN  Medication Review/Management: medications reviewed     Problem: Obstructive Sleep Apnea Risk or Actual (Comorbidity Management)  Goal: Unobstructed Breathing During Sleep  Outcome: Ongoing, Progressing     Problem: Pain Chronic (Persistent) (Comorbidity Management)  Goal: Acceptable Pain Control and Functional Ability  Outcome: Ongoing, Progressing  Intervention: Develop Pain Management Plan  Recent Flowsheet Documentation  Taken 10/5/2020 2001 by Martha Platt RN  Pain Management Interventions: see MAR  Intervention: Manage Persistent Pain  Recent Flowsheet Documentation  Taken 10/6/2020 0300 by Martha Platt RN  Medication Review/Management:  medications reviewed  Taken 10/6/2020 0215 by Martha Platt RN  Sleep/Rest Enhancement: awakenings minimized  Taken 10/6/2020 0100 by Martha Platt RN  Medication Review/Management: medications reviewed  Taken 10/5/2020 2300 by Martha Platt RN  Medication Review/Management: medications reviewed  Taken 10/5/2020 2100 by Martha Platt RN  Medication Review/Management: medications reviewed  Taken 10/5/2020 2001 by Martha Platt RN  Sleep/Rest Enhancement: awakenings minimized  Taken 10/5/2020 1900 by Martha Platt RN  Medication Review/Management: medications reviewed  Intervention: Optimize Psychosocial Wellbeing  Recent Flowsheet Documentation  Taken 10/6/2020 0215 by Martha Platt RN  Supportive Measures:   active listening utilized   self-care encouraged  Diversional Activities:   television   smartphone  Family/Support System Care:   self-care encouraged   support provided  Taken 10/5/2020 2001 by Martha Platt RN  Supportive Measures:   active listening utilized   self-care encouraged  Diversional Activities:   television   smartphone  Family/Support System Care:   self-care encouraged   support provided

## 2020-10-06 NOTE — PROGRESS NOTES
Antimicrobial Length of Therapy:    Day 4 of 5 vancomycin  Day 4 of 7 Rocephin    Thank you,    Marina Madera, PharmD  Pharmacy Resident  10/6/2020  13:33 EDT

## 2020-10-06 NOTE — PROGRESS NOTES
[facility] HOSPITALIST PROGRESS NOTE     Patient Identification:  Name:  Jimmy Recinos  Age:  44 y.o.  Sex:  male  :  1975  MRN:  [id number]  Visit Number:  [id number]  Primary Care Provider:  Provider, No Known    Length of stay:  3    Chief complaint: Left knee pain    Subjective:    Patient reports continued improvement in left knee pain and swelling.  However, patient does report increasing lethargy and sleeping the majority of yesterday.  He also endorses a new onset of productive cough and wheezing somewhat worsening shortness of breath.  Patient has been wearing 2 L nasal cannula while sleeping secondary to obstructive sleep apnea.  However, when removed while he was awake today, oxygen saturation was approximately 88% on room air.  As such, did discuss possible worsening of COVID-19.  Had lengthy discussion regarding risks and benefits of starting Remdesivir as well as Decadron.  Patient is agreeable to start both of these therapies.  ----------------------------------------------------------------------------------------------------------------------  Current Hospital Meds:  cefTRIAXone, 2 g, Intravenous, Q24H  heparin (porcine), 5,000 Units, Subcutaneous, Q12H  sodium chloride, 10 mL, Intravenous, Q12H  sodium chloride, 10 mL, Intravenous, Q12H  sodium chloride, 10 mL, Intravenous, Q12H  vancomycin, 2,000 mg, Intravenous, Q12H      sodium chloride, 75 mL/hr, Last Rate: 75 mL/hr (10/06/20 0040)      ----------------------------------------------------------------------------------------------------------------------  Vital Signs:  Temp:  [98.1 °F (36.7 °C)-99.2 °F (37.3 °C)] 98.5 °F (36.9 °C)  Heart Rate:  [84-96] 91  Resp:  [20-31] 22  BP: (125-159)/() 141/84      10/04/20  0600 10/05/20  0400 10/06/20  0204   Weight: (!) 268 kg (590 lb 13.3 oz) (!) 268 kg (591 lb 8 oz) (!) 269 kg (593 lb)     Body mass index is 85.09 kg/m².    Intake/Output Summary (Last 24 hours) at  10/6/2020 0747  Last data filed at 10/6/2020 0630  Gross per 24 hour   Intake 4083.11 ml   Output 1750 ml   Net 2333.11 ml     Diet Regular  ----------------------------------------------------------------------------------------------------------------------  Physical exam:  This physical exam has been personally performed remotely in the unit aided by real-time audio/visual communication tools.The use of a video visit has been reviewed with the patient and verbal informed consent has been obtained.     Physical Exam:  General: Patient appears awake, alert, and somewhat tired.  Head: Normocephalic, atraumatic  Eyes: EOMI. Conjunctivae and sclerae normal.  Ears: Ears appear intact with no abnormalities noted.   Neck: Trachea midline. No obvious JVD.  Lungs: Respirations appear to be regular, even and unlabored with no signs of respiratory distress. No audible wheezing.  Abdomen: No obvious abdominal distension.  MS: Muscle tone appears normal. No gross deformities.  Extremities: No clubbing, cyanosis or edema noted.  Skin: Erythematous circumferential rash around the left knee  Neurologic: Alert and oriented x3. No gross focal deficits.     ----------------------------------------------------------------------------------------------------------------------  Tele:    ----------------------------------------------------------------------------------------------------------------------  Results from last 7 days   Lab Units 10/06/20  0115 10/05/20  0243   CK TOTAL U/L 20 23     Results from last 7 days   Lab Units 10/06/20  0115 10/05/20  0243 10/04/20  0708 10/04/20  0507  10/02/20  2326   CRP mg/dL 11.39* 15.56*  --  19.51*  --  14.02*   LACTATE mmol/L  --   --   --   --   --  1.1   WBC 10*3/mm3 8.55 9.93 10.09  --    < > 14.09*   HEMOGLOBIN g/dL 11.1* 11.0* 11.1*  --    < > 12.0*   HEMATOCRIT % 37.7 37.2* 38.1  --    < > 40.6   MCV fL 90.2 90.1 91.1  --    < > 88.8   MCHC g/dL 29.4* 29.6* 29.1*  --    < > 29.6*      PLATELETS 10*3/mm3 245 238 241  --    < > 266    < > = values in this interval not displayed.         Results from last 7 days   Lab Units 10/06/20  0115 10/05/20  0243 10/04/20  0507 10/03/20  0730   SODIUM mmol/L 139 137 131* 135*   POTASSIUM mmol/L 3.8 3.8 4.1 4.0   CHLORIDE mmol/L 102 101 102 102   CO2 mmol/L 31.3* 28.0 20.1* 23.4   BUN mg/dL 7 8 9 14   CREATININE mg/dL 0.44* 0.42* 0.48* 0.47*   EGFR IF NONAFRICN AM mL/min/1.73 >150 >150 >150 >150   CALCIUM mg/dL 8.4* 8.3* 7.8* 8.0*   GLUCOSE mg/dL 130* 111* 101* 118*   ALBUMIN g/dL 2.72* 2.63*  --  2.53*   BILIRUBIN mg/dL 0.2 0.2  --  0.8   ALK PHOS U/L 69 79  --  65   AST (SGOT) U/L 21 20  --  22   ALT (SGPT) U/L 23 21  --  21   Estimated Creatinine Clearance: 457.6 mL/min (A) (by C-G formula based on SCr of 0.44 mg/dL (L)).    No results found for: AMMONIA      No results found for: BLOODCX  No results found for: URINECX  No results found for: WOUNDCX  No results found for: STOOLCX    I have personally looked at the labs and they are summarized above.  ----------------------------------------------------------------------------------------------------------------------  Imaging Results (Last 24 Hours)     ** No results found for the last 24 hours. **        ----------------------------------------------------------------------------------------------------------------------  Assessment and Plan:    1.  Sepsis -present on admission, continue to treat underlying etiology which is likely left knee cellulitis.  Continue vancomycin and Rocephin per infectious disease recommendations.    2.  Left knee cellulitis -see #1    3.  COVID-19 infection -patient with new development of cough overnight with new onset acute hypoxic respiratory failure.  Morbid obesity with obstructive sleep apnea is likely contributing to this, however this is a change in status.  Will start Remdesivir and Decadron today.    4.  Super morbid obesity -complicates all aspects of care    5.   Muscular dystrophy    6.  Essential hypertension -continue home antihypertensive medications, continue to monitor closely and make medication adjustments as necessary            Nader Noel, DO  10/06/20  08:47 EDT

## 2020-10-07 ENCOUNTER — APPOINTMENT (OUTPATIENT)
Dept: ULTRASOUND IMAGING | Facility: HOSPITAL | Age: 45
End: 2020-10-07

## 2020-10-07 LAB
ALBUMIN SERPL-MCNC: 2.46 G/DL (ref 3.5–5.2)
ALBUMIN SERPL-MCNC: 2.76 G/DL (ref 3.5–5.2)
ALBUMIN/GLOB SERPL: 0.6 G/DL
ALP SERPL-CCNC: 59 U/L (ref 39–117)
ALP SERPL-CCNC: 71 U/L (ref 39–117)
ALT SERPL W P-5'-P-CCNC: 20 U/L (ref 1–41)
ALT SERPL W P-5'-P-CCNC: 25 U/L (ref 1–41)
ANION GAP SERPL CALCULATED.3IONS-SCNC: 6.9 MMOL/L (ref 5–15)
AST SERPL-CCNC: 16 U/L (ref 1–40)
AST SERPL-CCNC: 20 U/L (ref 1–40)
BACTERIA SPEC AEROBE CULT: NORMAL
BASOPHILS # BLD AUTO: 0.02 10*3/MM3 (ref 0–0.2)
BASOPHILS NFR BLD AUTO: 0.3 % (ref 0–1.5)
BILIRUB CONJ SERPL-MCNC: <0.2 MG/DL (ref 0–0.3)
BILIRUB INDIRECT SERPL-MCNC: ABNORMAL MG/DL
BILIRUB SERPL-MCNC: 0.2 MG/DL (ref 0–1.2)
BILIRUB SERPL-MCNC: <0.2 MG/DL (ref 0–1.2)
BUN SERPL-MCNC: 5 MG/DL (ref 6–20)
BUN/CREAT SERPL: 13.9 (ref 7–25)
CALCIUM SPEC-SCNC: 7.8 MG/DL (ref 8.6–10.5)
CHLORIDE SERPL-SCNC: 106 MMOL/L (ref 98–107)
CK SERPL-CCNC: 15 U/L (ref 20–200)
CO2 SERPL-SCNC: 30.1 MMOL/L (ref 22–29)
CREAT SERPL-MCNC: 0.32 MG/DL (ref 0.76–1.27)
CREAT SERPL-MCNC: 0.36 MG/DL (ref 0.76–1.27)
CRP SERPL-MCNC: 7.59 MG/DL (ref 0–0.5)
D DIMER PPP FEU-MCNC: 0.99 MCGFEU/ML (ref 0–0.5)
DEPRECATED RDW RBC AUTO: 50.1 FL (ref 37–54)
EOSINOPHIL # BLD AUTO: 0.44 10*3/MM3 (ref 0–0.4)
EOSINOPHIL NFR BLD AUTO: 6.8 % (ref 0.3–6.2)
ERYTHROCYTE [DISTWIDTH] IN BLOOD BY AUTOMATED COUNT: 14.8 % (ref 12.3–15.4)
FERRITIN SERPL-MCNC: 124.6 NG/ML (ref 30–400)
FIBRINOGEN PPP-MCNC: 758 MG/DL (ref 173–524)
GFR SERPL CREATININE-BSD FRML MDRD: >150 ML/MIN/1.73
GFR SERPL CREATININE-BSD FRML MDRD: >150 ML/MIN/1.73
GLOBULIN UR ELPH-MCNC: 3.8 GM/DL
GLUCOSE SERPL-MCNC: 132 MG/DL (ref 65–99)
HCT VFR BLD AUTO: 36.5 % (ref 37.5–51)
HGB BLD-MCNC: 10.5 G/DL (ref 13–17.7)
HYPOCHROMIA BLD QL: NORMAL
IMM GRANULOCYTES # BLD AUTO: 0.15 10*3/MM3 (ref 0–0.05)
IMM GRANULOCYTES NFR BLD AUTO: 2.3 % (ref 0–0.5)
LDH SERPL-CCNC: 167 U/L (ref 135–225)
LYMPHOCYTES # BLD AUTO: 0.66 10*3/MM3 (ref 0.7–3.1)
LYMPHOCYTES NFR BLD AUTO: 10.3 % (ref 19.6–45.3)
MCH RBC QN AUTO: 26.3 PG (ref 26.6–33)
MCHC RBC AUTO-ENTMCNC: 28.8 G/DL (ref 31.5–35.7)
MCV RBC AUTO: 91.5 FL (ref 79–97)
MONOCYTES # BLD AUTO: 0.24 10*3/MM3 (ref 0.1–0.9)
MONOCYTES NFR BLD AUTO: 3.7 % (ref 5–12)
NEUTROPHILS NFR BLD AUTO: 4.92 10*3/MM3 (ref 1.7–7)
NEUTROPHILS NFR BLD AUTO: 76.6 % (ref 42.7–76)
NRBC BLD AUTO-RTO: 0 /100 WBC (ref 0–0.2)
PLAT MORPH BLD: NORMAL
PLATELET # BLD AUTO: 220 10*3/MM3 (ref 140–450)
PMV BLD AUTO: 9.3 FL (ref 6–12)
POTASSIUM SERPL-SCNC: 3.5 MMOL/L (ref 3.5–5.2)
PROT SERPL-MCNC: 6.3 G/DL (ref 6–8.5)
PROT SERPL-MCNC: 6.8 G/DL (ref 6–8.5)
RBC # BLD AUTO: 3.99 10*6/MM3 (ref 4.14–5.8)
SODIUM SERPL-SCNC: 143 MMOL/L (ref 136–145)
STOMATOCYTES BLD QL SMEAR: NORMAL
VANCOMYCIN TROUGH SERPL-MCNC: 10 MCG/ML (ref 5–20)
WBC # BLD AUTO: 6.43 10*3/MM3 (ref 3.4–10.8)

## 2020-10-07 PROCEDURE — 99233 SBSQ HOSP IP/OBS HIGH 50: CPT | Performed by: INTERNAL MEDICINE

## 2020-10-07 PROCEDURE — XW033E5 INTRODUCTION OF REMDESIVIR ANTI-INFECTIVE INTO PERIPHERAL VEIN, PERCUTANEOUS APPROACH, NEW TECHNOLOGY GROUP 5: ICD-10-PCS | Performed by: INTERNAL MEDICINE

## 2020-10-07 PROCEDURE — 80202 ASSAY OF VANCOMYCIN: CPT

## 2020-10-07 PROCEDURE — 85007 BL SMEAR W/DIFF WBC COUNT: CPT | Performed by: INTERNAL MEDICINE

## 2020-10-07 PROCEDURE — 80053 COMPREHEN METABOLIC PANEL: CPT | Performed by: INTERNAL MEDICINE

## 2020-10-07 PROCEDURE — 25010000002 DEXAMETHASONE PER 1 MG: Performed by: INTERNAL MEDICINE

## 2020-10-07 PROCEDURE — XW13325 TRANSFUSION OF CONVALESCENT PLASMA (NONAUTOLOGOUS) INTO PERIPHERAL VEIN, PERCUTANEOUS APPROACH, NEW TECHNOLOGY GROUP 5: ICD-10-PCS | Performed by: INTERNAL MEDICINE

## 2020-10-07 PROCEDURE — 86140 C-REACTIVE PROTEIN: CPT | Performed by: INTERNAL MEDICINE

## 2020-10-07 PROCEDURE — 25010000002 VANCOMYCIN: Performed by: INTERNAL MEDICINE

## 2020-10-07 PROCEDURE — 80076 HEPATIC FUNCTION PANEL: CPT | Performed by: INTERNAL MEDICINE

## 2020-10-07 PROCEDURE — 25010000002 HEPARIN (PORCINE) PER 1000 UNITS: Performed by: HOSPITALIST

## 2020-10-07 PROCEDURE — 85384 FIBRINOGEN ACTIVITY: CPT | Performed by: INTERNAL MEDICINE

## 2020-10-07 PROCEDURE — 82565 ASSAY OF CREATININE: CPT | Performed by: INTERNAL MEDICINE

## 2020-10-07 PROCEDURE — 97110 THERAPEUTIC EXERCISES: CPT

## 2020-10-07 PROCEDURE — 85379 FIBRIN DEGRADATION QUANT: CPT | Performed by: INTERNAL MEDICINE

## 2020-10-07 PROCEDURE — 83615 LACTATE (LD) (LDH) ENZYME: CPT | Performed by: INTERNAL MEDICINE

## 2020-10-07 PROCEDURE — 82728 ASSAY OF FERRITIN: CPT | Performed by: INTERNAL MEDICINE

## 2020-10-07 PROCEDURE — 93970 EXTREMITY STUDY: CPT | Performed by: RADIOLOGY

## 2020-10-07 PROCEDURE — 25010000002 VANCOMYCIN 5 G RECONSTITUTED SOLUTION: Performed by: NURSE PRACTITIONER

## 2020-10-07 PROCEDURE — 94799 UNLISTED PULMONARY SVC/PX: CPT

## 2020-10-07 PROCEDURE — 93970 EXTREMITY STUDY: CPT

## 2020-10-07 PROCEDURE — 82550 ASSAY OF CK (CPK): CPT | Performed by: INTERNAL MEDICINE

## 2020-10-07 PROCEDURE — 85025 COMPLETE CBC W/AUTO DIFF WBC: CPT | Performed by: INTERNAL MEDICINE

## 2020-10-07 PROCEDURE — 25010000002 CEFTRIAXONE PER 250 MG: Performed by: INTERNAL MEDICINE

## 2020-10-07 RX ORDER — ALBUTEROL SULFATE 90 UG/1
2 AEROSOL, METERED RESPIRATORY (INHALATION)
Status: DISCONTINUED | OUTPATIENT
Start: 2020-10-07 | End: 2020-10-11 | Stop reason: HOSPADM

## 2020-10-07 RX ORDER — DEXAMETHASONE SODIUM PHOSPHATE 4 MG/ML
6 INJECTION, SOLUTION INTRA-ARTICULAR; INTRALESIONAL; INTRAMUSCULAR; INTRAVENOUS; SOFT TISSUE DAILY
Status: DISCONTINUED | OUTPATIENT
Start: 2020-10-07 | End: 2020-10-11 | Stop reason: HOSPADM

## 2020-10-07 RX ORDER — HYDRALAZINE HYDROCHLORIDE 20 MG/ML
10 INJECTION INTRAMUSCULAR; INTRAVENOUS EVERY 6 HOURS PRN
Status: DISCONTINUED | OUTPATIENT
Start: 2020-10-07 | End: 2020-10-11 | Stop reason: HOSPADM

## 2020-10-07 RX ORDER — POTASSIUM CHLORIDE 20 MEQ/1
40 TABLET, EXTENDED RELEASE ORAL EVERY 4 HOURS
Status: COMPLETED | OUTPATIENT
Start: 2020-10-07 | End: 2020-10-07

## 2020-10-07 RX ADMIN — CEFTRIAXONE 2 G: 2 INJECTION, POWDER, FOR SOLUTION INTRAMUSCULAR; INTRAVENOUS at 12:14

## 2020-10-07 RX ADMIN — ACETAMINOPHEN 650 MG: 325 TABLET ORAL at 13:16

## 2020-10-07 RX ADMIN — HEPARIN SODIUM 5000 UNITS: 5000 INJECTION INTRAVENOUS; SUBCUTANEOUS at 08:22

## 2020-10-07 RX ADMIN — SODIUM CHLORIDE, PRESERVATIVE FREE 10 ML: 5 INJECTION INTRAVENOUS at 20:37

## 2020-10-07 RX ADMIN — ALBUTEROL SULFATE 2 PUFF: 90 AEROSOL, METERED RESPIRATORY (INHALATION) at 20:37

## 2020-10-07 RX ADMIN — VANCOMYCIN HYDROCHLORIDE 2000 MG: 1 INJECTION, POWDER, LYOPHILIZED, FOR SOLUTION INTRAVENOUS at 01:03

## 2020-10-07 RX ADMIN — DEXAMETHASONE SODIUM PHOSPHATE 6 MG: 4 INJECTION, SOLUTION INTRAMUSCULAR; INTRAVENOUS at 17:12

## 2020-10-07 RX ADMIN — REMDESIVIR 200 MG: 100 INJECTION, POWDER, LYOPHILIZED, FOR SOLUTION INTRAVENOUS at 18:38

## 2020-10-07 RX ADMIN — VANCOMYCIN HYDROCHLORIDE 2000 MG: 1 INJECTION, POWDER, LYOPHILIZED, FOR SOLUTION INTRAVENOUS at 16:13

## 2020-10-07 RX ADMIN — POTASSIUM CHLORIDE 40 MEQ: 20 TABLET, EXTENDED RELEASE ORAL at 18:38

## 2020-10-07 RX ADMIN — HYDROCODONE BITARTRATE AND ACETAMINOPHEN 1 TABLET: 5; 325 TABLET ORAL at 08:23

## 2020-10-07 RX ADMIN — SODIUM CHLORIDE, PRESERVATIVE FREE 10 ML: 5 INJECTION INTRAVENOUS at 08:23

## 2020-10-07 RX ADMIN — POTASSIUM CHLORIDE 40 MEQ: 20 TABLET, EXTENDED RELEASE ORAL at 16:13

## 2020-10-07 RX ADMIN — ALBUTEROL SULFATE 2 PUFF: 90 AEROSOL, METERED RESPIRATORY (INHALATION) at 12:14

## 2020-10-07 RX ADMIN — HEPARIN SODIUM 5000 UNITS: 5000 INJECTION INTRAVENOUS; SUBCUTANEOUS at 20:36

## 2020-10-07 RX ADMIN — ALBUTEROL SULFATE 2 PUFF: 90 AEROSOL, METERED RESPIRATORY (INHALATION) at 16:13

## 2020-10-07 RX ADMIN — HYDROCODONE BITARTRATE AND ACETAMINOPHEN 1 TABLET: 5; 325 TABLET ORAL at 20:36

## 2020-10-07 NOTE — PROGRESS NOTES
Discharge Planning Assessment  JOEL Ornelas     Patient Name: Jimmy Recinos  MRN: [id number]  Today's Date: 10/7/2020    Admit Date: 10/2/2020      Discharge Plan     Row Name 10/07/20 1626       Plan    Plan  SS spoke with Pt. Pt reports that he would like to return home with family at discharge and will need EMS transport. SS will continue to follow and assist with discharge planning.    Patient/Family in Agreement with Plan  yes        Continued Care and Services - Admitted Since 10/2/2020     Home Medical Care Coordination complete    Service Provider Request Status Selected Services Address Phone Fax    [facility]   Selected [facility] Services [address] [place] [address] [address] [facility] [address] [phone]             Penny Esquivel

## 2020-10-07 NOTE — PLAN OF CARE
Goal Outcome Evaluation:  Plan of Care Reviewed With: patient  Progress: no change  Outcome Summary: VSS and remains afebrile. Patient continues on 2L NC, tolerating well, but has expiratory wheezing. Fluids were discontinued and stopped at 0130. Patient complained of left hand and arm swelling and feeling numb, provider made aware. Call light within reach. Will continue to monitor.      Problem: Adult Inpatient Plan of Care  Goal: Plan of Care Review  Outcome: Ongoing, Progressing  Flowsheets (Taken 10/7/2020 0516)  Progress: no change  Plan of Care Reviewed With: patient  Outcome Summary: VSS and remains afebrile. Patient continues on 2L NC, tolerating well, but has expiratory wheezing. Fluids were discontinued and stopped at 0130. Patient complained of left hand and arm swelling and feeling numb, provider made aware. Call light within reach. Will continue to monitor.  Goal: Patient-Specific Goal (Individualized)  Outcome: Ongoing, Progressing  Goal: Absence of Hospital-Acquired Illness or Injury  Outcome: Ongoing, Progressing  Intervention: Identify and Manage Fall Risk  Recent Flowsheet Documentation  Taken 10/7/2020 0500 by Martha Platt, RN  Safety Promotion/Fall Prevention:   activity supervised   assistive device/personal items within reach   clutter free environment maintained   fall prevention program maintained   nonskid shoes/slippers when out of bed   room organization consistent   safety round/check completed  Taken 10/7/2020 0300 by Martha Platt, RN  Safety Promotion/Fall Prevention:   activity supervised   assistive device/personal items within reach   clutter free environment maintained   fall prevention program maintained   nonskid shoes/slippers when out of bed   safety round/check completed   room organization consistent  Taken 10/7/2020 0100 by Martha Platt, RN  Safety Promotion/Fall Prevention:   activity supervised   assistive device/personal items within reach   clutter free environment  maintained   fall prevention program maintained   nonskid shoes/slippers when out of bed   room organization consistent   safety round/check completed  Taken 10/6/2020 2300 by Martha Platt RN  Safety Promotion/Fall Prevention:   activity supervised   assistive device/personal items within reach   clutter free environment maintained   fall prevention program maintained   muscle strengthening facilitated   room organization consistent   safety round/check completed  Taken 10/6/2020 2100 by Martha Platt RN  Safety Promotion/Fall Prevention:   activity supervised   assistive device/personal items within reach   clutter free environment maintained   fall prevention program maintained   nonskid shoes/slippers when out of bed   room organization consistent   safety round/check completed  Taken 10/6/2020 1900 by Martha Platt RN  Safety Promotion/Fall Prevention:   activity supervised   assistive device/personal items within reach   clutter free environment maintained   fall prevention program maintained   nonskid shoes/slippers when out of bed   room organization consistent   safety round/check completed  Intervention: Prevent and Manage VTE (venous thromboembolism) Risk  Recent Flowsheet Documentation  Taken 10/7/2020 0230 by Martha Platt RN  VTE Prevention/Management: (see MAR) other (see comments)  Taken 10/6/2020 2018 by Martha Platt RN  VTE Prevention/Management: (see MAR) other (see comments)  Intervention: Prevent Infection  Recent Flowsheet Documentation  Taken 10/7/2020 0500 by Martha Platt RN  Infection Prevention:   rest/sleep promoted   single patient room provided  Taken 10/7/2020 0300 by Marhta Platt RN  Infection Prevention:   single patient room provided   rest/sleep promoted  Taken 10/7/2020 0100 by Martha Platt RN  Infection Prevention:   rest/sleep promoted   single patient room provided  Taken 10/6/2020 2300 by Martha Platt RN  Infection Prevention:   single patient room provided   rest/sleep  promoted  Taken 10/6/2020 2100 by Martha Platt RN  Infection Prevention:   rest/sleep promoted   single patient room provided  Taken 10/6/2020 1900 by Martha Platt RN  Infection Prevention:   rest/sleep promoted   single patient room provided  Goal: Optimal Comfort and Wellbeing  Outcome: Ongoing, Progressing  Intervention: Provide Person-Centered Care  Recent Flowsheet Documentation  Taken 10/7/2020 0230 by Martha Platt RN  Trust Relationship/Rapport:   care explained   choices provided   questions answered   thoughts/feelings acknowledged  Taken 10/6/2020 2018 by Martha Platt RN  Trust Relationship/Rapport:   care explained   choices provided   questions answered   thoughts/feelings acknowledged   reassurance provided  Goal: Readiness for Transition of Care  Outcome: Ongoing, Progressing     Problem: Fall Injury Risk  Goal: Absence of Fall and Fall-Related Injury  Outcome: Ongoing, Progressing  Intervention: Identify and Manage Contributors to Fall Injury Risk  Recent Flowsheet Documentation  Taken 10/7/2020 0500 by Martha Platt RN  Medication Review/Management: medications reviewed  Taken 10/7/2020 0300 by Martha Platt RN  Medication Review/Management: medications reviewed  Taken 10/7/2020 0100 by Martha Platt RN  Medication Review/Management: medications reviewed  Taken 10/6/2020 2300 by Martha Platt RN  Medication Review/Management: medications reviewed  Taken 10/6/2020 2100 by Martha Platt RN  Medication Review/Management: medications reviewed  Taken 10/6/2020 1900 by Martha Platt RN  Medication Review/Management: medications reviewed  Intervention: Promote Injury-Free Environment  Recent Flowsheet Documentation  Taken 10/7/2020 0500 by Martha Platt RN  Safety Promotion/Fall Prevention:   activity supervised   assistive device/personal items within reach   clutter free environment maintained   fall prevention program maintained   nonskid shoes/slippers when out of bed   room organization  consistent   safety round/check completed  Taken 10/7/2020 0300 by Martha Platt RN  Safety Promotion/Fall Prevention:   activity supervised   assistive device/personal items within reach   clutter free environment maintained   fall prevention program maintained   nonskid shoes/slippers when out of bed   safety round/check completed   room organization consistent  Taken 10/7/2020 0100 by Martha Platt RN  Safety Promotion/Fall Prevention:   activity supervised   assistive device/personal items within reach   clutter free environment maintained   fall prevention program maintained   nonskid shoes/slippers when out of bed   room organization consistent   safety round/check completed  Taken 10/6/2020 2300 by Martha Platt RN  Safety Promotion/Fall Prevention:   activity supervised   assistive device/personal items within reach   clutter free environment maintained   fall prevention program maintained   muscle strengthening facilitated   room organization consistent   safety round/check completed  Taken 10/6/2020 2100 by Martha Platt RN  Safety Promotion/Fall Prevention:   activity supervised   assistive device/personal items within reach   clutter free environment maintained   fall prevention program maintained   nonskid shoes/slippers when out of bed   room organization consistent   safety round/check completed  Taken 10/6/2020 1900 by Martha Platt RN  Safety Promotion/Fall Prevention:   activity supervised   assistive device/personal items within reach   clutter free environment maintained   fall prevention program maintained   nonskid shoes/slippers when out of bed   room organization consistent   safety round/check completed     Problem: Skin Injury Risk Increased  Goal: Skin Health and Integrity  Outcome: Ongoing, Progressing  Intervention: Optimize Skin Protection  Recent Flowsheet Documentation  Taken 10/7/2020 0230 by Martha Platt, HERNANDEZ  Pressure Reduction Techniques: frequent weight shift encouraged  Pressure  Reduction Devices: positioning supports utilized  Skin Protection: adhesive use limited  Taken 10/6/2020 2018 by Martha Platt RN  Pressure Reduction Techniques: frequent weight shift encouraged  Pressure Reduction Devices: positioning supports utilized  Skin Protection: adhesive use limited     Problem: Asthma Comorbidity  Goal: Maintenance of Asthma Control  Outcome: Ongoing, Progressing  Intervention: Maintain Asthma Symptom Control  Recent Flowsheet Documentation  Taken 10/7/2020 0500 by Martha Platt RN  Medication Review/Management: medications reviewed  Taken 10/7/2020 0300 by Martha Platt RN  Medication Review/Management: medications reviewed  Taken 10/7/2020 0100 by Martha Platt RN  Medication Review/Management: medications reviewed  Taken 10/6/2020 2300 by Martha Platt RN  Medication Review/Management: medications reviewed  Taken 10/6/2020 2100 by Martha Platt RN  Medication Review/Management: medications reviewed  Taken 10/6/2020 1900 by Martha Platt RN  Medication Review/Management: medications reviewed     Problem: COPD Comorbidity  Goal: Maintenance of COPD Symptom Control  Outcome: Ongoing, Progressing  Intervention: Maintain COPD-Symptom Control  Recent Flowsheet Documentation  Taken 10/7/2020 0500 by Martha Platt RN  Medication Review/Management: medications reviewed  Taken 10/7/2020 0300 by Martha Platt RN  Medication Review/Management: medications reviewed  Taken 10/7/2020 0100 by Martha Platt RN  Medication Review/Management: medications reviewed  Taken 10/6/2020 2300 by Martha Platt RN  Medication Review/Management: medications reviewed  Taken 10/6/2020 2100 by Martha Platt RN  Medication Review/Management: medications reviewed  Taken 10/6/2020 1900 by Martha Platt RN  Medication Review/Management: medications reviewed     Problem: Diabetes Comorbidity  Goal: Blood Glucose Level Within Desired Range  Outcome: Ongoing, Progressing     Problem: Hypertension Comorbidity  Goal: Blood  Pressure in Desired Range  Outcome: Ongoing, Progressing  Intervention: Maintain Hypertension-Management Strategies  Recent Flowsheet Documentation  Taken 10/7/2020 0500 by Martha Platt RN  Medication Review/Management: medications reviewed  Taken 10/7/2020 0300 by Martha Platt RN  Medication Review/Management: medications reviewed  Taken 10/7/2020 0230 by Martha Platt RN  Syncope Management: position changed slowly  Taken 10/7/2020 0100 by Martha Platt RN  Medication Review/Management: medications reviewed  Taken 10/6/2020 2300 by Martha Platt RN  Medication Review/Management: medications reviewed  Taken 10/6/2020 2100 by Martha Platt RN  Medication Review/Management: medications reviewed  Taken 10/6/2020 1900 by Martha Platt RN  Medication Review/Management: medications reviewed     Problem: Obstructive Sleep Apnea Risk or Actual (Comorbidity Management)  Goal: Unobstructed Breathing During Sleep  Outcome: Ongoing, Progressing     Problem: Pain Chronic (Persistent) (Comorbidity Management)  Goal: Acceptable Pain Control and Functional Ability  Outcome: Ongoing, Progressing  Intervention: Develop Pain Management Plan  Recent Flowsheet Documentation  Taken 10/6/2020 2018 by Martha Platt RN  Pain Management Interventions: see MAR  Intervention: Manage Persistent Pain  Recent Flowsheet Documentation  Taken 10/7/2020 0500 by Martha Platt RN  Medication Review/Management: medications reviewed  Taken 10/7/2020 0300 by Martha Platt RN  Medication Review/Management: medications reviewed  Taken 10/7/2020 0230 by Martha Platt RN  Sleep/Rest Enhancement:   awakenings minimized   regular sleep/rest pattern promoted   room darkened  Taken 10/7/2020 0100 by Martha Platt RN  Medication Review/Management: medications reviewed  Taken 10/6/2020 2300 by Martha Platt RN  Medication Review/Management: medications reviewed  Taken 10/6/2020 2100 by Martha Platt RN  Medication Review/Management: medications reviewed  Taken  10/6/2020 2018 by Martha Platt, RN  Sleep/Rest Enhancement: awakenings minimized  Taken 10/6/2020 1900 by Martha Platt, RN  Medication Review/Management: medications reviewed  Intervention: Optimize Psychosocial Wellbeing  Recent Flowsheet Documentation  Taken 10/7/2020 0230 by Martha Platt, RN  Supportive Measures: active listening utilized  Diversional Activities:   television   smartphone  Family/Support System Care:   support provided   self-care encouraged  Taken 10/6/2020 2018 by Martha Platt, RN  Supportive Measures:   active listening utilized   self-care encouraged  Diversional Activities:   television   smartphone  Family/Support System Care:   support provided   self-care encouraged

## 2020-10-07 NOTE — THERAPY TREATMENT NOTE
Acute Care - Physical Therapy Treatment Note   [facility]     Patient Name: Jimmy Recinos  : 1975  MRN: [id number]  Today's Date: 10/7/2020   Onset of Illness/Injury or Date of Surgery: 10/02/20       PT Assessment (last 12 hours)      PT Evaluation and Treatment     Row Name 10/07/20 1300          Physical Therapy Time and Intention    Subjective Information  complains of;pain  -BC     Document Type  therapy note (daily note)  -BC     Mode of Treatment  physical therapy  -BC     Patient Effort  adequate  -BC     Row Name 10/07/20 1300          General Information    Patient Profile Reviewed  yes  -BC     Referring Physician  Dr. Cardoza  -BC     Risks Reviewed  patient:;LOB;nausea/vomiting;dizziness;increased discomfort;change in vital signs;increased drainage;lines disloged  -BC     Benefits Reviewed  patient:;improve function;increase independence;increase strength;increase balance;decrease pain;decrease risk of DVT;improve skin integrity;increase knowledge  -BC     Row Name 10/07/20 1300          Living Environment    Current Living Arrangements  home/apartment/condo  -BC     Row Name 10/07/20 1300          Bed Mobility    Bed Mobility  bed mobility (all) activities  -BC     All Activities, [place] (Bed Mobility)  maximum assist (25% patient effort)  -BC     Bed Mobility, Safety Issues  decreased use of legs for bridging/pushing;decreased use of arms for pushing/pulling;impaired trunk control for bed mobility  -BC     Assistive Device (Bed Mobility)  draw sheet  -BC     Row Name             Wound 20 Bilateral lower thoracic spine Abrasion    Wound - Properties Group Placement Date: 20  -KE Placement Time: 318 Present on Hospital Admission: Y  -KE Side: Bilateral  -KE Location: thoracic spine  -KE Primary Wound Type: Abrasion  -KE    Retired Wound - Properties Group Date first assessed: 20  -KE Time first assessed: 318  - Present on Hospital Admission: Y  -KE Side:  Bilateral  -KE Retired Orientation: lower  -KE Location: thoracic spine  -KE Primary Wound Type: Abrasion  -KE    Row Name             Wound 12/21/19 2330 Right gluteal Abrasion    Wound - Properties Group Placement Date: 12/21/19  -KS Placement Time: 2330 -KS Side: Right  -KS Location: gluteal  -KS Primary Wound Type: Abrasion  -KS Stage, Pressure Injury : Stage 1  -KM    Retired Wound - Properties Group Date first assessed: 12/21/19  -KS Time first assessed: 2330 -KS Side: Right  -KS Retired Orientation: medial  -KS Location: gluteal  -KS Primary Wound Type: Abrasion  -KS Retired Stage, Pressure Injury : other (see comments)  -KS    Row Name 10/07/20 1300          Physical Therapy Goals    Bed Mobility Goal Selection (PT)  bed mobility, PT goal 1  -BC     Row Name 10/07/20 1300          Bed Mobility Goal 1 (PT)    Activity/Assistive Device (Bed Mobility Goal 1, PT)  bed mobility activities, all  -BC     [place] Level/Cues Needed (Bed Mobility Goal 1, PT)  moderate assist (50-74% patient effort)  -BC     Time Frame (Bed Mobility Goal 1, PT)  by discharge  -BC       User Key  (r) = Recorded By, (t) = Taken By, (c) = Cosigned By    Initials Name Provider Type    BC Janell Rosales, PT Physical Therapist    Kyung Flores RN Registered Nurse    Mary Farfan RN Registered Nurse    Marsha Dubose RN Registered Nurse        Physical Therapy Education                 Title: PT OT SLP Therapies (Done)     Topic: Physical Therapy (Done)     Point: Mobility training (Done)     Learning Progress Summary           Patient Acceptance, E, VU by BC at 10/7/2020 1329    Acceptance, E, VU,NR by SC at 10/6/2020 2219    Acceptance, E, VU,NR by SC at 10/5/2020 2217    Acceptance, E, VU,NR by SC at 10/4/2020 2243    Acceptance, E, VU by SC at 10/4/2020 0027    Acceptance, E,TB, VU by AM at 10/3/2020 1045    Acceptance, E, VU by SC at 10/3/2020 0634                   Point: Home exercise program (Done)      Learning Progress Summary           Patient Acceptance, E, VU by BC at 10/7/2020 1329    Acceptance, E, VU,NR by SC at 10/6/2020 2219    Acceptance, E, VU,NR by SC at 10/5/2020 2217    Acceptance, E, VU,NR by SC at 10/4/2020 2243    Acceptance, E, VU by SC at 10/4/2020 0027    Acceptance, E,TB, VU by AM at 10/3/2020 1045    Acceptance, E, VU by SC at 10/3/2020 0634                   Point: Body mechanics (Done)     Learning Progress Summary           Patient Acceptance, E, VU by BC at 10/7/2020 1329    Acceptance, E, VU,NR by SC at 10/6/2020 2219    Acceptance, E, VU,NR by SC at 10/5/2020 2217    Acceptance, E, VU,NR by SC at 10/4/2020 2243    Acceptance, E, VU by SC at 10/4/2020 0027    Acceptance, E,TB, VU by AM at 10/3/2020 1045    Acceptance, E, VU by SC at 10/3/2020 0634                   Point: Precautions (Done)     Learning Progress Summary           Patient Acceptance, E, VU by BC at 10/7/2020 1329    Acceptance, E, VU,NR by SC at 10/6/2020 2219    Acceptance, E, VU,NR by SC at 10/5/2020 2217    Acceptance, E, VU,NR by SC at 10/4/2020 2243    Acceptance, E, VU by SC at 10/4/2020 0027    Acceptance, E,TB, VU by AM at 10/3/2020 1045    Acceptance, E, VU by SC at 10/3/2020 0634                               User Key     Initials Effective Dates Name Provider Type Discipline    BC 03/14/16 -  Janell Rosales PT Physical Therapist PT    AM 06/11/18 -  Bianka Banerjee, RN Registered Nurse Nurse    SC 07/07/20 -  Martha Platt, HERNANDEZ Registered Nurse Nurse              PT Recommendation and Plan     Plan of Care Reviewed With: patient       Time Calculation:   PT Charges     Row Name 10/07/20 1330             Time Calculation    PT Received On  10/07/20  -BC         Time Calculation- PT    Total Timed Code Minutes- PT  30 minute(s)  -BC         Timed Charges    49699 - PT Therapeutic Exercise Minutes  30  -BC        User Key  (r) = Recorded By, (t) = Taken By, (c) = Cosigned By    Initials Name Provider Type    BC  Janell Rosales, PT Physical Therapist        Therapy Charges for Today     Code Description Service Date Service Provider Modifiers Qty    01484189070 HC PT THER PROC EA 15 MIN 10/7/2020 Janell Rosales, PT GP 2               Janell Rosales, PT  10/7/2020

## 2020-10-07 NOTE — PROGRESS NOTES
Pharmacokinetics Service Note:    Mr. Recinos continues on day 5 of 7 of vancomycin for his L knee cellulitis and Strep mitis/oralis bacteremia. The current dosage is 2 gm q12hrs.  An 11.75 hour post 2 hour infusion level was reported as 10 mg/L this afternoon.  This level correlates with a predicted AUC of 550 mg/L.hr and a trough around 10.9 mg/L if collected on time, both of which are therapeutic for these indications.  Will continue the present regimen for now.      Thank you.  Daniela He, Pharm.D.  10/7/2020  16:24 EDT

## 2020-10-07 NOTE — PLAN OF CARE
Goal Outcome Evaluation:  Plan of Care Reviewed With: patient  Progress: no change  Outcome Summary: VSS. Afebrile. 2LNC. Pt c/o frequent pain, PRN pain meds administered. Plan of care discussed with pt and Dr. Noel. Pt left with bed in safe position and call light in reach.

## 2020-10-07 NOTE — PROGRESS NOTES
PROGRESS NOTE         Patient Identification:  Name:  Jimmy Recinos  Age:  44 y.o.  Sex:  male  :  1975  MRN:  [id number]  Visit Number:  [id number]  Primary Care Provider:  Provider, No Known         LOS: 4 days       ----------------------------------------------------------------------------------------------------------------------  Subjective       Chief Complaints:    Cellulitis        Interval History:      Case discussed with primary RN this morning.  Patient continues to have significant expiratory wheezing and coughing noted.  Currently on 2 L per nasal cannula with no apparent distress.  Nurse reports cellulitis is almost completely resolved.  However left arm swelling secondary to midline was noted this morning.  WBC normal.  CRP improving at 7.59.    Review of Systems:    Constitutional:  No fever, chills.  No night sweats. No appetite change or unexpected weight change.  Positive fatigue.  Eyes: no eye drainage, itching or redness.  HEENT: no mouth sores, dysphagia or nose bleed.  Respiratory: no for shortness of breath, cough or production of sputum.  Cardiovascular: no chest pain, no palpitations, no orthopnea.  Gastrointestinal: no nausea, vomiting or diarrhea. No abdominal pain, hematemesis or rectal bleeding.  Genitourinary: no dysuria or polyuria.  Hematologic/lymphatic: no lymph node abnormalities, no easy bruising or easy bleeding.  Musculoskeletal: Left lower extremity pain.  Skin: No rash and no itching.  Neurological: no loss of consciousness, no seizure, no headache.  Behavioral/Psych: no depression or suicidal ideation.  Endocrine: no hot flashes.  Immunologic: negative.       ----------------------------------------------------------------------------------------------------------------------      Objective       Current [facility] Meds:  albuterol sulfate HFA, 2 puff, Inhalation, 4x Daily - RT  cefTRIAXone, 2 g, Intravenous, Q24H  heparin (porcine), 5,000 Units,  Subcutaneous, Q12H  sodium chloride, 10 mL, Intravenous, Q12H  sodium chloride, 10 mL, Intravenous, Q12H  sodium chloride, 10 mL, Intravenous, Q12H  vancomycin, 2,000 mg, Intravenous, Q12H         ----------------------------------------------------------------------------------------------------------------------    Vital Signs:  Temp:  [97.7 °F (36.5 °C)-98.8 °F (37.1 °C)] 98 °F (36.7 °C)  Heart Rate:  [78-98] 98  Resp:  [22-26] 26  BP: (143-187)/() 184/101  Mean Arterial Pressure (Non-Invasive) for the past 24 hrs (Last 3 readings):   Noninvasive MAP (mmHg)   10/07/20 0902 129   10/07/20 0802 118   10/07/20 0702 128     SpO2 Percentage    10/07/20 0702 10/07/20 0802 10/07/20 0902   SpO2: 97% 91% 91%     SpO2:  [87 %-98 %] 91 %  on  Flow (L/min):  [2] 2;   Device (Oxygen Therapy): nasal cannula    Body mass index is 85.16 kg/m².  Wt Readings from Last 3 Encounters:   10/07/20 (!) 269 kg (593 lb 8 oz)   08/23/20 (!) 295 kg (650 lb)   01/02/20 (!) 273 kg (601 lb)        Intake/Output Summary (Last 24 hours) at 10/7/2020 1052  Last data filed at 10/7/2020 0645  Gross per 24 hour   Intake 4184.43 ml   Output 2350 ml   Net 1834.43 ml     Diet Regular  ----------------------------------------------------------------------------------------------------------------------      Physical Exam:    Deferred due to COVID-19 isolation.  ----------------------------------------------------------------------------------------------------------------------  Results from last 7 days   Lab Units 10/07/20  0122 10/06/20  0115 10/05/20  0243   CK TOTAL U/L 15* 20 23           Results from last 7 days   Lab Units 10/07/20  0122 10/06/20  0115 10/05/20  0243  10/02/20  2326   CRP mg/dL 7.59* 11.39* 15.56*   < > 14.02*   LACTATE mmol/L  --   --   --   --  1.1   WBC 10*3/mm3 6.43 8.55 9.93   < > 14.09*   HEMOGLOBIN g/dL 10.5* 11.1* 11.0*   < > 12.0*   HEMATOCRIT % 36.5* 37.7 37.2*   < > 40.6   MCV fL 91.5 90.2 90.1   < > 88.8   MCHC  g/dL 28.8* 29.4* 29.6*   < > 29.6*   PLATELETS 10*3/mm3 220 245 238   < > 266    < > = values in this interval not displayed.     Results from last 7 days   Lab Units 10/07/20  0122 10/06/20  0115 10/05/20  0243   SODIUM mmol/L 143 139 137   POTASSIUM mmol/L 3.5 3.8 3.8   CHLORIDE mmol/L 106 102 101   CO2 mmol/L 30.1* 31.3* 28.0   BUN mg/dL 5* 7 8   CREATININE mg/dL 0.36* 0.44* 0.42*   EGFR IF NONAFRICN AM mL/min/1.73 >150 >150 >150   CALCIUM mg/dL 7.8* 8.4* 8.3*   GLUCOSE mg/dL 132* 130* 111*   ALBUMIN g/dL 2.46* 2.72* 2.63*   BILIRUBIN mg/dL 0.2 0.2 0.2   ALK PHOS U/L 59 69 79   AST (SGOT) U/L 16 21 20   ALT (SGPT) U/L 20 23 21   Estimated Creatinine Clearance: 559.3 mL/min (A) (by C-G formula based on SCr of 0.36 mg/dL (L)).  No results found for: AMMONIA    No results found for: HGBA1C, POCGLU  Lab Results   Component Value Date    HGBA1C 5.00 08/23/2020     Lab Results   Component Value Date    TSH 0.793 12/21/2019       Blood Culture   Date Value Ref Range Status   10/02/2020 Abnormal Stain (C)  Preliminary   10/02/2020 No growth at 24 hours  Preliminary     No results found for: URINECX  No results found for: WOUNDCX  No results found for: STOOLCX  No results found for: RESPCX  Pain Management Panel     Pain Management Panel Latest Ref Rng & Units 10/19/2014    AMPHETAMINES SCREEN, URINE NEGATIVE Negative    BARBITURATES SCREEN NEGATIVE Negative    BENZODIAZEPINE SCREEN, URINE NEGATIVE Negative    COCAINE SCREEN, URINE NEGATIVE Negative    METHADONE SCREEN, URINE NEGATIVE Negative            ----------------------------------------------------------------------------------------------------------------------  Imaging Results (Last 24 Hours)     Procedure Component Value Units Date/Time    US Venous Doppler Upper Extremity Bilateral (duplex) [[id number]] Resulted: 10/07/20 1031     Updated: 10/07/20 1031                 ----------------------------------------------------------------------------------------------------------------------    Assessment/Plan       Assessment/Plan     ASSESSMENT:    1.  COVID-19  2.  Lower extremity cellulitis      PLAN:    Case discussed with primary RN this morning.  Patient continues to have significant expiratory wheezing and coughing noted.  Currently on 2 L per nasal cannula with no apparent distress.  Nurse reports cellulitis is almost completely resolved.  However left arm swelling secondary to midline was noted this morning.  WBC normal.  CRP improving at 7.59.    Blood cultures reveal 1 out of 2 Streptococcus mitis/oralis.    Chest x-ray unremarkable.  Patient unable to complete CT scan due to size.     Primary team to initiate convalescent plasma therapy and Remdesivir.  Consents were obtained per primary team.     We recommend to continue Rocephin 2 g IV every 24 hours and vancomycin pharmacy to dose.  Bilateral upper extremity venous duplex was ordered in the setting of left arm swelling. We will continue to follow closely and adjust antibiotic therapy as needed.    Code Status:   Code Status and Medical Interventions:   Ordered at: 10/03/20 0236     Level Of Support Discussed With:    Patient     Code Status:    CPR     Medical Interventions (Level of Support Prior to Arrest):    Full       Scribed for Rosita Kendrick MD.      Yolanda Gross, APRN  10/07/20  10:52 EDT     Physician Attestation:    The documentation recorded by the scribe accurately reflects the service I personally performed and the decisions made by me.    Rosita Kendrick MD  Infectious Diseases  10/07/20  10:52 EDT

## 2020-10-07 NOTE — PROGRESS NOTES
[facility]     Patient Identification:  Name:  Jimmy Recinos  Age:  44 y.o.  Sex:  male  :  1975  MRN:  [id number]  Visit Number:  [id number]  Primary Care Provider:  Provider, No Known    Length of stay:  4    Chief complaint: Shortness of breath    Subjective:    Patient reports his left knee pain has almost completely resolved.  However, patient is now with worsening shortness of breath.  He feels very tired and states he has been sleeping most of the day.  He denies any fevers or chills.  Denies any other new symptoms overnight.  ----------------------------------------------------------------------------------------------------------------------  Current [facility] Meds:  albuterol sulfate HFA, 2 puff, Inhalation, 4x Daily - RT  cefTRIAXone, 2 g, Intravenous, Q24H  dexamethasone, 6 mg, Intravenous, Daily  heparin (porcine), 5,000 Units, Subcutaneous, Q12H  INV [id number] remdesivir in NS IVPB, 200 mg, Intravenous, Q24H    Followed by  [START ON 10/8/2020] INV [id number] remdesivir in NS IVPB, 100 mg, Intravenous, Q24H  potassium chloride, 40 mEq, Oral, Q4H  sodium chloride, 10 mL, Intravenous, Q12H  sodium chloride, 10 mL, Intravenous, Q12H  sodium chloride, 10 mL, Intravenous, Q12H  vancomycin, 2,000 mg, Intravenous, Q12H      Pharmacy Consult - Remdesivir,       ----------------------------------------------------------------------------------------------------------------------  Vital Signs:  Temp:  [97.7 °F (36.5 °C)-98.6 °F (37 °C)] 98.1 °F (36.7 °C)  Heart Rate:  [78-98] 87  Resp:  [16-26] 16  BP: (143-187)/() 149/81      10/05/20  0400 10/06/20  0204 10/07/20  0400   Weight: (!) 268 kg (591 lb 8 oz) (!) 269 kg (593 lb) (!) 269 kg (593 lb 8 oz)     Body mass index is 85.16 kg/m².    Intake/Output Summary (Last 24 hours) at 10/7/2020 1553  Last data filed at 10/7/2020 0645  Gross per 24 hour   Intake 3344.43 ml   Output 1250 ml   Net 2094.43 ml     Diet  Regular  ----------------------------------------------------------------------------------------------------------------------  Physical exam:  This physical exam has been personally performed remotely in the unit aided by real-time audio/visual communication tools.The use of a video visit has been reviewed with the patient and verbal informed consent has been obtained.     Physical Exam:  General: Patient appears awake, alert, and somewhat tired.  Head: Normocephalic, atraumatic  Eyes: EOMI. Conjunctivae and sclerae normal.  Ears: Ears appear intact with no abnormalities noted.   Neck: Trachea midline. No obvious JVD.  Lungs: Respirations appear to be regular, even and unlabored with no signs of respiratory distress. No audible wheezing.  However, according to patient's nurse he does have wheezing on auscultation.  Abdomen: No obvious abdominal distension.  MS: Muscle tone appears normal. No gross deformities.  Extremities: No clubbing, cyanosis or edema noted.  Skin: Erythematous circumferential rash around the left knee  Neurologic: Alert and oriented x3. No gross focal deficits.     ----------------------------------------------------------------------------------------------------------------------  Tele:    ----------------------------------------------------------------------------------------------------------------------  Results from last 7 days   Lab Units 10/07/20  0122 10/06/20  0115 10/05/20  0243   CK TOTAL U/L 15* 20 23     Results from last 7 days   Lab Units 10/07/20  0122 10/06/20  0115 10/05/20  0243  10/02/20  2326   CRP mg/dL 7.59* 11.39* 15.56*   < > 14.02*   LACTATE mmol/L  --   --   --   --  1.1   WBC 10*3/mm3 6.43 8.55 9.93   < > 14.09*   HEMOGLOBIN g/dL 10.5* 11.1* 11.0*   < > 12.0*   HEMATOCRIT % 36.5* 37.7 37.2*   < > 40.6   MCV fL 91.5 90.2 90.1   < > 88.8   MCHC g/dL 28.8* 29.4* 29.6*   < > 29.6*   PLATELETS 10*3/mm3 220 245 238   < > 266    < > = values in this interval not  displayed.         Results from last 7 days   Lab Units 10/07/20  0122 10/06/20  0115 10/05/20  0243   SODIUM mmol/L 143 139 137   POTASSIUM mmol/L 3.5 3.8 3.8   CHLORIDE mmol/L 106 102 101   CO2 mmol/L 30.1* 31.3* 28.0   BUN mg/dL 5* 7 8   CREATININE mg/dL 0.36* 0.44* 0.42*   EGFR IF NONAFRICN AM mL/min/1.73 >150 >150 >150   CALCIUM mg/dL 7.8* 8.4* 8.3*   GLUCOSE mg/dL 132* 130* 111*   ALBUMIN g/dL 2.46* 2.72* 2.63*   BILIRUBIN mg/dL 0.2 0.2 0.2   ALK PHOS U/L 59 69 79   AST (SGOT) U/L 16 21 20   ALT (SGPT) U/L 20 23 21   Estimated Creatinine Clearance: 559.3 mL/min (A) (by C-G formula based on SCr of 0.36 mg/dL (L)).    No results found for: AMMONIA      No results found for: BLOODCX  No results found for: URINECX  No results found for: WOUNDCX  No results found for: STOOLCX    I have personally looked at the labs and they are summarized above.  ----------------------------------------------------------------------------------------------------------------------  Imaging Results (Last 24 Hours)     Procedure Component Value Units Date/Time    US Venous Doppler Upper Extremity Bilateral (duplex) [[id number]] Collected: 10/07/20 1253     Updated: 10/07/20 1256    Narrative:      US VENOUS DOPPLER UPPER EXTREMITY BILATERAL (DUPLEX)-     REASON FOR EXAM:  Rule out DVT; L03.116-Cellulitis of left lower limb;  U07.1-COVID-19     Multiple real-time images were obtained. The deep veins were well  demonstrated sonographically. There is good color doppler signal seen  filling the deep veins. They were completely compressed by the  ultrasound transducer. There was good spontaneous venous flow and  augmentation. There are no echoes seen along the deep veins to suggest  clot.          Impression:      No sonographic findings of deep venous thrombosis in the  veins of the upper extremities.     This report was finalized on 10/7/2020 12:54 PM by Dr. Carl Burton II, MD.                ----------------------------------------------------------------------------------------------------------------------  Assessment and Plan:    1.  Sepsis -present on admission, continue to treat underlying etiology which is likely left knee cellulitis.  Continue vancomycin and Rocephin per infectious disease recommendations.    2.  Left knee cellulitis -see #1    3.  COVID-19 infection -have started Decadron and Remdesivir today, continue to monitor closely and provide supplemental oxygen to maintain oxygen saturation greater than 90% on RA.    4.  Super morbid obesity -complicates all aspects of care    5.  Muscular dystrophy    6.  Essential hypertension -continue home antihypertensive medications, continue to monitor closely and make medication adjustments as necessary            Nader Noel,   10/07/20  15:53 EDT

## 2020-10-07 NOTE — NURSING NOTE
"Patient called out requesting to see his nurse. Upon entering the room the patient stated that his left arm was \"swollen and numb.\" After assessing his left arm and comparing it to his right arm it was obvious that it was swollen. I removed his wedding band and placed it in a bag on his bedside table with the patients name on it. Called and notified provider of swelling in arm. Provider discontinued fluids and encouraged propping the arm up on pillows. Will continue to monitor.   "

## 2020-10-08 LAB
ALBUMIN SERPL-MCNC: 2.81 G/DL (ref 3.5–5.2)
ALBUMIN/GLOB SERPL: 0.6 G/DL
ALP SERPL-CCNC: 79 U/L (ref 39–117)
ALT SERPL W P-5'-P-CCNC: 22 U/L (ref 1–41)
ANION GAP SERPL CALCULATED.3IONS-SCNC: 7.9 MMOL/L (ref 5–15)
AST SERPL-CCNC: 21 U/L (ref 1–40)
BASOPHILS # BLD AUTO: 0.03 10*3/MM3 (ref 0–0.2)
BASOPHILS NFR BLD AUTO: 0.4 % (ref 0–1.5)
BILIRUB CONJ SERPL-MCNC: <0.2 MG/DL (ref 0–0.3)
BILIRUB SERPL-MCNC: 0.2 MG/DL (ref 0–1.2)
BUN SERPL-MCNC: 5 MG/DL (ref 6–20)
BUN/CREAT SERPL: 13.9 (ref 7–25)
CALCIUM SPEC-SCNC: 9 MG/DL (ref 8.6–10.5)
CHLORIDE SERPL-SCNC: 100 MMOL/L (ref 98–107)
CK SERPL-CCNC: 38 U/L (ref 20–200)
CO2 SERPL-SCNC: 34.1 MMOL/L (ref 22–29)
CREAT SERPL-MCNC: 0.36 MG/DL (ref 0.76–1.27)
CRP SERPL-MCNC: 6.61 MG/DL (ref 0–0.5)
DEPRECATED RDW RBC AUTO: 50.4 FL (ref 37–54)
EOSINOPHIL # BLD AUTO: 0.02 10*3/MM3 (ref 0–0.4)
EOSINOPHIL NFR BLD AUTO: 0.2 % (ref 0.3–6.2)
ERYTHROCYTE [DISTWIDTH] IN BLOOD BY AUTOMATED COUNT: 14.7 % (ref 12.3–15.4)
FERRITIN SERPL-MCNC: 144.1 NG/ML (ref 30–400)
GFR SERPL CREATININE-BSD FRML MDRD: >150 ML/MIN/1.73
GLOBULIN UR ELPH-MCNC: 5 GM/DL
GLUCOSE SERPL-MCNC: 161 MG/DL (ref 65–99)
HCT VFR BLD AUTO: 41.4 % (ref 37.5–51)
HGB BLD-MCNC: 11.7 G/DL (ref 13–17.7)
HYPOCHROMIA BLD QL: NORMAL
IMM GRANULOCYTES # BLD AUTO: 0.16 10*3/MM3 (ref 0–0.05)
IMM GRANULOCYTES NFR BLD AUTO: 1.9 % (ref 0–0.5)
LYMPHOCYTES # BLD AUTO: 0.74 10*3/MM3 (ref 0.7–3.1)
LYMPHOCYTES NFR BLD AUTO: 8.8 % (ref 19.6–45.3)
MCH RBC QN AUTO: 26.5 PG (ref 26.6–33)
MCHC RBC AUTO-ENTMCNC: 28.3 G/DL (ref 31.5–35.7)
MCV RBC AUTO: 93.9 FL (ref 79–97)
MONOCYTES # BLD AUTO: 0.19 10*3/MM3 (ref 0.1–0.9)
MONOCYTES NFR BLD AUTO: 2.3 % (ref 5–12)
NEUTROPHILS NFR BLD AUTO: 7.28 10*3/MM3 (ref 1.7–7)
NEUTROPHILS NFR BLD AUTO: 86.4 % (ref 42.7–76)
NRBC BLD AUTO-RTO: 0.2 /100 WBC (ref 0–0.2)
PLAT MORPH BLD: NORMAL
PLATELET # BLD AUTO: 296 10*3/MM3 (ref 140–450)
PMV BLD AUTO: 9.6 FL (ref 6–12)
POTASSIUM SERPL-SCNC: 5 MMOL/L (ref 3.5–5.2)
PROT SERPL-MCNC: 7.8 G/DL (ref 6–8.5)
RBC # BLD AUTO: 4.41 10*6/MM3 (ref 4.14–5.8)
SODIUM SERPL-SCNC: 142 MMOL/L (ref 136–145)
WBC # BLD AUTO: 8.42 10*3/MM3 (ref 3.4–10.8)

## 2020-10-08 PROCEDURE — 85007 BL SMEAR W/DIFF WBC COUNT: CPT | Performed by: INTERNAL MEDICINE

## 2020-10-08 PROCEDURE — 25010000002 HEPARIN (PORCINE) PER 1000 UNITS: Performed by: HOSPITALIST

## 2020-10-08 PROCEDURE — 94799 UNLISTED PULMONARY SVC/PX: CPT

## 2020-10-08 PROCEDURE — 82728 ASSAY OF FERRITIN: CPT | Performed by: INTERNAL MEDICINE

## 2020-10-08 PROCEDURE — 85025 COMPLETE CBC W/AUTO DIFF WBC: CPT | Performed by: INTERNAL MEDICINE

## 2020-10-08 PROCEDURE — 80053 COMPREHEN METABOLIC PANEL: CPT | Performed by: INTERNAL MEDICINE

## 2020-10-08 PROCEDURE — 86140 C-REACTIVE PROTEIN: CPT | Performed by: INTERNAL MEDICINE

## 2020-10-08 PROCEDURE — 82248 BILIRUBIN DIRECT: CPT | Performed by: INTERNAL MEDICINE

## 2020-10-08 PROCEDURE — 25010000002 VANCOMYCIN: Performed by: NURSE PRACTITIONER

## 2020-10-08 PROCEDURE — 99232 SBSQ HOSP IP/OBS MODERATE 35: CPT | Performed by: INTERNAL MEDICINE

## 2020-10-08 PROCEDURE — 25010000002 DEXAMETHASONE PER 1 MG: Performed by: INTERNAL MEDICINE

## 2020-10-08 PROCEDURE — 97530 THERAPEUTIC ACTIVITIES: CPT

## 2020-10-08 PROCEDURE — 25010000002 VANCOMYCIN 5 G RECONSTITUTED SOLUTION: Performed by: NURSE PRACTITIONER

## 2020-10-08 PROCEDURE — 97110 THERAPEUTIC EXERCISES: CPT

## 2020-10-08 PROCEDURE — 25010000002 CEFTRIAXONE PER 250 MG: Performed by: INTERNAL MEDICINE

## 2020-10-08 PROCEDURE — 82550 ASSAY OF CK (CPK): CPT | Performed by: INTERNAL MEDICINE

## 2020-10-08 RX ORDER — HYDRALAZINE HYDROCHLORIDE 25 MG/1
25 TABLET, FILM COATED ORAL EVERY 6 HOURS PRN
Status: DISCONTINUED | OUTPATIENT
Start: 2020-10-08 | End: 2020-10-11 | Stop reason: HOSPADM

## 2020-10-08 RX ADMIN — HEPARIN SODIUM 5000 UNITS: 5000 INJECTION INTRAVENOUS; SUBCUTANEOUS at 08:47

## 2020-10-08 RX ADMIN — SODIUM CHLORIDE, PRESERVATIVE FREE 10 ML: 5 INJECTION INTRAVENOUS at 21:44

## 2020-10-08 RX ADMIN — VANCOMYCIN HYDROCHLORIDE 2000 MG: 1 INJECTION, POWDER, LYOPHILIZED, FOR SOLUTION INTRAVENOUS at 01:59

## 2020-10-08 RX ADMIN — SODIUM CHLORIDE, PRESERVATIVE FREE 10 ML: 5 INJECTION INTRAVENOUS at 21:43

## 2020-10-08 RX ADMIN — SODIUM CHLORIDE, PRESERVATIVE FREE 10 ML: 5 INJECTION INTRAVENOUS at 08:48

## 2020-10-08 RX ADMIN — ALBUTEROL SULFATE 2 PUFF: 90 AEROSOL, METERED RESPIRATORY (INHALATION) at 13:18

## 2020-10-08 RX ADMIN — CEFTRIAXONE 2 G: 2 INJECTION, POWDER, FOR SOLUTION INTRAMUSCULAR; INTRAVENOUS at 13:17

## 2020-10-08 RX ADMIN — HEPARIN SODIUM 5000 UNITS: 5000 INJECTION INTRAVENOUS; SUBCUTANEOUS at 21:43

## 2020-10-08 RX ADMIN — VANCOMYCIN HYDROCHLORIDE 2000 MG: 1 INJECTION, POWDER, LYOPHILIZED, FOR SOLUTION INTRAVENOUS at 13:18

## 2020-10-08 RX ADMIN — ALBUTEROL SULFATE 2 PUFF: 90 AEROSOL, METERED RESPIRATORY (INHALATION) at 21:43

## 2020-10-08 RX ADMIN — ALBUTEROL SULFATE 2 PUFF: 90 AEROSOL, METERED RESPIRATORY (INHALATION) at 17:18

## 2020-10-08 RX ADMIN — DEXAMETHASONE SODIUM PHOSPHATE 6 MG: 4 INJECTION, SOLUTION INTRAMUSCULAR; INTRAVENOUS at 08:47

## 2020-10-08 RX ADMIN — SODIUM CHLORIDE, PRESERVATIVE FREE 10 ML: 5 INJECTION INTRAVENOUS at 08:49

## 2020-10-08 RX ADMIN — REMDESIVIR 100 MG: 100 INJECTION, POWDER, LYOPHILIZED, FOR SOLUTION INTRAVENOUS at 17:18

## 2020-10-08 RX ADMIN — ALBUTEROL SULFATE 2 PUFF: 90 AEROSOL, METERED RESPIRATORY (INHALATION) at 08:47

## 2020-10-08 NOTE — PROGRESS NOTES
PROGRESS NOTE         Patient Identification:  Name:  Jimmy Recinos  Age:  44 y.o.  Sex:  male  :  1975  MRN:  [id number]  Visit Number:  [id number]  Primary Care Provider:  Provider, No Known         LOS: 5 days       ----------------------------------------------------------------------------------------------------------------------  Subjective       Chief Complaints:    Cellulitis        Interval History:      Patient comfortable this morning.  Currently on 2 L via nasal cannula.  Afebrile, no diarrhea.  WBC normal.  CRP improving at 6.61.  Venous duplex bilateral upper extremities negative for DVT.    Review of Systems:    Constitutional:  No fever, chills.  No night sweats. No appetite change or unexpected weight change.  Positive fatigue.  Eyes: no eye drainage, itching or redness.  HEENT: no mouth sores, dysphagia or nose bleed.  Respiratory: no for shortness of breath, cough or production of sputum.  Cardiovascular: no chest pain, no palpitations, no orthopnea.  Gastrointestinal: no nausea, vomiting or diarrhea. No abdominal pain, hematemesis or rectal bleeding.  Genitourinary: no dysuria or polyuria.  Hematologic/lymphatic: no lymph node abnormalities, no easy bruising or easy bleeding.  Musculoskeletal: Left lower extremity pain.  Skin: No rash and no itching.  Neurological: no loss of consciousness, no seizure, no headache.  Behavioral/Psych: no depression or suicidal ideation.  Endocrine: no hot flashes.  Immunologic: negative.       ----------------------------------------------------------------------------------------------------------------------      Objective       Current [facility] Meds:  albuterol sulfate HFA, 2 puff, Inhalation, 4x Daily - RT  cefTRIAXone, 2 g, Intravenous, Q24H  dexamethasone, 6 mg, Intravenous, Daily  heparin (porcine), 5,000 Units, Subcutaneous, Q12H  INV [id number] remdesivir in NS IVPB, 100 mg, Intravenous, Q24H  sodium chloride, 10 mL, Intravenous,  Q12H  sodium chloride, 10 mL, Intravenous, Q12H  sodium chloride, 10 mL, Intravenous, Q12H  vancomycin, 2,000 mg, Intravenous, Q12H         ----------------------------------------------------------------------------------------------------------------------    Vital Signs:  Temp:  [97.5 °F (36.4 °C)-98.3 °F (36.8 °C)] 97.8 °F (36.6 °C)  Heart Rate:  [72-88] 72  Resp:  [16-26] 24  BP: (136-173)/() 150/86  Mean Arterial Pressure (Non-Invasive) for the past 24 hrs (Last 3 readings):   Noninvasive MAP (mmHg)   10/08/20 0802 114   10/08/20 0702 109   10/08/20 0603 120     SpO2 Percentage    10/08/20 0702 10/08/20 0802 10/08/20 0900   SpO2: 94% 94% 95%     SpO2:  [90 %-98 %] 95 %  on  Flow (L/min):  [2] 2;   Device (Oxygen Therapy): nasal cannula    Body mass index is 85.12 kg/m².  Wt Readings from Last 3 Encounters:   10/08/20 (!) 269 kg (593 lb 3.2 oz)   08/23/20 (!) 295 kg (650 lb)   01/02/20 (!) 273 kg (601 lb)        Intake/Output Summary (Last 24 hours) at 10/8/2020 1041  Last data filed at 10/8/2020 0630  Gross per 24 hour   Intake 3025.82 ml   Output 5100 ml   Net -2074.18 ml     Diet Regular  ----------------------------------------------------------------------------------------------------------------------      Physical Exam:    Deferred due to COVID-19 isolation.  ----------------------------------------------------------------------------------------------------------------------  Results from last 7 days   Lab Units 10/08/20  0113 10/07/20  0122 10/06/20  0115   CK TOTAL U/L 38 15* 20           Results from last 7 days   Lab Units 10/08/20  0113 10/07/20  0122 10/06/20  0115  10/02/20  2326   CRP mg/dL 6.61* 7.59* 11.39*   < > 14.02*   LACTATE mmol/L  --   --   --   --  1.1   WBC 10*3/mm3 8.42 6.43 8.55   < > 14.09*   HEMOGLOBIN g/dL 11.7* 10.5* 11.1*   < > 12.0*   HEMATOCRIT % 41.4 36.5* 37.7   < > 40.6   MCV fL 93.9 91.5 90.2   < > 88.8   MCHC g/dL 28.3* 28.8* 29.4*   < > 29.6*   PLATELETS 10*3/mm3  296 220 245   < > 266    < > = values in this interval not displayed.     Results from last 7 days   Lab Units 10/08/20  0113 10/07/20  1646 10/07/20  1443 10/07/20  0122 10/06/20  0115   SODIUM mmol/L 142  --   --  143 139   POTASSIUM mmol/L 5.0  --   --  3.5 3.8   CHLORIDE mmol/L 100  --   --  106 102   CO2 mmol/L 34.1*  --   --  30.1* 31.3*   BUN mg/dL 5*  --   --  5* 7   CREATININE mg/dL 0.36*  --  0.32* 0.36* 0.44*   EGFR IF NONAFRICN AM mL/min/1.73 >150  --  >150 >150 >150   CALCIUM mg/dL 9.0  --   --  7.8* 8.4*   GLUCOSE mg/dL 161*  --   --  132* 130*   ALBUMIN g/dL 2.81* 2.76*  --  2.46* 2.72*   BILIRUBIN mg/dL 0.2 <0.2  --  0.2 0.2   ALK PHOS U/L 79 71  --  59 69   AST (SGOT) U/L 21 20  --  16 21   ALT (SGPT) U/L 22 25  --  20 23   Estimated Creatinine Clearance: 559.3 mL/min (A) (by C-G formula based on SCr of 0.36 mg/dL (L)).  No results found for: AMMONIA    No results found for: HGBA1C, POCGLU  Lab Results   Component Value Date    HGBA1C 5.00 08/23/2020     Lab Results   Component Value Date    TSH 0.793 12/21/2019       Blood Culture   Date Value Ref Range Status   10/02/2020 Abnormal Stain (C)  Preliminary   10/02/2020 No growth at 24 hours  Preliminary     No results found for: URINECX  No results found for: WOUNDCX  No results found for: STOOLCX  No results found for: RESPCX  Pain Management Panel     Pain Management Panel Latest Ref Rng & Units 10/19/2014    AMPHETAMINES SCREEN, URINE NEGATIVE Negative    BARBITURATES SCREEN NEGATIVE Negative    BENZODIAZEPINE SCREEN, URINE NEGATIVE Negative    COCAINE SCREEN, URINE NEGATIVE Negative    METHADONE SCREEN, URINE NEGATIVE Negative            ----------------------------------------------------------------------------------------------------------------------  Imaging Results (Last 24 Hours)     Procedure Component Value Units Date/Time    US Venous Doppler Upper Extremity Bilateral (duplex) [[id number]] Collected: 10/07/20 1253     Updated: 10/07/20  1256    Narrative:      US VENOUS DOPPLER UPPER EXTREMITY BILATERAL (DUPLEX)-     REASON FOR EXAM:  Rule out DVT; L03.116-Cellulitis of left lower limb;  U07.1-COVID-19     Multiple real-time images were obtained. The deep veins were well  demonstrated sonographically. There is good color doppler signal seen  filling the deep veins. They were completely compressed by the  ultrasound transducer. There was good spontaneous venous flow and  augmentation. There are no echoes seen along the deep veins to suggest  clot.          Impression:      No sonographic findings of deep venous thrombosis in the  veins of the upper extremities.     This report was finalized on 10/7/2020 12:54 PM by Dr. Carl Burton II, MD.             ----------------------------------------------------------------------------------------------------------------------    Assessment/Plan       Assessment/Plan     ASSESSMENT:    1.  COVID-19  2.  Lower extremity cellulitis      PLAN:    Patient comfortable this morning.  Currently on 2 L via nasal cannula.  Afebrile, no diarrhea.  WBC normal.  CRP improving at 6.61.  Venous duplex bilateral upper extremities negative for DVT.    Blood cultures reveal 1 out of 2 Streptococcus mitis/oralis.    Chest x-ray unremarkable.  Patient unable to complete CT scan due to size.     Primary team to initiate convalescent plasma therapy and Remdesivir.  Consents were obtained per primary team.     We recommend to continue Rocephin 2 g IV every 24 hours and vancomycin pharmacy to dose. We will continue to follow closely and adjust antibiotic therapy as needed.    Code Status:   Code Status and Medical Interventions:   Ordered at: 10/03/20 0236     Level Of Support Discussed With:    Patient     Code Status:    CPR     Medical Interventions (Level of Support Prior to Arrest):    Full         Hortencia Keith, APRN  10/08/20  10:41 EDT

## 2020-10-08 NOTE — PLAN OF CARE
Goal Outcome Evaluation:  Plan of Care Reviewed With: patient  Progress: no change   Pt resting in bed alert and orient. Pt on 2 liter nasal cannula. Pt coughing up mucus. Pt denies nausea, vomiting or diarrhea. Pt has po fluids within reach. Pt has call bell with in reach. Bed locked in low position.

## 2020-10-08 NOTE — THERAPY TREATMENT NOTE
Acute Care - Physical Therapy Treatment Note   [facility]     Patient Name: Jimmy Recinos  : 1975  MRN: [id number]  Today's Date: 10/8/2020   Onset of Illness/Injury or Date of Surgery: 10/02/20       PT Assessment (last 12 hours)      PT Evaluation and Treatment     Row Name 10/08/20 1300          Physical Therapy Time and Intention    Subjective Information  no complaints  -BC     Document Type  therapy note (daily note)  -BC     Patient Effort  adequate  -BC     Row Name 10/08/20 1300          General Information    Patient Profile Reviewed  yes  -BC     Onset of Illness/Injury or Date of Surgery  10/02/20  -BC     Referring Physician  Dr. Noel  -BC     Patient Observations  alert;cooperative;agree to therapy  -BC     Risks Reviewed  patient:;LOB;nausea/vomiting;dizziness;increased discomfort;change in vital signs;increased drainage;lines disloged  -BC     Benefits Reviewed  patient:;improve function;increase independence;increase strength;increase balance;decrease pain;decrease risk of DVT;improve skin integrity;increase knowledge  -BC     Row Name 10/08/20 1300          Bed Mobility    Bed Mobility  bed mobility (all) activities  -BC     All Activities, [place] (Bed Mobility)  dependent (less than 25% patient effort)  -BC     Bed Mobility, Safety Issues  decreased use of arms for pushing/pulling;decreased use of legs for bridging/pushing;impaired trunk control for bed mobility  -BC     Row Name 10/08/20 1300          Transfers    Comment (Transfers)  pt unable to transfer  -BC     Row Name 10/08/20 1300          Gait/Stairs (Locomotion)    Comment (Gait/Stairs)  pt unable to ambulate  -BC     Row Name             Wound 20 Bilateral lower thoracic spine Abrasion    Wound - Properties Group Placement Date: 20  -KE Placement Time: 318  -KE Present on Hospital Admission: Y  -KE Side: Bilateral  -KE Location: thoracic spine  -KE Primary Wound Type: Abrasion  -KE    Retired Wound -  Properties Group Date first assessed: 08/24/20  -KE Time first assessed: 0318 -KE Present on Hospital Admission: Y  -KE Side: Bilateral  -KE Retired Orientation: lower  -KE Location: thoracic spine  -KE Primary Wound Type: Abrasion  -KE    Row Name             Wound 12/21/19 2330 Right gluteal Abrasion    Wound - Properties Group Placement Date: 12/21/19  -KS Placement Time: 2330 -KS Side: Right  -KS Location: gluteal  -KS Primary Wound Type: Abrasion  -KS Stage, Pressure Injury : Stage 1  -KM    Retired Wound - Properties Group Date first assessed: 12/21/19  -KS Time first assessed: 2330 -KS Side: Right  -KS Retired Orientation: medial  -KS Location: gluteal  -KS Primary Wound Type: Abrasion  -KS Retired Stage, Pressure Injury : other (see comments)  -KS    Row Name 10/08/20 1300          Coping    Observed Emotional State  calm;cooperative  -BC     Verbalized Emotional State  acceptance  -BC     Family/Support Persons  family  -BC     Diversional Activities  television  -BC     Row Name 10/08/20 1300          Plan of Care Review    Plan of Care Reviewed With  patient  -BC     Row Name 10/08/20 1300          Physical Therapy Goals    Bed Mobility Goal Selection (PT)  bed mobility, PT goal 1  -BC     Row Name 10/08/20 1300          Bed Mobility Goal 1 (PT)    Activity/Assistive Device (Bed Mobility Goal 1, PT)  bed mobility activities, all  -BC     [place] Level/Cues Needed (Bed Mobility Goal 1, PT)  maximum assist (25-49% patient effort);2 person assist  -BC     Time Frame (Bed Mobility Goal 1, PT)  by discharge  -BC     Row Name 10/08/20 1300          Positioning and Restraints    Pre-Treatment Position  in bed  -BC     Post Treatment Position  bed  -BC     In Bed  notified nsg;supine;call light within reach;encouraged to call for assist;side rails up x3  -BC       User Key  (r) = Recorded By, (t) = Taken By, (c) = Cosigned By    Initials Name Provider Type    Janell Anne, PT Physical Therapist     Kyung Flores RN Registered Nurse    Mary Farfan RN Registered Nurse    Marsha Dubose RN Registered Nurse        Physical Therapy Education                 Title: PT OT SLP Therapies (Done)     Topic: Physical Therapy (Done)     Point: Mobility training (Done)     Learning Progress Summary           Patient Acceptance, E, VU by BC at 10/8/2020 1410    Acceptance, E, VU,NR by  at 10/8/2020 0743    Acceptance, E, VU,NR by SC at 10/7/2020 2221    Acceptance, E, VU by  at 10/7/2020 1643    Acceptance, E, VU by BC at 10/7/2020 1329    Acceptance, E, VU,NR by SC at 10/6/2020 2219    Acceptance, E, VU,NR by SC at 10/5/2020 2217    Acceptance, E, VU,NR by SC at 10/4/2020 2243    Acceptance, E, VU by SC at 10/4/2020 0027    Acceptance, E,TB, VU by AM at 10/3/2020 1045    Acceptance, E, VU by SC at 10/3/2020 0634                   Point: Home exercise program (Done)     Learning Progress Summary           Patient Acceptance, E, VU by BC at 10/8/2020 1410    Acceptance, E, VU,NR by  at 10/8/2020 0743    Acceptance, E, VU,NR by SC at 10/7/2020 2221    Acceptance, E, VU by  at 10/7/2020 1643    Acceptance, E, VU by BC at 10/7/2020 1329    Acceptance, E, VU,NR by SC at 10/6/2020 2219    Acceptance, E, VU,NR by SC at 10/5/2020 2217    Acceptance, E, VU,NR by SC at 10/4/2020 2243    Acceptance, E, VU by SC at 10/4/2020 0027    Acceptance, E,TB, VU by AM at 10/3/2020 1045    Acceptance, E, VU by SC at 10/3/2020 0634                   Point: Body mechanics (Done)     Learning Progress Summary           Patient Acceptance, E, VU by BC at 10/8/2020 1410    Acceptance, E, VU,NR by  at 10/8/2020 0743    Acceptance, E, VU,NR by SC at 10/7/2020 2221    Acceptance, E, VU by  at 10/7/2020 1643    Acceptance, E, VU by BC at 10/7/2020 1329    Acceptance, E, VU,NR by SC at 10/6/2020 2219    Acceptance, E, VU,NR by SC at 10/5/2020 2217    Acceptance, E, VU,NR by SC at 10/4/2020 2243    Acceptance, E, VU by SC  at 10/4/2020 0027    Acceptance, E,TB, VU by AM at 10/3/2020 1045    Acceptance, E, VU by SC at 10/3/2020 0634                   Point: Precautions (Done)     Learning Progress Summary           Patient Acceptance, E, VU by BC at 10/8/2020 1410    Acceptance, E, VU,NR by  at 10/8/2020 0743    Acceptance, E, VU,NR by SC at 10/7/2020 2221    Acceptance, E, VU by HR at 10/7/2020 1643    Acceptance, E, VU by BC at 10/7/2020 1329    Acceptance, E, VU,NR by SC at 10/6/2020 2219    Acceptance, E, VU,NR by SC at 10/5/2020 2217    Acceptance, E, VU,NR by SC at 10/4/2020 2243    Acceptance, E, VU by SC at 10/4/2020 0027    Acceptance, E,TB, VU by AM at 10/3/2020 1045    Acceptance, E, VU by SC at 10/3/2020 0634                               User Key     Initials Effective Dates Name Provider Type [facility] hospital 06/16/16 -  Meera Rosenbaum, RN Registered Nurse Nurse    BC 03/14/16 -  Janell Rosales PT Physical Therapist PT    AM 06/11/18 -  Bianka Banerjee, RN Registered Nurse Nurse     08/06/18 -  Gisel Henning, RN Registered Nurse Nurse    SC 07/07/20 -  Martha Platt, RN Registered Nurse Nurse              PT Recommendation and Plan     Plan of Care Reviewed With: patient       Time Calculation:   PT Charges     Row Name 10/08/20 1410             Time Calculation    PT Received On  10/08/20  -BC         Time Calculation- PT    Total Timed Code Minutes- PT  45 minute(s)  -BC         Timed Charges    68364 - PT Therapeutic Exercise Minutes  15  -BC      33713 - PT Therapeutic Activity Minutes  30  -BC        User Key  (r) = Recorded By, (t) = Taken By, (c) = Cosigned By    Initials Name Provider Type    BC Janell Rosales PT Physical Therapist        Therapy Charges for Today     Code Description Service Date Service Provider Modifiers Qty    13314352308 HC PT THER PROC EA 15 MIN 10/7/2020 Janell Rosales, PT GP 2    95129647861 HC PT THER PROC EA 15 MIN 10/8/2020 Janell Rosales, PT GP 1     [id number]  PT THERAPEUTIC ACT EA 15 MIN 10/8/2020 Janell Rosales, PT GP 2               Janell Rosales, PT  10/8/2020

## 2020-10-08 NOTE — PLAN OF CARE
Goal Outcome Evaluation:  Plan of Care Reviewed With: patient  Progress: no change  Outcome Summary: VSS and afebrile. Continues on 2L NC and tolerating. C/o of pain at beginning of shift, administered PRN pain meds and stated that pain subsided. Call light within reach. Will continue to monitor.      Problem: Adult Inpatient Plan of Care  Goal: Plan of Care Review  Outcome: Ongoing, Progressing  Flowsheets (Taken 10/8/2020 0350)  Progress: no change  Plan of Care Reviewed With: patient  Outcome Summary: VSS and afebrile. Continues on 2L NC and tolerating. C/o of pain at beginning of shift, administered PRN pain meds and stated that pain subsided. Call light within reach. Will continue to monitor.  Goal: Patient-Specific Goal (Individualized)  Outcome: Ongoing, Progressing  Goal: Absence of Hospital-Acquired Illness or Injury  Outcome: Ongoing, Progressing  Intervention: Identify and Manage Fall Risk  Recent Flowsheet Documentation  Taken 10/8/2020 0300 by Martha Platt, RN  Safety Promotion/Fall Prevention:   activity supervised   assistive device/personal items within reach   clutter free environment maintained   fall prevention program maintained   nonskid shoes/slippers when out of bed   room organization consistent   safety round/check completed  Taken 10/7/2020 2300 by Martha Platt, RN  Safety Promotion/Fall Prevention:   activity supervised   assistive device/personal items within reach   clutter free environment maintained   fall prevention program maintained   nonskid shoes/slippers when out of bed   room organization consistent   safety round/check completed  Taken 10/7/2020 1900 by Martha Platt, RN  Safety Promotion/Fall Prevention:   activity supervised   assistive device/personal items within reach   clutter free environment maintained   fall prevention program maintained   nonskid shoes/slippers when out of bed   room organization consistent   safety round/check completed  Intervention: Prevent and  Manage VTE (venous thromboembolism) Risk  Recent Flowsheet Documentation  Taken 10/8/2020 0215 by Martha Platt RN  VTE Prevention/Management: (see MAR) other (see comments)  Intervention: Prevent Infection  Recent Flowsheet Documentation  Taken 10/8/2020 0300 by Martha Platt RN  Infection Prevention:   rest/sleep promoted   single patient room provided  Taken 10/7/2020 2300 by Martha Platt RN  Infection Prevention:   single patient room provided   rest/sleep promoted  Taken 10/7/2020 1900 by Martha Platt RN  Infection Prevention:   single patient room provided   rest/sleep promoted  Goal: Optimal Comfort and Wellbeing  Outcome: Ongoing, Progressing  Intervention: Provide Person-Centered Care  Recent Flowsheet Documentation  Taken 10/8/2020 0215 by Martha Platt RN  Trust Relationship/Rapport:   care explained   choices provided   thoughts/feelings acknowledged  Taken 10/7/2020 2015 by Martha Platt RN  Trust Relationship/Rapport:   care explained   choices provided   reassurance provided   thoughts/feelings acknowledged  Goal: Readiness for Transition of Care  Outcome: Ongoing, Progressing     Problem: Fall Injury Risk  Goal: Absence of Fall and Fall-Related Injury  Outcome: Ongoing, Progressing  Intervention: Identify and Manage Contributors to Fall Injury Risk  Recent Flowsheet Documentation  Taken 10/8/2020 0300 by Martha Platt RN  Medication Review/Management: medications reviewed  Taken 10/7/2020 2300 by Marhta Platt RN  Medication Review/Management: medications reviewed  Taken 10/7/2020 1900 by Martha Platt RN  Medication Review/Management: medications reviewed  Intervention: Promote Injury-Free Environment  Recent Flowsheet Documentation  Taken 10/8/2020 0300 by Martha Platt RN  Safety Promotion/Fall Prevention:   activity supervised   assistive device/personal items within reach   clutter free environment maintained   fall prevention program maintained   nonskid shoes/slippers when out of bed   room  organization consistent   safety round/check completed  Taken 10/7/2020 2300 by Martha Platt RN  Safety Promotion/Fall Prevention:   activity supervised   assistive device/personal items within reach   clutter free environment maintained   fall prevention program maintained   nonskid shoes/slippers when out of bed   room organization consistent   safety round/check completed  Taken 10/7/2020 1900 by Martha Platt RN  Safety Promotion/Fall Prevention:   activity supervised   assistive device/personal items within reach   clutter free environment maintained   fall prevention program maintained   nonskid shoes/slippers when out of bed   room organization consistent   safety round/check completed     Problem: Skin Injury Risk Increased  Goal: Skin Health and Integrity  Outcome: Ongoing, Progressing  Intervention: Optimize Skin Protection  Recent Flowsheet Documentation  Taken 10/8/2020 0215 by Martha Platt RN  Pressure Reduction Techniques:   frequent weight shift encouraged   weight shift assistance provided  Pressure Reduction Devices: positioning supports utilized  Skin Protection:   adhesive use limited   incontinence pads utilized  Taken 10/7/2020 2015 by Martha Platt RN  Pressure Reduction Techniques:   frequent weight shift encouraged   weight shift assistance provided  Pressure Reduction Devices: positioning supports utilized  Skin Protection:   adhesive use limited   incontinence pads utilized     Problem: Asthma Comorbidity  Goal: Maintenance of Asthma Control  Outcome: Ongoing, Progressing  Intervention: Maintain Asthma Symptom Control  Recent Flowsheet Documentation  Taken 10/8/2020 0300 by Martha Platt RN  Medication Review/Management: medications reviewed  Taken 10/7/2020 2300 by Martha Platt RN  Medication Review/Management: medications reviewed  Taken 10/7/2020 1900 by Martha Platt RN  Medication Review/Management: medications reviewed     Problem: COPD Comorbidity  Goal: Maintenance of COPD  Symptom Control  Outcome: Ongoing, Progressing  Intervention: Maintain COPD-Symptom Control  Recent Flowsheet Documentation  Taken 10/8/2020 0300 by Martha Platt RN  Medication Review/Management: medications reviewed  Taken 10/7/2020 2300 by Martha Platt RN  Medication Review/Management: medications reviewed  Taken 10/7/2020 1900 by Martha Platt RN  Medication Review/Management: medications reviewed     Problem: Diabetes Comorbidity  Goal: Blood Glucose Level Within Desired Range  Outcome: Ongoing, Progressing     Problem: Hypertension Comorbidity  Goal: Blood Pressure in Desired Range  Outcome: Ongoing, Progressing  Intervention: Maintain Hypertension-Management Strategies  Recent Flowsheet Documentation  Taken 10/8/2020 0300 by Martha Platt RN  Medication Review/Management: medications reviewed  Taken 10/7/2020 2300 by Martha Platt RN  Medication Review/Management: medications reviewed  Taken 10/7/2020 1900 by Martha Platt RN  Medication Review/Management: medications reviewed     Problem: Obstructive Sleep Apnea Risk or Actual (Comorbidity Management)  Goal: Unobstructed Breathing During Sleep  Outcome: Ongoing, Progressing     Problem: Pain Chronic (Persistent) (Comorbidity Management)  Goal: Acceptable Pain Control and Functional Ability  Outcome: Ongoing, Progressing  Intervention: Develop Pain Management Plan  Recent Flowsheet Documentation  Taken 10/7/2020 2036 by Martha Platt RN  Pain Management Interventions: see MAR  Taken 10/7/2020 2015 by Martha Platt RN  Pain Management Interventions:   position adjusted   quiet environment facilitated   care clustered  Intervention: Manage Persistent Pain  Recent Flowsheet Documentation  Taken 10/8/2020 0300 by Martha Platt RN  Medication Review/Management: medications reviewed  Taken 10/8/2020 0215 by Martha Platt RN  Sleep/Rest Enhancement: awakenings minimized  Taken 10/7/2020 2300 by Martha Platt RN  Medication Review/Management: medications  reviewed  Taken 10/7/2020 1900 by Martha Platt, RN  Medication Review/Management: medications reviewed  Intervention: Optimize Psychosocial Wellbeing  Recent Flowsheet Documentation  Taken 10/8/2020 0215 by Martha Platt, RN  Diversional Activities:   television   smartphone  Family/Support System Care:   support provided   self-care encouraged  Taken 10/7/2020 2015 by Martha Platt, RN  Supportive Measures: active listening utilized  Diversional Activities:   television   smartphone  Family/Support System Care:   support provided   self-care encouraged

## 2020-10-08 NOTE — PROGRESS NOTES
[facility] HOSPITALIST PROGRESS NOTE     Patient Identification:  Name:  Jimmy Recinos  Age:  44 y.o.  Sex:  male  :  1975  MRN:  [id number]  Visit Number:  [id number]  Primary Care Provider:  Provider, No Known    Length of stay:  5    Chief complaint: Shortness of breath    Subjective:    Patient reports his left lower extremity erythema and cellulitis has nearly resolved.  He reports mild intermittent pain that he rates at a 1 to a 2 on a 1-10 pain scale.  Patient also reports shortness of breath has dramatically improved.  He does report new onset productive cough today but overall does feel much improved compared to yesterday.  ----------------------------------------------------------------------------------------------------------------------  Current Hospital Meds:  albuterol sulfate HFA, 2 puff, Inhalation, 4x Daily - RT  cefTRIAXone, 2 g, Intravenous, Q24H  dexamethasone, 6 mg, Intravenous, Daily  heparin (porcine), 5,000 Units, Subcutaneous, Q12H  INV [id number] remdesivir in NS IVPB, 100 mg, Intravenous, Q24H  sodium chloride, 10 mL, Intravenous, Q12H  sodium chloride, 10 mL, Intravenous, Q12H  sodium chloride, 10 mL, Intravenous, Q12H  vancomycin, 2,000 mg, Intravenous, Q12H         ----------------------------------------------------------------------------------------------------------------------  Vital Signs:  Temp:  [97.5 °F (36.4 °C)-98.3 °F (36.8 °C)] 97.9 °F (36.6 °C)  Heart Rate:  [69-93] 85  Resp:  [18-26] 24  BP: (146-174)/() 152/94      10/06/20  0204 10/07/20  0400 10/08/20  0400   Weight: (!) 269 kg (593 lb) (!) 269 kg (593 lb 8 oz) (!) 269 kg (593 lb 3.2 oz)     Body mass index is 85.12 kg/m².    Intake/Output Summary (Last 24 hours) at 10/8/2020 1808  Last data filed at 10/8/2020 1410  Gross per 24 hour   Intake 4256.82 ml   Output 6075 ml   Net -1818.18 ml     Diet  Regular  ----------------------------------------------------------------------------------------------------------------------  Physical exam:  This physical exam has been personally performed remotely in the unit aided by real-time audio/visual communication tools.The use of a video visit has been reviewed with the patient and verbal informed consent has been obtained.     Physical Exam:  General: Patient appears awake, alert, and much more responsive today  Head: Normocephalic, atraumatic  Eyes: EOMI. Conjunctivae and sclerae normal.  Ears: Ears appear intact with no abnormalities noted.   Neck: Trachea midline. No obvious JVD.  Lungs: Respirations appear to be regular, even and unlabored with no signs of respiratory distress. No audible wheezing.  However, according to patient's nurse he does have wheezing on auscultation.  Abdomen: No obvious abdominal distension.  MS: Muscle tone appears normal. No gross deformities.  Extremities: No clubbing, cyanosis or edema noted.  Skin: Erythematous circumferential rash around the left knee  Neurologic: Alert and oriented x3. No gross focal deficits.     ----------------------------------------------------------------------------------------------------------------------  Tele:    ----------------------------------------------------------------------------------------------------------------------  Results from last 7 days   Lab Units 10/08/20  0113 10/07/20  0122 10/06/20  0115   CK TOTAL U/L 38 15* 20     Results from last 7 days   Lab Units 10/08/20  0113 10/07/20  0122 10/06/20  0115  10/02/20  2326   CRP mg/dL 6.61* 7.59* 11.39*   < > 14.02*   LACTATE mmol/L  --   --   --   --  1.1   WBC 10*3/mm3 8.42 6.43 8.55   < > 14.09*   HEMOGLOBIN g/dL 11.7* 10.5* 11.1*   < > 12.0*   HEMATOCRIT % 41.4 36.5* 37.7   < > 40.6   MCV fL 93.9 91.5 90.2   < > 88.8   MCHC g/dL 28.3* 28.8* 29.4*   < > 29.6*   PLATELETS 10*3/mm3 296 220 245   < > 266    < > = values in this interval not  displayed.         Results from last 7 days   Lab Units 10/08/20  0113 10/07/20  1646 10/07/20  1443 10/07/20  0122 10/06/20  0115   SODIUM mmol/L 142  --   --  143 139   POTASSIUM mmol/L 5.0  --   --  3.5 3.8   CHLORIDE mmol/L 100  --   --  106 102   CO2 mmol/L 34.1*  --   --  30.1* 31.3*   BUN mg/dL 5*  --   --  5* 7   CREATININE mg/dL 0.36*  --  0.32* 0.36* 0.44*   EGFR IF NONAFRICN AM mL/min/1.73 >150  --  >150 >150 >150   CALCIUM mg/dL 9.0  --   --  7.8* 8.4*   GLUCOSE mg/dL 161*  --   --  132* 130*   ALBUMIN g/dL 2.81* 2.76*  --  2.46* 2.72*   BILIRUBIN mg/dL 0.2 <0.2  --  0.2 0.2   ALK PHOS U/L 79 71  --  59 69   AST (SGOT) U/L 21 20  --  16 21   ALT (SGPT) U/L 22 25  --  20 23   Estimated Creatinine Clearance: 559.3 mL/min (A) (by C-G formula based on SCr of 0.36 mg/dL (L)).    No results found for: AMMONIA      No results found for: BLOODCX  No results found for: URINECX  No results found for: WOUNDCX  No results found for: STOOLCX    I have personally looked at the labs and they are summarized above.  ----------------------------------------------------------------------------------------------------------------------  Imaging Results (Last 24 Hours)     ** No results found for the last 24 hours. **        ----------------------------------------------------------------------------------------------------------------------  Assessment and Plan:    1.  Sepsis -resolved, likely secondary to left lower extremity cellulitis, will discuss with infectious disease de-escalating antibiotics in anticipation of probable discharge in the near future.  Will continue vancomycin and Rocephin per infectious disease recommendations for now    2.  Left knee cellulitis -see #1    3.  COVID-19 infection -we will continue Decadron and Remdesivir day #2, continue to monitor closely and provide supplemental oxygen to maintain oxygen saturation greater than 90% on RA.    4.  Super morbid obesity -complicates all aspects of  care    5.  Muscular dystrophy    6.  Essential hypertension -continue home antihypertensive medications, continue to monitor closely and make medication adjustments as necessary            Nader Noel, DO  10/08/20  18:08 EDT

## 2020-10-09 LAB
ALBUMIN SERPL-MCNC: 2.78 G/DL (ref 3.5–5.2)
ALBUMIN/GLOB SERPL: 0.6 G/DL
ALP SERPL-CCNC: 64 U/L (ref 39–117)
ALT SERPL W P-5'-P-CCNC: 19 U/L (ref 1–41)
ANION GAP SERPL CALCULATED.3IONS-SCNC: 5.8 MMOL/L (ref 5–15)
AST SERPL-CCNC: 16 U/L (ref 1–40)
BASOPHILS # BLD AUTO: 0.01 10*3/MM3 (ref 0–0.2)
BASOPHILS NFR BLD AUTO: 0.1 % (ref 0–1.5)
BILIRUB CONJ SERPL-MCNC: <0.2 MG/DL (ref 0–0.3)
BILIRUB SERPL-MCNC: <0.2 MG/DL (ref 0–1.2)
BUN SERPL-MCNC: 11 MG/DL (ref 6–20)
BUN/CREAT SERPL: 32.4 (ref 7–25)
CALCIUM SPEC-SCNC: 9.2 MG/DL (ref 8.6–10.5)
CHLORIDE SERPL-SCNC: 98 MMOL/L (ref 98–107)
CK SERPL-CCNC: 14 U/L (ref 20–200)
CO2 SERPL-SCNC: 36.2 MMOL/L (ref 22–29)
CREAT SERPL-MCNC: 0.34 MG/DL (ref 0.76–1.27)
CRP SERPL-MCNC: 2.67 MG/DL (ref 0–0.5)
D DIMER PPP FEU-MCNC: 0.68 MCGFEU/ML (ref 0–0.5)
DEPRECATED RDW RBC AUTO: 50 FL (ref 37–54)
EOSINOPHIL # BLD AUTO: 0.01 10*3/MM3 (ref 0–0.4)
EOSINOPHIL NFR BLD AUTO: 0.1 % (ref 0.3–6.2)
ERYTHROCYTE [DISTWIDTH] IN BLOOD BY AUTOMATED COUNT: 15 % (ref 12.3–15.4)
FERRITIN SERPL-MCNC: 99.33 NG/ML (ref 30–400)
FIBRINOGEN PPP-MCNC: 643 MG/DL (ref 173–524)
GFR SERPL CREATININE-BSD FRML MDRD: >150 ML/MIN/1.73
GLOBULIN UR ELPH-MCNC: 4.4 GM/DL
GLUCOSE SERPL-MCNC: 130 MG/DL (ref 65–99)
HCT VFR BLD AUTO: 39.8 % (ref 37.5–51)
HGB BLD-MCNC: 11.4 G/DL (ref 13–17.7)
HYPOCHROMIA BLD QL: NORMAL
IMM GRANULOCYTES # BLD AUTO: 0.2 10*3/MM3 (ref 0–0.05)
IMM GRANULOCYTES NFR BLD AUTO: 2 % (ref 0–0.5)
LDH SERPL-CCNC: 185 U/L (ref 135–225)
LYMPHOCYTES # BLD AUTO: 1.06 10*3/MM3 (ref 0.7–3.1)
LYMPHOCYTES NFR BLD AUTO: 10.4 % (ref 19.6–45.3)
MCH RBC QN AUTO: 26.4 PG (ref 26.6–33)
MCHC RBC AUTO-ENTMCNC: 28.6 G/DL (ref 31.5–35.7)
MCV RBC AUTO: 92.1 FL (ref 79–97)
MONOCYTES # BLD AUTO: 0.51 10*3/MM3 (ref 0.1–0.9)
MONOCYTES NFR BLD AUTO: 5 % (ref 5–12)
NEUTROPHILS NFR BLD AUTO: 8.42 10*3/MM3 (ref 1.7–7)
NEUTROPHILS NFR BLD AUTO: 82.4 % (ref 42.7–76)
NRBC BLD AUTO-RTO: 0 /100 WBC (ref 0–0.2)
PLAT MORPH BLD: NORMAL
PLATELET # BLD AUTO: 316 10*3/MM3 (ref 140–450)
PMV BLD AUTO: 9.7 FL (ref 6–12)
POTASSIUM SERPL-SCNC: 4.4 MMOL/L (ref 3.5–5.2)
PROT SERPL-MCNC: 7.2 G/DL (ref 6–8.5)
RBC # BLD AUTO: 4.32 10*6/MM3 (ref 4.14–5.8)
SODIUM SERPL-SCNC: 140 MMOL/L (ref 136–145)
WBC # BLD AUTO: 10.21 10*3/MM3 (ref 3.4–10.8)

## 2020-10-09 PROCEDURE — 99232 SBSQ HOSP IP/OBS MODERATE 35: CPT | Performed by: HOSPITALIST

## 2020-10-09 PROCEDURE — 82248 BILIRUBIN DIRECT: CPT | Performed by: INTERNAL MEDICINE

## 2020-10-09 PROCEDURE — 94799 UNLISTED PULMONARY SVC/PX: CPT

## 2020-10-09 PROCEDURE — 85025 COMPLETE CBC W/AUTO DIFF WBC: CPT | Performed by: INTERNAL MEDICINE

## 2020-10-09 PROCEDURE — 85379 FIBRIN DEGRADATION QUANT: CPT | Performed by: INTERNAL MEDICINE

## 2020-10-09 PROCEDURE — 80053 COMPREHEN METABOLIC PANEL: CPT | Performed by: INTERNAL MEDICINE

## 2020-10-09 PROCEDURE — 86140 C-REACTIVE PROTEIN: CPT | Performed by: INTERNAL MEDICINE

## 2020-10-09 PROCEDURE — 25010000002 HEPARIN (PORCINE) PER 1000 UNITS: Performed by: HOSPITALIST

## 2020-10-09 PROCEDURE — 25010000002 VANCOMYCIN: Performed by: NURSE PRACTITIONER

## 2020-10-09 PROCEDURE — 82550 ASSAY OF CK (CPK): CPT | Performed by: INTERNAL MEDICINE

## 2020-10-09 PROCEDURE — 85007 BL SMEAR W/DIFF WBC COUNT: CPT | Performed by: INTERNAL MEDICINE

## 2020-10-09 PROCEDURE — 97110 THERAPEUTIC EXERCISES: CPT

## 2020-10-09 PROCEDURE — 97530 THERAPEUTIC ACTIVITIES: CPT

## 2020-10-09 PROCEDURE — 83615 LACTATE (LD) (LDH) ENZYME: CPT | Performed by: INTERNAL MEDICINE

## 2020-10-09 PROCEDURE — 25010000002 CEFTRIAXONE PER 250 MG: Performed by: INTERNAL MEDICINE

## 2020-10-09 PROCEDURE — 82728 ASSAY OF FERRITIN: CPT | Performed by: INTERNAL MEDICINE

## 2020-10-09 PROCEDURE — 25010000002 DEXAMETHASONE PER 1 MG: Performed by: INTERNAL MEDICINE

## 2020-10-09 PROCEDURE — 85384 FIBRINOGEN ACTIVITY: CPT | Performed by: INTERNAL MEDICINE

## 2020-10-09 RX ADMIN — REMDESIVIR 100 MG: 100 INJECTION, POWDER, LYOPHILIZED, FOR SOLUTION INTRAVENOUS at 17:41

## 2020-10-09 RX ADMIN — HEPARIN SODIUM 5000 UNITS: 5000 INJECTION INTRAVENOUS; SUBCUTANEOUS at 20:02

## 2020-10-09 RX ADMIN — SODIUM CHLORIDE, PRESERVATIVE FREE 10 ML: 5 INJECTION INTRAVENOUS at 09:07

## 2020-10-09 RX ADMIN — ALBUTEROL SULFATE 2 PUFF: 90 AEROSOL, METERED RESPIRATORY (INHALATION) at 17:41

## 2020-10-09 RX ADMIN — HEPARIN SODIUM 5000 UNITS: 5000 INJECTION INTRAVENOUS; SUBCUTANEOUS at 09:05

## 2020-10-09 RX ADMIN — SODIUM CHLORIDE, PRESERVATIVE FREE 10 ML: 5 INJECTION INTRAVENOUS at 20:10

## 2020-10-09 RX ADMIN — SODIUM CHLORIDE, PRESERVATIVE FREE 10 ML: 5 INJECTION INTRAVENOUS at 09:06

## 2020-10-09 RX ADMIN — VANCOMYCIN HYDROCHLORIDE 2000 MG: 1 INJECTION, POWDER, LYOPHILIZED, FOR SOLUTION INTRAVENOUS at 02:33

## 2020-10-09 RX ADMIN — DEXAMETHASONE SODIUM PHOSPHATE 6 MG: 4 INJECTION, SOLUTION INTRAMUSCULAR; INTRAVENOUS at 09:06

## 2020-10-09 RX ADMIN — ALBUTEROL SULFATE 2 PUFF: 90 AEROSOL, METERED RESPIRATORY (INHALATION) at 09:05

## 2020-10-09 RX ADMIN — ALBUTEROL SULFATE 2 PUFF: 90 AEROSOL, METERED RESPIRATORY (INHALATION) at 20:03

## 2020-10-09 RX ADMIN — SODIUM CHLORIDE, PRESERVATIVE FREE 10 ML: 5 INJECTION INTRAVENOUS at 20:09

## 2020-10-09 RX ADMIN — ALBUTEROL SULFATE 2 PUFF: 90 AEROSOL, METERED RESPIRATORY (INHALATION) at 12:24

## 2020-10-09 RX ADMIN — CEFTRIAXONE 2 G: 2 INJECTION, POWDER, FOR SOLUTION INTRAMUSCULAR; INTRAVENOUS at 12:25

## 2020-10-09 NOTE — PROGRESS NOTES
Discharge Planning Assessment  JOEL Ornelas     Patient Name: Jimmy Recinos  MRN: [id number]  Today's Date: 10/9/2020    Admit Date: 10/2/2020        Discharge Plan     Row Name 10/09/20 1713       Plan    Plan  Pt was admitted on 10/2/20. SS spoke with Pt. Pt reports he will go home at discharge and will need EMS transport arranged. SS will continue to follow and assist with discharge planning.    Patient/Family in Agreement with Plan  yes        Continued Care and Services - Admitted Since 10/2/2020     Home Medical Care Coordination complete    Service Provider Request Status Selected Services Address Phone Fax    [facility] [address] [place] [address] [facility] [address] [phone]            Selected Continued Care - Prior Encounters Includes selections from prior encounters from 7/4/2020 to 10/9/2020    Discharged on 9/9/2020 Admission date: 8/23/2020 - Discharge disposition: Home-Health Care Svc    Home Medical Care     Service Provider Selected Services Address Phone Fax    [facility] [address] [place] [address] [place] [address] [phone]                        Penny Esquivel

## 2020-10-09 NOTE — PLAN OF CARE
Goal Outcome Evaluation:  Plan of Care Reviewed With: patient  Progress: improving  Outcome Summary: Patient vital signs currently stable and he remains on 2L nasal cannula. He is alert and oriented x4. No significant changes noted. Will continue to monitor.

## 2020-10-09 NOTE — THERAPY TREATMENT NOTE
Acute Care - Physical Therapy Treatment Note   [place]     Patient Name: Jimmy Recinos  : 1975  MRN: [id number]  Today's Date: 10/9/2020   Onset of Illness/Injury or Date of Surgery: 10/02/20       PT Assessment (last 12 hours)      PT Evaluation and Treatment     Row Name 10/09/20 1314          Physical Therapy Time and Intention    Subjective Information  no complaints  -CT     Document Type  therapy note (daily note)  -CT     Mode of Treatment  physical therapy  -CT     Patient Effort  adequate  -CT     Comment  Pt performed Ther ex this date and tolerated well.   -CT     Row Name 10/09/20 1314          General Information    Patient Profile Reviewed  yes  -CT     Row Name 10/09/20 1314          Bed Mobility    Bed Mobility  bed mobility (all) activities  -CT     All Activities, [place] (Bed Mobility)  dependent (less than 25% patient effort)  -CT     Bed Mobility, Safety Issues  decreased use of arms for pushing/pulling;decreased use of legs for bridging/pushing;impaired trunk control for bed mobility  -CT     Row Name 10/09/20 1314          Motor Skills    Therapeutic Exercise  -- BLE supine ther ex  -CT     Row Name             Wound 20 Bilateral lower thoracic spine Abrasion    Wound - Properties Group Placement Date: 20  -KE Placement Time: 318  -KE Present on Hospital Admission: Y  -KE Side: Bilateral  -KE Location: thoracic spine  -KE Primary Wound Type: Abrasion  -KE    Retired Wound - Properties Group Date first assessed: 20  -KE Time first assessed: 318  -KE Present on Hospital Admission: Y  -KE Side: Bilateral  -KE Retired Orientation: lower  -KE Location: thoracic spine  -KE Primary Wound Type: Abrasion  -KE    Row Name             Wound 19 Right gluteal Abrasion    Wound - Properties Group Placement Date: 19  -KS Placement Time:   -KS Side: Right  -KS Location: gluteal  -KS Primary Wound Type: Abrasion  -KS Stage, Pressure Injury : Stage 1   -KM    Retired Wound - Properties Group Date first assessed: 12/21/19  -KS Time first assessed: 2330  -KS Side: Right  -KS Retired Orientation: medial  -KS Location: gluteal  -KS Primary Wound Type: Abrasion  -KS Retired Stage, Pressure Injury : other (see comments)  -KS    Row Name 10/09/20 1314          Coping    Observed Emotional State  calm;cooperative  -CT     Verbalized Emotional State  acceptance  -CT     Family/Support Persons  family  -CT     Row Name 10/09/20 1314          Plan of Care Review    Plan of Care Reviewed With  patient  -CT     Row Name 10/09/20 1314          Positioning and Restraints    Pre-Treatment Position  in bed  -CT     Post Treatment Position  bed  -CT     In Bed  supine;call light within reach;encouraged to call for assist;side rails up x3  -CT       User Key  (r) = Recorded By, (t) = Taken By, (c) = Cosigned By    Initials Name Provider Type    CT Ernestine Ireland, MYA Physical Therapist    Kyung Flores RN Registered Nurse    Mary Farfan RN Registered Nurse    Marsha Dubose RN Registered Nurse        Physical Therapy Education                 Title: PT OT SLP Therapies (Done)     Topic: Physical Therapy (Done)     Point: Mobility training (Done)     Learning Progress Summary           Patient Acceptance, E,TB, VU by CT at 10/9/2020 1316    Acceptance, E, VU,NR by  at 10/9/2020 0818    Acceptance, E, VU by AM at 10/9/2020 0106    Acceptance, E, VU by BC at 10/8/2020 1410    Acceptance, E, VU,NR by  at 10/8/2020 0743    Acceptance, E, VU,NR by SC at 10/7/2020 2221    Acceptance, E, VU by HR at 10/7/2020 1643    Acceptance, E, VU by BC at 10/7/2020 1329    Acceptance, E, VU,NR by SC at 10/6/2020 2219    Acceptance, E, VU,NR by SC at 10/5/2020 2217    Acceptance, E, VU,NR by SC at 10/4/2020 2243    Acceptance, E, VU by SC at 10/4/2020 0027    Acceptance, E,TB, VU by AM1 at 10/3/2020 1045    Acceptance, E, VU by SC at 10/3/2020 0634                   Point:  Home exercise program (Done)     Learning Progress Summary           Patient Acceptance, E,TB, VU by CT at 10/9/2020 1316    Acceptance, E, VU,NR by  at 10/9/2020 0818    Acceptance, E, VU by AM at 10/9/2020 0106    Acceptance, E, VU by BC at 10/8/2020 1410    Acceptance, E, VU,NR by  at 10/8/2020 0743    Acceptance, E, VU,NR by SC at 10/7/2020 2221    Acceptance, E, VU by  at 10/7/2020 1643    Acceptance, E, VU by BC at 10/7/2020 1329    Acceptance, E, VU,NR by SC at 10/6/2020 2219    Acceptance, E, VU,NR by SC at 10/5/2020 2217    Acceptance, E, VU,NR by SC at 10/4/2020 2243    Acceptance, E, VU by SC at 10/4/2020 0027    Acceptance, E,TB, VU by AM1 at 10/3/2020 1045    Acceptance, E, VU by SC at 10/3/2020 0634                   Point: Body mechanics (Done)     Learning Progress Summary           Patient Acceptance, E,TB, VU by CT at 10/9/2020 1316    Acceptance, E, VU,NR by  at 10/9/2020 0818    Acceptance, E, VU by AM at 10/9/2020 0106    Acceptance, E, VU by BC at 10/8/2020 1410    Acceptance, E, VU,NR by  at 10/8/2020 0743    Acceptance, E, VU,NR by SC at 10/7/2020 2221    Acceptance, E, VU by  at 10/7/2020 1643    Acceptance, E, VU by BC at 10/7/2020 1329    Acceptance, E, VU,NR by SC at 10/6/2020 2219    Acceptance, E, VU,NR by SC at 10/5/2020 2217    Acceptance, E, VU,NR by SC at 10/4/2020 2243    Acceptance, E, VU by SC at 10/4/2020 0027    Acceptance, E,TB, VU by AM1 at 10/3/2020 1045    Acceptance, E, VU by SC at 10/3/2020 0634                   Point: Precautions (Done)     Learning Progress Summary           Patient Acceptance, E,TB, VU by CT at 10/9/2020 1316    Acceptance, E, VU,NR by  at 10/9/2020 0818    Acceptance, E, VU by  at 10/9/2020 0106    Acceptance, E, VU by BC at 10/8/2020 1410    Acceptance, E, VU,NR by  at 10/8/2020 0743    Acceptance, E, VU,NR by SC at 10/7/2020 2221    Acceptance, E, VU by  at 10/7/2020 1643    Acceptance, E, VU by BC at 10/7/2020 1329     Acceptance, E, VU,NR by SC at 10/6/2020 2219    Acceptance, E, VU,NR by SC at 10/5/2020 2217    Acceptance, E, VU,NR by SC at 10/4/2020 2243    Acceptance, E, VU by SC at 10/4/2020 0027    Acceptance, E,TB, VU by AM1 at 10/3/2020 1045    Acceptance, E, VU by SC at 10/3/2020 0634                               User Key     Initials Effective Dates Name Provider Type Discipline     06/16/16 -  Meera Rosenbaum, RN Registered Nurse Nurse    BC 03/14/16 -  Janell Rosales PT Physical Therapist PT    CT 04/03/18 -  Ernestine Ireland, PT Physical Therapist PT    AM1 06/11/18 -  Bianka Banerjee, RN Registered Nurse Nurse     08/06/18 -  Gisel Henning, RN Registered Nurse Nurse     06/24/20 -  Nancy Gill, RN Registered Nurse Nurse    SC 07/07/20 -  Martha Platt RN Registered Nurse Nurse              PT Recommendation and Plan     Plan of Care Reviewed With: patient       Time Calculation:   PT Charges     Row Name 10/09/20 1317             Time Calculation    PT Received On  10/09/20  -CT      PT Goal Re-Cert Due Date  10/14/20  -CT         Time Calculation- PT    Total Timed Code Minutes- PT  41 minute(s)  -CT        User Key  (r) = Recorded By, (t) = Taken By, (c) = Cosigned By    Initials Name Provider Type    CT Ernestine Ireland, PT Physical Therapist        Therapy Charges for Today     Code Description Service Date Service Provider Modifiers Qty    09590602875 HC PT THER PROC EA 15 MIN 10/9/2020 Ernestine Ireland, PT GP 2    94786037304 HC PT THERAPEUTIC ACT EA 15 MIN 10/9/2020 Ernestine Ireland, PT GP 1               Ernestine Ireland PT  10/9/2020

## 2020-10-09 NOTE — PROGRESS NOTES
PROGRESS NOTE         Patient Identification:  Name:  Jimmy Recinos  Age:  44 y.o.  Sex:  male  :  1975  MRN:  [id number]  Visit Number:  [id number]  Primary Care Provider:  Provider, No Known         LOS: 6 days       ----------------------------------------------------------------------------------------------------------------------  Subjective       Chief Complaints:    Cellulitis        Interval History:      Patient is comfortable this morning.  Afebrile, no diarrhea.  Currently on 2 L via nasal cannula.  Redness to lower extremity has about resolved according to nursing assessment.  Lung sounds diminished on exam.  WBC normal.  CRP improving 2.67.  Case discussed with primary RN.    Review of Systems:    Constitutional:  No fever, chills.  No night sweats. No appetite change or unexpected weight change.  Positive fatigue.  Eyes: no eye drainage, itching or redness.  HEENT: no mouth sores, dysphagia or nose bleed.  Respiratory: no for shortness of breath, cough or production of sputum.  Cardiovascular: no chest pain, no palpitations, no orthopnea.  Gastrointestinal: no nausea, vomiting or diarrhea. No abdominal pain, hematemesis or rectal bleeding.  Genitourinary: no dysuria or polyuria.  Hematologic/lymphatic: no lymph node abnormalities, no easy bruising or easy bleeding.  Musculoskeletal: Left lower extremity pain.  Skin: No rash and no itching.  Neurological: no loss of consciousness, no seizure, no headache.  Behavioral/Psych: no depression or suicidal ideation.  Endocrine: no hot flashes.  Immunologic: negative.       ----------------------------------------------------------------------------------------------------------------------      Objective       Current [facility] Meds:  albuterol sulfate HFA, 2 puff, Inhalation, 4x Daily - RT  cefTRIAXone, 2 g, Intravenous, Q24H  dexamethasone, 6 mg, Intravenous, Daily  heparin (porcine), 5,000 Units, Subcutaneous, Q12H  INV [id number]  remdesivir in NS IVPB, 100 mg, Intravenous, Q24H  sodium chloride, 10 mL, Intravenous, Q12H  sodium chloride, 10 mL, Intravenous, Q12H  sodium chloride, 10 mL, Intravenous, Q12H         ----------------------------------------------------------------------------------------------------------------------    Vital Signs:  Temp:  [96.6 °F (35.9 °C)-97.9 °F (36.6 °C)] 96.6 °F (35.9 °C)  Heart Rate:  [70-93] 79  Resp:  [11-28] 23  BP: (115-174)/() 160/87  Mean Arterial Pressure (Non-Invasive) for the past 24 hrs (Last 3 readings):   Noninvasive MAP (mmHg)   10/09/20 0902 115   10/09/20 0704 100   10/09/20 0603 100     SpO2 Percentage    10/09/20 0603 10/09/20 0704 10/09/20 0902   SpO2: 96% 93% 96%     SpO2:  [90 %-100 %] 96 %  on  Flow (L/min):  [2] 2;   Device (Oxygen Therapy): nasal cannula    Body mass index is 85.26 kg/m².  Wt Readings from Last 3 Encounters:   10/09/20 (!) 270 kg (594 lb 3.2 oz)   08/23/20 (!) 295 kg (650 lb)   01/02/20 (!) 273 kg (601 lb)        Intake/Output Summary (Last 24 hours) at 10/9/2020 1109  Last data filed at 10/9/2020 0534  Gross per 24 hour   Intake 3445.61 ml   Output 3300 ml   Net 145.61 ml     Diet Regular  ----------------------------------------------------------------------------------------------------------------------      Physical Exam:    Deferred due to COVID-19 isolation.  ----------------------------------------------------------------------------------------------------------------------  Results from last 7 days   Lab Units 10/09/20  0447 10/08/20  0113 10/07/20  0122   CK TOTAL U/L 14* 38 15*           Results from last 7 days   Lab Units 10/09/20  0447 10/08/20  0113 10/07/20  0122  10/02/20  2326   CRP mg/dL 2.67* 6.61* 7.59*   < > 14.02*   LACTATE mmol/L  --   --   --   --  1.1   WBC 10*3/mm3 10.21 8.42 6.43   < > 14.09*   HEMOGLOBIN g/dL 11.4* 11.7* 10.5*   < > 12.0*   HEMATOCRIT % 39.8 41.4 36.5*   < > 40.6   MCV fL 92.1 93.9 91.5   < > 88.8   MCHC g/dL  28.6* 28.3* 28.8*   < > 29.6*   PLATELETS 10*3/mm3 316 296 220   < > 266    < > = values in this interval not displayed.     Results from last 7 days   Lab Units 10/09/20  0447 10/08/20  0113 10/07/20  1646 10/07/20  1443 10/07/20  0122   SODIUM mmol/L 140 142  --   --  143   POTASSIUM mmol/L 4.4 5.0  --   --  3.5   CHLORIDE mmol/L 98 100  --   --  106   CO2 mmol/L 36.2* 34.1*  --   --  30.1*   BUN mg/dL 11 5*  --   --  5*   CREATININE mg/dL 0.34* 0.36*  --  0.32* 0.36*   EGFR IF NONAFRICN AM mL/min/1.73 >150 >150  --  >150 >150   CALCIUM mg/dL 9.2 9.0  --   --  7.8*   GLUCOSE mg/dL 130* 161*  --   --  132*   ALBUMIN g/dL 2.78* 2.81* 2.76*  --  2.46*   BILIRUBIN mg/dL <0.2 0.2 <0.2  --  0.2   ALK PHOS U/L 64 79 71  --  59   AST (SGOT) U/L 16 21 20  --  16   ALT (SGPT) U/L 19 22 25  --  20   Estimated Creatinine Clearance: 596.1 mL/min (A) (by C-G formula based on SCr of 0.34 mg/dL (L)).  No results found for: AMMONIA    No results found for: HGBA1C, POCGLU  Lab Results   Component Value Date    HGBA1C 5.00 08/23/2020     Lab Results   Component Value Date    TSH 0.793 12/21/2019       Blood Culture   Date Value Ref Range Status   10/02/2020 Abnormal Stain (C)  Preliminary   10/02/2020 No growth at 24 hours  Preliminary     No results found for: URINECX  No results found for: WOUNDCX  No results found for: STOOLCX  No results found for: RESPCX  Pain Management Panel     Pain Management Panel Latest Ref Rng & Units 10/19/2014    AMPHETAMINES SCREEN, URINE NEGATIVE Negative    BARBITURATES SCREEN NEGATIVE Negative    BENZODIAZEPINE SCREEN, URINE NEGATIVE Negative    COCAINE SCREEN, URINE NEGATIVE Negative    METHADONE SCREEN, URINE NEGATIVE Negative            ----------------------------------------------------------------------------------------------------------------------  Imaging Results (Last 24 Hours)     ** No results found for the last 24 hours. **                 ----------------------------------------------------------------------------------------------------------------------    Assessment/Plan       Assessment/Plan     ASSESSMENT:    1.  COVID-19  2.  Lower extremity cellulitis      PLAN:    Patient is comfortable this morning.  Afebrile, no diarrhea.  Currently on 2 L via nasal cannula.  Redness to lower extremity has about resolved according to nursing assessment.  Lung sounds diminished on exam.  WBC normal.  CRP improving 2.67.  Case discussed with primary RN.    Blood cultures reveal 1 out of 2 Streptococcus mitis/oralis.    Chest x-ray unremarkable.  Patient unable to complete CT scan due to size.     Primary team to initiate convalescent plasma therapy and Remdesivir.  Consents were obtained per primary team.     Patient completed course of Rocephin and vancomycin IV on 10/9/2020.  We will follow closely off antibiotic therapy for now.    Code Status:   Code Status and Medical Interventions:   Ordered at: 10/03/20 0236     Level Of Support Discussed With:    Patient     Code Status:    CPR     Medical Interventions (Level of Support Prior to Arrest):    Full         Hortencia Keith, APRN  10/09/20  11:09 EDT

## 2020-10-09 NOTE — PROGRESS NOTES
[facility]     Patient Identification:  Name:  Jimmy Recinos  Age:  44 y.o.  Sex:  male  :  1975  MRN:  [id number]  Visit Number:  [id number]  Primary Care Provider:  Provider, No Known    Length of stay:  6    Subjective: Patient seen and examined assisted by Meera Ayala RN.  Patient is in bed, awake and alert, very pleasant, no complaints at this time, denies nausea or vomiting no constipation.  Left lower extremity cellulitis is almost completely resolved, denies diarrhea..    Chief Complaint: Lower extremity weakness.  ----------------------------------------------------------------------------------------------------------------------  Current Hospital Meds:  albuterol sulfate HFA, 2 puff, Inhalation, 4x Daily - RT  dexamethasone, 6 mg, Intravenous, Daily  heparin (porcine), 5,000 Units, Subcutaneous, Q12H  INV [id number] remdesivir in NS IVPB, 100 mg, Intravenous, Q24H  sodium chloride, 10 mL, Intravenous, Q12H  sodium chloride, 10 mL, Intravenous, Q12H  sodium chloride, 10 mL, Intravenous, Q12H         ----------------------------------------------------------------------------------------------------------------------  Vital Signs:  Temp:  [96.6 °F (35.9 °C)-97.6 °F (36.4 °C)] 97.6 °F (36.4 °C)  Heart Rate:  [69-93] 87  Resp:  [11-28] 24  BP: (115-168)/() 132/67       Tele: 335 beats per      10/07/20  0400 10/08/20  0400 10/09/20  0400   Weight: (!) 269 kg (593 lb 8 oz) (!) 269 kg (593 lb 3.2 oz) (!) 270 kg (594 lb 3.2 oz)     Body mass index is 85.26 kg/m².    Intake/Output Summary (Last 24 hours) at 10/9/2020 1616  Last data filed at 10/9/2020 1430  Gross per 24 hour   Intake 3065.61 ml   Output 4650 ml   Net -1584.39 ml     Diet Regular  ----------------------------------------------------------------------------------------------------------------------  Physical exam:  General: Comfortable,awake, alert, oriented to self, place, and time,  well-developed, morbidly obese   No respiratory distress.    Skin:  Skin is warm and dry. No rash noted. No pallor.  Mild stasis redness of the left distal third lower extremity, both lower extremities chronic stasis discoloration of the legs.  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    HENT:  Head:  Normocephalic and atraumatic.  Mouth:  Moist mucous membranes.    Eyes:  Conjunctivae and EOM are normal.  Pupils are equal, round, and reactive to light.  No scleral icterus.    Neck:  Neck supple.  No JVD present.    Pulmonary/Chest:  No respiratory distress, no wheezes, no crackles, with normal breath sounds and good air movement.  Cardiovascular:  Normal rate, regular rhythm and normal heart sounds with no murmur.  Abdominal:  Soft.  Bowel sounds are normal.  No distension and no tenderness.   Extremities:  No edema, no tenderness, and no deformity.  No red or swollen joints anywhere.  Strong pulses in all 4 extremities with no clubbing, no cyanosis, no edema.  Neurological:  Motor strength equal no obvious deficit, sensory grossly intact.   No cranial nerve deficit.  No tongue deviation.  No facial droop.  No slurred speech.    Genitourinary: No Mehta catheter   back: No skin break  ----------------------------------------------------------------------------------------------------------------------  ----------------------------------------------------------------------------------------------------------------------  Results from last 7 days   Lab Units 10/09/20  0447 10/08/20  0113 10/07/20  0122   CK TOTAL U/L 14* 38 15*     Results from last 7 days   Lab Units 10/09/20  0447 10/08/20  0113 10/07/20  0122  10/02/20  2326   CRP mg/dL 2.67* 6.61* 7.59*   < > 14.02*   LACTATE mmol/L  --   --   --   --  1.1   WBC 10*3/mm3 10.21 8.42 6.43   < > 14.09*   HEMOGLOBIN g/dL 11.4* 11.7* 10.5*   < > 12.0*   HEMATOCRIT % 39.8 41.4 36.5*   < > 40.6   MCV fL 92.1 93.9 91.5   < > 88.8   MCHC g/dL 28.6* 28.3*  28.8*   < > 29.6*   PLATELETS 10*3/mm3 316 296 220   < > 266    < > = values in this interval not displayed.         Results from last 7 days   Lab Units 10/09/20  0447 10/08/20  0113 10/07/20  1646 10/07/20  1443 10/07/20  0122   SODIUM mmol/L 140 142  --   --  143   POTASSIUM mmol/L 4.4 5.0  --   --  3.5   CHLORIDE mmol/L 98 100  --   --  106   CO2 mmol/L 36.2* 34.1*  --   --  30.1*   BUN mg/dL 11 5*  --   --  5*   CREATININE mg/dL 0.34* 0.36*  --  0.32* 0.36*   EGFR IF NONAFRICN AM mL/min/1.73 >150 >150  --  >150 >150   CALCIUM mg/dL 9.2 9.0  --   --  7.8*   GLUCOSE mg/dL 130* 161*  --   --  132*   ALBUMIN g/dL 2.78* 2.81* 2.76*  --  2.46*   BILIRUBIN mg/dL <0.2 0.2 <0.2  --  0.2   ALK PHOS U/L 64 79 71  --  59   AST (SGOT) U/L 16 21 20  --  16   ALT (SGPT) U/L 19 22 25  --  20   Estimated Creatinine Clearance: 596.1 mL/min (A) (by C-G formula based on SCr of 0.34 mg/dL (L)).    No results found for: AMMONIA      Blood Culture   Date Value Ref Range Status   10/02/2020 Streptococcus mitis / oralis (C)  Final   10/02/2020 No growth at 5 days  Final     No results found for: URINECX  No results found for: WOUNDCX  No results found for: STOOLCX    I have personally looked at the labs and they are summarized above.  ----------------------------------------------------------------------------------------------------------------------  Imaging Results (Last 24 Hours)     ** No results found for the last 24 hours. **        ----------------------------------------------------------------------------------------------------------------------  Assessment and Plan:  -Sepsis present on admission secondary to left lower extremity cellulitis  -Left lower extremity cellulitis improving  -Left knee cellulitis resolving  -COVID-19 infection, on Decadron, Remdesevir and received convalescent plasma  -Obstructive sleep apnea  -Essential hypertension      Continue with antibiotic, daily local care both lower extremity, continue  Decadron, hopefully home soon.  Start patient on Hibiclens wash, DVT prophylaxis.    Kady Jaimes MD  10/09/20  16:16 EDT

## 2020-10-10 LAB
ALBUMIN SERPL-MCNC: 2.9 G/DL (ref 3.5–5.2)
ALBUMIN/GLOB SERPL: 0.7 G/DL
ALP SERPL-CCNC: 59 U/L (ref 39–117)
ALT SERPL W P-5'-P-CCNC: 23 U/L (ref 1–41)
ANION GAP SERPL CALCULATED.3IONS-SCNC: 4.4 MMOL/L (ref 5–15)
AST SERPL-CCNC: 19 U/L (ref 1–40)
BASOPHILS # BLD AUTO: 0.02 10*3/MM3 (ref 0–0.2)
BASOPHILS NFR BLD AUTO: 0.2 % (ref 0–1.5)
BILIRUB CONJ SERPL-MCNC: <0.2 MG/DL (ref 0–0.3)
BILIRUB SERPL-MCNC: 0.2 MG/DL (ref 0–1.2)
BUN SERPL-MCNC: 15 MG/DL (ref 6–20)
BUN/CREAT SERPL: 42.9 (ref 7–25)
CALCIUM SPEC-SCNC: 8.9 MG/DL (ref 8.6–10.5)
CHLORIDE SERPL-SCNC: 97 MMOL/L (ref 98–107)
CK SERPL-CCNC: 13 U/L (ref 20–200)
CO2 SERPL-SCNC: 34.6 MMOL/L (ref 22–29)
CREAT SERPL-MCNC: 0.35 MG/DL (ref 0.76–1.27)
CRP SERPL-MCNC: 0.97 MG/DL (ref 0–0.5)
DEPRECATED RDW RBC AUTO: 49.8 FL (ref 37–54)
EOSINOPHIL # BLD AUTO: 0.05 10*3/MM3 (ref 0–0.4)
EOSINOPHIL NFR BLD AUTO: 0.6 % (ref 0.3–6.2)
ERYTHROCYTE [DISTWIDTH] IN BLOOD BY AUTOMATED COUNT: 15.2 % (ref 12.3–15.4)
FERRITIN SERPL-MCNC: 90.07 NG/ML (ref 30–400)
GFR SERPL CREATININE-BSD FRML MDRD: >150 ML/MIN/1.73
GLOBULIN UR ELPH-MCNC: 4.2 GM/DL
GLUCOSE SERPL-MCNC: 107 MG/DL (ref 65–99)
HCT VFR BLD AUTO: 41.7 % (ref 37.5–51)
HGB BLD-MCNC: 12.1 G/DL (ref 13–17.7)
IMM GRANULOCYTES # BLD AUTO: 0.13 10*3/MM3 (ref 0–0.05)
IMM GRANULOCYTES NFR BLD AUTO: 1.5 % (ref 0–0.5)
LYMPHOCYTES # BLD AUTO: 1.19 10*3/MM3 (ref 0.7–3.1)
LYMPHOCYTES NFR BLD AUTO: 13.8 % (ref 19.6–45.3)
MCH RBC QN AUTO: 26.5 PG (ref 26.6–33)
MCHC RBC AUTO-ENTMCNC: 29 G/DL (ref 31.5–35.7)
MCV RBC AUTO: 91.4 FL (ref 79–97)
MONOCYTES # BLD AUTO: 0.53 10*3/MM3 (ref 0.1–0.9)
MONOCYTES NFR BLD AUTO: 6.2 % (ref 5–12)
NEUTROPHILS NFR BLD AUTO: 6.69 10*3/MM3 (ref 1.7–7)
NEUTROPHILS NFR BLD AUTO: 77.7 % (ref 42.7–76)
NRBC BLD AUTO-RTO: 0 /100 WBC (ref 0–0.2)
PLATELET # BLD AUTO: 262 10*3/MM3 (ref 140–450)
PMV BLD AUTO: 9.4 FL (ref 6–12)
POTASSIUM SERPL-SCNC: 4.6 MMOL/L (ref 3.5–5.2)
PROT SERPL-MCNC: 7.1 G/DL (ref 6–8.5)
RBC # BLD AUTO: 4.56 10*6/MM3 (ref 4.14–5.8)
SODIUM SERPL-SCNC: 136 MMOL/L (ref 136–145)
WBC # BLD AUTO: 8.61 10*3/MM3 (ref 3.4–10.8)

## 2020-10-10 PROCEDURE — 25010000002 HEPARIN (PORCINE) PER 1000 UNITS: Performed by: HOSPITALIST

## 2020-10-10 PROCEDURE — 86140 C-REACTIVE PROTEIN: CPT | Performed by: INTERNAL MEDICINE

## 2020-10-10 PROCEDURE — 94640 AIRWAY INHALATION TREATMENT: CPT

## 2020-10-10 PROCEDURE — 94799 UNLISTED PULMONARY SVC/PX: CPT

## 2020-10-10 PROCEDURE — 80053 COMPREHEN METABOLIC PANEL: CPT | Performed by: INTERNAL MEDICINE

## 2020-10-10 PROCEDURE — 82550 ASSAY OF CK (CPK): CPT | Performed by: INTERNAL MEDICINE

## 2020-10-10 PROCEDURE — 82728 ASSAY OF FERRITIN: CPT | Performed by: INTERNAL MEDICINE

## 2020-10-10 PROCEDURE — 85025 COMPLETE CBC W/AUTO DIFF WBC: CPT | Performed by: INTERNAL MEDICINE

## 2020-10-10 PROCEDURE — 25010000002 DEXAMETHASONE PER 1 MG: Performed by: INTERNAL MEDICINE

## 2020-10-10 PROCEDURE — 82248 BILIRUBIN DIRECT: CPT | Performed by: INTERNAL MEDICINE

## 2020-10-10 PROCEDURE — 99232 SBSQ HOSP IP/OBS MODERATE 35: CPT | Performed by: HOSPITALIST

## 2020-10-10 RX ORDER — PHENAZOPYRIDINE HYDROCHLORIDE 200 MG/1
200 TABLET, FILM COATED ORAL ONCE
Status: COMPLETED | OUTPATIENT
Start: 2020-10-10 | End: 2020-10-10

## 2020-10-10 RX ADMIN — SODIUM CHLORIDE, PRESERVATIVE FREE 10 ML: 5 INJECTION INTRAVENOUS at 09:09

## 2020-10-10 RX ADMIN — ALBUTEROL SULFATE 2 PUFF: 90 AEROSOL, METERED RESPIRATORY (INHALATION) at 12:30

## 2020-10-10 RX ADMIN — ALBUTEROL SULFATE 2 PUFF: 90 AEROSOL, METERED RESPIRATORY (INHALATION) at 17:15

## 2020-10-10 RX ADMIN — SODIUM CHLORIDE, PRESERVATIVE FREE 10 ML: 5 INJECTION INTRAVENOUS at 09:10

## 2020-10-10 RX ADMIN — PHENAZOPYRIDINE HYDROCHLORIDE 200 MG: 200 TABLET ORAL at 04:55

## 2020-10-10 RX ADMIN — DEXAMETHASONE SODIUM PHOSPHATE 6 MG: 4 INJECTION, SOLUTION INTRAMUSCULAR; INTRAVENOUS at 09:09

## 2020-10-10 RX ADMIN — REMDESIVIR 100 MG: 100 INJECTION, POWDER, LYOPHILIZED, FOR SOLUTION INTRAVENOUS at 17:15

## 2020-10-10 RX ADMIN — HYDROCODONE BITARTRATE AND ACETAMINOPHEN 1 TABLET: 5; 325 TABLET ORAL at 03:01

## 2020-10-10 RX ADMIN — ALBUTEROL SULFATE 2 PUFF: 90 AEROSOL, METERED RESPIRATORY (INHALATION) at 09:08

## 2020-10-10 RX ADMIN — SODIUM CHLORIDE, PRESERVATIVE FREE 10 ML: 5 INJECTION INTRAVENOUS at 20:04

## 2020-10-10 RX ADMIN — ALBUTEROL SULFATE 2 PUFF: 90 AEROSOL, METERED RESPIRATORY (INHALATION) at 20:03

## 2020-10-10 RX ADMIN — HEPARIN SODIUM 5000 UNITS: 5000 INJECTION INTRAVENOUS; SUBCUTANEOUS at 20:03

## 2020-10-10 RX ADMIN — SODIUM CHLORIDE, PRESERVATIVE FREE 10 ML: 5 INJECTION INTRAVENOUS at 09:08

## 2020-10-10 RX ADMIN — HEPARIN SODIUM 5000 UNITS: 5000 INJECTION INTRAVENOUS; SUBCUTANEOUS at 09:08

## 2020-10-10 RX ADMIN — SODIUM CHLORIDE, PRESERVATIVE FREE 10 ML: 5 INJECTION INTRAVENOUS at 20:03

## 2020-10-10 NOTE — PLAN OF CARE
Goal Outcome Evaluation:  Plan of Care Reviewed With: patient  Progress: improving  Outcome Summary: Patient vital signs currently stable and he remains on room air. He continues to be alert and oriented x4. He has complained of some pain when urinating throughout the night. Otherwise, no significant changes noted. Will continue to monitor.

## 2020-10-10 NOTE — PROGRESS NOTES
[facility]     Patient Identification:  Name:  Jimmy Recinos  Age:  44 y.o.  Sex:  male  :  1975  MRN:  [id number]  Visit Number:  [id number]  Primary Care Provider:  Provider, No Known    Length of stay:  7    Subjective: Patient seen and examined through video conference, other information gathered from patient's nurse Ms. Meera Rosenbaum RN.  Patient's redness of left lower extremity continues to improve and almost resolved, he has been washed with Hibiclens daily, reports some burning on his Mehta catheter which will be discontinued today, he is receiving his third day of Remdesevir, last dose being tomorrow if continues to improve.  Hopefully can be discharged.  Patient is longer on antibiotic.    Chief Complaint: Dysuria with Mehta cath  ----------------------------------------------------------------------------------------------------------------------  Current Hospital Meds:  albuterol sulfate HFA, 2 puff, Inhalation, 4x Daily - RT  dexamethasone, 6 mg, Intravenous, Daily  heparin (porcine), 5,000 Units, Subcutaneous, Q12H  INV [id number] remdesivir in NS IVPB, 100 mg, Intravenous, Q24H  sodium chloride, 10 mL, Intravenous, Q12H  sodium chloride, 10 mL, Intravenous, Q12H  sodium chloride, 10 mL, Intravenous, Q12H         ----------------------------------------------------------------------------------------------------------------------  Vital Signs:  Temp:  [97.6 °F (36.4 °C)-98.4 °F (36.9 °C)] 97.8 °F (36.6 °C)  Heart Rate:  [56-93] 71  Resp:  [11-28] 24  BP: (118-168)/(67-97) 133/74       Tele: Sinus rhythm 62 bpm      10/08/20  0400 10/09/20  0400 10/10/20  0603   Weight: (!) 269 kg (593 lb 3.2 oz) (!) 270 kg (594 lb 3.2 oz) (!) 268 kg (590 lb)     Body mass index is 84.66 kg/m².    Intake/Output Summary (Last 24 hours) at 10/10/2020 1036  Last data filed at 10/10/2020 0400  Gross per 24 hour   Intake 3330.83 ml   Output 3450 ml   Net -119.17 ml      Diet Regular  ----------------------------------------------------------------------------------------------------------------------  This physical exam has been personally performed remotely in the unit aided by real-time audio/visual communication tools.  Meera Rosenbaum RN. Is present at bedside during this exam and assisted during exam. The use of a video visit has been reviewed with the patient and verbal informed consent has been obtained.     Physical Exam:  General: Patient appears awake, alert, and in no acute distress.  Morbidly obese  Head: Normocephalic, atraumatic  Eyes: EOMI. Conjunctivae and sclerae normal.  Ears: Ears appear intact with no abnormalities noted.   Neck: Trachea midline. No obvious JVD.  Heart: Tele reveals sinus rhythm 62 bpm  Lungs: Respirations appear to be regular, even and unlabored with no signs of respiratory distress. No audible wheezing.  Abdomen: No obvious abdominal distension.  MS: Muscle tone appears normal. No gross deformities.  Extremities:  No edema, no tenderness, and no deformity.  No red or swollen joints anywhere.  Strong pulses in all 4 extremities with no clubbing, no cyanosis, no edema.  The redness on the left distal third of legs is all resolved.    Neurological:  Motor strength equal no obvious deficit, sensory grossly intact.   No cranial nerve deficit.  No tongue deviation.  No facial droop.  No slurred speech.    Genitourinary: Mehta catheter in place.  Nonbloody urine noted  back: No skin break    ----------------------------------------------------------------------------------------------------------------------  ----------------------------------------------------------------------------------------------------------------------  Results from last 7 days   Lab Units 10/10/20  0747 10/09/20  0447 10/08/20  0113   CK TOTAL U/L 13* 14* 38     Results from last 7 days   Lab Units 10/10/20  0747 10/09/20  0447 10/08/20  0113   CRP mg/dL 0.97* 2.67*  6.61*   WBC 10*3/mm3 8.61 10.21 8.42   HEMOGLOBIN g/dL 12.1* 11.4* 11.7*   HEMATOCRIT % 41.7 39.8 41.4   MCV fL 91.4 92.1 93.9   MCHC g/dL 29.0* 28.6* 28.3*   PLATELETS 10*3/mm3 262 316 296         Results from last 7 days   Lab Units 10/10/20  0747 10/09/20  0447 10/08/20  0113   SODIUM mmol/L 136 140 142   POTASSIUM mmol/L 4.6 4.4 5.0   CHLORIDE mmol/L 97* 98 100   CO2 mmol/L 34.6* 36.2* 34.1*   BUN mg/dL 15 11 5*   CREATININE mg/dL 0.35* 0.34* 0.36*   EGFR IF NONAFRICN AM mL/min/1.73 >150 >150 >150   CALCIUM mg/dL 8.9 9.2 9.0   GLUCOSE mg/dL 107* 130* 161*   ALBUMIN g/dL 2.90* 2.78* 2.81*   BILIRUBIN mg/dL 0.2 <0.2 0.2   ALK PHOS U/L 59 64 79   AST (SGOT) U/L 19 16 21   ALT (SGPT) U/L 23 19 22   Estimated Creatinine Clearance: 575.2 mL/min (A) (by C-G formula based on SCr of 0.35 mg/dL (L)).    No results found for: AMMONIA      No results found for: BLOODCX  No results found for: URINECX  No results found for: WOUNDCX  No results found for: STOOLCX    I have personally looked at the labs and they are summarized above.  ----------------------------------------------------------------------------------------------------------------------  Imaging Results (Last 24 Hours)     ** No results found for the last 24 hours. **        ----------------------------------------------------------------------------------------------------------------------  Assessment and Plan:    -Suspension admission secondary left lower extremity cellulitis  -Left lower extremity cellulitis now resolved  -Complicated 19 infection clinically improving  -Acute hypoxic respiratory failure now improved off oxygen  -Obstructive sleep apnea  -Essential hypertension  -Morbid obesity  -Chronic stasis of both lower extremities    Patient is improving, he will be seen his last dose of Remdesevir tomorrow if s continues to improve off antibiotic, hopefully discharge tomorrow.    Kady Jaimes MD  10/10/20  10:36 EDT

## 2020-10-10 NOTE — PLAN OF CARE
Goal Outcome Evaluation:  Plan of Care Reviewed With: patient  Progress: improving   Pt resting in bed, pt alert and orient. Pt afebrile on 2 liter nasal cannula. Pt denies any complaints of pain. Pt has call bell with in reach. Pt bed locked in low position with bed alarm set.

## 2020-10-10 NOTE — PROGRESS NOTES
PROGRESS NOTE         Patient Identification:  Name:  Jimmy Recinos  Age:  44 y.o.  Sex:  male  :  1975  MRN:  [id number]  Visit Number:  [id number]  Primary Care Provider:  Provider, No Known         LOS: 7 days       ----------------------------------------------------------------------------------------------------------------------  Subjective       Chief Complaints:    Cellulitis        Interval History:      Patient on 2 L via nasal cannula.  Afebrile.  WBC normal.  CRP normal.  Left lower extremity redness almost resolved.  Patient stable off antibiotic therapy.    Review of Systems:    Constitutional:  No fever, chills.  No night sweats. No appetite change or unexpected weight change.  Positive fatigue.  Eyes: no eye drainage, itching or redness.  HEENT: no mouth sores, dysphagia or nose bleed.  Respiratory: no for shortness of breath, cough or production of sputum.  Cardiovascular: no chest pain, no palpitations, no orthopnea.  Gastrointestinal: no nausea, vomiting or diarrhea. No abdominal pain, hematemesis or rectal bleeding.  Genitourinary: no dysuria or polyuria.  Hematologic/lymphatic: no lymph node abnormalities, no easy bruising or easy bleeding.  Musculoskeletal: Left lower extremity pain.  Skin: No rash and no itching.  Neurological: no loss of consciousness, no seizure, no headache.  Behavioral/Psych: no depression or suicidal ideation.  Endocrine: no hot flashes.  Immunologic: negative.       ----------------------------------------------------------------------------------------------------------------------      Objective       Current Hospital Meds:  albuterol sulfate HFA, 2 puff, Inhalation, 4x Daily - RT  dexamethasone, 6 mg, Intravenous, Daily  heparin (porcine), 5,000 Units, Subcutaneous, Q12H  INV [id number] remdesivir in NS IVPB, 100 mg, Intravenous, Q24H  sodium chloride, 10 mL, Intravenous, Q12H  sodium chloride, 10 mL, Intravenous, Q12H  sodium chloride, 10 mL,  Intravenous, Q12H         ----------------------------------------------------------------------------------------------------------------------    Vital Signs:  Temp:  [97.7 °F (36.5 °C)-98.4 °F (36.9 °C)] 97.8 °F (36.6 °C)  Heart Rate:  [56-93] 71  Resp:  [11-28] 24  BP: (118-168)/(67-97) 133/74  Mean Arterial Pressure (Non-Invasive) for the past 24 hrs (Last 3 readings):   Noninvasive MAP (mmHg)   10/10/20 0902 91   10/10/20 0803 80   10/10/20 0702 92     SpO2 Percentage    10/10/20 0702 10/10/20 0817 10/10/20 0920   SpO2: 98% 94% 96%     SpO2:  [92 %-100 %] 96 %  on  Flow (L/min):  [2] 2;   Device (Oxygen Therapy): nasal cannula    Body mass index is 84.66 kg/m².  Wt Readings from Last 3 Encounters:   10/10/20 (!) 268 kg (590 lb)   08/23/20 (!) 295 kg (650 lb)   01/02/20 (!) 273 kg (601 lb)        Intake/Output Summary (Last 24 hours) at 10/10/2020 1107  Last data filed at 10/10/2020 0400  Gross per 24 hour   Intake 3330.83 ml   Output 3450 ml   Net -119.17 ml     Diet Regular  ----------------------------------------------------------------------------------------------------------------------      Physical Exam:    Deferred due to COVID-19 isolation.  ----------------------------------------------------------------------------------------------------------------------  Results from last 7 days   Lab Units 10/10/20  0747 10/09/20  0447 10/08/20  0113   CK TOTAL U/L 13* 14* 38           Results from last 7 days   Lab Units 10/10/20  0747 10/09/20  0447 10/08/20  0113   CRP mg/dL 0.97* 2.67* 6.61*   WBC 10*3/mm3 8.61 10.21 8.42   HEMOGLOBIN g/dL 12.1* 11.4* 11.7*   HEMATOCRIT % 41.7 39.8 41.4   MCV fL 91.4 92.1 93.9   MCHC g/dL 29.0* 28.6* 28.3*   PLATELETS 10*3/mm3 262 316 296     Results from last 7 days   Lab Units 10/10/20  0747 10/09/20  0447 10/08/20  0113   SODIUM mmol/L 136 140 142   POTASSIUM mmol/L 4.6 4.4 5.0   CHLORIDE mmol/L 97* 98 100   CO2 mmol/L 34.6* 36.2* 34.1*   BUN mg/dL 15 11 5*   CREATININE  mg/dL 0.35* 0.34* 0.36*   EGFR IF NONAFRICN AM mL/min/1.73 >150 >150 >150   CALCIUM mg/dL 8.9 9.2 9.0   GLUCOSE mg/dL 107* 130* 161*   ALBUMIN g/dL 2.90* 2.78* 2.81*   BILIRUBIN mg/dL 0.2 <0.2 0.2   ALK PHOS U/L 59 64 79   AST (SGOT) U/L 19 16 21   ALT (SGPT) U/L 23 19 22   Estimated Creatinine Clearance: 575.2 mL/min (A) (by C-G formula based on SCr of 0.35 mg/dL (L)).  No results found for: AMMONIA    No results found for: HGBA1C, POCGLU  Lab Results   Component Value Date    HGBA1C 5.00 08/23/2020     Lab Results   Component Value Date    TSH 0.793 12/21/2019       Blood Culture   Date Value Ref Range Status   10/02/2020 Abnormal Stain (C)  Preliminary   10/02/2020 No growth at 24 hours  Preliminary     No results found for: URINECX  No results found for: WOUNDCX  No results found for: STOOLCX  No results found for: RESPCX  Pain Management Panel     Pain Management Panel Latest Ref Rng & Units 10/19/2014    AMPHETAMINES SCREEN, URINE NEGATIVE Negative    BARBITURATES SCREEN NEGATIVE Negative    BENZODIAZEPINE SCREEN, URINE NEGATIVE Negative    COCAINE SCREEN, URINE NEGATIVE Negative    METHADONE SCREEN, URINE NEGATIVE Negative            ----------------------------------------------------------------------------------------------------------------------  Imaging Results (Last 24 Hours)     ** No results found for the last 24 hours. **          ----------------------------------------------------------------------------------------------------------------------    Assessment/Plan       Assessment/Plan     ASSESSMENT:    1.  COVID-19  2.  Lower extremity cellulitis      PLAN:    Patient on 2 L via nasal cannula.  Afebrile.  WBC normal.  CRP normal.  Left lower extremity redness almost resolved.  Patient stable off antibiotic therapy.    Blood cultures reveal 1 out of 2 Streptococcus mitis/oralis.    Chest x-ray unremarkable.  Patient unable to complete CT scan due to size.     Patient completed convalescent plasma  therapy and is receiving last dose of remdesivir.  Consents were obtained per primary team.     Patient completed course of Rocephin and vancomycin IV on 10/9/2020.  We will follow closely off antibiotic therapy for now.    Code Status:   Code Status and Medical Interventions:   Ordered at: 10/03/20 0236     Level Of Support Discussed With:    Patient     Code Status:    CPR     Medical Interventions (Level of Support Prior to Arrest):    Full         KHUSHBU Boothe  10/10/20  11:07 EDT

## 2020-10-11 ENCOUNTER — READMISSION MANAGEMENT (OUTPATIENT)
Dept: CALL CENTER | Facility: HOSPITAL | Age: 45
End: 2020-10-11

## 2020-10-11 VITALS
HEIGHT: 70 IN | BODY MASS INDEX: 45.1 KG/M2 | HEART RATE: 54 BPM | WEIGHT: 315 LBS | OXYGEN SATURATION: 95 % | DIASTOLIC BLOOD PRESSURE: 78 MMHG | SYSTOLIC BLOOD PRESSURE: 136 MMHG | TEMPERATURE: 98.6 F | RESPIRATION RATE: 18 BRPM

## 2020-10-11 LAB
ALBUMIN SERPL-MCNC: 3.01 G/DL (ref 3.5–5.2)
ALBUMIN/GLOB SERPL: 0.7 G/DL
ALP SERPL-CCNC: 58 U/L (ref 39–117)
ALT SERPL W P-5'-P-CCNC: 28 U/L (ref 1–41)
ANION GAP SERPL CALCULATED.3IONS-SCNC: 7.3 MMOL/L (ref 5–15)
AST SERPL-CCNC: 21 U/L (ref 1–40)
BASOPHILS # BLD AUTO: 0.03 10*3/MM3 (ref 0–0.2)
BASOPHILS NFR BLD AUTO: 0.3 % (ref 0–1.5)
BILIRUB CONJ SERPL-MCNC: <0.2 MG/DL (ref 0–0.3)
BILIRUB SERPL-MCNC: 0.2 MG/DL (ref 0–1.2)
BUN SERPL-MCNC: 15 MG/DL (ref 6–20)
BUN/CREAT SERPL: 38.5 (ref 7–25)
CALCIUM SPEC-SCNC: 8.8 MG/DL (ref 8.6–10.5)
CHLORIDE SERPL-SCNC: 97 MMOL/L (ref 98–107)
CK SERPL-CCNC: 20 U/L (ref 20–200)
CO2 SERPL-SCNC: 33.7 MMOL/L (ref 22–29)
CREAT SERPL-MCNC: 0.39 MG/DL (ref 0.76–1.27)
CRP SERPL-MCNC: 0.64 MG/DL (ref 0–0.5)
D DIMER PPP FEU-MCNC: 0.76 MCGFEU/ML (ref 0–0.5)
DEPRECATED RDW RBC AUTO: 50.6 FL (ref 37–54)
EOSINOPHIL # BLD AUTO: 0.06 10*3/MM3 (ref 0–0.4)
EOSINOPHIL NFR BLD AUTO: 0.6 % (ref 0.3–6.2)
ERYTHROCYTE [DISTWIDTH] IN BLOOD BY AUTOMATED COUNT: 15.5 % (ref 12.3–15.4)
FERRITIN SERPL-MCNC: 70.86 NG/ML (ref 30–400)
FIBRINOGEN PPP-MCNC: 481 MG/DL (ref 173–524)
GFR SERPL CREATININE-BSD FRML MDRD: >150 ML/MIN/1.73
GLOBULIN UR ELPH-MCNC: 4.2 GM/DL
GLUCOSE SERPL-MCNC: 92 MG/DL (ref 65–99)
HCT VFR BLD AUTO: 42.4 % (ref 37.5–51)
HGB BLD-MCNC: 12.4 G/DL (ref 13–17.7)
IMM GRANULOCYTES # BLD AUTO: 0.14 10*3/MM3 (ref 0–0.05)
IMM GRANULOCYTES NFR BLD AUTO: 1.4 % (ref 0–0.5)
LDH SERPL-CCNC: 166 U/L (ref 135–225)
LYMPHOCYTES # BLD AUTO: 1.3 10*3/MM3 (ref 0.7–3.1)
LYMPHOCYTES NFR BLD AUTO: 13.1 % (ref 19.6–45.3)
MCH RBC QN AUTO: 26.6 PG (ref 26.6–33)
MCHC RBC AUTO-ENTMCNC: 29.2 G/DL (ref 31.5–35.7)
MCV RBC AUTO: 90.8 FL (ref 79–97)
MONOCYTES # BLD AUTO: 0.68 10*3/MM3 (ref 0.1–0.9)
MONOCYTES NFR BLD AUTO: 6.8 % (ref 5–12)
NEUTROPHILS NFR BLD AUTO: 7.74 10*3/MM3 (ref 1.7–7)
NEUTROPHILS NFR BLD AUTO: 77.8 % (ref 42.7–76)
NRBC BLD AUTO-RTO: 0 /100 WBC (ref 0–0.2)
PLATELET # BLD AUTO: 282 10*3/MM3 (ref 140–450)
PMV BLD AUTO: 9.4 FL (ref 6–12)
POTASSIUM SERPL-SCNC: 4.5 MMOL/L (ref 3.5–5.2)
PROT SERPL-MCNC: 7.2 G/DL (ref 6–8.5)
RBC # BLD AUTO: 4.67 10*6/MM3 (ref 4.14–5.8)
SODIUM SERPL-SCNC: 138 MMOL/L (ref 136–145)
WBC # BLD AUTO: 9.95 10*3/MM3 (ref 3.4–10.8)

## 2020-10-11 PROCEDURE — 82728 ASSAY OF FERRITIN: CPT | Performed by: INTERNAL MEDICINE

## 2020-10-11 PROCEDURE — 86140 C-REACTIVE PROTEIN: CPT | Performed by: INTERNAL MEDICINE

## 2020-10-11 PROCEDURE — 85379 FIBRIN DEGRADATION QUANT: CPT | Performed by: INTERNAL MEDICINE

## 2020-10-11 PROCEDURE — 82248 BILIRUBIN DIRECT: CPT | Performed by: INTERNAL MEDICINE

## 2020-10-11 PROCEDURE — 25010000002 DEXAMETHASONE PER 1 MG: Performed by: INTERNAL MEDICINE

## 2020-10-11 PROCEDURE — 94799 UNLISTED PULMONARY SVC/PX: CPT

## 2020-10-11 PROCEDURE — 80053 COMPREHEN METABOLIC PANEL: CPT | Performed by: INTERNAL MEDICINE

## 2020-10-11 PROCEDURE — 85025 COMPLETE CBC W/AUTO DIFF WBC: CPT | Performed by: INTERNAL MEDICINE

## 2020-10-11 PROCEDURE — 25010000002 HEPARIN (PORCINE) PER 1000 UNITS: Performed by: HOSPITALIST

## 2020-10-11 PROCEDURE — 82550 ASSAY OF CK (CPK): CPT | Performed by: INTERNAL MEDICINE

## 2020-10-11 PROCEDURE — 99239 HOSP IP/OBS DSCHRG MGMT >30: CPT | Performed by: HOSPITALIST

## 2020-10-11 PROCEDURE — 83615 LACTATE (LD) (LDH) ENZYME: CPT | Performed by: INTERNAL MEDICINE

## 2020-10-11 PROCEDURE — 85384 FIBRINOGEN ACTIVITY: CPT | Performed by: INTERNAL MEDICINE

## 2020-10-11 RX ORDER — DEXAMETHASONE 6 MG/1
6 TABLET ORAL
Qty: 6 TABLET | Refills: 0
Start: 2020-10-11 | End: 2020-10-15

## 2020-10-11 RX ORDER — DEXAMETHASONE 4 MG/1
6 TABLET ORAL
Qty: 6 TABLET | Refills: 0 | Status: SHIPPED | OUTPATIENT
Start: 2020-10-11 | End: 2020-10-11 | Stop reason: SDUPTHER

## 2020-10-11 RX ORDER — DEXAMETHASONE 6 MG/1
6 TABLET ORAL
Qty: 4 TABLET | Refills: 0 | Status: SHIPPED | OUTPATIENT
Start: 2020-10-11 | End: 2020-10-11 | Stop reason: SDUPTHER

## 2020-10-11 RX ADMIN — SODIUM CHLORIDE, PRESERVATIVE FREE 10 ML: 5 INJECTION INTRAVENOUS at 09:16

## 2020-10-11 RX ADMIN — DEXAMETHASONE SODIUM PHOSPHATE 6 MG: 4 INJECTION, SOLUTION INTRAMUSCULAR; INTRAVENOUS at 09:15

## 2020-10-11 RX ADMIN — ALBUTEROL SULFATE 2 PUFF: 90 AEROSOL, METERED RESPIRATORY (INHALATION) at 08:23

## 2020-10-11 RX ADMIN — HEPARIN SODIUM 5000 UNITS: 5000 INJECTION INTRAVENOUS; SUBCUTANEOUS at 09:15

## 2020-10-11 NOTE — PLAN OF CARE
Problem: Adult Inpatient Plan of Care  Goal: Absence of Hospital-Acquired Illness or Injury  Intervention: Prevent Infection  Recent Flowsheet Documentation  Taken 10/11/2020 0300 by Nancy Monet RN  Infection Prevention:   rest/sleep promoted   single patient room provided   visitors restricted/screened  Taken 10/11/2020 0159 by Nancy Monet RN  Infection Prevention:   rest/sleep promoted   single patient room provided   visitors restricted/screened  Taken 10/11/2020 0100 by Nancy Monet RN  Infection Prevention:   rest/sleep promoted   single patient room provided   visitors restricted/screened  Taken 10/10/2020 2300 by Nancy Monet RN  Infection Prevention:   rest/sleep promoted   single patient room provided   visitors restricted/screened  Taken 10/10/2020 2100 by Nancy Monet RN  Infection Prevention:   rest/sleep promoted   single patient room provided   visitors restricted/screened  Taken 10/10/2020 1958 by Nancy Monet RN  Infection Prevention:   rest/sleep promoted   single patient room provided   visitors restricted/screened  Taken 10/10/2020 1900 by Nancy Monet RN  Infection Prevention:   rest/sleep promoted   single patient room provided   visitors restricted/screened     Problem: Adult Inpatient Plan of Care  Goal: Optimal Comfort and Wellbeing  Outcome: Ongoing, Progressing  Intervention: Provide Person-Centered Care  Recent Flowsheet Documentation  Taken 10/11/2020 0159 by Nancy Monet RN  Trust Relationship/Rapport: care explained  Taken 10/10/2020 1958 by Nancy Monet RN  Trust Relationship/Rapport:   care explained   questions answered   reassurance provided   Goal Outcome Evaluation:  Plan of Care Reviewed With: patient  Progress: improving  Outcome Summary: Pt resting well. Vitals stable and afebrile. Pt has very pleasant attitude. Will continue to monitor.

## 2020-10-11 NOTE — DISCHARGE SUMMARY
[facility] HOSPITALIST MEDICINE DISCHARGE SUMMARY    Patient Identification:  Name:  Jimmy Recinos  Age:  44 y.o.  Sex:  male  :  1975  MRN:  [id number]  Visit Number:  [id number]    Date of Admission: 10/2/2020  Date of Discharge: 2020  DISCHARGE DISPOSITION   Stable  PCP: Provider, No Known    DISCHARGE DIAGNOSIS : Sepsis present on admission secondary to left lower extremity cellulitis, left knee cellulitis, chronic bilateral lymphedema with chronic venous stasis, morbid obesity with a BMI of 84.6, muscular dystrophy, essential hypertension initially was hypertensive but became normotensive since then during this stay without medication, history of apparent asthma, nocturnal hypoxemia on oxygen at home at night due to obstructive sleep apnea, left groin lymphadenopathy reactive from cellulitis of left lower extremity, probable left lower extremity peripheral arterial disease needs further evaluation appointment made with Dr. Spencer outpatient, reactive left lower groin lymphadenopathy due to cellulitis.      HOSPITAL COURSE  Patient is a 44 y.o. male presented to [facility] complaining of fever, fatigue, left lower extremity redness and pain.  On evaluation patient has findings suggestive cellulitis, arterial Doppler was also ordered at the emergency room which there was a questionable left superficial femoral artery stenosis.  Further work-up recommended.  Venous Doppler was also done due to the chronic d-dimer elevation which is negative.  X-ray the chest was unremarkable, CT of the chest was performed unfortunately due to his size it cannot be completed.  When he came in he is positive for COVID-19 but he reported that he was been positive since 2 weeks ago after being exposed by his wife and child he denies respiratory symptoms, he does develop hypoxemia at night due to obstructive sleep apnea, no coughing.  Cultures were performed which are all nonyielding,  infectious disease was consulted and initially did not recommend further treatment for COVID-19 since his a symptomatic.  On day 3 he did develop episodes of mild hypoxemia with room air saturation of 88%, Remdesevir Decadron and convalescent plasma was started.  A day later patient felt much better, his been saturating above 96 to 98% on room air.  There is no recurrence of hypoxemia.  His cellulitis has also completely resolved.  He has been off antibiotic for more than 48 hours and CRP continue to trend down back almost normal, cultures were nonyielding.  Infectious disease cleared the patient of antibiotic.  Plan is to discharge him home today, to follow-up with his primary care divider through teleconference, patient is advised to come to the emergency room immediately should he develop respiratory symptoms, short of breath, coughing fever chills.  Appointment was also made to see Dr. Spencer to evaluate the questionable superficial femoral artery stenosis on the left lower extremity that was noted from arterial Doppler performed at the emergency room.    VITAL SIGNS:      10/09/20  0400 10/10/20  0603 10/11/20  0400   Weight: (!) 270 kg (594 lb 3.2 oz) (!) 268 kg (590 lb) (!) 268 kg (590 lb)     Body mass index is 84.66 kg/m².  Vitals:    10/11/20 1402   BP: 136/78   Pulse: 54   Resp: 18   Temp:    SpO2: 95%     PHYSICAL EXAM:  General: Comfortable,awake, alert, oriented to self, place, and time, well-developed, morbidly obese no respiratory distress.    Skin:  Skin is warm and dry. No rash noted. No pallor.    HENT:  Head:  Normocephalic and atraumatic.  Mouth:  Moist mucous membranes.    Eyes:  Conjunctivae and EOM are normal.  Pupils are equal, round, and reactive to light.  No scleral icterus.    Neck:  Neck supple.  No JVD present.    Pulmonary/Chest:  No respiratory distress, no wheezes, no crackles, with normal breath sounds and good air movement.  Cardiovascular:  Normal rate, regular rhythm and normal  heart sounds with no murmur.  Abdominal:  Soft.  Bowel sounds are normal.  No distension and no tenderness.  Obese no hepatosplenomegaly but exam is limited due to the size,  Extremities: Chronic lymphedema of both lower extremity with stasis discoloration, the left lower extremity cellulitis specifically the distal third anterior area has completely cleared of redness, no cyanosis or clubbing.  Neurological:  Motor strength equal no obvious deficit, sensory grossly intact.   No cranial nerve deficit.  No tongue deviation.  No facial droop.  No slurred speech.    Genitourinary: Mehta catheter has been removed  Back: No skin break  ----------    DISCHARGE MEDICATIONS:     Discharge Medications      New Medications      Instructions Start Date   dexamethasone 6 MG tablet  Commonly known as: DECADRON   6 mg, Oral, Daily With Breakfast         Stop These Medications    ibuprofen 200 MG tablet  Commonly known as: ADVIL,MOTRIN                  Additional Instructions for the Follow-ups that You Need to Schedule     Discharge Follow-up with PCP   As directed       Currently Documented PCP:    Provider, No Known    PCP Phone Number:    [phone]     Follow Up Details: Primary care provider by tele-visit within 1 week            Contact information for follow-up providers     Provider, No Known .    Why: Primary care provider by tele-visit within 1 week  Contact information:  [facility] [address]  [phone]                   Contact information for after-discharge care     Home Medical Care     [facility] .    Service: Home Health Services  Contact information:  [address] [place] [address] [address] [place] [id number] [phone]                             Kady Jaimes MD  10/11/20  15:20 EDT    Please note that this discharge summary required more than 30 minutes to complete.

## 2020-10-11 NOTE — PLAN OF CARE
Goal Outcome Evaluation:  Plan of Care Reviewed With: patient  Progress: improving    Pt being discharged home. No complaints today.

## 2020-10-11 NOTE — PROGRESS NOTES
PROGRESS NOTE         Patient Identification:  Name:  Jimmy Recinos  Age:  44 y.o.  Sex:  male  :  1975  MRN:  [id number]  Visit Number:  [id number]  Primary Care Provider:  Provider, No Known         LOS: 8 days       ----------------------------------------------------------------------------------------------------------------------  Subjective       Chief Complaints:    Cellulitis        Interval History:      Patient remains on 2 L via nasal cannula this morning.  Patient is comfortable and feeling well.  WBC normal.  CRP 0.64.  He is stable off antibiotic therapy.  Primary team planning possible discharge home today.    Review of Systems:    Constitutional:  No fever, chills.  No night sweats. No appetite change or unexpected weight change.  Positive fatigue.  Eyes: no eye drainage, itching or redness.  HEENT: no mouth sores, dysphagia or nose bleed.  Respiratory: no for shortness of breath, cough or production of sputum.  Cardiovascular: no chest pain, no palpitations, no orthopnea.  Gastrointestinal: no nausea, vomiting or diarrhea. No abdominal pain, hematemesis or rectal bleeding.  Genitourinary: no dysuria or polyuria.  Hematologic/lymphatic: no lymph node abnormalities, no easy bruising or easy bleeding.  Musculoskeletal: Left lower extremity pain.  Skin: No rash and no itching.  Neurological: no loss of consciousness, no seizure, no headache.  Behavioral/Psych: no depression or suicidal ideation.  Endocrine: no hot flashes.  Immunologic: negative.       ----------------------------------------------------------------------------------------------------------------------      Objective       Current Hospital Meds:  albuterol sulfate HFA, 2 puff, Inhalation, 4x Daily - RT  dexamethasone, 6 mg, Intravenous, Daily  heparin (porcine), 5,000 Units, Subcutaneous, Q12H  INV [id number] remdesivir in NS IVPB, 100 mg, Intravenous, Q24H  sodium chloride, 10 mL, Intravenous, Q12H  sodium chloride,  10 mL, Intravenous, Q12H  sodium chloride, 10 mL, Intravenous, Q12H         ----------------------------------------------------------------------------------------------------------------------    Vital Signs:  Temp:  [97.8 °F (36.6 °C)-98.1 °F (36.7 °C)] 97.9 °F (36.6 °C)  Heart Rate:  [56-82] 75  Resp:  [16-24] 16  BP: (120-158)/(60-93) 135/81  Mean Arterial Pressure (Non-Invasive) for the past 24 hrs (Last 3 readings):   Noninvasive MAP (mmHg)   10/11/20 0702 97   10/11/20 0602 115   10/11/20 0502 111     SpO2 Percentage    10/11/20 0602 10/11/20 0702 10/11/20 0823   SpO2: 96% 96% 97%     SpO2:  [93 %-100 %] 97 %  on  Flow (L/min):  [2] 2;   Device (Oxygen Therapy): nasal cannula    Body mass index is 84.66 kg/m².  Wt Readings from Last 3 Encounters:   10/11/20 (!) 268 kg (590 lb)   08/23/20 (!) 295 kg (650 lb)   01/02/20 (!) 273 kg (601 lb)        Intake/Output Summary (Last 24 hours) at 10/11/2020 1108  Last data filed at 10/11/2020 0900  Gross per 24 hour   Intake 2640 ml   Output 4825 ml   Net -2185 ml     Diet Regular  ----------------------------------------------------------------------------------------------------------------------      Physical Exam:    Deferred due to COVID-19 isolation.  ----------------------------------------------------------------------------------------------------------------------  Results from last 7 days   Lab Units 10/11/20  0453 10/10/20  0747 10/09/20  0447   CK TOTAL U/L 20 13* 14*           Results from last 7 days   Lab Units 10/11/20  0453 10/11/20  0452 10/10/20  0747 10/09/20  0447   CRP mg/dL 0.64*  --  0.97* 2.67*   WBC 10*3/mm3  --  9.95 8.61 10.21   HEMOGLOBIN g/dL  --  12.4* 12.1* 11.4*   HEMATOCRIT %  --  42.4 41.7 39.8   MCV fL  --  90.8 91.4 92.1   MCHC g/dL  --  29.2* 29.0* 28.6*   PLATELETS 10*3/mm3  --  816 252 316     Results from last 7 days   Lab Units 10/11/20  0453 10/10/20  0747 10/09/20  0447   SODIUM mmol/L 138 136 140   POTASSIUM mmol/L 4.5 4.6  4.4   CHLORIDE mmol/L 97* 97* 98   CO2 mmol/L 33.7* 34.6* 36.2*   BUN mg/dL 15 15 11   CREATININE mg/dL 0.39* 0.35* 0.34*   EGFR IF NONAFRICN AM mL/min/1.73 >150 >150 >150   CALCIUM mg/dL 8.8 8.9 9.2   GLUCOSE mg/dL 92 107* 130*   ALBUMIN g/dL 3.01* 2.90* 2.78*   BILIRUBIN mg/dL 0.2 0.2 <0.2   ALK PHOS U/L 58 59 64   AST (SGOT) U/L 21 19 16   ALT (SGPT) U/L 28 23 19   Estimated Creatinine Clearance: 516.2 mL/min (A) (by C-G formula based on SCr of 0.39 mg/dL (L)).  No results found for: AMMONIA    No results found for: HGBA1C, POCGLU  Lab Results   Component Value Date    HGBA1C 5.00 08/23/2020     Lab Results   Component Value Date    TSH 0.793 12/21/2019       Blood Culture   Date Value Ref Range Status   10/02/2020 Abnormal Stain (C)  Preliminary   10/02/2020 No growth at 24 hours  Preliminary     No results found for: URINECX  No results found for: WOUNDCX  No results found for: STOOLCX  No results found for: RESPCX  Pain Management Panel     Pain Management Panel Latest Ref Rng & Units 10/19/2014    AMPHETAMINES SCREEN, URINE NEGATIVE Negative    BARBITURATES SCREEN NEGATIVE Negative    BENZODIAZEPINE SCREEN, URINE NEGATIVE Negative    COCAINE SCREEN, URINE NEGATIVE Negative    METHADONE SCREEN, URINE NEGATIVE Negative            ----------------------------------------------------------------------------------------------------------------------  Imaging Results (Last 24 Hours)     ** No results found for the last 24 hours. **          ----------------------------------------------------------------------------------------------------------------------    Assessment/Plan       Assessment/Plan     ASSESSMENT:    1.  COVID-19  2.  Lower extremity cellulitis      PLAN:    Patient remains on 2 L via nasal cannula this morning.  Patient is comfortable and feeling well.  WBC normal.  CRP 0.64.  He is stable off antibiotic therapy.  Primary team planning possible discharge home today.    Blood cultures reveal 1 out  of 2 Streptococcus mitis/oralis.    Chest x-ray unremarkable.  Patient unable to complete CT scan due to size.     Patient completed convalescent plasma therapy and is receiving last dose of remdesivir.  Consents were obtained per primary team.     Patient completed course of Rocephin and vancomycin IV on 10/9/2020.  Stable from ID standpoint  .  Code Status:   Code Status and Medical Interventions:   Ordered at: 10/03/20 0236     Level Of Support Discussed With:    Patient     Code Status:    CPR     Medical Interventions (Level of Support Prior to Arrest):    Full         Hortencia Keith, KHUSHBU  10/11/20  11:08 EDT

## 2020-10-11 NOTE — OUTREACH NOTE
Prep Survey      Responses   [facility] facility patient discharged from?  [facility]   Is LACE score < 7 ?  No   Eligibility  Readm Mgmt   Discharge diagnosis  cellulitis  left lower extremity   Does the patient have one of the following disease processes/diagnoses(primary or secondary)?  COVID-19   Does the patient have Home health ordered?  Yes   What is the Home health agency?   [facility]    Is there a DME ordered?  No   Prep survey completed?  Yes          Laney Khanna RN

## 2020-10-12 ENCOUNTER — READMISSION MANAGEMENT (OUTPATIENT)
Dept: CALL CENTER | Facility: HOSPITAL | Age: 45
End: 2020-10-12

## 2020-10-12 NOTE — OUTREACH NOTE
COVID-19 Week 1 Survey      Responses   [facility] patient discharged from?  [facility]   Does the patient have one of the following disease processes/diagnoses(primary or secondary)?  COVID-19   COVID-19 underlying condition?  None   Call Number  Call 2   Week 1 Call successful?  No   Discharge diagnosis  cellulitis  left lower extremity          Ines Muhammad RN

## 2020-10-13 ENCOUNTER — READMISSION MANAGEMENT (OUTPATIENT)
Dept: CALL CENTER | Facility: HOSPITAL | Age: 45
End: 2020-10-13

## 2020-10-13 NOTE — OUTREACH NOTE
COVID-19 Week 1 Survey      Responses   [facility] patient discharged from?  [facility]   Does the patient have one of the following disease processes/diagnoses(primary or secondary)?  COVID-19   COVID-19 underlying condition?  None   Call Number  Call 3   Week 1 Call successful?  No   Discharge diagnosis  cellulitis  left lower extremity          Kristan Solano LPN

## 2020-10-16 ENCOUNTER — READMISSION MANAGEMENT (OUTPATIENT)
Dept: CALL CENTER | Facility: HOSPITAL | Age: 45
End: 2020-10-16

## 2020-10-16 NOTE — OUTREACH NOTE
COVID-19 Week 1 Survey      Responses   [facility] patient discharged from?  [facility]   Does the patient have one of the following disease processes/diagnoses(primary or secondary)?  COVID-19   COVID-19 underlying condition?  None   Call Number  Call 4   Week 1 Call successful?  No [Hang up.]   Revoke  Decline to participate [No answer x 4 attempts-hang up on this attempt.]          Katherine Shen RN

## 2020-12-08 ENCOUNTER — HOSPITAL ENCOUNTER (EMERGENCY)
Facility: HOSPITAL | Age: 45
Discharge: HOME | End: 2020-12-08
Attending: EMERGENCY MEDICINE | Admitting: EMERGENCY MEDICINE

## 2020-12-08 VITALS
WEIGHT: 315 LBS | HEIGHT: 70 IN | TEMPERATURE: 98.6 F | HEART RATE: 101 BPM | RESPIRATION RATE: 20 BRPM | OXYGEN SATURATION: 95 % | DIASTOLIC BLOOD PRESSURE: 53 MMHG | SYSTOLIC BLOOD PRESSURE: 115 MMHG | BODY MASS INDEX: 45.1 KG/M2

## 2020-12-08 DIAGNOSIS — L03.115 CELLULITIS OF RIGHT LEG: Primary | ICD-10-CM

## 2020-12-08 LAB
ALBUMIN SERPL-MCNC: 2.93 G/DL (ref 3.5–5.2)
ALBUMIN/GLOB SERPL: 0.6 G/DL
ALP SERPL-CCNC: 85 U/L (ref 39–117)
ALT SERPL W P-5'-P-CCNC: 14 U/L (ref 1–41)
ANION GAP SERPL CALCULATED.3IONS-SCNC: 8 MMOL/L (ref 5–15)
AST SERPL-CCNC: 14 U/L (ref 1–40)
BASOPHILS # BLD AUTO: 0.01 10*3/MM3 (ref 0–0.2)
BASOPHILS NFR BLD AUTO: 0.1 % (ref 0–1.5)
BILIRUB SERPL-MCNC: 0.7 MG/DL (ref 0–1.2)
BILIRUB UR QL STRIP: ABNORMAL
BUN SERPL-MCNC: 12 MG/DL (ref 6–20)
BUN/CREAT SERPL: 26.7 (ref 7–25)
CALCIUM SPEC-SCNC: 8.7 MG/DL (ref 8.6–10.5)
CHLORIDE SERPL-SCNC: 97 MMOL/L (ref 98–107)
CLARITY UR: CLEAR
CO2 SERPL-SCNC: 27 MMOL/L (ref 22–29)
COLOR UR: ABNORMAL
CREAT SERPL-MCNC: 0.45 MG/DL (ref 0.76–1.27)
CRP SERPL-MCNC: 21.11 MG/DL (ref 0–0.5)
DEPRECATED RDW RBC AUTO: 49.5 FL (ref 37–54)
EOSINOPHIL # BLD AUTO: 0.16 10*3/MM3 (ref 0–0.4)
EOSINOPHIL NFR BLD AUTO: 1.2 % (ref 0.3–6.2)
ERYTHROCYTE [DISTWIDTH] IN BLOOD BY AUTOMATED COUNT: 15.4 % (ref 12.3–15.4)
ERYTHROCYTE [SEDIMENTATION RATE] IN BLOOD: 54 MM/HR (ref 0–15)
GFR SERPL CREATININE-BSD FRML MDRD: >150 ML/MIN/1.73
GLOBULIN UR ELPH-MCNC: 4.6 GM/DL
GLUCOSE SERPL-MCNC: 105 MG/DL (ref 65–99)
GLUCOSE UR STRIP-MCNC: NEGATIVE MG/DL
HCT VFR BLD AUTO: 42 % (ref 37.5–51)
HGB BLD-MCNC: 12.4 G/DL (ref 13–17.7)
HGB UR QL STRIP.AUTO: NEGATIVE
IMM GRANULOCYTES # BLD AUTO: 0.05 10*3/MM3 (ref 0–0.05)
IMM GRANULOCYTES NFR BLD AUTO: 0.4 % (ref 0–0.5)
KETONES UR QL STRIP: ABNORMAL
LEUKOCYTE ESTERASE UR QL STRIP.AUTO: NEGATIVE
LYMPHOCYTES # BLD AUTO: 0.67 10*3/MM3 (ref 0.7–3.1)
LYMPHOCYTES NFR BLD AUTO: 5.2 % (ref 19.6–45.3)
MCH RBC QN AUTO: 26.3 PG (ref 26.6–33)
MCHC RBC AUTO-ENTMCNC: 29.5 G/DL (ref 31.5–35.7)
MCV RBC AUTO: 89 FL (ref 79–97)
MONOCYTES # BLD AUTO: 0.37 10*3/MM3 (ref 0.1–0.9)
MONOCYTES NFR BLD AUTO: 2.9 % (ref 5–12)
NEUTROPHILS NFR BLD AUTO: 11.68 10*3/MM3 (ref 1.7–7)
NEUTROPHILS NFR BLD AUTO: 90.2 % (ref 42.7–76)
NITRITE UR QL STRIP: NEGATIVE
NRBC BLD AUTO-RTO: 0 /100 WBC (ref 0–0.2)
PH UR STRIP.AUTO: 5.5 [PH] (ref 5–8)
PLATELET # BLD AUTO: 222 10*3/MM3 (ref 140–450)
PMV BLD AUTO: 9.8 FL (ref 6–12)
POTASSIUM SERPL-SCNC: 3.8 MMOL/L (ref 3.5–5.2)
PROT SERPL-MCNC: 7.5 G/DL (ref 6–8.5)
PROT UR QL STRIP: ABNORMAL
RBC # BLD AUTO: 4.72 10*6/MM3 (ref 4.14–5.8)
SODIUM SERPL-SCNC: 132 MMOL/L (ref 136–145)
SP GR UR STRIP: 1.03 (ref 1–1.03)
UROBILINOGEN UR QL STRIP: ABNORMAL
WBC # BLD AUTO: 12.94 10*3/MM3 (ref 3.4–10.8)

## 2020-12-08 PROCEDURE — 80053 COMPREHEN METABOLIC PANEL: CPT | Performed by: PHYSICIAN ASSISTANT

## 2020-12-08 PROCEDURE — 85652 RBC SED RATE AUTOMATED: CPT | Performed by: PHYSICIAN ASSISTANT

## 2020-12-08 PROCEDURE — 86140 C-REACTIVE PROTEIN: CPT | Performed by: PHYSICIAN ASSISTANT

## 2020-12-08 PROCEDURE — 99283 EMERGENCY DEPT VISIT LOW MDM: CPT

## 2020-12-08 PROCEDURE — 85025 COMPLETE CBC W/AUTO DIFF WBC: CPT | Performed by: PHYSICIAN ASSISTANT

## 2020-12-08 PROCEDURE — 87040 BLOOD CULTURE FOR BACTERIA: CPT | Performed by: PHYSICIAN ASSISTANT

## 2020-12-08 PROCEDURE — 25010000002 DALBAVANCIN 500 MG RECONSTITUTED SOLUTION 1 EACH VIAL: Performed by: PHYSICIAN ASSISTANT

## 2020-12-08 PROCEDURE — 96365 THER/PROPH/DIAG IV INF INIT: CPT

## 2020-12-08 PROCEDURE — 81003 URINALYSIS AUTO W/O SCOPE: CPT | Performed by: PHYSICIAN ASSISTANT

## 2020-12-08 RX ORDER — DEXTROSE MONOHYDRATE 50 MG/ML
5 INJECTION, SOLUTION INTRAVENOUS CONTINUOUS
Status: DISCONTINUED | OUTPATIENT
Start: 2020-12-08 | End: 2020-12-09 | Stop reason: HOSPADM

## 2020-12-08 RX ORDER — SODIUM CHLORIDE 0.9 % (FLUSH) 0.9 %
10 SYRINGE (ML) INJECTION AS NEEDED
Status: DISCONTINUED | OUTPATIENT
Start: 2020-12-08 | End: 2020-12-09 | Stop reason: HOSPADM

## 2020-12-08 RX ADMIN — DEXTROSE MONOHYDRATE 5 ML/HR: 5 INJECTION, SOLUTION INTRAVENOUS at 22:29

## 2020-12-08 RX ADMIN — DALBAVANCIN 1500 MG: 500 INJECTION, POWDER, FOR SOLUTION INTRAVENOUS at 21:30

## 2020-12-08 RX ADMIN — SODIUM CHLORIDE 500 ML: 9 INJECTION, SOLUTION INTRAVENOUS at 20:20

## 2020-12-08 NOTE — ED PROVIDER NOTES
Subjective   44-year-old male that presents to the emergency department chief complaint right leg swelling redness.  Patient states has history of lymphedema, hypertension.  Patient states that she had low-grade fevers.      History provided by:  Patient   used: No    Leg Swelling  Location:  Bilateral leg swelling.  Quality:  Swelling aching legs.  Severity:  Moderate  Onset quality:  Gradual  Duration:  4 days  Timing:  Intermittent  Progression:  Worsening  Chronicity:  New  Associated symptoms: fever and rash    Associated symptoms: no abdominal pain, no chest pain, no ear pain, no headaches, no loss of consciousness, no myalgias, no rhinorrhea, no shortness of breath, no sore throat and no vomiting        Review of Systems   Constitutional: Positive for fever.   HENT: Negative.  Negative for ear pain, rhinorrhea and sore throat.    Eyes: Negative.  Negative for redness and itching.   Respiratory: Negative.  Negative for shortness of breath.    Cardiovascular: Negative.  Negative for chest pain.   Gastrointestinal: Negative.  Negative for abdominal distention, abdominal pain and vomiting.   Genitourinary: Negative.  Negative for dysuria, enuresis, flank pain, frequency, genital sores and hematuria.   Musculoskeletal: Negative.  Negative for arthralgias, back pain, gait problem, joint swelling and myalgias.   Skin: Positive for rash.   Neurological: Negative.  Negative for dizziness, seizures, loss of consciousness, speech difficulty, light-headedness, numbness and headaches.   Hematological: Negative.  Does not bruise/bleed easily.   Psychiatric/Behavioral: Negative.  Negative for agitation, behavioral problems, decreased concentration, dysphoric mood and hallucinations. The patient is not nervous/anxious.    All other systems reviewed and are negative.      Past Medical History:   Diagnosis Date   • Bilateral cellulitis of lower leg 11/26/2018   • Charcot-Ramya disease    • Chronic low back  pain    • Debility    • Elevated liver enzymes    • Essential hypertension 12/5/2018   • Lymphedema of both lower extremities    • MRSA nasal colonization    • Muscular dystrophy (CMS/HCC)     Since age 15 yo   • Obesity, morbid, BMI 50 or higher (CMS/HCC) 12/5/2018   • VRE (vancomycin-resistant Enterococci)        No Known Allergies    Past Surgical History:   Procedure Laterality Date   • HERNIA REPAIR  2004    x 2, ventral   • KNEE SURGERY Right    • TONSILLECTOMY     • WRIST SURGERY Left     ORIF       Family History   Problem Relation Age of Onset   • Heart disease Mother    • Diabetes Mother    • Cancer Mother    • Hypertension Father        Social History     Socioeconomic History   • Marital status:      Spouse name: Not on file   • Number of children: Not on file   • Years of education: Not on file   • Highest education level: Not on file   Tobacco Use   • Smoking status: Never Smoker   • Smokeless tobacco: Never Used   Substance and Sexual Activity   • Alcohol use: Yes     Alcohol/week: 1.0 standard drinks     Types: 1 Shots of liquor per week     Frequency: Never     Comment: on occasion once a month   • Drug use: No   • Sexual activity: Defer           Objective   Physical Exam  Vitals signs and nursing note reviewed.   Constitutional:       General: He is not in acute distress.     Appearance: Normal appearance. He is normal weight. He is not ill-appearing, toxic-appearing or diaphoretic.   HENT:      Head: Normocephalic and atraumatic.      Right Ear: Tympanic membrane, ear canal and external ear normal. There is no impacted cerumen.      Left Ear: Tympanic membrane, ear canal and external ear normal. There is no impacted cerumen.      Nose: Nose normal. No congestion or rhinorrhea.      Mouth/Throat:      Mouth: Mucous membranes are moist.      Pharynx: Oropharynx is clear. No oropharyngeal exudate or posterior oropharyngeal erythema.   Eyes:      General: No scleral icterus.        Right eye:  No discharge.         Left eye: No discharge.      Extraocular Movements: Extraocular movements intact.      Conjunctiva/sclera: Conjunctivae normal.      Pupils: Pupils are equal, round, and reactive to light.   Neck:      Musculoskeletal: Normal range of motion and neck supple. No neck rigidity or muscular tenderness.   Cardiovascular:      Rate and Rhythm: Normal rate and regular rhythm.      Pulses: Normal pulses.      Heart sounds: Normal heart sounds. No murmur. No friction rub. No gallop.    Pulmonary:      Effort: Pulmonary effort is normal. No respiratory distress.      Breath sounds: Normal breath sounds. No stridor. No rhonchi or rales.   Abdominal:      General: Abdomen is flat. Bowel sounds are normal. There is no distension.      Palpations: There is no mass.      Tenderness: There is no abdominal tenderness. There is no right CVA tenderness, left CVA tenderness, guarding or rebound.      Hernia: No hernia is present.   Musculoskeletal:         General: Swelling and tenderness present. No deformity or signs of injury.   Lymphadenopathy:      Cervical: No cervical adenopathy.   Skin:     General: Skin is warm.      Capillary Refill: Capillary refill takes less than 2 seconds.      Coloration: Skin is not jaundiced.      Findings: Erythema and rash present. No lesion.             Comments: Insect bites profusely   Neurological:      General: No focal deficit present.      Mental Status: He is alert and oriented to person, place, and time. Mental status is at baseline.      Cranial Nerves: No cranial nerve deficit.      Sensory: No sensory deficit.      Motor: No weakness.      Coordination: Coordination normal.      Gait: Gait normal.   Psychiatric:         Mood and Affect: Mood normal.         Behavior: Behavior normal.         Thought Content: Thought content normal.         Procedures           ED Course  ED Course as of Dec 08 2020   Tue Dec 08, 2020   2019 Discussed care with patient.  Patient made  aware that no beds available at this facility or entire state to be admitted.  Patient will be treated with Dalvance and discharged home.    []      ED Course User Index  [] Tayo Dutta PA-C                                           [facility]    Final diagnoses:   Cellulitis of right leg            Tayo Dutta PA-C  12/08/20 2020

## 2020-12-09 NOTE — ED NOTES
Called [facility] spoke with Hortencia for transport back home. She will check with [facility]I and if okay will send truck, or call us and let us know.      Symes, Heather  12/08/20 2250       Symes, Heather  12/08/20 9245

## 2020-12-09 NOTE — DISCHARGE INSTRUCTIONS
Call one of the offices below to establish a primary care provider.  If you are unable to get an appointment and feel it is an emergency and need to be seen immediately please return to the Emergency Department.    Call one of the office below to set up a primary care provider.    Dr. John Gaines                                                                                                       [address] [facility] [address]  [phone]    Dr. Santana, Dr. RIMA Diaz, Dr. ARIANE Diaz ([facility])  [address] [facility] [address]  [phone]    Dr. Silva, Dr. Ocasio, Dr. Huitron ([facility])  [address] [facility] [address]  [phone]    Dr. Pérez  [address] [facility] [address]  [phone]    Dr. Sherwood, Dr. De Paz, Dr. Rosales, Dr. Day ([facility])  [address] [place] [address] [address]  [facility] [address]  [phone]    Dr. Belle Khanna  [address] [facility] [place] [address]  [phone]    Dr. Radha Rodriguez  [address]   [address]  [facility] [address]  [phone]    Dr. Gaines  [address] [facility] [address]  [phone]    Dr. Najera, Dr. James  [address] [facility] [place] [address]  [phone]    Dr. Lilly  [address]                                                              [address]  [facility] [address]  [phone]    Dr. Bradshaw  [address] [facility] [address]  [phone]    Dr. Kayla Hardy  [address]  [address]  [place] [address]  [phone]    Dr. Hanna and [facility]   [address] [facility]  [address]  [place] [address]  [phone]

## 2020-12-09 NOTE — ED NOTES
[facility] here to transport patient home. Multiple departments help with transfer to EMS stretcher and in truck. BRANDIE. Ben Green RN  12/08/20 4866

## 2020-12-13 LAB
BACTERIA SPEC AEROBE CULT: NORMAL
BACTERIA SPEC AEROBE CULT: NORMAL

## 2021-03-18 ENCOUNTER — BULK ORDERING (OUTPATIENT)
Dept: CASE MANAGEMENT | Facility: OTHER | Age: 46
End: 2021-03-18

## 2021-03-18 DIAGNOSIS — Z23 IMMUNIZATION DUE: ICD-10-CM

## 2023-12-01 NOTE — PROGRESS NOTES
Discharge Planning Assessment   [facility]     Patient Name: Jimmy Recinos  MRN: [id number]  Today's Date: 10/5/2020    Admit Date: 10/2/2020    Discharge Needs Assessment     Row Name 10/05/20 1159       Living Environment    Lives With  child(mercy), dependent;spouse    Current Living Arrangements  home/apartment/condo    Primary Care Provided by  self    Provides Primary Care For  no one    Family Caregiver if Needed  none    Quality of Family Relationships  unable to assess    Able to Return to Prior Arrangements  yes       Transition Planning    Patient/Family Anticipates Transition to  home with family       Discharge Needs Assessment    Equipment Currently Used at Home  -- Trilogy machine        Discharge Plan     Row Name 10/05/20 1200       Plan    Plan  SS spoke with Pt. Pt lives with wife, Philly, and daughter. Pt utilizes [facility]. Pt utilizes a trilogy machine. Pt's PCP is Nabeel Hanna. Pt utilizes [facility]. Pt does not have a POA or Living Will. Pt would like to return home at discharge and EMS will need to transport. SS will continue to follow and assist with discharge planning.    Patient/Family in Agreement with Plan  yes    Row Name 10/05/20 1136       Plan    Plan Comments  10/5:Adm to PCU 10/3/20. Readmit. (Adm 8/23-9/920 with LE cellulitis. Covid isol. T 99.2.  Sat 91-97% -O2 2L NC. (L) knee cellulitis- on Rocephin & Vanc per ID recs. NS @ 75 ml/hr. Covid-asymptomatic-no ind for decadron, convalescent plasma or remdesivir at this time. Bcx results (P). H/O Muscular dystrophy. CRP impr 15.56 (19.51). WBC 9.93.        Continued Care and Services - Admitted Since 10/2/2020     Home Medical Care Coordination complete    Service Provider Request Status Selected Services Address Phone Fax    [facility]   Selected Home Health Services [address] [place] [address] [place] [address] [phone]            Selected Continued Care - Prior  Encounters Includes selections from prior encounters from 7/4/2020 to 10/5/2020    Discharged on 9/9/2020 Admission date: 8/23/2020 - Discharge disposition: Home-Health Care Svc    Home Medical Care     Service Provider Selected Services Address Phone Fax    [facility]  Home Health Services [address] [place] [address] [facility] [address] [phone]                      Demographic Summary     Row Name 10/05/20 1159       General Information    Admission Type  inpatient    Referral Source  nursing SS received consult for [facility] health.        Penny Esquivel     1 0 = swallows foods/liquids without difficulty